# Patient Record
Sex: MALE | Race: ASIAN | NOT HISPANIC OR LATINO | URBAN - METROPOLITAN AREA
[De-identification: names, ages, dates, MRNs, and addresses within clinical notes are randomized per-mention and may not be internally consistent; named-entity substitution may affect disease eponyms.]

---

## 2017-12-18 ENCOUNTER — EMERGENCY (EMERGENCY)
Facility: HOSPITAL | Age: 77
LOS: 1 days | Discharge: HOME | End: 2017-12-18
Attending: EMERGENCY MEDICINE | Admitting: EMERGENCY MEDICINE
Payer: MEDICARE

## 2017-12-18 VITALS
TEMPERATURE: 98 F | DIASTOLIC BLOOD PRESSURE: 85 MMHG | RESPIRATION RATE: 20 BRPM | SYSTOLIC BLOOD PRESSURE: 136 MMHG | HEART RATE: 80 BPM | OXYGEN SATURATION: 100 %

## 2017-12-18 DIAGNOSIS — R26.81 UNSTEADINESS ON FEET: ICD-10-CM

## 2017-12-18 LAB
ALBUMIN SERPL ELPH-MCNC: 3.5 G/DL — SIGNIFICANT CHANGE UP (ref 3.3–5)
ALP SERPL-CCNC: 93 U/L — SIGNIFICANT CHANGE UP (ref 40–120)
ALT FLD-CCNC: 23 U/L — SIGNIFICANT CHANGE UP (ref 4–41)
AST SERPL-CCNC: 25 U/L — SIGNIFICANT CHANGE UP (ref 4–40)
BASE EXCESS BLDV CALC-SCNC: 3.1 MMOL/L — SIGNIFICANT CHANGE UP
BASOPHILS # BLD AUTO: 0.07 K/UL — SIGNIFICANT CHANGE UP (ref 0–0.2)
BASOPHILS NFR BLD AUTO: 0.4 % — SIGNIFICANT CHANGE UP (ref 0–2)
BILIRUB SERPL-MCNC: 0.3 MG/DL — SIGNIFICANT CHANGE UP (ref 0.2–1.2)
BLOOD GAS VENOUS - CREATININE: 0.89 MG/DL — SIGNIFICANT CHANGE UP (ref 0.5–1.3)
BUN SERPL-MCNC: 13 MG/DL — SIGNIFICANT CHANGE UP (ref 7–23)
CALCIUM SERPL-MCNC: 8 MG/DL — LOW (ref 8.4–10.5)
CHLORIDE BLDV-SCNC: 97 MMOL/L — SIGNIFICANT CHANGE UP (ref 96–108)
CHLORIDE SERPL-SCNC: 92 MMOL/L — LOW (ref 98–107)
CHOLEST SERPL-MCNC: 145 MG/DL — SIGNIFICANT CHANGE UP (ref 120–199)
CK MB BLD-MCNC: 3.63 NG/ML — SIGNIFICANT CHANGE UP (ref 1–6.6)
CK MB BLD-MCNC: SIGNIFICANT CHANGE UP (ref 0–2.5)
CK SERPL-CCNC: 89 U/L — SIGNIFICANT CHANGE UP (ref 30–200)
CO2 SERPL-SCNC: 24 MMOL/L — SIGNIFICANT CHANGE UP (ref 22–31)
CREAT SERPL-MCNC: 0.96 MG/DL — SIGNIFICANT CHANGE UP (ref 0.5–1.3)
EOSINOPHIL # BLD AUTO: 0.1 K/UL — SIGNIFICANT CHANGE UP (ref 0–0.5)
EOSINOPHIL NFR BLD AUTO: 0.6 % — SIGNIFICANT CHANGE UP (ref 0–6)
GAS PNL BLDV: 126 MMOL/L — LOW (ref 136–146)
GLUCOSE BLDV-MCNC: 261 — HIGH (ref 70–99)
GLUCOSE SERPL-MCNC: 247 MG/DL — HIGH (ref 70–99)
HBA1C BLD-MCNC: 8.4 % — HIGH (ref 4–5.6)
HCO3 BLDV-SCNC: 26 MMOL/L — SIGNIFICANT CHANGE UP (ref 20–27)
HCT VFR BLD CALC: 39.2 % — SIGNIFICANT CHANGE UP (ref 39–50)
HCT VFR BLDV CALC: 43.1 % — SIGNIFICANT CHANGE UP (ref 39–51)
HDLC SERPL-MCNC: 37 MG/DL — SIGNIFICANT CHANGE UP (ref 35–55)
HGB BLD-MCNC: 13.7 G/DL — SIGNIFICANT CHANGE UP (ref 13–17)
HGB BLDV-MCNC: 14 G/DL — SIGNIFICANT CHANGE UP (ref 13–17)
IMM GRANULOCYTES # BLD AUTO: 0.07 # — SIGNIFICANT CHANGE UP
IMM GRANULOCYTES NFR BLD AUTO: 0.4 % — SIGNIFICANT CHANGE UP (ref 0–1.5)
LACTATE BLDV-MCNC: 1.9 MMOL/L — SIGNIFICANT CHANGE UP (ref 0.5–2)
LIPID PNL WITH DIRECT LDL SERPL: 83 MG/DL — SIGNIFICANT CHANGE UP
LYMPHOCYTES # BLD AUTO: 24.7 % — SIGNIFICANT CHANGE UP (ref 13–44)
LYMPHOCYTES # BLD AUTO: 3.93 K/UL — HIGH (ref 1–3.3)
MCHC RBC-ENTMCNC: 32 PG — SIGNIFICANT CHANGE UP (ref 27–34)
MCHC RBC-ENTMCNC: 34.9 % — SIGNIFICANT CHANGE UP (ref 32–36)
MCV RBC AUTO: 91.6 FL — SIGNIFICANT CHANGE UP (ref 80–100)
MONOCYTES # BLD AUTO: 1.35 K/UL — HIGH (ref 0–0.9)
MONOCYTES NFR BLD AUTO: 8.5 % — SIGNIFICANT CHANGE UP (ref 2–14)
NEUTROPHILS # BLD AUTO: 10.41 K/UL — HIGH (ref 1.8–7.4)
NEUTROPHILS NFR BLD AUTO: 65.4 % — SIGNIFICANT CHANGE UP (ref 43–77)
NRBC # FLD: 0 — SIGNIFICANT CHANGE UP
PCO2 BLDV: 50 MMHG — SIGNIFICANT CHANGE UP (ref 41–51)
PH BLDV: 7.37 PH — SIGNIFICANT CHANGE UP (ref 7.32–7.43)
PLATELET # BLD AUTO: 248 K/UL — SIGNIFICANT CHANGE UP (ref 150–400)
PMV BLD: 10 FL — SIGNIFICANT CHANGE UP (ref 7–13)
PO2 BLDV: 42 MMHG — HIGH (ref 35–40)
POTASSIUM BLDV-SCNC: 4.1 MMOL/L — SIGNIFICANT CHANGE UP (ref 3.4–4.5)
POTASSIUM SERPL-MCNC: 4.4 MMOL/L — SIGNIFICANT CHANGE UP (ref 3.5–5.3)
POTASSIUM SERPL-SCNC: 4.4 MMOL/L — SIGNIFICANT CHANGE UP (ref 3.5–5.3)
PROT SERPL-MCNC: 7.9 G/DL — SIGNIFICANT CHANGE UP (ref 6–8.3)
RBC # BLD: 4.28 M/UL — SIGNIFICANT CHANGE UP (ref 4.2–5.8)
RBC # FLD: 11.9 % — SIGNIFICANT CHANGE UP (ref 10.3–14.5)
SAO2 % BLDV: 73.1 % — SIGNIFICANT CHANGE UP (ref 60–85)
SODIUM SERPL-SCNC: 127 MMOL/L — LOW (ref 135–145)
TRIGL SERPL-MCNC: 124 MG/DL — SIGNIFICANT CHANGE UP (ref 10–149)
TROPONIN T SERPL-MCNC: < 0.06 NG/ML — SIGNIFICANT CHANGE UP (ref 0–0.06)
TROPONIN T SERPL-MCNC: < 0.06 NG/ML — SIGNIFICANT CHANGE UP (ref 0–0.06)
WBC # BLD: 15.93 K/UL — HIGH (ref 3.8–10.5)
WBC # FLD AUTO: 15.93 K/UL — HIGH (ref 3.8–10.5)

## 2017-12-18 PROCEDURE — 99218: CPT | Mod: GC

## 2017-12-18 PROCEDURE — 70450 CT HEAD/BRAIN W/O DYE: CPT | Mod: 26

## 2017-12-18 PROCEDURE — 71020: CPT | Mod: 26

## 2017-12-18 RX ORDER — ALBUTEROL 90 UG/1
2.5 AEROSOL, METERED ORAL EVERY 4 HOURS
Qty: 0 | Refills: 0 | Status: DISCONTINUED | OUTPATIENT
Start: 2017-12-18 | End: 2017-12-19

## 2017-12-18 RX ORDER — IPRATROPIUM/ALBUTEROL SULFATE 18-103MCG
3 AEROSOL WITH ADAPTER (GRAM) INHALATION ONCE
Qty: 0 | Refills: 0 | Status: COMPLETED | OUTPATIENT
Start: 2017-12-18 | End: 2017-12-18

## 2017-12-18 RX ORDER — TAMSULOSIN HYDROCHLORIDE 0.4 MG/1
0.4 CAPSULE ORAL AT BEDTIME
Qty: 0 | Refills: 0 | Status: DISCONTINUED | OUTPATIENT
Start: 2017-12-18 | End: 2017-12-22

## 2017-12-18 RX ADMIN — ALBUTEROL 2.5 MILLIGRAM(S): 90 AEROSOL, METERED ORAL at 23:04

## 2017-12-18 RX ADMIN — Medication 3 MILLILITER(S): at 18:38

## 2017-12-18 RX ADMIN — Medication 40 MILLIGRAM(S): at 19:52

## 2017-12-18 RX ADMIN — TAMSULOSIN HYDROCHLORIDE 0.4 MILLIGRAM(S): 0.4 CAPSULE ORAL at 23:04

## 2017-12-18 NOTE — ED ADULT NURSE NOTE - OBJECTIVE STATEMENT
The patient is a 78 y/o male a&ox4 presenting from [facility] with a c/c of SOB and cough x3 weeks.  The patient reports his SOB worsens with exertion and remits with rest.  At present patient is denying SOB, respirations even and unlabored, patient able to speak comfortably in full sentences.  Patient reports a productive cough with "brown," sputum x3 weeks.  Patient denies CP, N/V/D, fevers/chills, GI/ symptoms.  VSS, patient in nad MD at bedside, will continue to monitor.

## 2017-12-18 NOTE — ED ADULT NURSE REASSESSMENT NOTE - NS ED NURSE REASSESS COMMENT FT1
pt aox3; duoneb treatment given as per order. Pt appears comfortable in no acute respiratory distress VSS. blood sugar 196. Pt going to cdu; report endorsed to MINH Knight

## 2017-12-18 NOTE — CONSULT NOTE ADULT - PROBLEM SELECTOR RECOMMENDATION 9
- Recommend MRI brain w/o contrast, MRA head w/o contrast, MRA neck w/ contrast  - Check HbA1c and lipid profile  - ASA 81 mg daily  - If neuroimaging unrevealing, pt may f/u at neurology clinic [phone] or with his private neurologist

## 2017-12-18 NOTE — ED CDU PROVIDER INITIAL DAY NOTE - ATTENDING CONTRIBUTION TO CARE
fe: hx from pt, family, urgi-care referral.   Pt has hx 'asthma' and on advair. past 2-3 weeks has increasing but intermittent episodes wheeze/shortness of breath, cough. went to urgi [facility]care today, received pred and nebulized treatments and sent to ed for further care. In ED, pt also c/o episodes of unsteady gait past 2 weeks, at times associated with dizziness. last episode this am.   exam: occas bilateral wheeze. rr approx 16. no resp distress.  gait nl. neuro exam nl.  impression: subacute asthma worsening. appears stable after rx.   neurology consulted for gait abn; CT done and pt recc to have MRI. Transferred to CDU for further care.

## 2017-12-18 NOTE — ED ADULT TRIAGE NOTE - CHIEF COMPLAINT QUOTE
Arrives via EMS from [facility].  pt c/o diff breathing and cough getting worse x 1 week.  states sob worse w exertion. denies chest pain.   h/o asthma only. Arrives via EMS from [facility].  pt c/o diff breathing and cough getting worse x 1 week.  states sob worse w exertion. denies chest pain.   h/o asthma, DM (non-compliant w meds)

## 2017-12-18 NOTE — ED PROVIDER NOTE - PROGRESS NOTE DETAILS
CARA Paris- Neuro saw and examined patient, recommend MRI to r/o cva/tia. Pt feels better with nebulizers, but pt and family agree to stay.

## 2017-12-18 NOTE — CONSULT NOTE ADULT - ASSESSMENT
78 y/o man w/ PMH DM II (noncompliant with medications) p/w difficulty walking x 1 hour today, now resolved. Neurologic exam significant for horizontal nystagmus on right lateral gaze with no other focal deficits, ambulating without difficulty. CTH shows nonspecific left pontine lucency which may be artifactual. Symptoms today may have been 2/2 inner ear pathology ie vestibular neuritis in the setting of URI-like symptoms, however TIA is also in differential. Given ABCD2 score of 5, would recommend further neuroimaging in CDU.

## 2017-12-18 NOTE — ED PROVIDER NOTE - ATTENDING CONTRIBUTION TO CARE
fe: hx from pt, family, urgi-care referral.   Pt has hx 'asthma' and on advair. past 2-3 weeks has increasing but intermittent episodes wheeze/shortness of breath, cough. went to [facility] today, received pred and nebulized treatments and sent to ed for further care. In ED, pt also c/o episodes of unsteady gait past 2 weeks, at times associated with dizziness. last episode this am.   exam: occas bilateral wheeze. rr approx 16. no resp distress.  gait nl. neuro exam nl.  impression: subacute asthma worsening. appears stable after rx.   neurology consulted for gait abn; CT done and pt recc to have MRI. will transfer to CDU for further care.

## 2017-12-18 NOTE — CONSULT NOTE ADULT - ATTENDING COMMENTS
Pt examined, chart reviewed, case discussed on rounds. Pt is now non-focal and without Sx. MRI/MRA head negative.  Probable peripheral vertigo due to URI.

## 2017-12-18 NOTE — ED PROVIDER NOTE - OBJECTIVE STATEMENT
77 year old male, PMhx of DM (non-compliant on medications), asthma (on daily advair, no albuterol); presents to the ED from urgent care for low O2 sat and difficulty ambulating. Patient states he has had an upper respiratory infection (nasal congestion, mild cough, sore throat) two weeks ago. 77 year old male, PMhx of DM (non-compliant on medications), asthma (on daily advair, no albuterol); presents to the ED from urgent care for low O2 sat and difficulty ambulating. Patient states he has had an upper respiratory infection (nasal congestion, mild cough, sore throat) two weeks ago. Since then, he has had a worsening cough and dizziness this morning. Patient describes the dizziness as being "off balance", difficulty walking straight which lasted approximately 4 hours and dissipated without intervention. The dizziness and worsening cough/wheezing prompted him to go to the urgent care center, who sent him to the ED due to low O2 sat. Patient endorses a similar episode of dizziness two weeks ago, and intermittent chills for the last several days. Cough is productive of "dark" grey sputum. He denies chest pain, headache, nausea, vomiting, decreased PO intake, or other complaints. 77 year old male, PMhx of DM (non-compliant on medications), asthma (on daily advair, no albuterol); presents to the ED from urgent care for low O2 sat and difficulty ambulating. Patient states he has had an upper respiratory infection (nasal congestion, mild cough, sore throat) two weeks ago. Since then, he has had a worsening cough and dizziness this morning. Patient describes the dizziness as being "off balance", difficulty walking straight which lasted approximately 4 hours and dissipated without intervention. The dizziness and worsening cough/wheezing prompted him to go to the urgent care center, who sent him to the ED due to low O2 sat. Patient endorses a similar episode of dizziness two weeks ago, and intermittent chills for the last several days. Cough is productive of "dark" grey sputum. He denies chest pain, headache, nausea, vomiting, decreased PO intake, or other complaints. Pt received prednisone 60mg and duonebs at urgent care with some relief.

## 2017-12-18 NOTE — ED ADULT NURSE NOTE - CHIEF COMPLAINT QUOTE
Arrives via EMS from [facility].  pt c/o diff breathing and cough getting worse x 1 week.  states sob worse w exertion. denies chest pain.   h/o asthma, DM (non-compliant w meds)

## 2017-12-18 NOTE — CONSULT NOTE ADULT - SUBJECTIVE AND OBJECTIVE BOX
Neurology Consult Note    Name  JIMMY CALLAHAN    HPI: 76 y/o man w/ PMH DM II (noncompliant with medications) p/w difficulty walking x 1 hour today. Pt has had URI-like symptoms with cough, runny nose x several days. Today, woke up in usual state of health, however around 12 PM, when standing up to walk outside, realized he had trouble walking, was swaying and falling over the both sides. Also had more trouble breathing, coughing/wheezing. Denies vertigo, n/v, or tinnitus. Did not change with position. Pt has had similar symptoms few weeks ago, also in the setting of URI symptoms, lasting 5-10 mins and self-resolving. Symptoms today lasted approximately 1 hour and self-resolved. Denies visual changes, HA, focal numbness, or weakness.     NIHSS 0 MRS 0 ABCD2 5    Review of Systems:  Constitutional: As above  Eyes: no eye pain, visual disturbances, or discharge  ENT:  No difficulty hearing, tinnitus, vertigo; No sinus or throat pain  Neck: No pain or stiffness  Respiratory: No cough, dyspnea, wheezing   Cardiovascular: No chest pain, palpitations  Gastrointestinal: As above  Genitourinary: No dysuria, frequency, hematuria or incontinence  Neurological: As above  Psychiatric: No depression, anxiety, mood swings or difficulty sleeping  Musculoskeletal: No joint pain or swelling; No muscle, back or extremity pain  Skin: No itching, burning, rashes or lesions     MEDICATIONS: Noncompliant with DM II meds, not taking ASA    Allergies    No Known Allergies    PAST MEDICAL & SURGICAL HISTORY:  Asthma  Diabetes    SH: Denies tobacco, EtOH, or other substance use    FH: Mother possibly had a stroke in her 70s.    Objective:   Vital Signs Last 24 Hrs  T(C): 36.9 (18 Dec 2017 16:21), Max: 36.9 (18 Dec 2017 15:34)  T(F): 98.4 (18 Dec 2017 16:21), Max: 98.4 (18 Dec 2017 15:34)  HR: 83 (18 Dec 2017 16:21) (80 - 83)  BP: 176/95 (18 Dec 2017 16:21) (136/85 - 176/95)  BP(mean): --  RR: 18 (18 Dec 2017 16:21) (18 - 20)  SpO2: 100% (18 Dec 2017 16:21) (100% - 100%)    General Adult Exam  GENERAL APPEARANCE: Well developed, well nourished, alert and cooperative, and appears to be in no acute distress.    Neurological Exam:  Mental Status: Orientated to self, date and place.  follows commands, speech fluent    Cranial Nerves: PERRL, EOMI, VFF, + several beats horizontal nystagmus on right lateral gaze.  CN V1-3 intact to light touch.  No facial asymmetry.  Hearing intact to finger rub bilaterally.  Tongue, uvula and palate midline.  Sternocleidomastoid and Trapezius intact bilaterally.    Motor:   Tone: normal.                  Strength:     [] Upper extremity                      Delt       Bicep    Tricep                                                  R         5/5        5/5        5/5       5/5                                               L          5/5 5/5        5/5       5/5  [] Lower extremity                       HF          KE          KF        DF         PF                                               R        5/5 5/5 5/5       5/5       5/5                                               L         5/5        5/5       5/5       5/5        5/5   Tremor: No resting, postural or action tremor.  No myoclonus.    Sensation: intact to light touch x 4 extremities    Coord: No ataxia or dysmetria on FTN or HTS b/l    Gait: Normal, ambulates independently. Mild difficulty with tandem gait      Other:      12-18                          13.7   15.93 )-----------( 248                 39.2     127<L>  |  92<L>  |  13  ----------------------------<  247<H>  4.4   |  24  |  0.96    Ca    8.0<L>      18 Dec 2017 16:18    TPro  7.9  /  Alb  3.5  /  TBili  0.3  /  DBili  x   /  AST  25  /  ALT  23  /  AlkPhos  93  12-18    LIVER FUNCTIONS - ( 18 Dec 2017 16:18 )  Alb: 3.5 g/dL / Pro: 7.9 g/dL / ALK PHOS: 93 u/L / ALT: 23 u/L / AST: 25 u/L / GGT: x             Radiology    < from: CT Head No Cont (12.18.17 @ 17:12) >  FINDINGS:     There is no acute intra-axial or extra-axial hemorrhage. There is no mass   effect, effacement of the basal cisterns or shift of midline structures.   There is no evidence of acute territorial infarct.    A focus of lucency is demonstrated in the left caitlin may be artifactual   due to streak from adjacent bony structures or may chronic pontine   pathology. The ventricles and sulci are normal in size and configuration   for the patient's age.    There is an empty/partially empty sella. This can be further evaluated   with MRI if pituitary abnormality is clinically aspect.    Bilateral maxillary sinus air-fluid levels. The remaining visualized   paranasal sinuses, mastoid air cells and middle ear cavities are clear.    There is no displaced calvarial fracture.      IMPRESSION:    No acute intracranial hemorrhage, mass effect or acute territorial   infarct.    Nonspecific lucency in the left caitlin may be artifactual; however, if   there are neurologic symptoms commensurate with pontine pathology,   contrast enhanced brain MRI would provide better evaluation of this   region of the brain.    Bilateral maxillary sinus air-fluid level; correlate with signs and   symptoms for acute sinusitis.    < end of copied text >

## 2017-12-19 VITALS — OXYGEN SATURATION: 97 %

## 2017-12-19 PROCEDURE — 70544 MR ANGIOGRAPHY HEAD W/O DYE: CPT | Mod: 26,59

## 2017-12-19 PROCEDURE — 99217: CPT | Mod: GC

## 2017-12-19 PROCEDURE — 70548 MR ANGIOGRAPHY NECK W/DYE: CPT | Mod: 26

## 2017-12-19 PROCEDURE — 70551 MRI BRAIN STEM W/O DYE: CPT | Mod: 26

## 2017-12-19 RX ORDER — MECLIZINE HCL 12.5 MG
1 TABLET ORAL
Qty: 9 | Refills: 0
Start: 2017-12-19 | End: 2017-12-21

## 2017-12-19 RX ORDER — ALBUTEROL 90 UG/1
2 AEROSOL, METERED ORAL
Qty: 1 | Refills: 0
Start: 2017-12-19 | End: 2018-01-17

## 2017-12-19 RX ORDER — ASPIRIN/CALCIUM CARB/MAGNESIUM 324 MG
81 TABLET ORAL ONCE
Qty: 0 | Refills: 0 | Status: COMPLETED | OUTPATIENT
Start: 2017-12-19 | End: 2017-12-19

## 2017-12-19 RX ORDER — ALBUTEROL 90 UG/1
2.5 AEROSOL, METERED ORAL EVERY 6 HOURS
Qty: 0 | Refills: 0 | Status: DISCONTINUED | OUTPATIENT
Start: 2017-12-19 | End: 2017-12-22

## 2017-12-19 RX ADMIN — ALBUTEROL 2.5 MILLIGRAM(S): 90 AEROSOL, METERED ORAL at 05:47

## 2017-12-19 RX ADMIN — ALBUTEROL 2.5 MILLIGRAM(S): 90 AEROSOL, METERED ORAL at 09:14

## 2017-12-19 RX ADMIN — Medication 81 MILLIGRAM(S): at 09:14

## 2017-12-19 NOTE — ED CDU PROVIDER SUBSEQUENT DAY NOTE - HISTORY
69 yo male, hx dm , asthma (never intubated), presenting with cough, sob, dizziness.  CXR clear, receiving steroids/nebs for asthma with improvement.  Seen by neuro for dizziness, MRI/MRA okay.  Pt denies any neuro complaints. pending neuro re-eval.

## 2017-12-19 NOTE — ED CDU PROVIDER DISPOSITION NOTE - CLINICAL COURSE
76 y/o male pmh dm, asthma sent in from urgent care for cough, sob and dizziness. Pt admits to productive cough with brown sputum x2 weeks. Pt admits to increasing wheezing and WALKER over the last 2 weeks. Pt admits to feeling lightheaded today so he went to urgent care. Pt was off balance at urgent care w/ diffuse wheezing and hypoxia and advised to present to ER.  CXR clear, receiving steroids/nebs for asthma with improvement.  Seen by neuro for dizziness, MRI/MRA was normal and recommended meclizine for peripheral vertigo.  your respiratory symptoms resolved.  will need to continue nebs at home gradually spacing it out from Q6 to Q8 then Q12 then as needed.  You have an appt with your PMD and neurologist.    final diagnosis peripheral vertigo with asthma exacerbation.  rx prednisone, and albuterol.

## 2017-12-19 NOTE — ED CDU PROVIDER DISPOSITION NOTE - PLAN OF CARE
Follow up with your primary physician for a post hospital visit within 48 hours, taking all results from the ER to be reviewed. Continue any medications you were on prior to this visit. To take the prednisone 40mg (2, 20mg tablets ) once daily for 5 more days starting today, as well as the proventil inhaler 2 puffs every 6 hours as needed for cough/wheezing/bronchospasm. If any chest pains, shortness of breath, worsening, concerning or new signs or symptoms return to the ER Follow up with your primary physician for a post hospital visit within 48 hours, taking all results from the ER to be reviewed. Continue any medications you were on prior to this visit. To take the prednisone 40mg (2, 20mg tablets ) once daily for 5 more days starting today, as well as the proventil inhaler 2 puffs every 6 hours as needed for cough/wheezing/bronchospasm. If needed for dizziness you can take the meclizine 25 mg 1 tab every 8 hours.   Set up a follow up to be seen by Dr Garcia , neurology for a post hospital visit as well, call to make the appointment,  [phone]    If any chest pains, shortness of breath, dizziness, worsening, concerning or new signs or symptoms return to the ER

## 2017-12-19 NOTE — ED CDU PROVIDER SUBSEQUENT DAY NOTE - MEDICAL DECISION MAKING DETAILS
Asthma exacerbation- change nebs to every 6 hours, cont prednisone, if improvement with nebs and able to ambulate without difficulty will dc home   Dizziness- MRI/MRA ok, waiting on neuro re-eval this morning

## 2018-04-04 NOTE — ED ADULT TRIAGE NOTE - BP NONINVASIVE DIASTOLIC (MM HG)
Thank you for letting us care for you today.      -The following procedures were ordered for you: Mammogram.  [facility] ([phone]). We have specialists that will call you to set this up.   If you do not hear from someone about this in 2-3 days please call the phone number listed to schedule this appointment. .             
85

## 2019-11-29 ENCOUNTER — INPATIENT (INPATIENT)
Facility: HOSPITAL | Age: 79
LOS: 2 days | Discharge: HOME | End: 2019-12-02
Attending: HOSPITALIST | Admitting: HOSPITALIST
Payer: MEDICARE

## 2019-11-29 VITALS
HEART RATE: 109 BPM | SYSTOLIC BLOOD PRESSURE: 143 MMHG | DIASTOLIC BLOOD PRESSURE: 50 MMHG | OXYGEN SATURATION: 100 % | TEMPERATURE: 98 F | RESPIRATION RATE: 18 BRPM

## 2019-11-29 DIAGNOSIS — Z29.9 ENCOUNTER FOR PROPHYLACTIC MEASURES, UNSPECIFIED: ICD-10-CM

## 2019-11-29 DIAGNOSIS — J45.909 UNSPECIFIED ASTHMA, UNCOMPLICATED: ICD-10-CM

## 2019-11-29 DIAGNOSIS — R79.89 OTHER SPECIFIED ABNORMAL FINDINGS OF BLOOD CHEMISTRY: ICD-10-CM

## 2019-11-29 DIAGNOSIS — Z02.9 ENCOUNTER FOR ADMINISTRATIVE EXAMINATIONS, UNSPECIFIED: ICD-10-CM

## 2019-11-29 DIAGNOSIS — Z98.49 CATARACT EXTRACTION STATUS, UNSPECIFIED EYE: Chronic | ICD-10-CM

## 2019-11-29 DIAGNOSIS — J18.9 PNEUMONIA, UNSPECIFIED ORGANISM: ICD-10-CM

## 2019-11-29 DIAGNOSIS — E11.9 TYPE 2 DIABETES MELLITUS WITHOUT COMPLICATIONS: ICD-10-CM

## 2019-11-29 DIAGNOSIS — E87.1 HYPO-OSMOLALITY AND HYPONATREMIA: ICD-10-CM

## 2019-11-29 DIAGNOSIS — N40.0 BENIGN PROSTATIC HYPERPLASIA WITHOUT LOWER URINARY TRACT SYMPTOMS: ICD-10-CM

## 2019-11-29 LAB
ALBUMIN SERPL ELPH-MCNC: 3.4 G/DL — SIGNIFICANT CHANGE UP (ref 3.3–5)
ALP SERPL-CCNC: 76 U/L — SIGNIFICANT CHANGE UP (ref 40–120)
ALT FLD-CCNC: 11 U/L — SIGNIFICANT CHANGE UP (ref 4–41)
ANION GAP SERPL CALC-SCNC: 10 MMO/L — SIGNIFICANT CHANGE UP (ref 7–14)
ANION GAP SERPL CALC-SCNC: 11 MMO/L — SIGNIFICANT CHANGE UP (ref 7–14)
APPEARANCE UR: CLEAR — SIGNIFICANT CHANGE UP
AST SERPL-CCNC: 17 U/L — SIGNIFICANT CHANGE UP (ref 4–40)
BACTERIA # UR AUTO: HIGH
BASE EXCESS BLDV CALC-SCNC: 2.7 MMOL/L — SIGNIFICANT CHANGE UP
BASOPHILS # BLD AUTO: 0.02 K/UL — SIGNIFICANT CHANGE UP (ref 0–0.2)
BASOPHILS NFR BLD AUTO: 0.2 % — SIGNIFICANT CHANGE UP (ref 0–2)
BILIRUB SERPL-MCNC: 0.8 MG/DL — SIGNIFICANT CHANGE UP (ref 0.2–1.2)
BILIRUB UR-MCNC: NEGATIVE — SIGNIFICANT CHANGE UP
BLOOD GAS VENOUS - CREATININE: 1.02 MG/DL — SIGNIFICANT CHANGE UP (ref 0.5–1.3)
BLOOD GAS VENOUS - FIO2: 21 — SIGNIFICANT CHANGE UP
BLOOD UR QL VISUAL: HIGH
BUN SERPL-MCNC: 14 MG/DL — SIGNIFICANT CHANGE UP (ref 7–23)
BUN SERPL-MCNC: 17 MG/DL — SIGNIFICANT CHANGE UP (ref 7–23)
CALCIUM SERPL-MCNC: 7.6 MG/DL — LOW (ref 8.4–10.5)
CALCIUM SERPL-MCNC: 8.5 MG/DL — SIGNIFICANT CHANGE UP (ref 8.4–10.5)
CHLORIDE BLDV-SCNC: 98 MMOL/L — SIGNIFICANT CHANGE UP (ref 96–108)
CHLORIDE SERPL-SCNC: 102 MMOL/L — SIGNIFICANT CHANGE UP (ref 98–107)
CHLORIDE SERPL-SCNC: 96 MMOL/L — LOW (ref 98–107)
CK MB BLD-MCNC: 2.32 NG/ML — SIGNIFICANT CHANGE UP (ref 1–6.6)
CK MB BLD-MCNC: 2.44 NG/ML — SIGNIFICANT CHANGE UP (ref 1–6.6)
CK MB BLD-MCNC: SIGNIFICANT CHANGE UP (ref 0–2.5)
CK MB BLD-MCNC: SIGNIFICANT CHANGE UP (ref 0–2.5)
CK SERPL-CCNC: 63 U/L — SIGNIFICANT CHANGE UP (ref 30–200)
CK SERPL-CCNC: 71 U/L — SIGNIFICANT CHANGE UP (ref 30–200)
CO2 SERPL-SCNC: 20 MMOL/L — LOW (ref 22–31)
CO2 SERPL-SCNC: 26 MMOL/L — SIGNIFICANT CHANGE UP (ref 22–31)
COLOR SPEC: YELLOW — SIGNIFICANT CHANGE UP
CREAT SERPL-MCNC: 0.95 MG/DL — SIGNIFICANT CHANGE UP (ref 0.5–1.3)
CREAT SERPL-MCNC: 1.09 MG/DL — SIGNIFICANT CHANGE UP (ref 0.5–1.3)
EOSINOPHIL # BLD AUTO: 0.01 K/UL — SIGNIFICANT CHANGE UP (ref 0–0.5)
EOSINOPHIL NFR BLD AUTO: 0.1 % — SIGNIFICANT CHANGE UP (ref 0–6)
GAS PNL BLDV: 133 MMOL/L — LOW (ref 136–146)
GLUCOSE BLDV-MCNC: 238 MG/DL — HIGH (ref 70–99)
GLUCOSE SERPL-MCNC: 245 MG/DL — HIGH (ref 70–99)
GLUCOSE SERPL-MCNC: 334 MG/DL — HIGH (ref 70–99)
GLUCOSE UR-MCNC: >1000 — HIGH
HCO3 BLDV-SCNC: 25 MMOL/L — SIGNIFICANT CHANGE UP (ref 20–27)
HCT VFR BLD CALC: 39.8 % — SIGNIFICANT CHANGE UP (ref 39–50)
HCT VFR BLDV CALC: 42.3 % — SIGNIFICANT CHANGE UP (ref 39–51)
HGB BLD-MCNC: 13.6 G/DL — SIGNIFICANT CHANGE UP (ref 13–17)
HGB BLDV-MCNC: 13.8 G/DL — SIGNIFICANT CHANGE UP (ref 13–17)
HYALINE CASTS # UR AUTO: NEGATIVE — SIGNIFICANT CHANGE UP
IMM GRANULOCYTES NFR BLD AUTO: 0.8 % — SIGNIFICANT CHANGE UP (ref 0–1.5)
KETONES UR-MCNC: SIGNIFICANT CHANGE UP
LACTATE BLDV-MCNC: 1.7 MMOL/L — SIGNIFICANT CHANGE UP (ref 0.5–2)
LEUKOCYTE ESTERASE UR-ACNC: SIGNIFICANT CHANGE UP
LYMPHOCYTES # BLD AUTO: 1.5 K/UL — SIGNIFICANT CHANGE UP (ref 1–3.3)
LYMPHOCYTES # BLD AUTO: 13.1 % — SIGNIFICANT CHANGE UP (ref 13–44)
MAGNESIUM SERPL-MCNC: 1.9 MG/DL — SIGNIFICANT CHANGE UP (ref 1.6–2.6)
MAGNESIUM SERPL-MCNC: 2 MG/DL — SIGNIFICANT CHANGE UP (ref 1.6–2.6)
MCHC RBC-ENTMCNC: 31.2 PG — SIGNIFICANT CHANGE UP (ref 27–34)
MCHC RBC-ENTMCNC: 34.2 % — SIGNIFICANT CHANGE UP (ref 32–36)
MCV RBC AUTO: 91.3 FL — SIGNIFICANT CHANGE UP (ref 80–100)
MONOCYTES # BLD AUTO: 0.79 K/UL — SIGNIFICANT CHANGE UP (ref 0–0.9)
MONOCYTES NFR BLD AUTO: 6.9 % — SIGNIFICANT CHANGE UP (ref 2–14)
NEUTROPHILS # BLD AUTO: 9.03 K/UL — HIGH (ref 1.8–7.4)
NEUTROPHILS NFR BLD AUTO: 78.9 % — HIGH (ref 43–77)
NITRITE UR-MCNC: NEGATIVE — SIGNIFICANT CHANGE UP
NRBC # FLD: 0 K/UL — SIGNIFICANT CHANGE UP (ref 0–0)
PCO2 BLDV: 49 MMHG — SIGNIFICANT CHANGE UP (ref 41–51)
PH BLDV: 7.37 PH — SIGNIFICANT CHANGE UP (ref 7.32–7.43)
PH UR: 6 — SIGNIFICANT CHANGE UP (ref 5–8)
PHOSPHATE SERPL-MCNC: 1.3 MG/DL — LOW (ref 2.5–4.5)
PLATELET # BLD AUTO: 176 K/UL — SIGNIFICANT CHANGE UP (ref 150–400)
PMV BLD: 10.7 FL — SIGNIFICANT CHANGE UP (ref 7–13)
PO2 BLDV: < 24 MMHG — LOW (ref 35–40)
POTASSIUM BLDV-SCNC: 3.7 MMOL/L — SIGNIFICANT CHANGE UP (ref 3.4–4.5)
POTASSIUM SERPL-MCNC: 3.9 MMOL/L — SIGNIFICANT CHANGE UP (ref 3.5–5.3)
POTASSIUM SERPL-MCNC: 4 MMOL/L — SIGNIFICANT CHANGE UP (ref 3.5–5.3)
POTASSIUM SERPL-SCNC: 3.9 MMOL/L — SIGNIFICANT CHANGE UP (ref 3.5–5.3)
POTASSIUM SERPL-SCNC: 4 MMOL/L — SIGNIFICANT CHANGE UP (ref 3.5–5.3)
PROT SERPL-MCNC: 7.1 G/DL — SIGNIFICANT CHANGE UP (ref 6–8.3)
PROT UR-MCNC: 50 — SIGNIFICANT CHANGE UP
RBC # BLD: 4.36 M/UL — SIGNIFICANT CHANGE UP (ref 4.2–5.8)
RBC # FLD: 12.9 % — SIGNIFICANT CHANGE UP (ref 10.3–14.5)
RBC CASTS # UR COMP ASSIST: SIGNIFICANT CHANGE UP (ref 0–?)
SAO2 % BLDV: 26.9 % — LOW (ref 60–85)
SODIUM SERPL-SCNC: 132 MMOL/L — LOW (ref 135–145)
SODIUM SERPL-SCNC: 133 MMOL/L — LOW (ref 135–145)
SP GR SPEC: 1.02 — SIGNIFICANT CHANGE UP (ref 1–1.04)
SQUAMOUS # UR AUTO: SIGNIFICANT CHANGE UP
TROPONIN T, HIGH SENSITIVITY: 127 NG/L — CRITICAL HIGH (ref ?–14)
TROPONIN T, HIGH SENSITIVITY: 98 NG/L — CRITICAL HIGH (ref ?–14)
TSH SERPL-MCNC: 4.66 UIU/ML — HIGH (ref 0.27–4.2)
UROBILINOGEN FLD QL: NORMAL — SIGNIFICANT CHANGE UP
WBC # BLD: 11.44 K/UL — HIGH (ref 3.8–10.5)
WBC # FLD AUTO: 11.44 K/UL — HIGH (ref 3.8–10.5)
WBC UR QL: SIGNIFICANT CHANGE UP (ref 0–?)

## 2019-11-29 PROCEDURE — 99223 1ST HOSP IP/OBS HIGH 75: CPT

## 2019-11-29 PROCEDURE — 71046 X-RAY EXAM CHEST 2 VIEWS: CPT | Mod: 26

## 2019-11-29 RX ORDER — AZITHROMYCIN 500 MG/1
500 TABLET, FILM COATED ORAL ONCE
Refills: 0 | Status: COMPLETED | OUTPATIENT
Start: 2019-11-29 | End: 2019-11-29

## 2019-11-29 RX ORDER — TAMSULOSIN HYDROCHLORIDE 0.4 MG/1
0.4 CAPSULE ORAL AT BEDTIME
Refills: 0 | Status: DISCONTINUED | OUTPATIENT
Start: 2019-11-29 | End: 2019-12-02

## 2019-11-29 RX ORDER — DEXTROSE 50 % IN WATER 50 %
15 SYRINGE (ML) INTRAVENOUS ONCE
Refills: 0 | Status: DISCONTINUED | OUTPATIENT
Start: 2019-11-29 | End: 2019-12-02

## 2019-11-29 RX ORDER — AZITHROMYCIN 500 MG/1
500 TABLET, FILM COATED ORAL EVERY 24 HOURS
Refills: 0 | Status: DISCONTINUED | OUTPATIENT
Start: 2019-11-30 | End: 2019-12-01

## 2019-11-29 RX ORDER — SODIUM CHLORIDE 9 MG/ML
1000 INJECTION INTRAMUSCULAR; INTRAVENOUS; SUBCUTANEOUS
Refills: 0 | Status: COMPLETED | OUTPATIENT
Start: 2019-11-29 | End: 2019-11-29

## 2019-11-29 RX ORDER — DEXTROSE 50 % IN WATER 50 %
25 SYRINGE (ML) INTRAVENOUS ONCE
Refills: 0 | Status: DISCONTINUED | OUTPATIENT
Start: 2019-11-29 | End: 2019-12-02

## 2019-11-29 RX ORDER — GLUCAGON INJECTION, SOLUTION 0.5 MG/.1ML
1 INJECTION, SOLUTION SUBCUTANEOUS ONCE
Refills: 0 | Status: DISCONTINUED | OUTPATIENT
Start: 2019-11-29 | End: 2019-12-02

## 2019-11-29 RX ORDER — DEXTROSE 50 % IN WATER 50 %
12.5 SYRINGE (ML) INTRAVENOUS ONCE
Refills: 0 | Status: DISCONTINUED | OUTPATIENT
Start: 2019-11-29 | End: 2019-12-02

## 2019-11-29 RX ORDER — ASPIRIN/CALCIUM CARB/MAGNESIUM 324 MG
81 TABLET ORAL DAILY
Refills: 0 | Status: DISCONTINUED | OUTPATIENT
Start: 2019-11-29 | End: 2019-12-02

## 2019-11-29 RX ORDER — ACETAMINOPHEN 500 MG
650 TABLET ORAL EVERY 6 HOURS
Refills: 0 | Status: DISCONTINUED | OUTPATIENT
Start: 2019-11-29 | End: 2019-12-02

## 2019-11-29 RX ORDER — HEPARIN SODIUM 5000 [USP'U]/ML
5000 INJECTION INTRAVENOUS; SUBCUTANEOUS EVERY 12 HOURS
Refills: 0 | Status: DISCONTINUED | OUTPATIENT
Start: 2019-11-29 | End: 2019-12-02

## 2019-11-29 RX ORDER — TIOTROPIUM BROMIDE 18 UG/1
1 CAPSULE ORAL; RESPIRATORY (INHALATION) DAILY
Refills: 0 | Status: DISCONTINUED | OUTPATIENT
Start: 2019-11-29 | End: 2019-12-02

## 2019-11-29 RX ORDER — SODIUM CHLORIDE 9 MG/ML
1000 INJECTION INTRAMUSCULAR; INTRAVENOUS; SUBCUTANEOUS ONCE
Refills: 0 | Status: COMPLETED | OUTPATIENT
Start: 2019-11-29 | End: 2019-11-29

## 2019-11-29 RX ORDER — SODIUM CHLORIDE 9 MG/ML
1000 INJECTION INTRAMUSCULAR; INTRAVENOUS; SUBCUTANEOUS
Refills: 0 | Status: DISCONTINUED | OUTPATIENT
Start: 2019-11-29 | End: 2019-12-01

## 2019-11-29 RX ORDER — IPRATROPIUM/ALBUTEROL SULFATE 18-103MCG
3 AEROSOL WITH ADAPTER (GRAM) INHALATION EVERY 6 HOURS
Refills: 0 | Status: DISCONTINUED | OUTPATIENT
Start: 2019-11-29 | End: 2019-12-02

## 2019-11-29 RX ORDER — ASPIRIN/CALCIUM CARB/MAGNESIUM 324 MG
162 TABLET ORAL DAILY
Refills: 0 | Status: DISCONTINUED | OUTPATIENT
Start: 2019-11-29 | End: 2019-11-29

## 2019-11-29 RX ORDER — ACETAMINOPHEN 500 MG
650 TABLET ORAL ONCE
Refills: 0 | Status: COMPLETED | OUTPATIENT
Start: 2019-11-29 | End: 2019-11-29

## 2019-11-29 RX ORDER — SODIUM CHLORIDE 9 MG/ML
1000 INJECTION, SOLUTION INTRAVENOUS
Refills: 0 | Status: DISCONTINUED | OUTPATIENT
Start: 2019-11-29 | End: 2019-12-02

## 2019-11-29 RX ORDER — CEFTRIAXONE 500 MG/1
1000 INJECTION, POWDER, FOR SOLUTION INTRAMUSCULAR; INTRAVENOUS EVERY 24 HOURS
Refills: 0 | Status: DISCONTINUED | OUTPATIENT
Start: 2019-11-30 | End: 2019-12-02

## 2019-11-29 RX ORDER — CEFTRIAXONE 500 MG/1
1000 INJECTION, POWDER, FOR SOLUTION INTRAMUSCULAR; INTRAVENOUS ONCE
Refills: 0 | Status: COMPLETED | OUTPATIENT
Start: 2019-11-29 | End: 2019-11-29

## 2019-11-29 RX ORDER — SODIUM,POTASSIUM PHOSPHATES 278-250MG
1 POWDER IN PACKET (EA) ORAL ONCE
Refills: 0 | Status: COMPLETED | OUTPATIENT
Start: 2019-11-29 | End: 2019-11-29

## 2019-11-29 RX ORDER — INSULIN LISPRO 100/ML
VIAL (ML) SUBCUTANEOUS
Refills: 0 | Status: DISCONTINUED | OUTPATIENT
Start: 2019-11-29 | End: 2019-12-02

## 2019-11-29 RX ORDER — INSULIN LISPRO 100/ML
VIAL (ML) SUBCUTANEOUS AT BEDTIME
Refills: 0 | Status: DISCONTINUED | OUTPATIENT
Start: 2019-11-29 | End: 2019-12-02

## 2019-11-29 RX ORDER — ONDANSETRON 8 MG/1
4 TABLET, FILM COATED ORAL EVERY 8 HOURS
Refills: 0 | Status: DISCONTINUED | OUTPATIENT
Start: 2019-11-29 | End: 2019-12-02

## 2019-11-29 RX ORDER — BUDESONIDE AND FORMOTEROL FUMARATE DIHYDRATE 160; 4.5 UG/1; UG/1
2 AEROSOL RESPIRATORY (INHALATION)
Refills: 0 | Status: DISCONTINUED | OUTPATIENT
Start: 2019-11-29 | End: 2019-12-02

## 2019-11-29 RX ORDER — ONDANSETRON 8 MG/1
4 TABLET, FILM COATED ORAL ONCE
Refills: 0 | Status: COMPLETED | OUTPATIENT
Start: 2019-11-29 | End: 2019-11-29

## 2019-11-29 RX ADMIN — Medication 650 MILLIGRAM(S): at 18:01

## 2019-11-29 RX ADMIN — AZITHROMYCIN 500 MILLIGRAM(S): 500 TABLET, FILM COATED ORAL at 13:45

## 2019-11-29 RX ADMIN — AZITHROMYCIN 255 MILLIGRAM(S): 500 TABLET, FILM COATED ORAL at 12:40

## 2019-11-29 RX ADMIN — Medication 162 MILLIGRAM(S): at 13:41

## 2019-11-29 RX ADMIN — Medication 650 MILLIGRAM(S): at 11:20

## 2019-11-29 RX ADMIN — BUDESONIDE AND FORMOTEROL FUMARATE DIHYDRATE 2 PUFF(S): 160; 4.5 AEROSOL RESPIRATORY (INHALATION) at 22:32

## 2019-11-29 RX ADMIN — Medication 1 TABLET(S): at 22:32

## 2019-11-29 RX ADMIN — SODIUM CHLORIDE 1000 MILLILITER(S): 9 INJECTION INTRAMUSCULAR; INTRAVENOUS; SUBCUTANEOUS at 17:48

## 2019-11-29 RX ADMIN — Medication 650 MILLIGRAM(S): at 10:48

## 2019-11-29 RX ADMIN — CEFTRIAXONE 100 MILLIGRAM(S): 500 INJECTION, POWDER, FOR SOLUTION INTRAMUSCULAR; INTRAVENOUS at 12:00

## 2019-11-29 RX ADMIN — SODIUM CHLORIDE 150 MILLILITER(S): 9 INJECTION INTRAMUSCULAR; INTRAVENOUS; SUBCUTANEOUS at 14:51

## 2019-11-29 RX ADMIN — SODIUM CHLORIDE 1000 MILLILITER(S): 9 INJECTION INTRAMUSCULAR; INTRAVENOUS; SUBCUTANEOUS at 10:48

## 2019-11-29 RX ADMIN — ONDANSETRON 4 MILLIGRAM(S): 8 TABLET, FILM COATED ORAL at 19:55

## 2019-11-29 RX ADMIN — CEFTRIAXONE 1000 MILLIGRAM(S): 500 INJECTION, POWDER, FOR SOLUTION INTRAMUSCULAR; INTRAVENOUS at 12:40

## 2019-11-29 RX ADMIN — TAMSULOSIN HYDROCHLORIDE 0.4 MILLIGRAM(S): 0.4 CAPSULE ORAL at 22:33

## 2019-11-29 RX ADMIN — SODIUM CHLORIDE 75 MILLILITER(S): 9 INJECTION INTRAMUSCULAR; INTRAVENOUS; SUBCUTANEOUS at 17:48

## 2019-11-29 RX ADMIN — SODIUM CHLORIDE 1000 MILLILITER(S): 9 INJECTION INTRAMUSCULAR; INTRAVENOUS; SUBCUTANEOUS at 11:30

## 2019-11-29 RX ADMIN — Medication 650 MILLIGRAM(S): at 19:30

## 2019-11-29 RX ADMIN — HEPARIN SODIUM 5000 UNIT(S): 5000 INJECTION INTRAVENOUS; SUBCUTANEOUS at 22:33

## 2019-11-29 NOTE — ED PROVIDER NOTE - NEUROLOGICAL, MLM
A&O x3. CN 2-12 intact. Strength 5/5 throughout b/l UE and LE. Sensation intact to light touch throughout b/l UE and LE. Finger to nose intact. Gait wnl.

## 2019-11-29 NOTE — ED PROVIDER NOTE - CLINICAL SUMMARY MEDICAL DECISION MAKING FREE TEXT BOX
Pt p/w multiple symptoms, on exam in NAD and non focal neuro exam. May be due to viral illness and dehydration vs peripheral vertigo vs less likely central etiology as pt has non focal neuro exam, intermittent dizziness as opposed to constant, and steady gait. Plan: labs, IVF, analgesia prn, cxr

## 2019-11-29 NOTE — ED ADULT TRIAGE NOTE - CHIEF COMPLAINT QUOTE
Pt c/o fever for the past 2-3 days, weakness, intermittent sob, dizziness, and unsteady gait. Pt denies sob, breathing even and unlabored, afebrile, denies any present pain.

## 2019-11-29 NOTE — H&P ADULT - NSHPSOCIALHISTORY_GEN_ALL_CORE
Pt is  and lives with his wife. He is fully functional and independent at baseline; he still drives. He works at [employer]. He denies smoking and drinking.

## 2019-11-29 NOTE — H&P ADULT - NEUROLOGICAL DETAILS
responds to verbal commands/responds to pain/sensation intact/normal strength/cranial nerves intact/alert and oriented x 3

## 2019-11-29 NOTE — H&P ADULT - RS GEN PE MLT RESP DETAILS PC
airway patent/wheezes/no chest wall tenderness/no intercostal retractions/respirations non-labored/rhonchi/no rales

## 2019-11-29 NOTE — ED PROVIDER NOTE - ATTENDING CONTRIBUTION TO CARE
Dr. Lam: 80 yo male with DM, asthma, in ED with intermittent subjective fevers for 4 days with essentially nonproductive cough.  Pt states this is accompanied by feeling "off balance", but when clarified during interview, pt feels more generally weak when he feels subjectively febrile, and it makes him feel like he cannot walk like he normally does.  No associated room spinning sensation, CP/SOB, N/V/D or abdominal pain.  No recent travel.  Pt did recently go to an urgent care for this and was told that he may have ?vestibular neuritis, but no treatment or further evaluation was done.  Today symptoms continued and so he came to the ED.  On exam pt overall well appearing, in NAD, heart RRR, lungs CTAB, abd NTND, extremities without swelling, strength 5/5 in all extremities and skin without rash.  Normal speech and normal tandem gait.  Family in room agrees that pt's speech and gait are his normal.

## 2019-11-29 NOTE — H&P ADULT - PROBLEM SELECTOR PLAN 4
Start insulin sliding scale for coverage while inpatient  Hold oral hypoglycemics (Metformin)  Check HgA1C  Monitor finger sticks  Diabetic diet

## 2019-11-29 NOTE — H&P ADULT - NSHPLABSRESULTS_GEN_ALL_CORE
EKG: NSR at 97 bpm with ILBBB, TWI I and AVL  CE x2: Trop 127-->98  WBC: 11.44  Na: 133  Glucose: 245  UA: > 1000 glucose, blood, small LE, bacteria

## 2019-11-29 NOTE — H&P ADULT - PROBLEM SELECTOR PLAN 2
Likely demand ischemia in the setting of underlying sepsis/infectious process  Less likely ACS given no anginal symptoms and normal CK/CKMB levels  EKG shows NSR at 97 bpm with ILBBB, TWI I and AVL  Delta troponin 127-->98, CK and CKMB levels normal  ASA 81mg started  Echocardiogram ordered  Monitor on telemetry  Will consider cardiology consult if clinically indicated

## 2019-11-29 NOTE — H&P ADULT - PROBLEM SELECTOR PLAN 1
Pt meets criteria for sepsis given leukocytosis, tachycardia and fever to 100.6F  Sepsis likely secondary to pneumonia seen on CXR  CXR shows right lower lobe pneumonia.  S/P 1L NS in ED, will continue maintenance IVF with NS at 75 cc/hr  Continue Zithromax and Ceftriaxone IV  Tylenol PRN for fever  Duonebs PRN for shortness of breath and wheezing  Urine and blood cultures and urine legionella ordered

## 2019-11-29 NOTE — H&P ADULT - NEGATIVE CARDIOVASCULAR SYMPTOMS
no claudication/no paroxysmal nocturnal dyspnea/no orthopnea/no peripheral edema/no palpitations/no chest pain

## 2019-11-29 NOTE — H&P ADULT - HISTORY OF PRESENT ILLNESS
78 y/o male with a PMHx of DM, BPH and asthma presents to ED with intermittent fevers for the past three days. Pt states that he never took his temperature over the past few days, but he felt warm to touch (as per his granddaughter) which fluctuated with intermittent chills and shivering. Pt has been taking Tylenol as needed for the "fevers" which provided some help. Pt admits to associated nausea with two episodes of non-bloody and non-bilious vomiting yesterday, which is accompanied by diminished appetite. Patient's granddaughter reports that he does not drink a lot of water on a daily basis and his appetite has decreased since this started. Pt also elicits feeling generalized weakness and unsteadiness for the past few days. Pt says he feels "off balance" when he ambulates. Pt had an episode of dyspnea yesterday when ambulating to the bathroom which he used his inhaler for with mild alleviation of his symptoms. Pt denies recent travel, sick contacts, headache, dizziness, visual deficits, chest pain, shortness of breath at rest, orthopnea, palpitations, abdominal pain, diarrhea, constipation, hematochezia, melena, dysuria, hematuria, LOC, syncope, peripheral edema. Upon arrival to ED, pt found to be febrile to 100.6 and tachycardic to 109 bpm. ED course: EKG: NSR at 97 bpm with ILBBB, TWI I and AVL. CE x2: Trop 127-->98. WBC: 11.44. Na: 133. Glucose: 245. UA: > 1000 glucose, blood, small LE, bacteria. CXR: Right lower lobe pneumonia. Pt was given IVF, Ceftriaxone and Zithromax. Pt is now admitted to telemetry.

## 2019-11-29 NOTE — H&P ADULT - ATTENDING COMMENTS
Nausea/vomiting: vomited once during visit. Etiology unclear - possible due to RLL pneumonia.  - Zofran 4 mg IV q8h PRN  - abdominal exam benign  - monitor

## 2019-11-29 NOTE — ED ADULT NURSE NOTE - OBJECTIVE STATEMENT
Presents with c/o weakness, fever?, unsteady gait, and shortness of breath x few days.  IV access obtained.  Labs drawn and sent, to include RSV.  IVF in progress.  Call bell within reach.  Instructed to call for assistance as needed.  Family currently at bedside.

## 2019-11-29 NOTE — H&P ADULT - PROBLEM SELECTOR PLAN 5
Pt with diffuse expiratory wheezing likely in the setting of underlying pneumonia  Continue Advair and start duonebs q6hrs PRN  Pt oxygenating well on room air  No need for supplemental oxygen or continuous pulse oximetry at this time as respiratory status is stable

## 2019-11-29 NOTE — H&P ADULT - NEGATIVE NEUROLOGICAL SYMPTOMS
no paresthesias/no generalized seizures/no focal seizures/no loss of sensation/no syncope/no vertigo/no headache/no facial palsy/no tremors/no confusion/no loss of consciousness/no hemiparesis/no transient paralysis/no weakness

## 2019-11-29 NOTE — H&P ADULT - ASSESSMENT
78 y/o male with a PMHx of DM, BPH and asthma presents to ED with intermittent fevers, chills, fatigue and nausea, found to be febrile and tachycardic with leukocytosis meeting criteria for sepsis likely secondary to right lower lobe pneumonia, complicated by elevated troponin levels and hyponatremia.

## 2019-11-29 NOTE — ED PROVIDER NOTE - OBJECTIVE STATEMENT
79 M hx DM, asthma, presenting due to 4 days of feeling fevers, associated with loss of balance, and NB vomiting. Better in the morning. In PM starts shivering, losing balance.  When not feeling feverish does not feel off balance. Presently has mild dizziness; feels generally weak. Per family member pt not having dysarthria but having "slow response time". Was at urgent care a  few days ago told this may be due to "vestibular neuritis". 79 M hx DM, asthma, presenting due to 4 days of feeling fevers, associated with loss of balance, and NB vomiting. Better in the morning. In PM starts shivering, losing balance.  When not feeling feverish does not feel off balance. Presently has mild dizziness, cough; feels generally weak. Per family member pt not having dysarthria but having "slow response time". Yesterday noted slight increase in work of breathing so used albuterol with improvement. He denies sore throat, CP, SoB, rhinorrhea, focal numbness/tingling right now. Was at urgent care a  few days ago told this may be due to "vestibular neuritis".

## 2019-11-29 NOTE — H&P ADULT - [facility] PE MLT D E PC
----- Message from Aliza Cadet sent at 5/2/2018 12:10 PM CDT -----  Contact: self  [phone]  Pt is requesting to speak to you regarding her medicine that you gave her on 5-1-18, she read the side affects and she has concerns. Pls call pt [phone]. Thanks.......Ez   no rub/no murmur/regular rate and rhythm

## 2019-11-29 NOTE — H&P ADULT - NEGATIVE OPHTHALMOLOGIC SYMPTOMS
no blurred vision L/no pain R/no loss of vision L/no blurred vision R/no diplopia/no loss of vision R/no pain L/no photophobia

## 2019-11-29 NOTE — H&P ADULT - PROBLEM SELECTOR PLAN 3
Likely hypovolemic hyponatremia in the setting of decreased PO intake  Pt appears dry on exam  S/P 1L NS bolus in ED  Continue maintenance NS at 75 cc/hr  Encourage oral hydration  Monitor BMP

## 2019-11-30 LAB
ALBUMIN SERPL ELPH-MCNC: 2.5 G/DL — LOW (ref 3.3–5)
ALP SERPL-CCNC: 70 U/L — SIGNIFICANT CHANGE UP (ref 40–120)
ALT FLD-CCNC: 8 U/L — SIGNIFICANT CHANGE UP (ref 4–41)
ANION GAP SERPL CALC-SCNC: 10 MMO/L — SIGNIFICANT CHANGE UP (ref 7–14)
AST SERPL-CCNC: 15 U/L — SIGNIFICANT CHANGE UP (ref 4–40)
BACTERIA UR CULT: SIGNIFICANT CHANGE UP
BILIRUB SERPL-MCNC: 0.2 MG/DL — SIGNIFICANT CHANGE UP (ref 0.2–1.2)
BUN SERPL-MCNC: 15 MG/DL — SIGNIFICANT CHANGE UP (ref 7–23)
CALCIUM SERPL-MCNC: 7.5 MG/DL — LOW (ref 8.4–10.5)
CHLORIDE SERPL-SCNC: 100 MMOL/L — SIGNIFICANT CHANGE UP (ref 98–107)
CO2 SERPL-SCNC: 23 MMOL/L — SIGNIFICANT CHANGE UP (ref 22–31)
CREAT SERPL-MCNC: 0.89 MG/DL — SIGNIFICANT CHANGE UP (ref 0.5–1.3)
GLUCOSE SERPL-MCNC: 306 MG/DL — HIGH (ref 70–99)
HCT VFR BLD CALC: 35.8 % — LOW (ref 39–50)
HGB BLD-MCNC: 11.9 G/DL — LOW (ref 13–17)
L PNEUMO AG UR QL: NEGATIVE — SIGNIFICANT CHANGE UP
MAGNESIUM SERPL-MCNC: 1.9 MG/DL — SIGNIFICANT CHANGE UP (ref 1.6–2.6)
MCHC RBC-ENTMCNC: 30.9 PG — SIGNIFICANT CHANGE UP (ref 27–34)
MCHC RBC-ENTMCNC: 33.2 % — SIGNIFICANT CHANGE UP (ref 32–36)
MCV RBC AUTO: 93 FL — SIGNIFICANT CHANGE UP (ref 80–100)
NRBC # FLD: 0 K/UL — SIGNIFICANT CHANGE UP (ref 0–0)
PHOSPHATE SERPL-MCNC: 1.4 MG/DL — LOW (ref 2.5–4.5)
PLATELET # BLD AUTO: 178 K/UL — SIGNIFICANT CHANGE UP (ref 150–400)
PMV BLD: 11.5 FL — SIGNIFICANT CHANGE UP (ref 7–13)
POTASSIUM SERPL-MCNC: 3.6 MMOL/L — SIGNIFICANT CHANGE UP (ref 3.5–5.3)
POTASSIUM SERPL-SCNC: 3.6 MMOL/L — SIGNIFICANT CHANGE UP (ref 3.5–5.3)
PROT SERPL-MCNC: 6 G/DL — SIGNIFICANT CHANGE UP (ref 6–8.3)
RBC # BLD: 3.85 M/UL — LOW (ref 4.2–5.8)
RBC # FLD: 13.2 % — SIGNIFICANT CHANGE UP (ref 10.3–14.5)
SODIUM SERPL-SCNC: 133 MMOL/L — LOW (ref 135–145)
SPECIMEN SOURCE: SIGNIFICANT CHANGE UP
T3FREE SERPL-MCNC: 2.03 PG/ML — SIGNIFICANT CHANGE UP (ref 1.8–4.6)
T4 FREE SERPL-MCNC: 1.32 NG/DL — SIGNIFICANT CHANGE UP (ref 0.9–1.8)
WBC # BLD: 9.87 K/UL — SIGNIFICANT CHANGE UP (ref 3.8–10.5)
WBC # FLD AUTO: 9.87 K/UL — SIGNIFICANT CHANGE UP (ref 3.8–10.5)

## 2019-11-30 PROCEDURE — 99233 SBSQ HOSP IP/OBS HIGH 50: CPT

## 2019-11-30 RX ADMIN — TAMSULOSIN HYDROCHLORIDE 0.4 MILLIGRAM(S): 0.4 CAPSULE ORAL at 21:48

## 2019-11-30 RX ADMIN — BUDESONIDE AND FORMOTEROL FUMARATE DIHYDRATE 2 PUFF(S): 160; 4.5 AEROSOL RESPIRATORY (INHALATION) at 12:31

## 2019-11-30 RX ADMIN — CEFTRIAXONE 100 MILLIGRAM(S): 500 INJECTION, POWDER, FOR SOLUTION INTRAMUSCULAR; INTRAVENOUS at 12:32

## 2019-11-30 RX ADMIN — Medication 1: at 13:51

## 2019-11-30 RX ADMIN — Medication 2: at 18:41

## 2019-11-30 RX ADMIN — HEPARIN SODIUM 5000 UNIT(S): 5000 INJECTION INTRAVENOUS; SUBCUTANEOUS at 13:51

## 2019-11-30 RX ADMIN — AZITHROMYCIN 255 MILLIGRAM(S): 500 TABLET, FILM COATED ORAL at 13:51

## 2019-11-30 RX ADMIN — BUDESONIDE AND FORMOTEROL FUMARATE DIHYDRATE 2 PUFF(S): 160; 4.5 AEROSOL RESPIRATORY (INHALATION) at 21:48

## 2019-11-30 RX ADMIN — HEPARIN SODIUM 5000 UNIT(S): 5000 INJECTION INTRAVENOUS; SUBCUTANEOUS at 21:48

## 2019-11-30 RX ADMIN — Medication 81 MILLIGRAM(S): at 12:32

## 2019-11-30 NOTE — PROGRESS NOTE ADULT - SUBJECTIVE AND OBJECTIVE BOX
Patient is a 79y old  Male who presents with a chief complaint of Fever, chills, weakness (2019 17:40)      SUBJECTIVE / OVERNIGHT EVENTS:    MEDICATIONS  (STANDING):  aspirin enteric coated 81 milliGRAM(s) Oral daily  azithromycin  IVPB 500 milliGRAM(s) IV Intermittent every 24 hours  budesonide 160 MICROgram(s)/formoterol 4.5 MICROgram(s) Inhaler 2 Puff(s) Inhalation two times a day  cefTRIAXone   IVPB 1000 milliGRAM(s) IV Intermittent every 24 hours  dextrose 5%. 1000 milliLiter(s) (50 mL/Hr) IV Continuous <Continuous>  dextrose 50% Injectable 12.5 Gram(s) IV Push once  dextrose 50% Injectable 25 Gram(s) IV Push once  dextrose 50% Injectable 25 Gram(s) IV Push once  heparin  Injectable 5000 Unit(s) SubCutaneous every 12 hours  insulin lispro (HumaLOG) corrective regimen sliding scale   SubCutaneous three times a day before meals  insulin lispro (HumaLOG) corrective regimen sliding scale   SubCutaneous at bedtime  sodium chloride 0.9%. 1000 milliLiter(s) (75 mL/Hr) IV Continuous <Continuous>  tamsulosin 0.4 milliGRAM(s) Oral at bedtime  tiotropium 18 MICROgram(s) Capsule 1 Capsule(s) Inhalation daily    MEDICATIONS  (PRN):  acetaminophen   Tablet .. 650 milliGRAM(s) Oral every 6 hours PRN Temp greater or equal to 38C (100.4F), Mild Pain (1 - 3), Moderate Pain (4 - 6)  albuterol/ipratropium for Nebulization 3 milliLiter(s) Nebulizer every 6 hours PRN Shortness of Breath and/or Wheezing  dextrose 40% Gel 15 Gram(s) Oral once PRN Blood Glucose LESS THAN 70 milliGRAM(s)/deciliter  glucagon  Injectable 1 milliGRAM(s) IntraMuscular once PRN Glucose LESS THAN 70 milligrams/deciliter  ondansetron Injectable 4 milliGRAM(s) IV Push every 8 hours PRN Nausea and/or Vomiting      Vital Signs Last 24 Hrs  T(C): 37.1 (2019 05:53), Max: 38 (2019 18:00)  T(F): 98.8 (2019 05:53), Max: 100.4 (2019 18:00)  HR: 87 (2019 05:53) (83 - 103)  BP: 122/60 (2019 05:53) (101/51 - 147/67)  BP(mean): --  RR: 17 (2019 05:53) (16 - 20)  SpO2: 98% (2019 05:53) (94% - 98%)  CAPILLARY BLOOD GLUCOSE      POCT Blood Glucose.: 124 mg/dL (2019 09:05)  POCT Blood Glucose.: 187 mg/dL (2019 22:23)  POCT Blood Glucose.: 123 mg/dL (2019 17:54)    I&O's Summary    2019 07:01  -  2019 07:00  --------------------------------------------------------  IN: 1750 mL / OUT: 850 mL / NET: 900 mL        PHYSICAL EXAM:  GENERAL: NAD, well-developed  HEAD:  Atraumatic, Normocephalic  EYES: EOMI, PERRLA, conjunctiva and sclera clear  NECK: Supple, No JVD  CHEST/LUNG: Clear to auscultation bilaterally; No wheeze  HEART: Regular rate and rhythm; No murmurs, rubs, or gallops  ABDOMEN: Soft, Nontender, Nondistended; Bowel sounds present  EXTREMITIES:  2+ Peripheral Pulses, No clubbing, cyanosis, or edema  PSYCH: AAOx3  NEUROLOGY: non-focal  SKIN: No rashes or lesions    LABS:                        13.6   11.44 )-----------( 176      ( 2019 10:30 )             39.8         132<L>  |  102  |  14  ----------------------------<  334<H>  3.9   |  20<L>  |  0.95    Ca    7.6<L>      2019 13:40  Phos  1.3       Mg     1.9         TPro  7.1  /  Alb  3.4  /  TBili  0.8  /  DBili  x   /  AST  17  /  ALT  11  /  AlkPhos  76        CARDIAC MARKERS ( 2019 13:40 )  x     / x     / 63 u/L / 2.32 ng/mL / x      CARDIAC MARKERS ( 2019 10:30 )  x     / x     / 71 u/L / 2.44 ng/mL / x          Urinalysis Basic - ( 2019 10:30 )    Color: YELLOW / Appearance: CLEAR / S.020 / pH: 6.0  Gluc: >1000 / Ketone: TRACE  / Bili: NEGATIVE / Urobili: NORMAL   Blood: MODERATE / Protein: 50 / Nitrite: NEGATIVE   Leuk Esterase: SMALL / RBC: 3-5 / WBC 3-5   Sq Epi: OCC / Non Sq Epi: x / Bacteria: MODERATE        RADIOLOGY & ADDITIONAL TESTS:    Imaging Personally Reviewed:    Consultant(s) Notes Reviewed:      Care Discussed with Consultants/Other Providers: Patient is a 79y old  Male who presents with a chief complaint of Fever, chills, weakness (2019 17:40)      SUBJECTIVE / OVERNIGHT EVENTS: patient seen and examined by bedside, feeling better, denies headache, dizziness, SOB, CP, Palpitations , N/V/D, abdominal pain  Tele; no acute events, NSR at 70s       MEDICATIONS  (STANDING):  aspirin enteric coated 81 milliGRAM(s) Oral daily  azithromycin  IVPB 500 milliGRAM(s) IV Intermittent every 24 hours  budesonide 160 MICROgram(s)/formoterol 4.5 MICROgram(s) Inhaler 2 Puff(s) Inhalation two times a day  cefTRIAXone   IVPB 1000 milliGRAM(s) IV Intermittent every 24 hours  dextrose 5%. 1000 milliLiter(s) (50 mL/Hr) IV Continuous <Continuous>  dextrose 50% Injectable 12.5 Gram(s) IV Push once  dextrose 50% Injectable 25 Gram(s) IV Push once  dextrose 50% Injectable 25 Gram(s) IV Push once  heparin  Injectable 5000 Unit(s) SubCutaneous every 12 hours  insulin lispro (HumaLOG) corrective regimen sliding scale   SubCutaneous three times a day before meals  insulin lispro (HumaLOG) corrective regimen sliding scale   SubCutaneous at bedtime  sodium chloride 0.9%. 1000 milliLiter(s) (75 mL/Hr) IV Continuous <Continuous>  tamsulosin 0.4 milliGRAM(s) Oral at bedtime  tiotropium 18 MICROgram(s) Capsule 1 Capsule(s) Inhalation daily    MEDICATIONS  (PRN):  acetaminophen   Tablet .. 650 milliGRAM(s) Oral every 6 hours PRN Temp greater or equal to 38C (100.4F), Mild Pain (1 - 3), Moderate Pain (4 - 6)  albuterol/ipratropium for Nebulization 3 milliLiter(s) Nebulizer every 6 hours PRN Shortness of Breath and/or Wheezing  dextrose 40% Gel 15 Gram(s) Oral once PRN Blood Glucose LESS THAN 70 milliGRAM(s)/deciliter  glucagon  Injectable 1 milliGRAM(s) IntraMuscular once PRN Glucose LESS THAN 70 milligrams/deciliter  ondansetron Injectable 4 milliGRAM(s) IV Push every 8 hours PRN Nausea and/or Vomiting      Vital Signs Last 24 Hrs  T(C): 37.1 (2019 05:53), Max: 38 (2019 18:00)  T(F): 98.8 (2019 05:53), Max: 100.4 (2019 18:00)  HR: 87 (2019 05:53) (83 - 103)  BP: 122/60 (2019 05:53) (101/51 - 147/67)  BP(mean): --  RR: 17 (2019 05:53) (16 - 20)  SpO2: 98% (2019 05:53) (94% - 98%)  CAPILLARY BLOOD GLUCOSE      POCT Blood Glucose.: 124 mg/dL (2019 09:05)  POCT Blood Glucose.: 187 mg/dL (2019 22:23)  POCT Blood Glucose.: 123 mg/dL (2019 17:54)    I&O's Summary    2019 07:01  -  2019 07:00  --------------------------------------------------------  IN: 1750 mL / OUT: 850 mL / NET: 900 mL        PHYSICAL EXAM:  GENERAL: NAD, thin built   HEAD:  Atraumatic, Normocephalic  EYES: EOMI, PERRLA, conjunctiva and sclera clear  NECK: Supple,   CHEST/LUNG:  course breath sounds in RLL ; No wheeze  HEART: Regular rate and rhythm;   ABDOMEN: Soft, Nontender, Nondistended; Bowel sounds present  EXTREMITIES:  2+ Peripheral Pulses, No clubbing, cyanosis, or edema  PSYCH: AAOx3  NEUROLOGY: non-focal  SKIN: No rashes or lesions    LABS:                        13.6   11.44 )-----------( 176      ( 2019 10:30 )             39.8         132<L>  |  102  |  14  ----------------------------<  334<H>  3.9   |  20<L>  |  0.95    Ca    7.6<L>      2019 13:40  Phos  1.3       Mg     1.9         TPro  7.1  /  Alb  3.4  /  TBili  0.8  /  DBili  x   /  AST  17  /  ALT  11  /  AlkPhos  76  11-      CARDIAC MARKERS ( 2019 13:40 )  x     / x     / 63 u/L / 2.32 ng/mL / x      CARDIAC MARKERS ( 2019 10:30 )  x     / x     / 71 u/L / 2.44 ng/mL / x          Urinalysis Basic - ( 2019 10:30 )    Color: YELLOW / Appearance: CLEAR / S.020 / pH: 6.0  Gluc: >1000 / Ketone: TRACE  / Bili: NEGATIVE / Urobili: NORMAL   Blood: MODERATE / Protein: 50 / Nitrite: NEGATIVE   Leuk Esterase: SMALL / RBC: 3-5 / WBC 3-5   Sq Epi: OCC / Non Sq Epi: x / Bacteria: MODERATE        RADIOLOGY & ADDITIONAL TESTS:  < from: Xray Chest 2 Views PA/Lat (19 @ 11:25) >    Patchy opacities in the right mid and lower lung fields consistent with   pneumonia. Left lung and right upper lobe are normal. The heart is not   enlarged and there are no effusions.        < end of copied text >    Imaging Personally Reviewed:    Consultant(s) Notes Reviewed:      Care Discussed with Consultants/Other Providers:

## 2019-11-30 NOTE — PROGRESS NOTE ADULT - ASSESSMENT
80 y/o male with a PMHx of DM, BPH and asthma presents to ED with intermittent fevers, chills, fatigue and nausea, found to be febrile and tachycardic with leukocytosis meeting criteria for sepsis likely secondary to right lower lobe pneumonia, complicated by elevated troponin levels and hyponatremia.

## 2019-11-30 NOTE — PROGRESS NOTE ADULT - PROBLEM SELECTOR PLAN 3
Likely hypovolemic hyponatremia in the setting of decreased PO intake  Pt appears dry on exam  S/P 1L NS bolus in ED  Continue maintenance NS at 75 cc/hr  Encourage oral hydration  Monitor BMP Likely hypovolemic hyponatremia in the setting of decreased PO intake  Pt appears dry on exam  S/P 1L NS bolus in ED  Continue maintenance NS at 75 cc/hr for 12 hrs   Encourage oral hydration  Monitor BMP

## 2019-12-01 ENCOUNTER — TRANSCRIPTION ENCOUNTER (OUTPATIENT)
Age: 79
End: 2019-12-01

## 2019-12-01 LAB
ALBUMIN SERPL ELPH-MCNC: 2.2 G/DL — LOW (ref 3.3–5)
ALP SERPL-CCNC: 66 U/L — SIGNIFICANT CHANGE UP (ref 40–120)
ALT FLD-CCNC: 10 U/L — SIGNIFICANT CHANGE UP (ref 4–41)
ANION GAP SERPL CALC-SCNC: 11 MMO/L — SIGNIFICANT CHANGE UP (ref 7–14)
AST SERPL-CCNC: 16 U/L — SIGNIFICANT CHANGE UP (ref 4–40)
BILIRUB SERPL-MCNC: 0.4 MG/DL — SIGNIFICANT CHANGE UP (ref 0.2–1.2)
BUN SERPL-MCNC: 12 MG/DL — SIGNIFICANT CHANGE UP (ref 7–23)
CALCIUM SERPL-MCNC: 7.9 MG/DL — LOW (ref 8.4–10.5)
CHLORIDE SERPL-SCNC: 101 MMOL/L — SIGNIFICANT CHANGE UP (ref 98–107)
CHOLEST SERPL-MCNC: 93 MG/DL — LOW (ref 120–199)
CO2 SERPL-SCNC: 23 MMOL/L — SIGNIFICANT CHANGE UP (ref 22–31)
CREAT SERPL-MCNC: 0.94 MG/DL — SIGNIFICANT CHANGE UP (ref 0.5–1.3)
GLUCOSE SERPL-MCNC: 108 MG/DL — HIGH (ref 70–99)
HBA1C BLD-MCNC: 6.9 % — HIGH (ref 4–5.6)
HCT VFR BLD CALC: 35.4 % — LOW (ref 39–50)
HDLC SERPL-MCNC: 22 MG/DL — LOW (ref 35–55)
HGB BLD-MCNC: 11.7 G/DL — LOW (ref 13–17)
LIPID PNL WITH DIRECT LDL SERPL: 49 MG/DL — SIGNIFICANT CHANGE UP
MAGNESIUM SERPL-MCNC: 2 MG/DL — SIGNIFICANT CHANGE UP (ref 1.6–2.6)
MCHC RBC-ENTMCNC: 30.3 PG — SIGNIFICANT CHANGE UP (ref 27–34)
MCHC RBC-ENTMCNC: 33.1 % — SIGNIFICANT CHANGE UP (ref 32–36)
MCV RBC AUTO: 91.7 FL — SIGNIFICANT CHANGE UP (ref 80–100)
NRBC # FLD: 0 K/UL — SIGNIFICANT CHANGE UP (ref 0–0)
PHOSPHATE SERPL-MCNC: 1.7 MG/DL — LOW (ref 2.5–4.5)
PLATELET # BLD AUTO: 174 K/UL — SIGNIFICANT CHANGE UP (ref 150–400)
PMV BLD: 11.2 FL — SIGNIFICANT CHANGE UP (ref 7–13)
POTASSIUM SERPL-MCNC: 3.2 MMOL/L — LOW (ref 3.5–5.3)
POTASSIUM SERPL-SCNC: 3.2 MMOL/L — LOW (ref 3.5–5.3)
PROT SERPL-MCNC: 5.9 G/DL — LOW (ref 6–8.3)
RBC # BLD: 3.86 M/UL — LOW (ref 4.2–5.8)
RBC # FLD: 13.1 % — SIGNIFICANT CHANGE UP (ref 10.3–14.5)
SODIUM SERPL-SCNC: 135 MMOL/L — SIGNIFICANT CHANGE UP (ref 135–145)
TRIGL SERPL-MCNC: 123 MG/DL — SIGNIFICANT CHANGE UP (ref 10–149)
WBC # BLD: 7.2 K/UL — SIGNIFICANT CHANGE UP (ref 3.8–10.5)
WBC # FLD AUTO: 7.2 K/UL — SIGNIFICANT CHANGE UP (ref 3.8–10.5)

## 2019-12-01 PROCEDURE — 99233 SBSQ HOSP IP/OBS HIGH 50: CPT

## 2019-12-01 PROCEDURE — 93306 TTE W/DOPPLER COMPLETE: CPT | Mod: 26

## 2019-12-01 RX ORDER — SODIUM,POTASSIUM PHOSPHATES 278-250MG
1 POWDER IN PACKET (EA) ORAL
Refills: 0 | Status: COMPLETED | OUTPATIENT
Start: 2019-12-01 | End: 2019-12-01

## 2019-12-01 RX ORDER — POTASSIUM CHLORIDE 20 MEQ
40 PACKET (EA) ORAL ONCE
Refills: 0 | Status: COMPLETED | OUTPATIENT
Start: 2019-12-01 | End: 2019-12-01

## 2019-12-01 RX ORDER — POTASSIUM PHOSPHATE, MONOBASIC POTASSIUM PHOSPHATE, DIBASIC 236; 224 MG/ML; MG/ML
15 INJECTION, SOLUTION INTRAVENOUS ONCE
Refills: 0 | Status: COMPLETED | OUTPATIENT
Start: 2019-12-01 | End: 2019-12-01

## 2019-12-01 RX ADMIN — Medication 1 TABLET(S): at 18:32

## 2019-12-01 RX ADMIN — Medication 2: at 18:32

## 2019-12-01 RX ADMIN — Medication 1 TABLET(S): at 11:00

## 2019-12-01 RX ADMIN — HEPARIN SODIUM 5000 UNIT(S): 5000 INJECTION INTRAVENOUS; SUBCUTANEOUS at 11:01

## 2019-12-01 RX ADMIN — Medication 40 MILLIEQUIVALENT(S): at 18:32

## 2019-12-01 RX ADMIN — POTASSIUM PHOSPHATE, MONOBASIC POTASSIUM PHOSPHATE, DIBASIC 62.5 MILLIMOLE(S): 236; 224 INJECTION, SOLUTION INTRAVENOUS at 18:32

## 2019-12-01 RX ADMIN — Medication 40 MILLIEQUIVALENT(S): at 11:00

## 2019-12-01 RX ADMIN — CEFTRIAXONE 100 MILLIGRAM(S): 500 INJECTION, POWDER, FOR SOLUTION INTRAMUSCULAR; INTRAVENOUS at 12:13

## 2019-12-01 RX ADMIN — BUDESONIDE AND FORMOTEROL FUMARATE DIHYDRATE 2 PUFF(S): 160; 4.5 AEROSOL RESPIRATORY (INHALATION) at 21:03

## 2019-12-01 RX ADMIN — BUDESONIDE AND FORMOTEROL FUMARATE DIHYDRATE 2 PUFF(S): 160; 4.5 AEROSOL RESPIRATORY (INHALATION) at 11:00

## 2019-12-01 RX ADMIN — AZITHROMYCIN 255 MILLIGRAM(S): 500 TABLET, FILM COATED ORAL at 12:51

## 2019-12-01 RX ADMIN — Medication 81 MILLIGRAM(S): at 11:01

## 2019-12-01 RX ADMIN — HEPARIN SODIUM 5000 UNIT(S): 5000 INJECTION INTRAVENOUS; SUBCUTANEOUS at 22:43

## 2019-12-01 RX ADMIN — TAMSULOSIN HYDROCHLORIDE 0.4 MILLIGRAM(S): 0.4 CAPSULE ORAL at 22:43

## 2019-12-01 NOTE — PROGRESS NOTE ADULT - PROBLEM SELECTOR PLAN 3
Likely hypovolemic hyponatremia in the setting of decreased PO intake  Pt appears dry on exam  S/P 1L NS bolus in ED  Continue maintenance NS at 75 cc/hr for 12 hrs   Encourage oral hydration  Monitor BMP Likely hypovolemic hyponatremia in the setting of decreased PO intake, Na 135 today, resolved   S/P 1L NS bolus in ED  s/p maintenance NS at 75 cc/hr for 12 hrs   Encourage oral hydration  Monitor BMP  hypokalemia and hypophosphatemia :will supplement , monitor BMP

## 2019-12-01 NOTE — PROGRESS NOTE ADULT - PROBLEM SELECTOR PLAN 2
Likely demand ischemia in the setting of underlying sepsis/infectious process  Less likely ACS given no anginal symptoms and normal CK/CKMB levels  EKG shows NSR at 97 bpm with ILBBB, TWI I and AVL  Delta troponin 127-->98, CK and CKMB levels normal  ASA 81mg started  Echocardiogram ordered  Monitor on telemetry  Will consider cardiology consult if clinically indicated Likely demand ischemia in the setting of underlying sepsis/infectious process  Less likely ACS given no anginal symptoms and normal CK/CKMB levels  EKG shows NSR at 97 bpm with ILBBB, TWI I and AVL  Delta troponin 127-->98, CK and CKMB levels normal  ASA 81mg started  Echocardiogram ordered  Monitor on telemetry, no acute events on tele , NSR 80s   Will consider cardiology consult if clinically indicated

## 2019-12-01 NOTE — DISCHARGE NOTE PROVIDER - CARE PROVIDER_API CALL
Dr. Galeana,   PCP  Phone: (   )    -  Fax: (   )    -  Follow Up Time: Dr. Galeana,   PCP  Phone: (   )    -  Fax: (   )    -  Follow Up Time:     David Bowden (MD)  Cardiovascular Disease; Nuclear Cardiology  [address] [place] [address]  Phone: [phone]  Fax: [phone]  Follow Up Time:

## 2019-12-01 NOTE — PROGRESS NOTE ADULT - SUBJECTIVE AND OBJECTIVE BOX
Patient is a 79y old  Male who presents with a chief complaint of Fever, chills, weakness (2019 12:08)      SUBJECTIVE / OVERNIGHT EVENTS:    MEDICATIONS  (STANDING):  aspirin enteric coated 81 milliGRAM(s) Oral daily  azithromycin  IVPB 500 milliGRAM(s) IV Intermittent every 24 hours  budesonide 160 MICROgram(s)/formoterol 4.5 MICROgram(s) Inhaler 2 Puff(s) Inhalation two times a day  cefTRIAXone   IVPB 1000 milliGRAM(s) IV Intermittent every 24 hours  dextrose 5%. 1000 milliLiter(s) (50 mL/Hr) IV Continuous <Continuous>  dextrose 50% Injectable 12.5 Gram(s) IV Push once  dextrose 50% Injectable 25 Gram(s) IV Push once  dextrose 50% Injectable 25 Gram(s) IV Push once  heparin  Injectable 5000 Unit(s) SubCutaneous every 12 hours  insulin lispro (HumaLOG) corrective regimen sliding scale   SubCutaneous three times a day before meals  insulin lispro (HumaLOG) corrective regimen sliding scale   SubCutaneous at bedtime  sodium chloride 0.9%. 1000 milliLiter(s) (75 mL/Hr) IV Continuous <Continuous>  tamsulosin 0.4 milliGRAM(s) Oral at bedtime  tiotropium 18 MICROgram(s) Capsule 1 Capsule(s) Inhalation daily    MEDICATIONS  (PRN):  acetaminophen   Tablet .. 650 milliGRAM(s) Oral every 6 hours PRN Temp greater or equal to 38C (100.4F), Mild Pain (1 - 3), Moderate Pain (4 - 6)  albuterol/ipratropium for Nebulization 3 milliLiter(s) Nebulizer every 6 hours PRN Shortness of Breath and/or Wheezing  dextrose 40% Gel 15 Gram(s) Oral once PRN Blood Glucose LESS THAN 70 milliGRAM(s)/deciliter  glucagon  Injectable 1 milliGRAM(s) IntraMuscular once PRN Glucose LESS THAN 70 milligrams/deciliter  ondansetron Injectable 4 milliGRAM(s) IV Push every 8 hours PRN Nausea and/or Vomiting      Vital Signs Last 24 Hrs  T(C): 36.8 (01 Dec 2019 05:45), Max: 37.4 (2019 21:09)  T(F): 98.3 (01 Dec 2019 05:45), Max: 99.4 (2019 21:09)  HR: 80 (01 Dec 2019 05:45) (80 - 92)  BP: 115/51 (01 Dec 2019 05:45) (114/51 - 146/97)  BP(mean): --  RR: 17 (01 Dec 2019 05:45) (16 - 17)  SpO2: 98% (01 Dec 2019 05:45) (98% - 100%)  CAPILLARY BLOOD GLUCOSE      POCT Blood Glucose.: 119 mg/dL (2019 21:44)  POCT Blood Glucose.: 241 mg/dL (2019 18:06)  POCT Blood Glucose.: 188 mg/dL (2019 13:11)  POCT Blood Glucose.: 124 mg/dL (2019 09:05)    I&O's Summary    2019 07:01  -  01 Dec 2019 07:00  --------------------------------------------------------  IN: 925 mL / OUT: 1525 mL / NET: -600 mL        PHYSICAL EXAM:  GENERAL: NAD, well-developed  HEAD:  Atraumatic, Normocephalic  EYES: EOMI, PERRLA, conjunctiva and sclera clear  NECK: Supple, No JVD  CHEST/LUNG: Clear to auscultation bilaterally; No wheeze  HEART: Regular rate and rhythm; No murmurs, rubs, or gallops  ABDOMEN: Soft, Nontender, Nondistended; Bowel sounds present  EXTREMITIES:  2+ Peripheral Pulses, No clubbing, cyanosis, or edema  PSYCH: AAOx3  NEUROLOGY: non-focal  SKIN: No rashes or lesions    LABS:                        11.7   7.20  )-----------( 174      ( 01 Dec 2019 07:00 )             35.4     11-30    133<L>  |  100  |  15  ----------------------------<  306<H>  3.6   |  23  |  0.89    Ca    7.5<L>      2019 14:20  Phos  1.4     11-30  Mg     1.9     11-30    TPro  6.0  /  Alb  2.5<L>  /  TBili  0.2  /  DBili  x   /  AST  15  /  ALT  8   /  AlkPhos  70  11-30      CARDIAC MARKERS ( 2019 13:40 )  x     / x     / 63 u/L / 2.32 ng/mL / x      CARDIAC MARKERS ( 2019 10:30 )  x     / x     / 71 u/L / 2.44 ng/mL / x          Urinalysis Basic - ( 2019 10:30 )    Color: YELLOW / Appearance: CLEAR / S.020 / pH: 6.0  Gluc: >1000 / Ketone: TRACE  / Bili: NEGATIVE / Urobili: NORMAL   Blood: MODERATE / Protein: 50 / Nitrite: NEGATIVE   Leuk Esterase: SMALL / RBC: 3-5 / WBC 3-5   Sq Epi: OCC / Non Sq Epi: x / Bacteria: MODERATE        RADIOLOGY & ADDITIONAL TESTS:    Imaging Personally Reviewed:    Consultant(s) Notes Reviewed:      Care Discussed with Consultants/Other Providers: Patient is a 79y old  Male who presents with a chief complaint of Fever, chills, weakness (2019 12:08)      SUBJECTIVE / OVERNIGHT EVENTS: patient seen and examined by bedside, feeling better , denies fever, chills , headache, dizziness, SOB, CP, Palpitations , N/V/D, abdominal pain        MEDICATIONS  (STANDING):  aspirin enteric coated 81 milliGRAM(s) Oral daily  azithromycin  IVPB 500 milliGRAM(s) IV Intermittent every 24 hours  budesonide 160 MICROgram(s)/formoterol 4.5 MICROgram(s) Inhaler 2 Puff(s) Inhalation two times a day  cefTRIAXone   IVPB 1000 milliGRAM(s) IV Intermittent every 24 hours  dextrose 5%. 1000 milliLiter(s) (50 mL/Hr) IV Continuous <Continuous>  dextrose 50% Injectable 12.5 Gram(s) IV Push once  dextrose 50% Injectable 25 Gram(s) IV Push once  dextrose 50% Injectable 25 Gram(s) IV Push once  heparin  Injectable 5000 Unit(s) SubCutaneous every 12 hours  insulin lispro (HumaLOG) corrective regimen sliding scale   SubCutaneous three times a day before meals  insulin lispro (HumaLOG) corrective regimen sliding scale   SubCutaneous at bedtime  sodium chloride 0.9%. 1000 milliLiter(s) (75 mL/Hr) IV Continuous <Continuous>  tamsulosin 0.4 milliGRAM(s) Oral at bedtime  tiotropium 18 MICROgram(s) Capsule 1 Capsule(s) Inhalation daily    MEDICATIONS  (PRN):  acetaminophen   Tablet .. 650 milliGRAM(s) Oral every 6 hours PRN Temp greater or equal to 38C (100.4F), Mild Pain (1 - 3), Moderate Pain (4 - 6)  albuterol/ipratropium for Nebulization 3 milliLiter(s) Nebulizer every 6 hours PRN Shortness of Breath and/or Wheezing  dextrose 40% Gel 15 Gram(s) Oral once PRN Blood Glucose LESS THAN 70 milliGRAM(s)/deciliter  glucagon  Injectable 1 milliGRAM(s) IntraMuscular once PRN Glucose LESS THAN 70 milligrams/deciliter  ondansetron Injectable 4 milliGRAM(s) IV Push every 8 hours PRN Nausea and/or Vomiting      Vital Signs Last 24 Hrs  T(C): 36.8 (01 Dec 2019 05:45), Max: 37.4 (2019 21:09)  T(F): 98.3 (01 Dec 2019 05:45), Max: 99.4 (2019 21:09)  HR: 80 (01 Dec 2019 05:45) (80 - 92)  BP: 115/51 (01 Dec 2019 05:45) (114/51 - 146/97)  BP(mean): --  RR: 17 (01 Dec 2019 05:45) (16 - 17)  SpO2: 98% (01 Dec 2019 05:45) (98% - 100%)  CAPILLARY BLOOD GLUCOSE      POCT Blood Glucose.: 119 mg/dL (2019 21:44)  POCT Blood Glucose.: 241 mg/dL (2019 18:06)  POCT Blood Glucose.: 188 mg/dL (2019 13:11)  POCT Blood Glucose.: 124 mg/dL (2019 09:05)    I&O's Summary    2019 07:01  -  01 Dec 2019 07:00  --------------------------------------------------------  IN: 925 mL / OUT: 1525 mL / NET: -600 mL  PHYSICAL EXAM:  GENERAL: NAD, thin built   HEAD:  Atraumatic, Normocephalic  EYES: EOMI, PERRLA, conjunctiva and sclera clear  NECK: Supple,   CHEST/LUNG:  course breath sounds in RLL ; No wheeze  HEART: Regular rate and rhythm;   ABDOMEN: Soft, Nontender, Nondistended; Bowel sounds present  EXTREMITIES:  2+ Peripheral Pulses, No clubbing, cyanosis, or edema  PSYCH: AAOx3  NEUROLOGY: non-focal  SKIN: No rashes or lesions          LABS:                        11.7   7.20  )-----------( 174      ( 01 Dec 2019 07:00 )             35.4     11-30    133<L>  |  100  |  15  ----------------------------<  306<H>  3.6   |  23  |  0.89    Ca    7.5<L>      2019 14:20  Phos  1.4     11-30  Mg     1.9     11-30    TPro  6.0  /  Alb  2.5<L>  /  TBili  0.2  /  DBili  x   /  AST  15  /  ALT  8   /  AlkPhos  70        CARDIAC MARKERS ( 2019 13:40 )  x     / x     / 63 u/L / 2.32 ng/mL / x      CARDIAC MARKERS ( 2019 10:30 )  x     / x     / 71 u/L / 2.44 ng/mL / x          Urinalysis Basic - ( 2019 10:30 )    Color: YELLOW / Appearance: CLEAR / S.020 / pH: 6.0  Gluc: >1000 / Ketone: TRACE  / Bili: NEGATIVE / Urobili: NORMAL   Blood: MODERATE / Protein: 50 / Nitrite: NEGATIVE   Leuk Esterase: SMALL / RBC: 3-5 / WBC 3-5   Sq Epi: OCC / Non Sq Epi: x / Bacteria: MODERATE        RADIOLOGY & ADDITIONAL TESTS:    Imaging Personally Reviewed:    Consultant(s) Notes Reviewed:      Care Discussed with Consultants/Other Providers:

## 2019-12-01 NOTE — DISCHARGE NOTE PROVIDER - HOSPITAL COURSE
80 y/o male with a PMHx of DM, BPH and asthma presents to ED with intermittent fevers, chills, fatigue and nausea, found to be febrile and tachycardic with leukocytosis meeting criteria for sepsis likely secondary to right lower lobe pneumonia, complicated by elevated troponin levels and hyponatremia.        Hospital course:    Patient was admitted for sepsis secondary to RLL PNA. CXR showed RLL pneumonia. He was given IVF and IV ceftriaxone. He was on Zithromax but the urine legionella came back negative so Zithromax was DC'ed. Urine cx and blood cx were negative. He also had elevated troponin levels on admission likely demand ischemia in the setting of underlying sepsis/infectious process. EKG showed NSR at 97 bpm with ILBBB, TWI I and AVL. CE x2: Trop 127-->98, CK levels normal. Echo was done that was normal. Aspirin was started. Patients course was c/b diffuse expiratory wheezing likely in the setting of underlying pneumonia. Pt was given Advair and duonebs.        **Incomplete 80 y/o male with a PMHx of DM, BPH and asthma presents to ED with intermittent fevers, chills, fatigue and nausea, found to be febrile and tachycardic with leukocytosis meeting criteria for sepsis likely secondary to right lower lobe pneumonia, complicated by elevated troponin levels and hyponatremia.        Hospital course:    Patient was admitted for sepsis secondary to RLL PNA. CXR showed RLL pneumonia. He was given IVF and IV ceftriaxone. He was on Zithromax but the urine legionella came back negative so Zithromax was DC'ed. Urine cx and blood cx were negative. He also had elevated troponin levels on admission likely demand ischemia in the setting of underlying sepsis/infectious process. EKG showed NSR at 97 bpm with ILBBB, TWI I and AVL. CE x2: Trop 127-->98, CK levels normal. Echo was done that was normal. Aspirin was started. Cardiology consulted, low suspicion for ACS, no inpatient workup needed. Patients course was c/b diffuse expiratory wheezing likely in the setting of underlying pneumonia. Pt was given Advair and duonebs with improvement. IV ceftriaxone changed to ***, Pt to complete total 6 days of abx *****        **Incomplete 78 y/o male with a PMHx of DM, BPH and asthma presents to ED with intermittent fevers, chills, fatigue and nausea, found to be febrile and tachycardic with leukocytosis meeting criteria for sepsis likely secondary to right lower lobe pneumonia, complicated by elevated troponin levels and hyponatremia.        Hospital course:    Patient was admitted for sepsis secondary to RLL PNA. CXR showed RLL pneumonia. He was given IVF and IV ceftriaxone. He was on Zithromax but the urine legionella came back negative so Zithromax was DC'ed. Urine cx and blood cx were negative. He also had elevated troponin levels on admission likely demand ischemia in the setting of underlying sepsis/infectious process. EKG showed NSR at 97 bpm with ILBBB, TWI I and AVL. CE x2: Trop 127-->98, CK levels normal. Echo was done that was normal. Aspirin was started. Cardiology consulted, low suspicion for ACS, no inpatient workup needed. outpatient stress test. Patients course was c/b diffuse expiratory wheezing likely in the setting of underlying pneumonia. Pt was given Advair and duonebs with improvement. IV ceftriaxone changed to ***, Pt to complete total 6 days of abx *****        **Incomplete 80 y/o male with a PMHx of DM, BPH and asthma presents to ED with intermittent fevers, chills, fatigue and nausea, found to be febrile and tachycardic with leukocytosis meeting criteria for sepsis likely secondary to right lower lobe pneumonia, complicated by elevated troponin levels and hyponatremia.        Hospital course:    Patient was admitted for sepsis secondary to RLL PNA. CXR showed RLL pneumonia. He was given IVF and IV ceftriaxone. He was on Zithromax but the urine legionella came back negative so Zithromax was DC'ed. Urine cx and blood cx were negative. He also had elevated troponin levels on admission likely demand ischemia in the setting of underlying sepsis/infectious process. EKG showed NSR at 97 bpm with ILBBB, TWI I and AVL. CE x2: Trop 127-->98, CK levels normal. Echo was done that was normal. Aspirin was started. Cardiology consulted, low suspicion for ACS, no inpatient workup needed. outpatient stress test. Patients course was c/b diffuse expiratory wheezing likely in the setting of underlying pneumonia. Pt was given Advair and duonebs with improvement. Pt completed 5 days of IV ceftriaxone. PT assessed pt no needs        As per Dr White pt cleared for discharge on 12/2. Reviewed discharge medications with patient; All new medications requiring new prescription sent to pharmacy of patients choice. Reviewed need for prescription for previous home medication and new prescriptions sent if requested. Patient in agreement and understands.

## 2019-12-01 NOTE — PROGRESS NOTE ADULT - PROBLEM SELECTOR PLAN 1
Pt meets criteria for sepsis given leukocytosis, tachycardia and fever to 100.6F  Sepsis likely secondary to pneumonia seen on CXR  CXR shows right lower lobe pneumonia.  S/P 1L NS in ED, will continue maintenance IVF with NS at 75 cc/hr for 12 hrs   Continue Zithromax and Ceftriaxone IV  Tylenol PRN for fever  Duonebs PRN for shortness of breath and wheezing  Urine and blood cultures and urine legionella  pending Pt meets criteria for sepsis given leukocytosis, tachycardia and fever to 100.6F  Sepsis likely secondary to pneumonia seen on CXR  CXR shows right lower lobe pneumonia.  S/P 1L NS in ED, will continue maintenance IVF with NS at 75 cc/hr for 12 hrs   on  Zithromax and Ceftriaxone IV  Tylenol PRN for fever  Duonebs PRN for shortness of breath and wheezing  Urine and blood cultures  NGTD  and urine legionella   negative  Will DC Zithromax after today's dose and transition to PO abx to treat for 5-7 day course

## 2019-12-01 NOTE — DISCHARGE NOTE PROVIDER - PROVIDER TOKENS
FREE:[LAST:[Dr. Galeana],PHONE:[(   )    -],FAX:[(   )    -],ADDRESS:[PCP]] FREE:[LAST:[Dr. Galeana],PHONE:[(   )    -],FAX:[(   )    -],ADDRESS:[PCP]],PROVIDER:[TOKEN:[[id number]]]

## 2019-12-01 NOTE — PROGRESS NOTE ADULT - PROBLEM SELECTOR PLAN 4
Start insulin sliding scale for coverage while inpatient  Hold oral hypoglycemics (Metformin)  Check HgA1C  Monitor finger sticks  Diabetic diet Start insulin sliding scale for coverage while inpatient  Hold oral hypoglycemics (Metformin)  HgA1C 6.9  Monitor finger sticks  Diabetic diet

## 2019-12-01 NOTE — DISCHARGE NOTE PROVIDER - NSDCCPCAREPLAN_GEN_ALL_CORE_FT
PRINCIPAL DISCHARGE DIAGNOSIS  Diagnosis: PNA (pneumonia)  Assessment and Plan of Treatment: You were admitted for a pneumonia which is a lung infection. You were given IV antibiotics. You will go home on oral antibiotics. Please monitor for fevers. Please report back to the ER if you have high fevers or high heart rates. Please follow up with your PCP.      SECONDARY DISCHARGE DIAGNOSES  Diagnosis: Troponin level elevated  Assessment and Plan of Treatment: Your cardiac enzymes were elevated. You were started on aspirin. You had an echo done that was normal. Please follow up with your PCP. PRINCIPAL DISCHARGE DIAGNOSIS  Diagnosis: PNA (pneumonia)  Assessment and Plan of Treatment: You were admitted for a pneumonia which is a lung infection. You were given IV antibiotics. You will go home on oral antibiotics. Please monitor for fevers. Please report back to the ER if you have high fevers or high heart rates. Please follow up with your PCP.      SECONDARY DISCHARGE DIAGNOSES  Diagnosis: Troponin level elevated  Assessment and Plan of Treatment: Your cardiac enzymes were elevated. You were started on aspirin. You had an echo done that was normal. Please follow up with your cardiology in 2 weeks    Diagnosis: BPH (benign prostatic hyperplasia)  Assessment and Plan of Treatment: continue home meds, follow up with PMD    Diagnosis: DM (diabetes mellitus)  Assessment and Plan of Treatment: Hypoglycemia management: please check your fingerstick every morning or if you are not feeling well. If your FS is >300mg/dl X3 or more readings please contact your PMD/Endocrinologist. If your FS is low <70mg/dl and/or you have symptoms of very low blood sugar FIRST drink1/2 cup of apple juice (or take 4 glucose tab/tube of glucose gel) and recheck FS in 15mins. Repeat these steps until blood sugar is above 100mg/dl, if NECESSARY. Then call your provider to discuss low blood sugar.   What to expend at followup appointment: please bring a log of your fingerstick and/or your glucometer to you appointment. Your blood sugar tracking will help your diabetes team determine the best plan    Diagnosis: Asthma  Assessment and Plan of Treatment: Continue current medications as prescribed.  Avoid exposures to environmental allergens such as carpets, pets and both first-hand and second-hand smoking.  During seasonal allergy period, take a shower as soon as you get home and change your clothes immediately.  Follow up your routine medical appointments. PRINCIPAL DISCHARGE DIAGNOSIS  Diagnosis: PNA (pneumonia)  Assessment and Plan of Treatment: You were admitted for a pneumonia which is a lung infection. You were given IV antibiotics. You will go home on oral antibiotics. Please monitor for fevers. Please report back to the ER if you have high fevers or high heart rates. Please follow up with your PCP.      SECONDARY DISCHARGE DIAGNOSES  Diagnosis: Troponin level elevated  Assessment and Plan of Treatment: Your cardiac enzymes were elevated. You were started on aspirin. You had an echo done that was normal. recommended outpatient stress. Please follow up with your Dr Bowden in 2 weeks    Diagnosis: BPH (benign prostatic hyperplasia)  Assessment and Plan of Treatment: continue home meds, follow up with PMD    Diagnosis: DM (diabetes mellitus)  Assessment and Plan of Treatment: Hypoglycemia management: please check your fingerstick every morning or if you are not feeling well. If your FS is >300mg/dl X3 or more readings please contact your PMD/Endocrinologist. If your FS is low <70mg/dl and/or you have symptoms of very low blood sugar FIRST drink1/2 cup of apple juice (or take 4 glucose tab/tube of glucose gel) and recheck FS in 15mins. Repeat these steps until blood sugar is above 100mg/dl, if NECESSARY. Then call your provider to discuss low blood sugar.   What to expend at followup appointment: please bring a log of your fingerstick and/or your glucometer to you appointment. Your blood sugar tracking will help your diabetes team determine the best plan    Diagnosis: Asthma  Assessment and Plan of Treatment: Continue current medications as prescribed.  Avoid exposures to environmental allergens such as carpets, pets and both first-hand and second-hand smoking.  During seasonal allergy period, take a shower as soon as you get home and change your clothes immediately.  Follow up your routine medical appointments. PRINCIPAL DISCHARGE DIAGNOSIS  Diagnosis: PNA (pneumonia)  Assessment and Plan of Treatment: You were admitted for a pneumonia which is a lung infection. You were given IV antibiotic and completed course on 12/2. Please monitor for fevers. Please report back to the ER if you have high fevers or high heart rates. Please follow up with your PCP.      SECONDARY DISCHARGE DIAGNOSES  Diagnosis: Troponin level elevated  Assessment and Plan of Treatment: Your cardiac enzymes were elevated. You were started on aspirin. You had an echo done that was normal. recommended outpatient stress. Please follow up with your Dr Bowden in 2 weeks    Diagnosis: BPH (benign prostatic hyperplasia)  Assessment and Plan of Treatment: continue home meds, follow up with PMD    Diagnosis: DM (diabetes mellitus)  Assessment and Plan of Treatment: Hypoglycemia management: please check your fingerstick every morning or if you are not feeling well. If your FS is >300mg/dl X3 or more readings please contact your PMD/Endocrinologist. If your FS is low <70mg/dl and/or you have symptoms of very low blood sugar FIRST drink1/2 cup of apple juice (or take 4 glucose tab/tube of glucose gel) and recheck FS in 15mins. Repeat these steps until blood sugar is above 100mg/dl, if NECESSARY. Then call your provider to discuss low blood sugar.   What to expend at followup appointment: please bring a log of your fingerstick and/or your glucometer to you appointment. Your blood sugar tracking will help your diabetes team determine the best plan    Diagnosis: Asthma  Assessment and Plan of Treatment: Continue current medications as prescribed.  Avoid exposures to environmental allergens such as carpets, pets and both first-hand and second-hand smoking.  During seasonal allergy period, take a shower as soon as you get home and change your clothes immediately.  Follow up your routine medical appointments. PRINCIPAL DISCHARGE DIAGNOSIS  Diagnosis: PNA (pneumonia)  Assessment and Plan of Treatment: You were admitted for a pneumonia which is a lung infection. You were given IV antibiotic transitioned to PO augmentin to completed course on 12/5. Please monitor for fevers. Please report back to the ER if you have high fevers or high heart rates. Please follow up with your PCP.      SECONDARY DISCHARGE DIAGNOSES  Diagnosis: Troponin level elevated  Assessment and Plan of Treatment: Your cardiac enzymes were elevated. You were started on aspirin. You had an echo done that was normal. recommended outpatient stress. Please follow up with your Dr Bowden in 2 weeks    Diagnosis: BPH (benign prostatic hyperplasia)  Assessment and Plan of Treatment: continue home meds, follow up with PMD    Diagnosis: DM (diabetes mellitus)  Assessment and Plan of Treatment: Hypoglycemia management: please check your fingerstick every morning or if you are not feeling well. If your FS is >300mg/dl X3 or more readings please contact your PMD/Endocrinologist. If your FS is low <70mg/dl and/or you have symptoms of very low blood sugar FIRST drink1/2 cup of apple juice (or take 4 glucose tab/tube of glucose gel) and recheck FS in 15mins. Repeat these steps until blood sugar is above 100mg/dl, if NECESSARY. Then call your provider to discuss low blood sugar.   What to expend at followup appointment: please bring a log of your fingerstick and/or your glucometer to you appointment. Your blood sugar tracking will help your diabetes team determine the best plan    Diagnosis: Asthma  Assessment and Plan of Treatment: Continue current medications as prescribed.  Avoid exposures to environmental allergens such as carpets, pets and both first-hand and second-hand smoking.  During seasonal allergy period, take a shower as soon as you get home and change your clothes immediately.  Follow up your routine medical appointments.

## 2019-12-01 NOTE — DISCHARGE NOTE PROVIDER - NSDCMRMEDTOKEN_GEN_ALL_CORE_FT
Advair Diskus 250 mcg-50 mcg inhalation powder: 1 puff(s) inhaled 2 times a day  metFORMIN 500 mg oral tablet: 0.5 tab(s) orally 2 times a day  tamsulosin 0.4 mg oral capsule: 1 cap(s) orally once a day (at bedtime)  Ventolin HFA 90 mcg/inh inhalation aerosol: 2 puff(s) inhaled every 6 hours acetaminophen 325 mg oral tablet: 2 tab(s) orally every 6 hours, As needed, Temp greater or equal to 38C (100.4F), Mild Pain (1 - 3), Moderate Pain (4 - 6)  Advair Diskus 250 mcg-50 mcg inhalation powder: 1 puff(s) inhaled 2 times a day  aspirin 81 mg oral delayed release tablet: 1 tab(s) orally once a day  benzonatate 100 mg oral capsule: 1 cap(s) orally 3 times a day, As needed, Cough  lactulose 10 g/15 mL oral syrup: 22.5 milliliter(s) orally 2 times a day, As needed, constipation  metFORMIN 500 mg oral tablet: 0.5 tab(s) orally 2 times a day  tamsulosin 0.4 mg oral capsule: 1 cap(s) orally once a day (at bedtime)  Ventolin HFA 90 mcg/inh inhalation aerosol: 2 puff(s) inhaled every 6 hours acetaminophen 325 mg oral tablet: 2 tab(s) orally every 6 hours, As needed, Temp greater or equal to 38C (100.4F), Mild Pain (1 - 3), Moderate Pain (4 - 6)  Advair Diskus 250 mcg-50 mcg inhalation powder: 1 puff(s) inhaled 2 times a day  amoxicillin-clavulanate 875 mg-125 mg oral tablet: 1 tab(s) orally 2 times a day to end on 12/5  aspirin 81 mg oral delayed release tablet: 1 tab(s) orally once a day  benzonatate 100 mg oral capsule: 1 cap(s) orally 3 times a day, As needed, Cough  lactulose 10 g/15 mL oral syrup: 22.5 milliliter(s) orally 2 times a day, As needed, constipation  metFORMIN 500 mg oral tablet: 0.5 tab(s) orally 2 times a day  tamsulosin 0.4 mg oral capsule: 1 cap(s) orally once a day (at bedtime)  Ventolin HFA 90 mcg/inh inhalation aerosol: 2 puff(s) inhaled every 6 hours acetaminophen 325 mg oral tablet: 2 tab(s) orally every 6 hours, As needed, Temp greater or equal to 38C (100.4F), Mild Pain (1 - 3), Moderate Pain (4 - 6)  Advair Diskus 250 mcg-50 mcg inhalation powder: 1 puff(s) inhaled 2 times a day  Aspirin Enteric Coated 81 mg oral delayed release tablet: 1 tab(s) orally once a day  benzonatate 100 mg oral capsule: 1 cap(s) orally 3 times a day, As needed, Cough  metFORMIN 500 mg oral tablet: 0.5 tab(s) orally 2 times a day  tamsulosin 0.4 mg oral capsule: 1 cap(s) orally once a day (at bedtime)  Ventolin HFA 90 mcg/inh inhalation aerosol: 2 puff(s) inhaled every 6 hours

## 2019-12-02 ENCOUNTER — TRANSCRIPTION ENCOUNTER (OUTPATIENT)
Age: 79
End: 2019-12-02

## 2019-12-02 VITALS
OXYGEN SATURATION: 96 % | TEMPERATURE: 98 F | DIASTOLIC BLOOD PRESSURE: 55 MMHG | SYSTOLIC BLOOD PRESSURE: 126 MMHG | RESPIRATION RATE: 18 BRPM | HEART RATE: 79 BPM

## 2019-12-02 LAB
ALBUMIN SERPL ELPH-MCNC: 2.8 G/DL — LOW (ref 3.3–5)
ALP SERPL-CCNC: 83 U/L — SIGNIFICANT CHANGE UP (ref 40–120)
ALT FLD-CCNC: 16 U/L — SIGNIFICANT CHANGE UP (ref 4–41)
ANION GAP SERPL CALC-SCNC: 10 MMO/L — SIGNIFICANT CHANGE UP (ref 7–14)
AST SERPL-CCNC: 24 U/L — SIGNIFICANT CHANGE UP (ref 4–40)
BILIRUB SERPL-MCNC: 0.4 MG/DL — SIGNIFICANT CHANGE UP (ref 0.2–1.2)
BUN SERPL-MCNC: 12 MG/DL — SIGNIFICANT CHANGE UP (ref 7–23)
CALCIUM SERPL-MCNC: 8.7 MG/DL — SIGNIFICANT CHANGE UP (ref 8.4–10.5)
CHLORIDE SERPL-SCNC: 100 MMOL/L — SIGNIFICANT CHANGE UP (ref 98–107)
CO2 SERPL-SCNC: 24 MMOL/L — SIGNIFICANT CHANGE UP (ref 22–31)
CREAT SERPL-MCNC: 0.87 MG/DL — SIGNIFICANT CHANGE UP (ref 0.5–1.3)
GLUCOSE SERPL-MCNC: 142 MG/DL — HIGH (ref 70–99)
HCT VFR BLD CALC: 38.9 % — LOW (ref 39–50)
HGB BLD-MCNC: 13 G/DL — SIGNIFICANT CHANGE UP (ref 13–17)
MAGNESIUM SERPL-MCNC: 2 MG/DL — SIGNIFICANT CHANGE UP (ref 1.6–2.6)
MCHC RBC-ENTMCNC: 30.7 PG — SIGNIFICANT CHANGE UP (ref 27–34)
MCHC RBC-ENTMCNC: 33.4 % — SIGNIFICANT CHANGE UP (ref 32–36)
MCV RBC AUTO: 91.7 FL — SIGNIFICANT CHANGE UP (ref 80–100)
NRBC # FLD: 0 K/UL — SIGNIFICANT CHANGE UP (ref 0–0)
PHOSPHATE SERPL-MCNC: 2.7 MG/DL — SIGNIFICANT CHANGE UP (ref 2.5–4.5)
PLATELET # BLD AUTO: 224 K/UL — SIGNIFICANT CHANGE UP (ref 150–400)
PMV BLD: 10.6 FL — SIGNIFICANT CHANGE UP (ref 7–13)
POTASSIUM SERPL-MCNC: 4 MMOL/L — SIGNIFICANT CHANGE UP (ref 3.5–5.3)
POTASSIUM SERPL-SCNC: 4 MMOL/L — SIGNIFICANT CHANGE UP (ref 3.5–5.3)
PROT SERPL-MCNC: 6.8 G/DL — SIGNIFICANT CHANGE UP (ref 6–8.3)
RBC # BLD: 4.24 M/UL — SIGNIFICANT CHANGE UP (ref 4.2–5.8)
RBC # FLD: 13.3 % — SIGNIFICANT CHANGE UP (ref 10.3–14.5)
SODIUM SERPL-SCNC: 134 MMOL/L — LOW (ref 135–145)
WBC # BLD: 7.4 K/UL — SIGNIFICANT CHANGE UP (ref 3.8–10.5)
WBC # FLD AUTO: 7.4 K/UL — SIGNIFICANT CHANGE UP (ref 3.8–10.5)

## 2019-12-02 PROCEDURE — 99239 HOSP IP/OBS DSCHRG MGMT >30: CPT

## 2019-12-02 RX ORDER — LACTULOSE 10 G/15ML
22.5 SOLUTION ORAL
Qty: 100 | Refills: 0
Start: 2019-12-02 | End: 2019-12-15

## 2019-12-02 RX ORDER — ASPIRIN/CALCIUM CARB/MAGNESIUM 324 MG
1 TABLET ORAL
Qty: 30 | Refills: 0
Start: 2019-12-02 | End: 2019-12-31

## 2019-12-02 RX ORDER — LACTULOSE 10 G/15ML
15 SOLUTION ORAL
Refills: 0 | Status: DISCONTINUED | OUTPATIENT
Start: 2019-12-02 | End: 2019-12-02

## 2019-12-02 RX ORDER — ACETAMINOPHEN 500 MG
2 TABLET ORAL
Qty: 0 | Refills: 0 | DISCHARGE
Start: 2019-12-02

## 2019-12-02 RX ADMIN — Medication 2: at 12:56

## 2019-12-02 RX ADMIN — BUDESONIDE AND FORMOTEROL FUMARATE DIHYDRATE 2 PUFF(S): 160; 4.5 AEROSOL RESPIRATORY (INHALATION) at 08:50

## 2019-12-02 RX ADMIN — Medication 2: at 08:51

## 2019-12-02 RX ADMIN — HEPARIN SODIUM 5000 UNIT(S): 5000 INJECTION INTRAVENOUS; SUBCUTANEOUS at 10:22

## 2019-12-02 RX ADMIN — LACTULOSE 15 GRAM(S): 10 SOLUTION ORAL at 08:50

## 2019-12-02 RX ADMIN — Medication 81 MILLIGRAM(S): at 11:02

## 2019-12-02 RX ADMIN — CEFTRIAXONE 100 MILLIGRAM(S): 500 INJECTION, POWDER, FOR SOLUTION INTRAMUSCULAR; INTRAVENOUS at 11:03

## 2019-12-02 RX ADMIN — Medication 100 MILLIGRAM(S): at 11:02

## 2019-12-02 NOTE — PROGRESS NOTE ADULT - ATTENDING COMMENTS
Nausea/vomiting: vomited once during visit. Etiology unclear - possible due to RLL pneumonia.  - Zofran 4 mg IV q8h PRN  - abdominal exam benign  - monitor
Spent 35 mins on dc  Outpt f/u PCP and cardiology
PT eval as pt has stairs at home

## 2019-12-02 NOTE — CONSULT NOTE ADULT - SUBJECTIVE AND OBJECTIVE BOX
David Bowden MD  Interventional Cardiology / Advance Heart Failure and Cardiac Transplant Specialist  [place] Office : [phone] [place] [address] [place] [address]  Tel:   [employer] Office : [phone] [facility] [place] [address]  Tel: [phone]  Cell : [phone]      HISTORY OF PRESENTING ILLNESS:  HPI:  80 y/o male with a PMHx of DM, BPH and asthma presents to ED with intermittent fevers for the past three days. Pt states that he never took his temperature over the past few days, but he felt warm to touch (as per his granddaughter) which fluctuated with intermittent chills and shivering. Pt has been taking Tylenol as needed for the "fevers" which provided some help. Pt admits to associated nausea with two episodes of non-bloody and non-bilious vomiting yesterday, which is accompanied by diminished appetite. Patient's granddaughter reports that he does not drink a lot of water on a daily basis and his appetite has decreased since this started. Pt also elicits feeling generalized weakness and unsteadiness for the past few days. Pt says he feels "off balance" when he ambulates. Pt had an episode of dyspnea yesterday when ambulating to the bathroom which he used his inhaler for with mild alleviation of his symptoms. Pt denies recent travel, sick contacts, headache, dizziness, visual deficits, chest pain, shortness of breath at rest, orthopnea, palpitations, abdominal pain, diarrhea, constipation, hematochezia, melena, dysuria, hematuria, LOC, syncope, peripheral edema. Upon arrival to ED, pt found to be febrile to 100.6 and tachycardic to 109 bpm. ED course: EKG: NSR at 97 bpm with ILBBB, TWI I and AVL. CE x2: Trop 127-->98. WBC: 11.44. Na: 133. Glucose: 245. UA: > 1000 glucose, blood, small LE, bacteria. CXR: Right lower lobe pneumonia. Pt was given IVF, Ceftriaxone and Zithromax. Pt is now admitted to telemetry.  Pt doing better with abx, no cp, SOB improving, cough improving, less weak, denies any chest pains on exertion as out pt, has SOB sec to asthma which he has had for years stable    PAST MEDICAL & SURGICAL HISTORY:  BPH (benign prostatic hyperplasia)  DM (diabetes mellitus)  Asthma  S/P cataract extraction      SOCIAL HISTORY: Substance Use (street drugs): ( x ) never used  (  ) other:    FAMILY HISTORY:  No pertinent family history in first degree relatives      REVIEW OF SYSTEMS:  CONSTITUTIONAL: No fever, weight loss, or fatigue  EYES: No eye pain, visual disturbances, or discharge  ENMT:  No difficulty hearing, tinnitus, vertigo; No sinus or throat pain  BREASTS: No pain, masses, or nipple discharge  GASTROINTESTINAL: No abdominal or epigastric pain. No nausea, vomiting, or hematemesis; No diarrhea or constipation. No melena or hematochezia.  GENITOURINARY: No dysuria, frequency, hematuria, or incontinence  NEUROLOGICAL: No headaches, memory loss, loss of strength, numbness, or tremors  ENDOCRINE: No heat or cold intolerance; No hair loss  MUSCULOSKELETAL: No joint pain or swelling; No muscle, back, or extremity pain  PSYCHIATRIC: No depression, anxiety, mood swings, or difficulty sleeping  HEME/LYMPH: No easy bruising, or bleeding gums  All others negative    MEDICATIONS:  aspirin enteric coated 81 milliGRAM(s) Oral daily  heparin  Injectable 5000 Unit(s) SubCutaneous every 12 hours  tamsulosin 0.4 milliGRAM(s) Oral at bedtime    cefTRIAXone   IVPB 1000 milliGRAM(s) IV Intermittent every 24 hours    albuterol/ipratropium for Nebulization 3 milliLiter(s) Nebulizer every 6 hours PRN  benzonatate 100 milliGRAM(s) Oral three times a day PRN  budesonide 160 MICROgram(s)/formoterol 4.5 MICROgram(s) Inhaler 2 Puff(s) Inhalation two times a day  tiotropium 18 MICROgram(s) Capsule 1 Capsule(s) Inhalation daily    acetaminophen   Tablet .. 650 milliGRAM(s) Oral every 6 hours PRN  ondansetron Injectable 4 milliGRAM(s) IV Push every 8 hours PRN    lactulose Syrup 15 Gram(s) Oral two times a day PRN    dextrose 40% Gel 15 Gram(s) Oral once PRN  dextrose 50% Injectable 12.5 Gram(s) IV Push once  dextrose 50% Injectable 25 Gram(s) IV Push once  dextrose 50% Injectable 25 Gram(s) IV Push once  glucagon  Injectable 1 milliGRAM(s) IntraMuscular once PRN  insulin lispro (HumaLOG) corrective regimen sliding scale   SubCutaneous three times a day before meals  insulin lispro (HumaLOG) corrective regimen sliding scale   SubCutaneous at bedtime    dextrose 5%. 1000 milliLiter(s) IV Continuous <Continuous>      FAMILY HISTORY:  No pertinent family history in first degree relatives        Allergies    No Known Allergies    Intolerances    	      PHYSICAL EXAM:  T(C): 36.6 (12-02-19 @ 10:20), Max: 37.5 (12-02-19 @ 01:45)  HR: 79 (12-02-19 @ 10:20) (77 - 88)  BP: 126/55 (12-02-19 @ 10:20) (126/55 - 147/53)  RR: 18 (12-02-19 @ 10:20) (17 - 18)  SpO2: 96% (12-02-19 @ 10:20) (96% - 99%)  Wt(kg): --  I&O's Summary      GENERAL: NAD   EYES: EOMI, PERRLA, conjunctiva and sclera clear  ENMT: No tonsillar erythema, exudates, or enlargement; Moist mucous membranes, Good dentition, No lesions  Cardiovascular: Normal S1 S2, No JVD, No murmurs, No edema  Respiratory: mild b/l rhonchi  Gastrointestinal:  Soft, Non-tender, + BS	  Extremities: Normal range of motion, No clubbing, cyanosis or edema  LYMPH: No lymphadenopathy noted    LABS:	 	    CARDIAC MARKERS:                                  13.0   7.40  )-----------( 224      ( 02 Dec 2019 07:30 )             38.9     12-02    134<L>  |  100  |  12  ----------------------------<  142<H>  4.0   |  24  |  0.87    Ca    8.7      02 Dec 2019 07:30  Phos  2.7     12-02  Mg     2.0     12-02    TPro  6.8  /  Alb  2.8<L>  /  TBili  0.4  /  DBili  x   /  AST  24  /  ALT  16  /  AlkPhos  83  12-02    proBNP:   Lipid Profile:   HgA1c:   TSH:     Consultant(s) Notes Reviewed:  [x ] YES  [ ] NO    Care Discussed with Consultants/Other Providers [ x] YES  [ ] NO    Imaging Personally Reviewed independently:  [x] YES  [ ] NO    All labs, radiologic studies, vitals, orders and medications list reviewed. Patient is seen and examined at bedside. Case discussed with medical team.    ASSESSMENT/PLAN:

## 2019-12-02 NOTE — PROGRESS NOTE ADULT - PROBLEM SELECTOR PLAN 1
Pt meets criteria for sepsis given leukocytosis, tachycardia and fever to 100.6F  Sepsis likely secondary to pneumonia seen on CXR  CXR shows right lower lobe pneumonia.  S/P 1L NS in ED, will continue maintenance IVF with NS at 75 cc/hr for 12 hrs    Zithromax and Ceftriaxone IV will transition to augmentin on dc  Tylenol PRN for fever  Duonebs PRN for shortness of breath and wheezing  Urine and blood cultures  NGTD  and urine legionella   negative

## 2019-12-02 NOTE — PHYSICAL THERAPY INITIAL EVALUATION ADULT - ADDITIONAL COMMENTS
Pt lives in house with family,  with 2-3 steps to enter. Pt was independent in all ADLs and ambulation prior to admission. No prior use of an assistive device.

## 2019-12-02 NOTE — PHYSICAL THERAPY INITIAL EVALUATION ADULT - PERTINENT HX OF CURRENT PROBLEM, REHAB EVAL
As per H&P: "80 y/o male with a PMHx of DM, BPH and asthma presents to ED with intermittent fevers, chills, fatigue and nausea, found to be febrile and tachycardic with leukocytosis meeting criteria for sepsis likely secondary to right lower lobe pneumonia, complicated by elevated troponin levels and hyponatremia."

## 2019-12-02 NOTE — CONSULT NOTE ADULT - ASSESSMENT
EKG - NSR LBBB  echo - normal LV     a/p     1) Tropinin elevation - likely type 2 sec to sepsis, no current chest pains, will follow up in office as out pt for out pt stress test     2) Pneumonia - on abx     3) DVT prophylasix - sc heaprin

## 2019-12-02 NOTE — PROGRESS NOTE ADULT - PROBLEM SELECTOR PLAN 8
Attempted to reach out to PCP, unavailable, [facility] system with multiple different physicians

## 2019-12-02 NOTE — CHART NOTE - NSCHARTNOTEFT_GEN_A_CORE
Pt with elevated trop, normal CK. Normal TTE. CP free. Cardiology consulted for elevated trop. As per Dr Bowden pt cleared for discharge. Followup in his clinic

## 2019-12-02 NOTE — PROGRESS NOTE ADULT - PROBLEM SELECTOR PLAN 2
Likely demand ischemia in the setting of underlying sepsis/infectious process  Less likely ACS given no anginal symptoms and normal CK/CKMB levels  EKG shows NSR at 97 bpm with ILBBB, TWI I and AVL  Delta troponin 127-->98, CK and CKMB levels normal  ASA 81mg started  Echocardiogram with no acute findings, cards cleared pt  Monitor on telemetry, no acute events on tele , NSR 80s

## 2019-12-02 NOTE — PROGRESS NOTE ADULT - PROBLEM SELECTOR PLAN 4
Type 2 DM  Start insulin sliding scale for coverage while inpatient  Hold oral hypoglycemics (Metformin)  HgA1C 6.9  Monitor finger sticks  Diabetic diet

## 2019-12-02 NOTE — DISCHARGE NOTE NURSING/CASE MANAGEMENT/SOCIAL WORK - PATIENT PORTAL LINK FT
You can access the FollowMyHealth Patient Portal offered by [facility] by registering at the following website: [email or web address]. By joining "[employer]’s FollowMyHealth portal, you will also be able to view your health information using other applications (apps) compatible with our system.

## 2019-12-02 NOTE — PROGRESS NOTE ADULT - SUBJECTIVE AND OBJECTIVE BOX
[facility] Division of Hospital Medicine  Ismael White MD  Pager [phone]      Patient is a 79y old  Male who presents with a chief complaint of Fever, chills, weakness (02 Dec 2019 11:14)      SUBJECTIVE / OVERNIGHT EVENTS: Improved cough and SOB    MEDICATIONS  (STANDING):  aspirin enteric coated 81 milliGRAM(s) Oral daily  budesonide 160 MICROgram(s)/formoterol 4.5 MICROgram(s) Inhaler 2 Puff(s) Inhalation two times a day  dextrose 5%. 1000 milliLiter(s) (50 mL/Hr) IV Continuous <Continuous>  dextrose 50% Injectable 12.5 Gram(s) IV Push once  dextrose 50% Injectable 25 Gram(s) IV Push once  dextrose 50% Injectable 25 Gram(s) IV Push once  heparin  Injectable 5000 Unit(s) SubCutaneous every 12 hours  insulin lispro (HumaLOG) corrective regimen sliding scale   SubCutaneous three times a day before meals  insulin lispro (HumaLOG) corrective regimen sliding scale   SubCutaneous at bedtime  tamsulosin 0.4 milliGRAM(s) Oral at bedtime  tiotropium 18 MICROgram(s) Capsule 1 Capsule(s) Inhalation daily    MEDICATIONS  (PRN):  acetaminophen   Tablet .. 650 milliGRAM(s) Oral every 6 hours PRN Temp greater or equal to 38C (100.4F), Mild Pain (1 - 3), Moderate Pain (4 - 6)  albuterol/ipratropium for Nebulization 3 milliLiter(s) Nebulizer every 6 hours PRN Shortness of Breath and/or Wheezing  benzonatate 100 milliGRAM(s) Oral three times a day PRN Cough  dextrose 40% Gel 15 Gram(s) Oral once PRN Blood Glucose LESS THAN 70 milliGRAM(s)/deciliter  glucagon  Injectable 1 milliGRAM(s) IntraMuscular once PRN Glucose LESS THAN 70 milligrams/deciliter  lactulose Syrup 15 Gram(s) Oral two times a day PRN constipation  ondansetron Injectable 4 milliGRAM(s) IV Push every 8 hours PRN Nausea and/or Vomiting      CAPILLARY BLOOD GLUCOSE      POCT Blood Glucose.: 209 mg/dL (02 Dec 2019 12:44)  POCT Blood Glucose.: 239 mg/dL (02 Dec 2019 08:45)  POCT Blood Glucose.: 150 mg/dL (01 Dec 2019 22:19)  POCT Blood Glucose.: 228 mg/dL (01 Dec 2019 17:49)    I&O's Summary      PHYSICAL EXAM:  Vital Signs Last 24 Hrs  T(C): 36.6 (02 Dec 2019 10:20), Max: 37.5 (02 Dec 2019 01:45)  T(F): 97.9 (02 Dec 2019 10:20), Max: 99.5 (02 Dec 2019 01:45)  HR: 79 (02 Dec 2019 10:20) (77 - 88)  BP: 126/55 (02 Dec 2019 10:20) (126/55 - 147/53)  BP(mean): --  RR: 18 (02 Dec 2019 10:20) (17 - 18)  SpO2: 96% (02 Dec 2019 10:20) (96% - 99%)    CONSTITUTIONAL: NAD  EYES: PERRLA; conjunctiva and sclera clear  ENMT: mmm  NECK: Supple, no palpable masses; no thyromegaly  RESPIRATORY: Normal respiratory effort; lungs are clear to auscultation bilaterally  CARDIOVASCULAR: Regular rate and rhythm, + S1 and S2  ABDOMEN: Nontender to palpation, normoactive bowel sounds, no rebound/guarding  MUSCULOSKELETAL:  no clubbing or cyanosis of digits;   PSYCH: A+O x 3  NEUROLOGY: no gross sensory deficits   SKIN: No rashes;     LABS:                        13.0   7.40  )-----------( 224      ( 02 Dec 2019 07:30 )             38.9     12-02    134<L>  |  100  |  12  ----------------------------<  142<H>  4.0   |  24  |  0.87    Ca    8.7      02 Dec 2019 07:30  Phos  2.7     12-02  Mg     2.0     12-02    TPro  6.8  /  Alb  2.8<L>  /  TBili  0.4  /  DBili  x   /  AST  24  /  ALT  16  /  AlkPhos  83  12-02              Culture - Blood (collected 29 Nov 2019 15:20)  Source: BLOOD  Preliminary Report (01 Dec 2019 15:20):    NO ORGANISMS ISOLATED    NO ORGANISMS ISOLATED AT 48 HRS.    Culture - Blood (collected 29 Nov 2019 14:54)  Source: BLOOD  Preliminary Report (01 Dec 2019 14:55):    NO ORGANISMS ISOLATED    NO ORGANISMS ISOLATED AT 48 HRS.

## 2019-12-02 NOTE — PROGRESS NOTE ADULT - PROBLEM SELECTOR PLAN 5
Pt with diffuse expiratory wheezing likely in the setting of underlying pneumonia  Continue Advair and duonebs q6hrs PRN  Pt oxygenating well on room air  No need for supplemental oxygen or continuous pulse oximetry at this time as respiratory status is stable

## 2019-12-04 LAB
BACTERIA BLD CULT: SIGNIFICANT CHANGE UP
BACTERIA BLD CULT: SIGNIFICANT CHANGE UP

## 2019-12-16 ENCOUNTER — INPATIENT (INPATIENT)
Facility: HOSPITAL | Age: 79
LOS: 1 days | Discharge: HOME | End: 2019-12-18
Attending: HOSPITALIST | Admitting: HOSPITALIST
Payer: MEDICARE

## 2019-12-16 VITALS
TEMPERATURE: 98 F | DIASTOLIC BLOOD PRESSURE: 44 MMHG | OXYGEN SATURATION: 99 % | SYSTOLIC BLOOD PRESSURE: 148 MMHG | HEART RATE: 80 BPM | RESPIRATION RATE: 20 BRPM

## 2019-12-16 DIAGNOSIS — J45.909 UNSPECIFIED ASTHMA, UNCOMPLICATED: ICD-10-CM

## 2019-12-16 DIAGNOSIS — N40.0 BENIGN PROSTATIC HYPERPLASIA WITHOUT LOWER URINARY TRACT SYMPTOMS: ICD-10-CM

## 2019-12-16 DIAGNOSIS — Z29.9 ENCOUNTER FOR PROPHYLACTIC MEASURES, UNSPECIFIED: ICD-10-CM

## 2019-12-16 DIAGNOSIS — E11.9 TYPE 2 DIABETES MELLITUS WITHOUT COMPLICATIONS: ICD-10-CM

## 2019-12-16 DIAGNOSIS — R07.9 CHEST PAIN, UNSPECIFIED: ICD-10-CM

## 2019-12-16 DIAGNOSIS — J18.9 PNEUMONIA, UNSPECIFIED ORGANISM: ICD-10-CM

## 2019-12-16 DIAGNOSIS — Z02.9 ENCOUNTER FOR ADMINISTRATIVE EXAMINATIONS, UNSPECIFIED: ICD-10-CM

## 2019-12-16 DIAGNOSIS — J90 PLEURAL EFFUSION, NOT ELSEWHERE CLASSIFIED: ICD-10-CM

## 2019-12-16 DIAGNOSIS — E87.1 HYPO-OSMOLALITY AND HYPONATREMIA: ICD-10-CM

## 2019-12-16 DIAGNOSIS — Z98.49 CATARACT EXTRACTION STATUS, UNSPECIFIED EYE: Chronic | ICD-10-CM

## 2019-12-16 LAB
ALBUMIN SERPL ELPH-MCNC: 3.5 G/DL — SIGNIFICANT CHANGE UP (ref 3.3–5)
ALP SERPL-CCNC: 72 U/L — SIGNIFICANT CHANGE UP (ref 40–120)
ALT FLD-CCNC: 14 U/L — SIGNIFICANT CHANGE UP (ref 4–41)
ANION GAP SERPL CALC-SCNC: 11 MMO/L — SIGNIFICANT CHANGE UP (ref 7–14)
AST SERPL-CCNC: 20 U/L — SIGNIFICANT CHANGE UP (ref 4–40)
BASE EXCESS BLDV CALC-SCNC: 0.7 MMOL/L — SIGNIFICANT CHANGE UP
BASOPHILS # BLD AUTO: 0.12 K/UL — SIGNIFICANT CHANGE UP (ref 0–0.2)
BASOPHILS NFR BLD AUTO: 1.5 % — SIGNIFICANT CHANGE UP (ref 0–2)
BILIRUB SERPL-MCNC: 0.2 MG/DL — SIGNIFICANT CHANGE UP (ref 0.2–1.2)
BLOOD GAS VENOUS - CREATININE: 0.85 MG/DL — SIGNIFICANT CHANGE UP (ref 0.5–1.3)
BUN SERPL-MCNC: 13 MG/DL — SIGNIFICANT CHANGE UP (ref 7–23)
CALCIUM SERPL-MCNC: 9 MG/DL — SIGNIFICANT CHANGE UP (ref 8.4–10.5)
CHLORIDE BLDV-SCNC: 97 MMOL/L — SIGNIFICANT CHANGE UP (ref 96–108)
CHLORIDE SERPL-SCNC: 96 MMOL/L — LOW (ref 98–107)
CO2 SERPL-SCNC: 25 MMOL/L — SIGNIFICANT CHANGE UP (ref 22–31)
CREAT SERPL-MCNC: 0.96 MG/DL — SIGNIFICANT CHANGE UP (ref 0.5–1.3)
EOSINOPHIL # BLD AUTO: 0.22 K/UL — SIGNIFICANT CHANGE UP (ref 0–0.5)
EOSINOPHIL NFR BLD AUTO: 2.7 % — SIGNIFICANT CHANGE UP (ref 0–6)
GAS PNL BLDV: 132 MMOL/L — LOW (ref 136–146)
GLUCOSE BLDV-MCNC: 147 MG/DL — HIGH (ref 70–99)
GLUCOSE SERPL-MCNC: 169 MG/DL — HIGH (ref 70–99)
HCO3 BLDV-SCNC: 25 MMOL/L — SIGNIFICANT CHANGE UP (ref 20–27)
HCT VFR BLD CALC: 37.3 % — LOW (ref 39–50)
HCT VFR BLDV CALC: 38.2 % — LOW (ref 39–51)
HGB BLD-MCNC: 12.2 G/DL — LOW (ref 13–17)
HGB BLDV-MCNC: 12.4 G/DL — LOW (ref 13–17)
IMM GRANULOCYTES NFR BLD AUTO: 0.2 % — SIGNIFICANT CHANGE UP (ref 0–1.5)
LACTATE BLDV-MCNC: 2.4 MMOL/L — HIGH (ref 0.5–2)
LYMPHOCYTES # BLD AUTO: 3.5 K/UL — HIGH (ref 1–3.3)
LYMPHOCYTES # BLD AUTO: 43.6 % — SIGNIFICANT CHANGE UP (ref 13–44)
MCHC RBC-ENTMCNC: 29.7 PG — SIGNIFICANT CHANGE UP (ref 27–34)
MCHC RBC-ENTMCNC: 32.7 % — SIGNIFICANT CHANGE UP (ref 32–36)
MCV RBC AUTO: 90.8 FL — SIGNIFICANT CHANGE UP (ref 80–100)
MONOCYTES # BLD AUTO: 0.78 K/UL — SIGNIFICANT CHANGE UP (ref 0–0.9)
MONOCYTES NFR BLD AUTO: 9.7 % — SIGNIFICANT CHANGE UP (ref 2–14)
NEUTROPHILS # BLD AUTO: 3.38 K/UL — SIGNIFICANT CHANGE UP (ref 1.8–7.4)
NEUTROPHILS NFR BLD AUTO: 42.3 % — LOW (ref 43–77)
NRBC # FLD: 0 K/UL — SIGNIFICANT CHANGE UP (ref 0–0)
PCO2 BLDV: 40 MMHG — LOW (ref 41–51)
PH BLDV: 7.41 PH — SIGNIFICANT CHANGE UP (ref 7.32–7.43)
PLATELET # BLD AUTO: 345 K/UL — SIGNIFICANT CHANGE UP (ref 150–400)
PMV BLD: 9.8 FL — SIGNIFICANT CHANGE UP (ref 7–13)
PO2 BLDV: 87 MMHG — HIGH (ref 35–40)
POTASSIUM BLDV-SCNC: 3.9 MMOL/L — SIGNIFICANT CHANGE UP (ref 3.4–4.5)
POTASSIUM SERPL-MCNC: 4.4 MMOL/L — SIGNIFICANT CHANGE UP (ref 3.5–5.3)
POTASSIUM SERPL-SCNC: 4.4 MMOL/L — SIGNIFICANT CHANGE UP (ref 3.5–5.3)
PROT SERPL-MCNC: 6.8 G/DL — SIGNIFICANT CHANGE UP (ref 6–8.3)
RBC # BLD: 4.11 M/UL — LOW (ref 4.2–5.8)
RBC # FLD: 13 % — SIGNIFICANT CHANGE UP (ref 10.3–14.5)
SAO2 % BLDV: 96 % — HIGH (ref 60–85)
SODIUM SERPL-SCNC: 132 MMOL/L — LOW (ref 135–145)
WBC # BLD: 8.02 K/UL — SIGNIFICANT CHANGE UP (ref 3.8–10.5)
WBC # FLD AUTO: 8.02 K/UL — SIGNIFICANT CHANGE UP (ref 3.8–10.5)

## 2019-12-16 PROCEDURE — 99223 1ST HOSP IP/OBS HIGH 75: CPT | Mod: GC

## 2019-12-16 PROCEDURE — 71046 X-RAY EXAM CHEST 2 VIEWS: CPT | Mod: 26

## 2019-12-16 PROCEDURE — 71275 CT ANGIOGRAPHY CHEST: CPT | Mod: 26

## 2019-12-16 RX ORDER — LIDOCAINE 4 G/100G
1 CREAM TOPICAL ONCE
Refills: 0 | Status: COMPLETED | OUTPATIENT
Start: 2019-12-16 | End: 2019-12-16

## 2019-12-16 RX ORDER — INSULIN LISPRO 100/ML
VIAL (ML) SUBCUTANEOUS
Refills: 0 | Status: DISCONTINUED | OUTPATIENT
Start: 2019-12-16 | End: 2019-12-18

## 2019-12-16 RX ORDER — PIPERACILLIN AND TAZOBACTAM 4; .5 G/20ML; G/20ML
3.38 INJECTION, POWDER, LYOPHILIZED, FOR SOLUTION INTRAVENOUS ONCE
Refills: 0 | Status: COMPLETED | OUTPATIENT
Start: 2019-12-16 | End: 2019-12-16

## 2019-12-16 RX ORDER — ACETAMINOPHEN 500 MG
650 TABLET ORAL EVERY 6 HOURS
Refills: 0 | Status: DISCONTINUED | OUTPATIENT
Start: 2019-12-16 | End: 2019-12-17

## 2019-12-16 RX ORDER — INSULIN LISPRO 100/ML
VIAL (ML) SUBCUTANEOUS AT BEDTIME
Refills: 0 | Status: DISCONTINUED | OUTPATIENT
Start: 2019-12-16 | End: 2019-12-18

## 2019-12-16 RX ORDER — AZITHROMYCIN 500 MG/1
500 TABLET, FILM COATED ORAL ONCE
Refills: 0 | Status: COMPLETED | OUTPATIENT
Start: 2019-12-16 | End: 2019-12-16

## 2019-12-16 RX ORDER — DEXTROSE 50 % IN WATER 50 %
12.5 SYRINGE (ML) INTRAVENOUS ONCE
Refills: 0 | Status: DISCONTINUED | OUTPATIENT
Start: 2019-12-16 | End: 2019-12-18

## 2019-12-16 RX ORDER — SODIUM CHLORIDE 9 MG/ML
1000 INJECTION, SOLUTION INTRAVENOUS
Refills: 0 | Status: DISCONTINUED | OUTPATIENT
Start: 2019-12-16 | End: 2019-12-18

## 2019-12-16 RX ORDER — DEXTROSE 50 % IN WATER 50 %
15 SYRINGE (ML) INTRAVENOUS ONCE
Refills: 0 | Status: DISCONTINUED | OUTPATIENT
Start: 2019-12-16 | End: 2019-12-18

## 2019-12-16 RX ORDER — BUDESONIDE AND FORMOTEROL FUMARATE DIHYDRATE 160; 4.5 UG/1; UG/1
2 AEROSOL RESPIRATORY (INHALATION)
Refills: 0 | Status: DISCONTINUED | OUTPATIENT
Start: 2019-12-16 | End: 2019-12-18

## 2019-12-16 RX ORDER — TAMSULOSIN HYDROCHLORIDE 0.4 MG/1
1 CAPSULE ORAL
Qty: 0 | Refills: 0 | DISCHARGE

## 2019-12-16 RX ORDER — DEXTROSE 50 % IN WATER 50 %
25 SYRINGE (ML) INTRAVENOUS ONCE
Refills: 0 | Status: DISCONTINUED | OUTPATIENT
Start: 2019-12-16 | End: 2019-12-18

## 2019-12-16 RX ORDER — VANCOMYCIN HCL 1 G
1000 VIAL (EA) INTRAVENOUS ONCE
Refills: 0 | Status: COMPLETED | OUTPATIENT
Start: 2019-12-16 | End: 2019-12-16

## 2019-12-16 RX ORDER — VANCOMYCIN HCL 1 G
750 VIAL (EA) INTRAVENOUS EVERY 24 HOURS
Refills: 0 | Status: DISCONTINUED | OUTPATIENT
Start: 2019-12-17 | End: 2019-12-17

## 2019-12-16 RX ORDER — PIPERACILLIN AND TAZOBACTAM 4; .5 G/20ML; G/20ML
3.38 INJECTION, POWDER, LYOPHILIZED, FOR SOLUTION INTRAVENOUS EVERY 8 HOURS
Refills: 0 | Status: DISCONTINUED | OUTPATIENT
Start: 2019-12-16 | End: 2019-12-17

## 2019-12-16 RX ORDER — GLUCAGON INJECTION, SOLUTION 0.5 MG/.1ML
1 INJECTION, SOLUTION SUBCUTANEOUS ONCE
Refills: 0 | Status: DISCONTINUED | OUTPATIENT
Start: 2019-12-16 | End: 2019-12-18

## 2019-12-16 RX ORDER — TAMSULOSIN HYDROCHLORIDE 0.4 MG/1
0.4 CAPSULE ORAL AT BEDTIME
Refills: 0 | Status: DISCONTINUED | OUTPATIENT
Start: 2019-12-16 | End: 2019-12-18

## 2019-12-16 RX ORDER — HEPARIN SODIUM 5000 [USP'U]/ML
5000 INJECTION INTRAVENOUS; SUBCUTANEOUS EVERY 12 HOURS
Refills: 0 | Status: DISCONTINUED | OUTPATIENT
Start: 2019-12-16 | End: 2019-12-18

## 2019-12-16 RX ORDER — METFORMIN HYDROCHLORIDE 850 MG/1
0.5 TABLET ORAL
Qty: 0 | Refills: 0 | DISCHARGE

## 2019-12-16 RX ORDER — ALBUTEROL 90 UG/1
2 AEROSOL, METERED ORAL EVERY 6 HOURS
Refills: 0 | Status: DISCONTINUED | OUTPATIENT
Start: 2019-12-16 | End: 2019-12-18

## 2019-12-16 RX ADMIN — LIDOCAINE 1 PATCH: 4 CREAM TOPICAL at 21:22

## 2019-12-16 RX ADMIN — BUDESONIDE AND FORMOTEROL FUMARATE DIHYDRATE 2 PUFF(S): 160; 4.5 AEROSOL RESPIRATORY (INHALATION) at 22:14

## 2019-12-16 RX ADMIN — AZITHROMYCIN 255 MILLIGRAM(S): 500 TABLET, FILM COATED ORAL at 16:11

## 2019-12-16 RX ADMIN — Medication 250 MILLIGRAM(S): at 18:05

## 2019-12-16 RX ADMIN — LIDOCAINE 1 PATCH: 4 CREAM TOPICAL at 18:02

## 2019-12-16 RX ADMIN — TAMSULOSIN HYDROCHLORIDE 0.4 MILLIGRAM(S): 0.4 CAPSULE ORAL at 22:07

## 2019-12-16 RX ADMIN — PIPERACILLIN AND TAZOBACTAM 25 GRAM(S): 4; .5 INJECTION, POWDER, LYOPHILIZED, FOR SOLUTION INTRAVENOUS at 22:06

## 2019-12-16 RX ADMIN — PIPERACILLIN AND TAZOBACTAM 200 GRAM(S): 4; .5 INJECTION, POWDER, LYOPHILIZED, FOR SOLUTION INTRAVENOUS at 15:35

## 2019-12-16 RX ADMIN — LIDOCAINE 1 PATCH: 4 CREAM TOPICAL at 09:46

## 2019-12-16 NOTE — PATIENT PROFILE ADULT - HOME ACCESSIBILITY CONCERNS
Consent: The patient's consent was obtained including but not limited to risks of crusting, scabbing, blistering, scarring, darker or lighter pigmentary change, recurrence, incomplete removal and infection. Detail Level: Simple Number Of Freeze-Thaw Cycles: 2 freeze-thaw cycles Duration Of Freeze Thaw-Cycle (Seconds): 5 Render Post-Care Instructions In Note?: no Post-Care Instructions: I reviewed with the patient in detail post-care instructions. Patient is to wear sunprotection, and avoid picking at any of the treated lesions. Pt may apply Vaseline to crusted or scabbing areas. stairs to enter home

## 2019-12-16 NOTE — H&P ADULT - HISTORY OF PRESENT ILLNESS
79 M with PMH of asthma, DM, BPH presenting with cough and worsening shortness of breath for past few weeks. Patient also with R lower chest wall pain when coughing and taking deep breaths. Patient has had cough for past few weeks, worsening over the past 5 days. Patient denies any chest pain at rest, no dyspnea on exertion. Of note, patient was recently discharged from the hospital after treated for RLL pneumonia with a 5 day course of abx. Patient denies any fevers, chills, headache, dizziness, abdominal pain, nausea, vomiting, diarrhea, dysuria.     In the ED, T 97.5, HR 80, /44, RR 20 satting 99% on room air. Patient given vanc, zosyn, and azithro. Lidocaine patch for chest wall pain. 79 M with PMH of asthma, DM, BPH presenting with worsening R sided chest wall pain when coughing, also worse with deep breaths. Patient also with productive cough for past few weeks. Patient denies any substernal chest pain, no pain at rest, no dyspnea on exertion. Of note, patient was recently discharged from the hospital after completing course of abx for RLL PNA. Patient denies any fevers, chills, headache, dizziness, abdominal pain, nausea, vomiting, diarrhea, dysuria. No recent falls or trauma.     In the ED, T 97.5, HR 80, /44, RR 20 satting 99% on room air. Patient given vanc, zosyn, and azithro. Lidocaine patch for chest wall pain. 80yo Male with MHx of asthma, DM Type 2, BPH presenting with worsening Rt sided chest wall pain when coughing, also worse with deep breaths. Patient also with productive cough for past few weeks. Patient denies any substernal chest pain, no pain at rest, no dyspnea on exertion. Of note, patient was recently discharged from the hospital after completing course of abx for RLL PNA. Patient denies any fevers, chills, headache, dizziness, abdominal pain, nausea, vomiting, diarrhea, dysuria. No recent falls or trauma.     In the ED, T 97.5, HR 80, /44, RR 20 satting 99% on room air. Patient given vanc, zosyn, and azithro. Lidocaine patch for chest wall pain.

## 2019-12-16 NOTE — ED PROVIDER NOTE - ATTENDING CONTRIBUTION TO CARE
DR. BARBOZA, ATTENDING MD-  I performed a face to face bedside interview with the patient regarding history of present illness, review of symptoms and past medical history. I completed an independent physical exam.  I have discussed the patient's plan of care with the resident.   Documentation as above in the note.    78 y/o male h/o recent rll pna two weeks prior here with worsening right lower chest pain, worse with deep insp, cough.  No h/o dvt/pe.  No leg swelling/pain.  Concern for recurrent pna vs ptx vs pleurisy vs pe vs acs.  Obtain cbc, cmp, trop, ctpa, cxr, ekg, give pain med, reassess.

## 2019-12-16 NOTE — H&P ADULT - NSHPPHYSICALEXAM_GEN_ALL_CORE
PHYSICAL EXAM:    Vital Signs Last 24 Hrs  T(C): 36.6 (16 Dec 2019 16:19), Max: 36.7 (16 Dec 2019 09:12)  T(F): 97.9 (16 Dec 2019 16:19), Max: 98.1 (16 Dec 2019 09:12)  HR: 76 (16 Dec 2019 16:19) (76 - 82)  BP: 126/45 (16 Dec 2019 16:19) (126/45 - 160/62)  BP(mean): --  RR: 18 (16 Dec 2019 16:19) (18 - 20)  SpO2: 100% (16 Dec 2019 16:19) (99% - 100%)    General: No acute distress, thin elderly male  HEENT: NCAT.  PERRL.  EOMI.  No scleral icterus or injection.   Neck: Supple.  Full ROM.  No JVD. No lymphadenopathy.   Heart: RRR.  Normal S1 and S2.  No murmurs, rubs, or gallops.   Lungs: CTAB. No wheezes, crackles, or rhonchi. Decreased breath sounds at R base  Abdomen: BS+, soft, NT/ND.   Skin: Warm and dry.  No rashes.  Extremities: No edema, clubbing, or cyanosis.   Musculoskeletal: R sided R pain, tender to palpation. No deformities.   Neuro: A&Ox3.  No neuro focal deficits. Patient moving all extremities PHYSICAL EXAM:    Vital Signs Last 24 Hrs  T(C): 36.6 (16 Dec 2019 16:19), Max: 36.7 (16 Dec 2019 09:12)  T(F): 97.9 (16 Dec 2019 16:19), Max: 98.1 (16 Dec 2019 09:12)  HR: 76 (16 Dec 2019 16:19) (76 - 82)  BP: 126/45 (16 Dec 2019 16:19) (126/45 - 160/62)  BP(mean): --  RR: 18 (16 Dec 2019 16:19) (18 - 20)  SpO2: 100% (16 Dec 2019 16:19) (99% - 100%)    General: No acute distress, thin elderly male  HEENT: NCAT.  PERRL.  EOMI.  No scleral icterus or injection.   Neck: Supple.  Full ROM.  No JVD. No lymphadenopathy.   Heart: RRR.  Normal S1 and S2.  No murmurs, rubs, or gallops.   Lungs: CTAB. No wheezes or rhonchi. Decreased breath sounds at R base  Abdomen: BS+, soft, NT/ND.   Skin: Warm and dry.  No rashes.  Extremities: No edema, clubbing, or cyanosis.   Musculoskeletal: R sided R pain, tender to palpation. No deformities.   Neuro: A&Ox3.  No neuro focal deficits. Patient moving all extremities

## 2019-12-16 NOTE — ED ADULT TRIAGE NOTE - CHIEF COMPLAINT QUOTE
Pt c/o SOB and pain when he breathes on the right side. Pt was recently treated here for  pneumonia.

## 2019-12-16 NOTE — ED PROVIDER NOTE - CARE PLAN
Principal Discharge DX:	Chest pain Principal Discharge DX:	Pneumonia  Secondary Diagnosis:	Pleural effusion  Secondary Diagnosis:	Chest pain

## 2019-12-16 NOTE — H&P ADULT - PROBLEM SELECTOR PLAN 9
Transitions of Care Status:  1.  Name of PCP: Dr. Julia Shah  2.  PCP Contacted on Admission: [ ] Y    [ x] N    3.  PCP contacted at Discharge: [ ] Y    [ ] N    [ ] N/A  4.  Post-Discharge Appointment Date and Location:  5.  Summary of Handoff given to PCP: - DVT ppx: HSQ  - Diet: DASH/CC

## 2019-12-16 NOTE — H&P ADULT - PROBLEM SELECTOR PLAN 3
- chest pain is musculoskeletal and pleuritic most likely 2/2 to pleural effusion  - continue lidocaine patch, tylenol for pain control - chest pain multifactorial of musculoskeletal and pleuritic etiology most likely 2/2 to pleural effusion  - continue lidocaine patch, tylenol for pain control

## 2019-12-16 NOTE — ED ADULT NURSE NOTE - NSIMPLEMENTINTERV_GEN_ALL_ED
Implemented All Universal Safety Interventions:  [place] to call system. Call bell, personal items and telephone within reach. Instruct patient to call for assistance. Room bathroom lighting operational. Non-slip footwear when patient is off stretcher. Physically safe environment: no spills, clutter or unnecessary equipment. Stretcher in lowest position, wheels locked, appropriate side rails in place.

## 2019-12-16 NOTE — ED PROVIDER NOTE - NS ED ROS FT
GENERAL: No fever or chills  EYES: No change in vision  HEENT: No trouble swallowing or speaking  CARDIAC: No chest pain  PULMONARY: +cough, no SOB  GI: No abdominal pain, no nausea or no vomiting, no diarrhea or constipation  : No changes in urination  SKIN: No rashes  NEURO: No headache, no numbness  MSK: No joint pain  Otherwise as HPI or negative.

## 2019-12-16 NOTE — H&P ADULT - NSHPREVIEWOFSYSTEMS_GEN_ALL_CORE
REVIEW OF SYSTEMS:    CONSTITUTIONAL: No weakness, fevers or chills  EYES/ENT: No visual changes;  No vertigo or throat pain   NECK: No pain or stiffness  RESPIRATORY: + cough, no wheezing, hemoptysis; No shortness of breath  CARDIOVASCULAR: + chest wall pain, no palpitations  GASTROINTESTINAL: No abdominal pain; nausea, vomiting;  diarrhea or constipation. No hematemesis, melena or hematochezia.  GENITOURINARY: No dysuria, frequency or hematuria  NEUROLOGICAL: No numbness or weakness  SKIN: No itching, burning, rashes, or lesions   MUSCULOSKEL: R rib pain  All other review of systems is negative unless indicated above. REVIEW OF SYSTEMS:    CONSTITUTIONAL: No weakness, fevers or chills  EYES/ENT: No visual changes;  No vertigo or throat pain   NECK: No pain or stiffness  RESPIRATORY: + cough, no wheezing, hemoptysis; No shortness of breath  CARDIOVASCULAR: + chest wall pain, no palpitations  GASTROINTESTINAL: No abdominal pain; nausea, vomiting;  diarrhea or constipation. No hematemesis, melena or hematochezia.  GENITOURINARY: No dysuria, frequency or hematuria  NEUROLOGICAL: No numbness or weakness  SKIN: No itching, burning, rashes, or lesions   MUSCULOSKEL: Rt sided rib pain

## 2019-12-16 NOTE — H&P ADULT - PROBLEM SELECTOR PLAN 10
Transitions of Care Status:  1.  Name of PCP: Dr. Julia Shah  2.  PCP Contacted on Admission: [ ] Y    [ x] N    3.  PCP contacted at Discharge: [ ] Y    [ ] N    [ ] N/A  4.  Post-Discharge Appointment Date and Location:  5.  Summary of Handoff given to PCP:

## 2019-12-16 NOTE — H&P ADULT - PROBLEM SELECTOR PLAN 5
- patient with A1c 6.9 on 12/1/19 on metformin at home  - monitor FS, ISS premeal, qhs - hyponatremia to 132 most likely in the setting of PNA  - encourage po intake  - continue to monitor - hyponatremia to 132 most likely in the setting of PNA  - encourage po intake  - monitor lytes daily

## 2019-12-16 NOTE — H&P ADULT - PROBLEM SELECTOR PLAN 1
- patient with cough for past few weeks, recently treated with abx. CTA showing consolidative and patchy opacities in the right middle lobe and right   lower lobe 2/2 to PNA  - most likely resolving PNA  - continue zosyn for now   - f/u blood cultures - patient with cough for past few weeks, recently treated with abx. CTA showing consolidative and patchy opacities in the right middle lobe and right   lower lobe 2/2 to PNA  - most likely resolving PNA  - continue zosyn for now   - f/u blood cultures  - incentive spirometer - patient with cough for past few weeks, recently treated with abx. CTA showing consolidative and patchy opacities in the right middle lobe and right   lower lobe 2/2 to PNA  - continue vanc and zosyn for now   - pulm consult in am  - f/u blood cultures, urine legionella  - incentive spirometer Recently treated PNA, RML;  CTA showing consolidative and patchy opacities in the right middle lobe and right   lower lobe 2/2 to PNA  - continue vanc and zosyn  -s/p Azithromycin in ED   - Pulmonology consult in am, primary team   - f/u blood cultures, urine legionella  - incentive spirometer

## 2019-12-16 NOTE — ED PROVIDER NOTE - CLINICAL SUMMARY MEDICAL DECISION MAKING FREE TEXT BOX
78 y/o M w/ PMH of asthma, BPH, DM presenting w/ cough and pleuritic chest pain. Possible due to residual RLL pneumonia irritating diaphragm, however given recent hospitalization and pleuritic description of pain, will get CTA to eval for PE. Labs. Poss MSK pain, will place lidocaine patch. Reassess the need for additional intervention as clinically indicated.

## 2019-12-16 NOTE — ED ADULT NURSE REASSESSMENT NOTE - NS ED NURSE REASSESS COMMENT FT1
report received form [facility], pt A&Ox4, wife at bedside. endorses chest pain with deep breath, otherwise asymptomatic. respirations even and non labored. pt in for transport to floor.

## 2019-12-16 NOTE — H&P ADULT - ASSESSMENT
79 M with PMH of asthma, DM, BPH presenting with cough and worsening shortness of breath found to have consolidation and patchy opacities in the RML and RLL on CTA, admitted for PNA. 80yo Male with MHx of asthma, DM Type 2, BPH, recently discharged after completing course of abx for RLL PNA readmitted with likely recurrent PNA of RML and RLL c/b new Rt pleural effusion and pleuritic CP;

## 2019-12-16 NOTE — H&P ADULT - NSHPSOCIALHISTORY_GEN_ALL_CORE
Patient denies smoking history or alcohol use. Lives with wife and son. Ambulatory without assistance.

## 2019-12-16 NOTE — H&P ADULT - PROBLEM SELECTOR PLAN 6
- continue tamsulosin TSH on prior admission elevated, 4.66; Possibly due to infection  -f/u outpt with the PCP in 2-4 weeks

## 2019-12-16 NOTE — H&P ADULT - PROBLEM SELECTOR PLAN 4
- hyponatremia to 132 most likely in the setting of poor po intake  - encourage po intake  - continue to monitor - hyponatremia to 132 most likely in the setting of PNA  - encourage po intake  - continue to monitor - patient with A1c 6.9 on 12/1/19 on metformin at home  -hold oral agents  - monitor FS, ISS premeal, qhs   - patient with A1c 6.9 on 12/1/19 on metformin at home  -hold oral agents  - monitor FS, ISS premeal, qhs

## 2019-12-16 NOTE — ED PROVIDER NOTE - OBJECTIVE STATEMENT
78 y/o M w/ PMH of asthma, BPH, DM presenting w/ cough. Triage note states "SOB" but pt denies feeling short of breath. Presents with family. Reports for the past few weeks he has been having persistent cough. Associated w/ R lower chest wall pain when cough and when taking deep breath. Denies pain at rest. Pain w/ cough worsened over the past approx 5 days which prompted ED visit. Denies dyspnea on exertion. Pt has been using topical balms w/ minimal relief. Did not use any medication for his cough. Has been unable to appropriately use inhalers due pain w/ deep breath. Denies trauma to the area. Was recently admitted for a right lower lobe pneumonia. Pt reports this past has been present since discharge, just worsening recently. Denies fevers, chills, headache, dizziness, blurred vision, chest pain, shortness of breath, abdominal pain, n/v/d/c, urinary symptoms, MSK pain, rash. 78 y/o M w/ PMH of asthma, BPH, DM presenting w/ cough. Triage note states "SOB" but pt denies feeling short of breath. Presents with family. Reports for the past few weeks he has been having persistent cough. Associated w/ R lower chest wall pain when cough and when taking deep breath. Denies pain at rest. Pain w/ cough worsened over the past approx 5 days which prompted ED visit. Denies dyspnea on exertion. Pt has been using topical balms w/ minimal relief. Did not use any medication for his cough. Has been unable to appropriately use inhalers due pain w/ deep breath. Denies trauma to the area. Was recently admitted for a right lower lobe pneumonia. Pt reports this past has been present since discharge, just worsening recently. Completed course of abx outpatient. Denies fevers, chills, headache, dizziness, blurred vision, chest pain, shortness of breath, abdominal pain, n/v/d/c, urinary symptoms, MSK pain, rash.

## 2019-12-16 NOTE — H&P ADULT - NSHPLABSRESULTS_GEN_ALL_CORE
CBC Full  -  ( 16 Dec 2019 10:19 )  WBC Count : 8.02 K/uL  Hemoglobin : 12.2 g/dL  Hematocrit : 37.3 %  Platelet Count - Automated : 345 K/uL  Mean Cell Volume : 90.8 fL  Mean Cell Hemoglobin : 29.7 pg  Mean Cell Hemoglobin Concentration : 32.7 %  Auto Neutrophil # : 3.38 K/uL  Auto Lymphocyte # : 3.50 K/uL  Auto Monocyte # : 0.78 K/uL  Auto Eosinophil # : 0.22 K/uL  Auto Basophil # : 0.12 K/uL  Auto Neutrophil % : 42.3 %  Auto Lymphocyte % : 43.6 %  Auto Monocyte % : 9.7 %  Auto Eosinophil % : 2.7 %  Auto Basophil % : 1.5 %    12-16    132<L>  |  96<L>  |  13  ----------------------------<  169<H>  4.4   |  25  |  0.96    Ca    9.0      16 Dec 2019 10:19    TPro  6.8  /  Alb  3.5  /  TBili  0.2  /  DBili  x   /  AST  20  /  ALT  14  /  AlkPhos  72  12-16    LIVER FUNCTIONS - ( 16 Dec 2019 10:19 )  Alb: 3.5 g/dL / Pro: 6.8 g/dL / ALK PHOS: 72 u/L / ALT: 14 u/L / AST: 20 u/L / GGT: x             < from: CT Angio Chest w/ IV Cont (12.16.19 @ 12:25) >      FINDINGS:    No axillary, mediastinal, or hilar lymphadenopathy. The thyroid is normal.    Atherosclerotic changes of the thoracic aorta which is normal in size and   contour. The pulmonary artery is normal in size. No pulmonary embolus.    Patent central airways. Small right pleural effusion. Patchy   consolidation of the right middle lobe and right lower lobe,. The   remainder of the lungs are clear. T pneumothorax.    Below the diaphragm, there are bilateral renal cysts.    Evaluation of the osseous structures demonstratesdegenerative changes.    IMPRESSION:     1.  No pulmonary embolus.  2.  Consolidative and patchy opacities in the right middle lobe and right   lower lobe are likely secondary to a combination of pneumonia and   atelectasis.  3.  Right pleural effusion is new.    < end of copied text > EKG, nsr, 78bpm, qtc 460, LBBB, isolated Tw inv in aVL, no acute ST changes - my reading, unchanged compared to 11/29/19      CBC Full  -  ( 16 Dec 2019 10:19 )  WBC Count : 8.02 K/uL  Hemoglobin : 12.2 g/dL  Hematocrit : 37.3 %  Platelet Count - Automated : 345 K/uL  Mean Cell Volume : 90.8 fL  Mean Cell Hemoglobin : 29.7 pg  Mean Cell Hemoglobin Concentration : 32.7 %  Auto Neutrophil # : 3.38 K/uL  Auto Lymphocyte # : 3.50 K/uL  Auto Monocyte # : 0.78 K/uL  Auto Eosinophil # : 0.22 K/uL  Auto Basophil # : 0.12 K/uL  Auto Neutrophil % : 42.3 %  Auto Lymphocyte % : 43.6 %  Auto Monocyte % : 9.7 %  Auto Eosinophil % : 2.7 %  Auto Basophil % : 1.5 %    12-16    132<L>  |  96<L>  |  13  ----------------------------<  169<H>  4.4   |  25  |  0.96    Ca    9.0      16 Dec 2019 10:19    TPro  6.8  /  Alb  3.5  /  TBili  0.2  /  DBili  x   /  AST  20  /  ALT  14  /  AlkPhos  72  12-16    LIVER FUNCTIONS - ( 16 Dec 2019 10:19 )  Alb: 3.5 g/dL / Pro: 6.8 g/dL / ALK PHOS: 72 u/L / ALT: 14 u/L / AST: 20 u/L / GGT: x             < from: CT Angio Chest w/ IV Cont (12.16.19 @ 12:25) >      FINDINGS:    No axillary, mediastinal, or hilar lymphadenopathy. The thyroid is normal.    Atherosclerotic changes of the thoracic aorta which is normal in size and   contour. The pulmonary artery is normal in size. No pulmonary embolus.    Patent central airways. Small right pleural effusion. Patchy   consolidation of the right middle lobe and right lower lobe,. The   remainder of the lungs are clear. T pneumothorax.    Below the diaphragm, there are bilateral renal cysts.    Evaluation of the osseous structures demonstrates degenerative changes.    IMPRESSION:     1.  No pulmonary embolus.  2.  Consolidative and patchy opacities in the right middle lobe and right   lower lobe are likely secondary to a combination of pneumonia and   atelectasis.  3.  Right pleural effusion is new.    < end of copied text >

## 2019-12-17 ENCOUNTER — TRANSCRIPTION ENCOUNTER (OUTPATIENT)
Age: 79
End: 2019-12-17

## 2019-12-17 DIAGNOSIS — R07.81 PLEURODYNIA: ICD-10-CM

## 2019-12-17 DIAGNOSIS — J18.1 LOBAR PNEUMONIA, UNSPECIFIED ORGANISM: ICD-10-CM

## 2019-12-17 DIAGNOSIS — E11.65 TYPE 2 DIABETES MELLITUS WITH HYPERGLYCEMIA: ICD-10-CM

## 2019-12-17 LAB
ALBUMIN SERPL ELPH-MCNC: 3.3 G/DL — SIGNIFICANT CHANGE UP (ref 3.3–5)
ALP SERPL-CCNC: 63 U/L — SIGNIFICANT CHANGE UP (ref 40–120)
ALT FLD-CCNC: 13 U/L — SIGNIFICANT CHANGE UP (ref 4–41)
ANION GAP SERPL CALC-SCNC: 11 MMO/L — SIGNIFICANT CHANGE UP (ref 7–14)
AST SERPL-CCNC: 17 U/L — SIGNIFICANT CHANGE UP (ref 4–40)
BASOPHILS # BLD AUTO: 0.11 K/UL — SIGNIFICANT CHANGE UP (ref 0–0.2)
BASOPHILS NFR BLD AUTO: 1.5 % — SIGNIFICANT CHANGE UP (ref 0–2)
BILIRUB SERPL-MCNC: 0.3 MG/DL — SIGNIFICANT CHANGE UP (ref 0.2–1.2)
BUN SERPL-MCNC: 11 MG/DL — SIGNIFICANT CHANGE UP (ref 7–23)
CALCIUM SERPL-MCNC: 8.5 MG/DL — SIGNIFICANT CHANGE UP (ref 8.4–10.5)
CHLORIDE SERPL-SCNC: 100 MMOL/L — SIGNIFICANT CHANGE UP (ref 98–107)
CO2 SERPL-SCNC: 24 MMOL/L — SIGNIFICANT CHANGE UP (ref 22–31)
CREAT SERPL-MCNC: 0.96 MG/DL — SIGNIFICANT CHANGE UP (ref 0.5–1.3)
EOSINOPHIL # BLD AUTO: 0.27 K/UL — SIGNIFICANT CHANGE UP (ref 0–0.5)
EOSINOPHIL NFR BLD AUTO: 3.6 % — SIGNIFICANT CHANGE UP (ref 0–6)
GLUCOSE BLDC GLUCOMTR-MCNC: 145 MG/DL — HIGH (ref 70–99)
GLUCOSE BLDC GLUCOMTR-MCNC: 147 MG/DL — HIGH (ref 70–99)
GLUCOSE BLDC GLUCOMTR-MCNC: 170 MG/DL — HIGH (ref 70–99)
GLUCOSE SERPL-MCNC: 107 MG/DL — HIGH (ref 70–99)
HCT VFR BLD CALC: 39.2 % — SIGNIFICANT CHANGE UP (ref 39–50)
HGB BLD-MCNC: 13.1 G/DL — SIGNIFICANT CHANGE UP (ref 13–17)
IMM GRANULOCYTES NFR BLD AUTO: 0.4 % — SIGNIFICANT CHANGE UP (ref 0–1.5)
L PNEUMO AG UR QL: NEGATIVE — SIGNIFICANT CHANGE UP
LYMPHOCYTES # BLD AUTO: 3.19 K/UL — SIGNIFICANT CHANGE UP (ref 1–3.3)
LYMPHOCYTES # BLD AUTO: 42.3 % — SIGNIFICANT CHANGE UP (ref 13–44)
MAGNESIUM SERPL-MCNC: 1.9 MG/DL — SIGNIFICANT CHANGE UP (ref 1.6–2.6)
MCHC RBC-ENTMCNC: 30.6 PG — SIGNIFICANT CHANGE UP (ref 27–34)
MCHC RBC-ENTMCNC: 33.4 % — SIGNIFICANT CHANGE UP (ref 32–36)
MCV RBC AUTO: 91.6 FL — SIGNIFICANT CHANGE UP (ref 80–100)
MONOCYTES # BLD AUTO: 0.71 K/UL — SIGNIFICANT CHANGE UP (ref 0–0.9)
MONOCYTES NFR BLD AUTO: 9.4 % — SIGNIFICANT CHANGE UP (ref 2–14)
NEUTROPHILS # BLD AUTO: 3.24 K/UL — SIGNIFICANT CHANGE UP (ref 1.8–7.4)
NEUTROPHILS NFR BLD AUTO: 42.8 % — LOW (ref 43–77)
NRBC # FLD: 0 K/UL — SIGNIFICANT CHANGE UP (ref 0–0)
PHOSPHATE SERPL-MCNC: 3.1 MG/DL — SIGNIFICANT CHANGE UP (ref 2.5–4.5)
PLATELET # BLD AUTO: 361 K/UL — SIGNIFICANT CHANGE UP (ref 150–400)
PMV BLD: 10.1 FL — SIGNIFICANT CHANGE UP (ref 7–13)
POTASSIUM SERPL-MCNC: 4.1 MMOL/L — SIGNIFICANT CHANGE UP (ref 3.5–5.3)
POTASSIUM SERPL-SCNC: 4.1 MMOL/L — SIGNIFICANT CHANGE UP (ref 3.5–5.3)
PROT SERPL-MCNC: 6.6 G/DL — SIGNIFICANT CHANGE UP (ref 6–8.3)
RBC # BLD: 4.28 M/UL — SIGNIFICANT CHANGE UP (ref 4.2–5.8)
RBC # FLD: 13.2 % — SIGNIFICANT CHANGE UP (ref 10.3–14.5)
SODIUM SERPL-SCNC: 135 MMOL/L — SIGNIFICANT CHANGE UP (ref 135–145)
SPECIMEN SOURCE: SIGNIFICANT CHANGE UP
SPECIMEN SOURCE: SIGNIFICANT CHANGE UP
WBC # BLD: 7.55 K/UL — SIGNIFICANT CHANGE UP (ref 3.8–10.5)
WBC # FLD AUTO: 7.55 K/UL — SIGNIFICANT CHANGE UP (ref 3.8–10.5)

## 2019-12-17 PROCEDURE — 99233 SBSQ HOSP IP/OBS HIGH 50: CPT

## 2019-12-17 PROCEDURE — 99223 1ST HOSP IP/OBS HIGH 75: CPT | Mod: GC

## 2019-12-17 RX ORDER — LIDOCAINE 4 G/100G
1 CREAM TOPICAL ONCE
Refills: 0 | Status: COMPLETED | OUTPATIENT
Start: 2019-12-17 | End: 2019-12-17

## 2019-12-17 RX ORDER — ASPIRIN/CALCIUM CARB/MAGNESIUM 324 MG
81 TABLET ORAL DAILY
Refills: 0 | Status: DISCONTINUED | OUTPATIENT
Start: 2019-12-17 | End: 2019-12-18

## 2019-12-17 RX ORDER — ACETAMINOPHEN 500 MG
650 TABLET ORAL EVERY 6 HOURS
Refills: 0 | Status: COMPLETED | OUTPATIENT
Start: 2019-12-17 | End: 2019-12-18

## 2019-12-17 RX ORDER — POLYETHYLENE GLYCOL 3350 17 G/17G
17 POWDER, FOR SOLUTION ORAL ONCE
Refills: 0 | Status: COMPLETED | OUTPATIENT
Start: 2019-12-17 | End: 2019-12-17

## 2019-12-17 RX ORDER — SENNA PLUS 8.6 MG/1
1 TABLET ORAL ONCE
Refills: 0 | Status: COMPLETED | OUTPATIENT
Start: 2019-12-17 | End: 2019-12-17

## 2019-12-17 RX ADMIN — Medication 650 MILLIGRAM(S): at 23:42

## 2019-12-17 RX ADMIN — BUDESONIDE AND FORMOTEROL FUMARATE DIHYDRATE 2 PUFF(S): 160; 4.5 AEROSOL RESPIRATORY (INHALATION) at 08:51

## 2019-12-17 RX ADMIN — Medication 81 MILLIGRAM(S): at 12:15

## 2019-12-17 RX ADMIN — SENNA PLUS 1 TABLET(S): 8.6 TABLET ORAL at 21:30

## 2019-12-17 RX ADMIN — LIDOCAINE 1 PATCH: 4 CREAM TOPICAL at 19:30

## 2019-12-17 RX ADMIN — LIDOCAINE 1 PATCH: 4 CREAM TOPICAL at 17:54

## 2019-12-17 RX ADMIN — Medication 650 MILLIGRAM(S): at 17:55

## 2019-12-17 RX ADMIN — HEPARIN SODIUM 5000 UNIT(S): 5000 INJECTION INTRAVENOUS; SUBCUTANEOUS at 05:54

## 2019-12-17 RX ADMIN — PIPERACILLIN AND TAZOBACTAM 25 GRAM(S): 4; .5 INJECTION, POWDER, LYOPHILIZED, FOR SOLUTION INTRAVENOUS at 05:53

## 2019-12-17 RX ADMIN — Medication 1: at 12:15

## 2019-12-17 RX ADMIN — HEPARIN SODIUM 5000 UNIT(S): 5000 INJECTION INTRAVENOUS; SUBCUTANEOUS at 17:54

## 2019-12-17 RX ADMIN — POLYETHYLENE GLYCOL 3350 17 GRAM(S): 17 POWDER, FOR SOLUTION ORAL at 21:26

## 2019-12-17 RX ADMIN — PIPERACILLIN AND TAZOBACTAM 25 GRAM(S): 4; .5 INJECTION, POWDER, LYOPHILIZED, FOR SOLUTION INTRAVENOUS at 15:02

## 2019-12-17 RX ADMIN — BUDESONIDE AND FORMOTEROL FUMARATE DIHYDRATE 2 PUFF(S): 160; 4.5 AEROSOL RESPIRATORY (INHALATION) at 21:26

## 2019-12-17 RX ADMIN — TAMSULOSIN HYDROCHLORIDE 0.4 MILLIGRAM(S): 0.4 CAPSULE ORAL at 21:26

## 2019-12-17 NOTE — CONSULT NOTE ADULT - ATTENDING COMMENTS
patient with recent admission for pneumonia, now readmitted for recurrent pleuritic chest pain and effusion. On ultrasound, effusion appears simple and very small, and Ct c/w resolving pneumonia. Agree with plan as above.

## 2019-12-17 NOTE — DISCHARGE NOTE PROVIDER - HOSPITAL COURSE
80 y/o male with a PMHx of DM, BPH and asthma recent adm for CAP- returns with pleuritic CP d/t small pleural effusion- seen by pulmonary- no intervention planned        HOSPITAL COURSE    Pneumonia of right middle lobe due to infectious organism.      - Recently treated PNA, RML;    CTA showing consolidative and patchy opacities in the right middle lobe and right     lower lobe 2/2 to PNA    - d/w pulm- no clinical indication of infection- d/c abx    - f/u blood cultures, urine legionella    - incentive spirometer.         Pleural effusion.      - Pleural effusion seen on CXR and CTA most likely in the setting of PNA    - patient satting well on room air, no need for urgent thoracentesis at this time    - Pulm advises OP f/u     -  monitor O2 sats.         Pleuritic chest pain.      - chest pain multifactorial of musculoskeletal and pleuritic etiology most likely 2/2 to pleural effusion    - continue lidocaine patch, tylenol ATC for pain control- hesitant to give  NSAIDs d/t  age.         Type 2 diabetes mellitus with hyperglycemia, without long-term current use of insulin.      -     - patient with A1c 6.9 on 12/1/19 on metformin at home    - hold oral agents    - monitor FS, ISS premeal, qhs.         Hyponatremia.      - resolved.         R/O Hypothyroidism.     - TSH on prior admission elevated, 4.66; Possibly due to infection    - f/u outpt with the PCP in 2-4 weeks.        BPH (benign prostatic hyperplasia).      - continue tamsulosin.         Asthma.     - continue Symbicort in patient, albuterol prn.         Dispo: 78 y/o male with a PMHx of DM, BPH and asthma recent adm for CAP- returns with pleuritic CP d/t small pleural effusion- seen by pulmonary- no intervention planned        HOSPITAL COURSE    Pneumonia of right middle lobe due to infectious organism.      - Recently treated PNA, RML    - CTA showing consolidative and patchy opacities in the right middle lobe and right     lower lobe 2/2 to PNA    - Pulmonary consult - Pt nontoxic appearing without evidence of infection and no constitutional symptoms, CTA chest showing RLL and RML opacities and R pleural effusion. Symptoms and imaging findings likely associated with resolved pneumonia. No role for thoracentesis at present time; R effusion is small without evidence of complexity. Pt will need repeat CT chest non-contrast in 6 weeks to ensure resolution of opacities and pleural effusions    - BCX NTD, urine legionella neg    - incentive spirometer.         Pleural effusion.      - Pleural effusion seen on CXR and CTA most likely in the setting of PNA    - patient satting well on room air, no need for urgent thoracentesis at this time    - Pulm advises OP f/u     -  monitor O2 sats.         Pleuritic chest pain.      - chest pain multifactorial of musculoskeletal and pleuritic etiology most likely 2/2 to pleural effusion    - continue lidocaine patch, tylenol ATC for pain control         Type 2 diabetes mellitus with hyperglycemia, without long-term current use of insulin.      -     - patient with A1c 6.9 on 12/1/19 on metformin at home    - hold oral agents    - monitor FS, ISS premeal, qhs.         Hyponatremia.      - resolved.         R/O Hypothyroidism.     - TSH on prior admission elevated, 4.66; Possibly due to infection    - f/u outpt with the PCP in 2-4 weeks.        BPH (benign prostatic hyperplasia).      - continue tamsulosin.         Asthma.     - continue Symbicort in patient, albuterol prn.         Dispo: DC home, pt's hospital course was discussed with attending Dr. Dillard on 12/18/19, pt is medically stable for DC 80 y/o male with a PMHx of DM, BPH and asthma recent adm for CAP- returns with pleuritic CP d/t small pleural effusion- seen by pulmonary- no intervention planned        HOSPITAL COURSE    Pneumonia of right middle lobe due to infectious organism.      - Recently treated PNA, RML    - CTA showing consolidative and patchy opacities in the right middle lobe and right     lower lobe 2/2 to PNA    - Pulmonary consult - Pt nontoxic appearing without evidence of infection and no constitutional symptoms, CTA chest showing RLL and RML opacities and R pleural effusion. Symptoms and imaging findings likely associated with resolved pneumonia. No role for thoracentesis at present time; R effusion is small without evidence of complexity. Pt will need repeat CT chest non-contrast in 6 weeks to ensure resolution of opacities and pleural effusions    - BCX NTD, urine legionella neg            Pleural effusion.      - Pleural effusion seen on CXR and CTA most likely in the setting of PNA    - very small        Pleuritic chest pain.  improved on day of d/c    - chest pain multifactorial of musculoskeletal and pleuritic etiology most likely 2/2 to pleural effusion    - continue lidocaine patch, tylenol ATC for pain control         Type 2 diabetes mellitus with hyperglycemia, without long-term current use of insulin.      -     - patient with A1c 6.9 on 12/1/19 on metformin at home    - hold oral agents    - monitor FS, ISS premeal, qhs.                     Asthma.     - continue Symbicort in patient, albuterol prn.         Dispo: DC home, pt's hospital course was discussed with attending Dr. Dillard on 12/18/19, pt is medically stable for DC

## 2019-12-17 NOTE — PROGRESS NOTE ADULT - SUBJECTIVE AND OBJECTIVE BOX
[facility] Division of Hospital Medicine  Jenni Morales MD  Pager [phone]    Patient is a 79y old  Male who presents with a chief complaint of Shortness of breath (16 Dec 2019 19:23)      SUBJECTIVE / OVERNIGHT EVENTS: pt seen and examined at 140p- said he has been getting pain on right side of chest on coughing and deep breathing- no fevers +fatigue  ADDITIONAL REVIEW OF SYSTEMS:    MEDICATIONS  (STANDING):  acetaminophen   Tablet .. 650 milliGRAM(s) Oral every 6 hours  aspirin enteric coated 81 milliGRAM(s) Oral daily  budesonide 160 MICROgram(s)/formoterol 4.5 MICROgram(s) Inhaler 2 Puff(s) Inhalation two times a day  dextrose 5%. 1000 milliLiter(s) (50 mL/Hr) IV Continuous <Continuous>  dextrose 50% Injectable 12.5 Gram(s) IV Push once  dextrose 50% Injectable 25 Gram(s) IV Push once  dextrose 50% Injectable 25 Gram(s) IV Push once  heparin  Injectable 5000 Unit(s) SubCutaneous every 12 hours  insulin lispro (HumaLOG) corrective regimen sliding scale   SubCutaneous three times a day before meals  insulin lispro (HumaLOG) corrective regimen sliding scale   SubCutaneous at bedtime  tamsulosin 0.4 milliGRAM(s) Oral at bedtime    MEDICATIONS  (PRN):  ALBUTerol    90 MICROgram(s) HFA Inhaler 2 Puff(s) Inhalation every 6 hours PRN Shortness of Breath and/or Wheezing  benzonatate 100 milliGRAM(s) Oral three times a day PRN Cough  dextrose 40% Gel 15 Gram(s) Oral once PRN Blood Glucose LESS THAN 70 milliGRAM(s)/deciliter  glucagon  Injectable 1 milliGRAM(s) IntraMuscular once PRN Glucose LESS THAN 70 milligrams/deciliter      CAPILLARY BLOOD GLUCOSE      POCT Blood Glucose.: 170 mg/dL (17 Dec 2019 11:49)  POCT Blood Glucose.: 105 mg/dL (17 Dec 2019 08:10)  POCT Blood Glucose.: 134 mg/dL (16 Dec 2019 22:16)    I&O's Summary      PHYSICAL EXAM:  Vital Signs Last 24 Hrs  T(C): 36.5 (17 Dec 2019 12:14), Max: 36.5 (16 Dec 2019 21:21)  T(F): 97.7 (17 Dec 2019 12:14), Max: 97.7 (16 Dec 2019 21:21)  HR: 85 (17 Dec 2019 12:14) (72 - 85)  BP: 131/68 (17 Dec 2019 12:14) (107/50 - 131/68)  BP(mean): --  RR: 16 (17 Dec 2019 12:14) (16 - 16)  SpO2: 98% (17 Dec 2019 12:14) (98% - 99%)  CONSTITUTIONAL: NAD, well-developed, well-groomed  EYES: conjunctiva and sclera clear  NECK: Supple  RESPIRATORY: Normal respiratory effort; lungs are clear to auscultation bilaterally  CARDIOVASCULAR: Regular rate and rhythm, normal S1 and S2, no murmur/rub/gallop; No lower extremity edema;   ABDOMEN: Nontender to palpation, normoactive bowel sounds, not distended;   MUSCULOSKELETAL:  Normal gait; no clubbing or cyanosis of digits; no joint swelling or tenderness to palpation  PSYCH: A+O to person, place, and time; affect appropriate      LABS:                        13.1   7.55  )-----------( 361      ( 17 Dec 2019 06:09 )             39.2     12-17    135  |  100  |  11  ----------------------------<  107<H>  4.1   |  24  |  0.96    Ca    8.5      17 Dec 2019 06:09  Phos  3.1     12-17  Mg     1.9     12-17    TPro  6.6  /  Alb  3.3  /  TBili  0.3  /  DBili  x   /  AST  17  /  ALT  13  /  AlkPhos  63  12-17                RADIOLOGY & ADDITIONAL TESTS:  Results Reviewed:   Imaging Personally Reviewed:  Electrocardiogram Personally Reviewed:    COORDINATION OF CARE:  Care Discussed with Consultants/Other Providers [Y/N]:  Prior or Outpatient Records Reviewed [Y/N]:

## 2019-12-17 NOTE — PROGRESS NOTE ADULT - ASSESSMENT
80 y/o male with a PMHx of DM, BPH and asthma recent adm for CAP- returns with pleuritic CP d/t small pleural effusion- seen by pulmonary- no intervention planned

## 2019-12-17 NOTE — PROGRESS NOTE ADULT - PROBLEM SELECTOR PLAN 3
- chest pain multifactorial of musculoskeletal and pleuritic etiology most likely 2/2 to pleural effusion  - continue lidocaine patch, tylenol ATC for pain control- hesitant to give  NSAIDs d/t  age

## 2019-12-17 NOTE — PROGRESS NOTE ADULT - PROBLEM SELECTOR PLAN 1
Recently treated PNA, RML;  CTA showing consolidative and patchy opacities in the right middle lobe and right   lower lobe 2/2 to PNA  - d/w pulm- no clinical indication of infection- d/c abx  - f/u blood cultures, urine legionella  - incentive spirometer

## 2019-12-17 NOTE — PROGRESS NOTE ADULT - PROBLEM SELECTOR PLAN 6
TSH on prior admission elevated, 4.66; Possibly due to infection  -f/u outpt with the PCP in 2-4 weeks

## 2019-12-17 NOTE — PROGRESS NOTE ADULT - PROBLEM SELECTOR PLAN 2
- Pleural effusion seen on CXR and CTA most likely in the setting of PNA  - patient satting well on room air, no need for urgent thoracentesis at this time  - Pulm advises OP f/u   -  monitor O2 sats

## 2019-12-17 NOTE — CONSULT NOTE ADULT - SUBJECTIVE AND OBJECTIVE BOX
CHIEF COMPLAINT: R pleuritic CP    HPI: 79M with asthma, T2DM, BPH, recent admission an an OSH for pneumonia presents with R sided pleuritic chest pain. Pt completed his course of antibiotics with resolution of the shortness of breath and cough but the pleuritic chest pain has persisted. Pt did has no fever or leukocytosis since admission. CTA chest with no PE but with RLL and RML opacities and R pleural effusion. Pt was initially treated with abx but they have been dc'd. On point of care ultrasound today, pt had a trace R pleural effusion without any evidence of complexity. Pt denies any weight loss or constitutional symptoms.     BPH (benign prostatic hyperplasia)  DM (diabetes mellitus)  Asthma  S/P cataract extraction      FAMILY HISTORY:  No pertinent family history in first degree relatives      SOCIAL HISTORY:  Smoking: [ x] Never Smoked [ ] Former Smoker (__ packs x ___ years) [ ] Current Smoker  (__ packs x ___ years)  Substance Use: [ x] Never Used [ ] Used ____  EtOH Use: none  Marital Status: [ ] Single [ ]  [ ]  [ ]   Sexual History:   Occupation:  Recent Travel:  Country of Birth:  Advance Directives:    Allergies    No Known Allergies    Intolerances        HOME MEDICATIONS:  Home Medications:  acetaminophen 325 mg oral tablet: 2 tab(s) orally every 6 hours, As needed, Temp greater or equal to 38C (100.4F), Mild Pain (1 - 3), Moderate Pain (4 - 6) (16 Dec 2019 20:23)  Advair Diskus 250 mcg-50 mcg inhalation powder: 1 puff(s) inhaled 2 times a day (16 Dec 2019 20:23)  Aspirin Enteric Coated 81 mg oral delayed release tablet: 1 tab(s) orally once a day (17 Dec 2019 00:49)  metFORMIN 500 mg oral tablet: 0.5 tab(s) orally 2 times a day (16 Dec 2019 20:23)  tamsulosin 0.4 mg oral capsule: 1 cap(s) orally once a day (at bedtime) (16 Dec 2019 20:23)      REVIEW OF SYSTEMS:  Constitutional: [ ] negative [ ] fevers [ ] chills [ ] weight loss [ ] weight gain [ ] malaise  HEENT: [ ] negative [ ] visual disturbances [ ] postnasal drip [ ] nasal congestion [ ] sore throat  CV: [ ] negative [ ] chest pain [ ] palpitations [ ] orthopnea   Resp: [ ] negative [ ] cough [ ] shortness of breath [ ] wheezing [ ] sputum [ ] hemoptysis  GI: [ ] negative [ ] nausea [ ] vomiting [ ] abdominal pain [ ] dysphagia [ ] diarrhea [ ] melena [ ] hematochezia  : [ ] negative [ ] dysuria [ ] nocturia [ ] hematuria [ ] increased urinary frequency  Musculoskeletal: [ ] negative [ ] back pain [ ] myalgias [ ] arthralgias [ ] fracture  Skin: [ ] negative [ ] rash [ ] pruritus  Neurological: [ ] negative [ ] headache [ ] dizziness [ ] syncope [ ] weakness [ ] numbness  Psychiatric: [ ] negative [ ] anxiety [ ] depression  Endocrine: [ ] negative [ ] diabetes [ ] thyroid problem  Hematologic/Lymphatic: [ ] negative [ ] anemia [ ] bleeding problem  Allergic/Immunologic: [ ] hives [ ] angioedema  [ x ] All other systems negative except as noted in HPI  [ ] Unable to assess ROS because ________    OBJECTIVE:  ICU Vital Signs Last 24 Hrs  T(C): 36.5 (17 Dec 2019 12:14), Max: 36.5 (16 Dec 2019 21:21)  T(F): 97.7 (17 Dec 2019 12:14), Max: 97.7 (16 Dec 2019 21:21)  HR: 85 (17 Dec 2019 12:14) (72 - 85)  BP: 131/68 (17 Dec 2019 12:14) (107/50 - 131/68)  BP(mean): --  ABP: --  ABP(mean): --  RR: 16 (17 Dec 2019 12:14) (16 - 16)  SpO2: 98% (17 Dec 2019 12:14) (98% - 99%)        CAPILLARY BLOOD GLUCOSE      POCT Blood Glucose.: 147 mg/dL (17 Dec 2019 17:09)      PHYSICAL EXAM:  General: NAD, appears comfortable  Skin: Warm and dry, no rashes  Neuro: AAOx3, nonfocal  HEENT: PERRL, EOMI, no oral lesions  Neck: Full range of motion, no JVD  Lungs: Clear to ascultation bilatereally, no wheezes, rales, or rhonchi   Heart: Regular rate and rhythm, no murmurs  Abdomen: Normoactive bowl sounds, soft, nontender, nondistended  Extremities: No lower extremity tenderness, erythema, or edema   Psych: normal mood and affect    LINES:     HOSPITAL MEDICATIONS:  Standing Meds:  acetaminophen   Tablet .. 650 milliGRAM(s) Oral every 6 hours  aspirin enteric coated 81 milliGRAM(s) Oral daily  budesonide 160 MICROgram(s)/formoterol 4.5 MICROgram(s) Inhaler 2 Puff(s) Inhalation two times a day  dextrose 5%. 1000 milliLiter(s) IV Continuous <Continuous>  dextrose 50% Injectable 12.5 Gram(s) IV Push once  dextrose 50% Injectable 25 Gram(s) IV Push once  dextrose 50% Injectable 25 Gram(s) IV Push once  heparin  Injectable 5000 Unit(s) SubCutaneous every 12 hours  insulin lispro (HumaLOG) corrective regimen sliding scale   SubCutaneous three times a day before meals  insulin lispro (HumaLOG) corrective regimen sliding scale   SubCutaneous at bedtime  lidocaine   Patch 1 Patch Transdermal once  tamsulosin 0.4 milliGRAM(s) Oral at bedtime      PRN Meds:  ALBUTerol    90 MICROgram(s) HFA Inhaler 2 Puff(s) Inhalation every 6 hours PRN  benzonatate 100 milliGRAM(s) Oral three times a day PRN  dextrose 40% Gel 15 Gram(s) Oral once PRN  glucagon  Injectable 1 milliGRAM(s) IntraMuscular once PRN      LABS:                        13.1   7.55  )-----------( 361      ( 17 Dec 2019 06:09 )             39.2     Hgb Trend: 13.1<--, 12.2<--  12-17    135  |  100  |  11  ----------------------------<  107<H>  4.1   |  24  |  0.96    Ca    8.5      17 Dec 2019 06:09  Phos  3.1     12-17  Mg     1.9     12-17    TPro  6.6  /  Alb  3.3  /  TBili  0.3  /  DBili  x   /  AST  17  /  ALT  13  /  AlkPhos  63  12-17    Creatinine Trend: 0.96<--, 0.96<--, 0.87<--, 0.94<--, 0.89<--, 0.95<--      < from: CT Angio Chest w/ IV Cont (12.16.19 @ 12:25) >  IMPRESSION:     1.  No pulmonary embolus.  2.  Consolidative and patchy opacities in the right middle lobe and right   lower lobe are likely secondary to a combination of pneumonia and   atelectasis.  3.  Right pleural effusion is new.    < end of copied text >

## 2019-12-17 NOTE — PROGRESS NOTE ADULT - PROBLEM SELECTOR PLAN 10
Transitions of Care Status:  1.  Name of PCP: Dr. Julia Shah  2.  PCP Contacted on Admission: [ ] Y    [ x] N    3.  PCP contacted at Discharge: [ ] Y    [ ] N    [ ] N/A  4.  Post-Discharge Appointment Date and Location:  5.  Summary of Handoff given to PCP:  misbah planning 12/18

## 2019-12-17 NOTE — PROGRESS NOTE ADULT - PROBLEM SELECTOR PLAN 4
- patient with A1c 6.9 on 12/1/19 on metformin at home  -hold oral agents  - monitor FS, ISS premeal, qhs

## 2019-12-17 NOTE — CONSULT NOTE ADULT - ASSESSMENT
79M with asthma, T2DM, BPH, recent admission an an OSH for pneumonia presents with R sided pleuritic chest pain. Pt nontoxic appearing without evidence of infection and no constitutional symptoms, CTA chest showing RLL and RML opacities and R pleural effusion. Symptoms and imaging findings likely associated with resolved pneumonia.    - No role for thoracentesis at present time; R effusion is small without evidence of complexity  - Pt will need repeat CT chest non-contrast in 6 weeks to ensure resolution of opacities and pleural effusions  - Patient can follow up with us outpatient at [address] [place] [address], [address]. Number [phone]  Appointments can also be made by e-mailing [email or web address] and providing patient's name, , and discharge diagnosis    Igor Chavis MD  Fellow, Pulmonary and Critical Care Medicine  [facility]  Pager: [phone], [phone] ([facility])  Email: [email or web address] 79M with asthma, T2DM, BPH, recent admission an an OSH for pneumonia presents with R sided pleuritic chest pain. Pt nontoxic appearing without evidence of infection and no constitutional symptoms, CTA chest showing RLL and RML opacities and R pleural effusion. Symptoms and imaging findings likely associated with resolved pneumonia.    - No role for thoracentesis at present time; R effusion is small without evidence of complexity  - Pt will need repeat CT chest non-contrast in 6 weeks to ensure resolution of opacities and pleural effusions  - Patient can follow up with us outpatient at [address] [place] [address], [address]. Number [phone]  Appointments can also be made by e-mailing [email or web address] and providing patient's name, , and discharge diagnosis  - Will sign off    Igor Chavis MD  Fellow, Pulmonary and Critical Care Medicine  [facility]  Pager: [phone], [phone] ([facility])  Email: [email or web address]

## 2019-12-17 NOTE — DISCHARGE NOTE PROVIDER - NSDCMRMEDTOKEN_GEN_ALL_CORE_FT
acetaminophen 325 mg oral tablet: 2 tab(s) orally every 6 hours, As needed, Temp greater or equal to 38C (100.4F), Mild Pain (1 - 3), Moderate Pain (4 - 6)  Advair Diskus 250 mcg-50 mcg inhalation powder: 1 puff(s) inhaled 2 times a day  Aspirin Enteric Coated 81 mg oral delayed release tablet: 1 tab(s) orally once a day  benzonatate 100 mg oral capsule: 1 cap(s) orally 3 times a day, As needed, Cough  metFORMIN 500 mg oral tablet: 0.5 tab(s) orally 2 times a day  tamsulosin 0.4 mg oral capsule: 1 cap(s) orally once a day (at bedtime)  Ventolin HFA 90 mcg/inh inhalation aerosol: 2 puff(s) inhaled every 6 hours

## 2019-12-17 NOTE — DISCHARGE NOTE PROVIDER - NSDCCPCAREPLAN_GEN_ALL_CORE_FT
PRINCIPAL DISCHARGE DIAGNOSIS  Diagnosis: Pneumonia  Assessment and Plan of Treatment: Monitor for worsening of disease, such as, increased cough/sputum, difficulty breathing, fever/chills, or changes in mental status. Follow-up with your PCP as an outpatient for further medical care/recommendations.      SECONDARY DISCHARGE DIAGNOSES  Diagnosis: Chest pain  Assessment and Plan of Treatment:     Diagnosis: Pleural effusion  Assessment and Plan of Treatment:     Diagnosis: Type 2 diabetes mellitus with hyperglycemia, without long-term current use of insulin  Assessment and Plan of Treatment: Continue your medication regimen and a consistent carbohydrate diet (Meaning eating the same amount of carbohydrates at the same time each day). Monitor blood glucose levels throughout the day before meals and at bedtime. Record blood sugars and bring to outpatient providers appointment in order to be reviewed by your doctor for management modifications. Monitor for signs/symptoms of low blood glucose, such as, dizziness, altered mental status, or cool/clammy skin. In addition, monitor for signs/symptoms of high blood glucose, such as, feeling hot, dry, fatigued, or with increased thirst/urination. Make regular podiatry appointments in order to have feet checked for wounds and uncontrolled toe nail growth to prevent infections, as well as, appointments with an ophthalmologist to monitor your vision.    Diagnosis: Hyponatremia  Assessment and Plan of Treatment: You were noted with low sodium levels that have now resolved. Please follow-up as an outpatient with your primary care provider for further care and recommendations.    Diagnosis: Asthma  Assessment and Plan of Treatment: Continue your inhalers and annual pulmonary function tests with your outpatient provider. Monitor for asthma exacerbation, such as, shortness of breath, hyperventilation, or any other difficulties breathing and report to the emergency room in the event that your rescue inhaler has not resolved your symptoms.    Diagnosis: BPH (benign prostatic hyperplasia)  Assessment and Plan of Treatment: Continue your medications as directed and please follow-up as an outpatient with your primary care provider for further care and recommendations. PRINCIPAL DISCHARGE DIAGNOSIS  Diagnosis: Pneumonia  Assessment and Plan of Treatment: Pulmonary evaluated you. Your CT chest showed right lower lobe and right middle lobe opacities and R pleural effusion. Symptoms and imaging findings likely associated with resolved pneumonia. No role for thoracentesis at present time; R effusion is small without evidence of complexity. There was no indication for antibiotics. You will need a repeat CT chest non-contrast in 6 weeks to ensure resolution of opacities and pleural effusion. Please follow up with pulmonary as outpatient for further care and recommendations ( [address] [place] [address], [facility]). You will be contacted for appointemnt details, however if you are not contacted by tomorrow, please call [phone] to make an appointment.      SECONDARY DISCHARGE DIAGNOSES  Diagnosis: Chest pain  Assessment and Plan of Treatment: Continue with tylenol only as needed.    Diagnosis: Pleural effusion  Assessment and Plan of Treatment: Your CT chest showed right lower lobe and right middle lobe opacities and R pleural effusion. Symptoms and imaging findings likely associated with resolved pneumonia. No role for thoracentesis at present time; R effusion is small without evidence of complexity. There was no indication for antibiotics. You will need a repeat CT chest non-contrast in 6 weeks to ensure resolution of opacities and pleural effusion. Please follow up with pulmonary as outpatient for further care and recommendations ( [address] [place] [address], [address]). You will be contacted for appointemnt details, however if you are not contacted by tomorrow, please call [phone] to make an appointment.    Diagnosis: Type 2 diabetes mellitus with hyperglycemia, without long-term current use of insulin  Assessment and Plan of Treatment: Continue your medication regimen and a consistent carbohydrate diet (Meaning eating the same amount of carbohydrates at the same time each day). Monitor blood glucose levels throughout the day before meals and at bedtime. Record blood sugars and bring to outpatient providers appointment in order to be reviewed by your doctor for management modifications. Monitor for signs/symptoms of low blood glucose, such as, dizziness, altered mental status, or cool/clammy skin. In addition, monitor for signs/symptoms of high blood glucose, such as, feeling hot, dry, fatigued, or with increased thirst/urination. Make regular podiatry appointments in order to have feet checked for wounds and uncontrolled toe nail growth to prevent infections, as well as, appointments with an ophthalmologist to monitor your vision.    Diagnosis: Hyponatremia  Assessment and Plan of Treatment: You were noted with low sodium levels that have now resolved. Please follow-up as an outpatient with your primary care provider for further care and recommendations.    Diagnosis: Asthma  Assessment and Plan of Treatment: Continue your inhalers and annual pulmonary function tests with your outpatient provider. Monitor for asthma exacerbation, such as, shortness of breath, hyperventilation, or any other difficulties breathing and report to the emergency room in the event that your rescue inhaler has not resolved your symptoms.    Diagnosis: BPH (benign prostatic hyperplasia)  Assessment and Plan of Treatment: Continue your medications as directed and please follow-up as an outpatient with your primary care provider for further care and recommendations.

## 2019-12-18 ENCOUNTER — TRANSCRIPTION ENCOUNTER (OUTPATIENT)
Age: 79
End: 2019-12-18

## 2019-12-18 VITALS
DIASTOLIC BLOOD PRESSURE: 57 MMHG | TEMPERATURE: 98 F | RESPIRATION RATE: 18 BRPM | HEART RATE: 68 BPM | SYSTOLIC BLOOD PRESSURE: 122 MMHG | OXYGEN SATURATION: 99 %

## 2019-12-18 LAB
ANION GAP SERPL CALC-SCNC: 13 MMO/L — SIGNIFICANT CHANGE UP (ref 7–14)
BASE EXCESS BLDV CALC-SCNC: 0.6 MMOL/L — SIGNIFICANT CHANGE UP
BLOOD GAS VENOUS - CREATININE: 0.91 MG/DL — SIGNIFICANT CHANGE UP (ref 0.5–1.3)
BLOOD GAS VENOUS - FIO2: 21 — SIGNIFICANT CHANGE UP
BUN SERPL-MCNC: 16 MG/DL — SIGNIFICANT CHANGE UP (ref 7–23)
CALCIUM SERPL-MCNC: 8.8 MG/DL — SIGNIFICANT CHANGE UP (ref 8.4–10.5)
CHLORIDE BLDV-SCNC: 100 MMOL/L — SIGNIFICANT CHANGE UP (ref 96–108)
CHLORIDE SERPL-SCNC: 100 MMOL/L — SIGNIFICANT CHANGE UP (ref 98–107)
CO2 SERPL-SCNC: 22 MMOL/L — SIGNIFICANT CHANGE UP (ref 22–31)
CREAT SERPL-MCNC: 0.89 MG/DL — SIGNIFICANT CHANGE UP (ref 0.5–1.3)
GAS PNL BLDV: 134 MMOL/L — LOW (ref 136–146)
GLUCOSE BLDC GLUCOMTR-MCNC: 116 MG/DL — HIGH (ref 70–99)
GLUCOSE BLDV-MCNC: 110 MG/DL — HIGH (ref 70–99)
GLUCOSE SERPL-MCNC: 114 MG/DL — HIGH (ref 70–99)
HCO3 BLDV-SCNC: 25 MMOL/L — SIGNIFICANT CHANGE UP (ref 20–27)
HCT VFR BLD CALC: 40.1 % — SIGNIFICANT CHANGE UP (ref 39–50)
HCT VFR BLDV CALC: 42.2 % — SIGNIFICANT CHANGE UP (ref 39–51)
HGB BLD-MCNC: 13.5 G/DL — SIGNIFICANT CHANGE UP (ref 13–17)
HGB BLDV-MCNC: 13.8 G/DL — SIGNIFICANT CHANGE UP (ref 13–17)
LACTATE BLDV-MCNC: 1 MMOL/L — SIGNIFICANT CHANGE UP (ref 0.5–2)
MAGNESIUM SERPL-MCNC: 2 MG/DL — SIGNIFICANT CHANGE UP (ref 1.6–2.6)
MCHC RBC-ENTMCNC: 30.1 PG — SIGNIFICANT CHANGE UP (ref 27–34)
MCHC RBC-ENTMCNC: 33.7 % — SIGNIFICANT CHANGE UP (ref 32–36)
MCV RBC AUTO: 89.5 FL — SIGNIFICANT CHANGE UP (ref 80–100)
NRBC # FLD: 0 K/UL — SIGNIFICANT CHANGE UP (ref 0–0)
PCO2 BLDV: 40 MMHG — LOW (ref 41–51)
PH BLDV: 7.41 PH — SIGNIFICANT CHANGE UP (ref 7.32–7.43)
PHOSPHATE SERPL-MCNC: 3.5 MG/DL — SIGNIFICANT CHANGE UP (ref 2.5–4.5)
PLATELET # BLD AUTO: 336 K/UL — SIGNIFICANT CHANGE UP (ref 150–400)
PMV BLD: 9.8 FL — SIGNIFICANT CHANGE UP (ref 7–13)
PO2 BLDV: 74 MMHG — HIGH (ref 35–40)
POTASSIUM BLDV-SCNC: 4.1 MMOL/L — SIGNIFICANT CHANGE UP (ref 3.4–4.5)
POTASSIUM SERPL-MCNC: 4.3 MMOL/L — SIGNIFICANT CHANGE UP (ref 3.5–5.3)
POTASSIUM SERPL-SCNC: 4.3 MMOL/L — SIGNIFICANT CHANGE UP (ref 3.5–5.3)
RBC # BLD: 4.48 M/UL — SIGNIFICANT CHANGE UP (ref 4.2–5.8)
RBC # FLD: 13.1 % — SIGNIFICANT CHANGE UP (ref 10.3–14.5)
SAO2 % BLDV: 93.6 % — HIGH (ref 60–85)
SODIUM SERPL-SCNC: 135 MMOL/L — SIGNIFICANT CHANGE UP (ref 135–145)
WBC # BLD: 7.15 K/UL — SIGNIFICANT CHANGE UP (ref 3.8–10.5)
WBC # FLD AUTO: 7.15 K/UL — SIGNIFICANT CHANGE UP (ref 3.8–10.5)

## 2019-12-18 PROCEDURE — 99239 HOSP IP/OBS DSCHRG MGMT >30: CPT

## 2019-12-18 RX ADMIN — Medication 81 MILLIGRAM(S): at 11:28

## 2019-12-18 RX ADMIN — Medication 650 MILLIGRAM(S): at 00:40

## 2019-12-18 RX ADMIN — Medication 650 MILLIGRAM(S): at 11:28

## 2019-12-18 RX ADMIN — LIDOCAINE 1 PATCH: 4 CREAM TOPICAL at 05:07

## 2019-12-18 RX ADMIN — HEPARIN SODIUM 5000 UNIT(S): 5000 INJECTION INTRAVENOUS; SUBCUTANEOUS at 05:15

## 2019-12-18 RX ADMIN — Medication 650 MILLIGRAM(S): at 06:15

## 2019-12-18 RX ADMIN — BUDESONIDE AND FORMOTEROL FUMARATE DIHYDRATE 2 PUFF(S): 160; 4.5 AEROSOL RESPIRATORY (INHALATION) at 08:34

## 2019-12-18 RX ADMIN — Medication 650 MILLIGRAM(S): at 05:16

## 2019-12-18 NOTE — CHART NOTE - NSCHARTNOTEFT_GEN_A_CORE
pt seen and examined at 1010a  was dressed and ready to go home  said he felt better today in terms of cp  for dc home w/ outpt f/u w/ pulm  see dc note in [facility]

## 2019-12-18 NOTE — DISCHARGE NOTE NURSING/CASE MANAGEMENT/SOCIAL WORK - PATIENT PORTAL LINK FT
You can access the FollowMyHealth Patient Portal offered by [facility] by registering at the following website: [email or web address]. By joining [employer]’s FollowMyHealth portal, you will also be able to view your health information using other applications (apps) compatible with our system.

## 2019-12-21 LAB
BACTERIA BLD CULT: SIGNIFICANT CHANGE UP
BACTERIA BLD CULT: SIGNIFICANT CHANGE UP

## 2019-12-24 ENCOUNTER — APPOINTMENT (OUTPATIENT)
Dept: PULMONOLOGY | Facility: CLINIC | Age: 79
End: 2019-12-24
Payer: MEDICAID

## 2019-12-24 VITALS
TEMPERATURE: 97.8 F | WEIGHT: 98 LBS | SYSTOLIC BLOOD PRESSURE: 132 MMHG | BODY MASS INDEX: 16.53 KG/M2 | HEIGHT: 64.49 IN | HEART RATE: 89 BPM | OXYGEN SATURATION: 95 % | DIASTOLIC BLOOD PRESSURE: 67 MMHG | RESPIRATION RATE: 16 BRPM

## 2019-12-24 DIAGNOSIS — J45.909 UNSPECIFIED ASTHMA, UNCOMPLICATED: ICD-10-CM

## 2019-12-24 DIAGNOSIS — J15.9 UNSPECIFIED BACTERIAL PNEUMONIA: ICD-10-CM

## 2019-12-24 PROCEDURE — 99214 OFFICE O/P EST MOD 30 MIN: CPT

## 2019-12-24 RX ORDER — FLUTICASONE PROPION/SALMETEROL 250-50 MCG
250-50 BLISTER, WITH INHALATION DEVICE INHALATION
Refills: 0 | Status: ACTIVE | COMMUNITY

## 2019-12-24 NOTE — PHYSICAL EXAM
[General Appearance - Well Developed] : well developed [Normal Appearance] : normal appearance [Well Groomed] : well groomed [General Appearance - Well Nourished] : well nourished [No Deformities] : no deformities [General Appearance - In No Acute Distress] : no acute distress [Normal Conjunctiva] : the conjunctiva exhibited no abnormalities [Eyelids - No Xanthelasma] : the eyelids demonstrated no xanthelasmas [Normal Oropharynx] : normal oropharynx [Neck Cervical Mass (___cm)] : no neck mass was observed [Neck Appearance] : the appearance of the neck was normal [Thyroid Diffuse Enlargement] : the thyroid was not enlarged [Jugular Venous Distention Increased] : there was no jugular-venous distention [Heart Rate And Rhythm] : heart rate and rhythm were normal [Thyroid Nodule] : there were no palpable thyroid nodules [Murmurs] : no murmurs present [Heart Sounds] : normal S1 and S2 [Respiration, Rhythm And Depth] : normal respiratory rhythm and effort [Exaggerated Use Of Accessory Muscles For Inspiration] : no accessory muscle use [Auscultation Breath Sounds / Voice Sounds] : lungs were clear to auscultation bilaterally [Abdomen Soft] : soft [Abdomen Tenderness] : non-tender [Abdomen Mass (___ Cm)] : no abdominal mass palpated [Nail Clubbing] : no clubbing of the fingernails [Cyanosis, Localized] : no localized cyanosis [Petechial Hemorrhages (___cm)] : no petechial hemorrhages [] : no ischemic changes

## 2019-12-24 NOTE — ASSESSMENT
[FreeTextEntry1] : 1. Resolved pneumonia: Repeat imaging in one month to ensure resolution\par \par 2. Asthma: Now well controlled, check pfts next visit.

## 2019-12-24 NOTE — HISTORY OF PRESENT ILLNESS
[FreeTextEntry1] : Patient with hx of asthma, recently hospitalized at [facility] for pneumonia. Then readmitted one week ago for atypical chest pain. CT c/w resolving pneumonia and pleural effusion. Patient now feels well with resolution of symptoms.

## 2020-11-23 NOTE — ED CDU PROVIDER INITIAL DAY NOTE - OBJECTIVE STATEMENT
76 y/o male pmh dm, asthma sent in from urgent care for cough, sob and dizziness. Pt admit sto productive cough with brown sputum x2 weeks. Pt admits to increasing wheezing and WALKER over the last 2 weeks. Pt admits to feeling lightheaded today so he went to urgent care. Pt was off balance at urgent care w/ diffuse wheezing and hypoxia and advised to present to ER. Pt denies chest pain, palpitations, diaphoresis, n/v/d, numbness, tingling, weakness, syncope, change in vision, slurred speech, fever or chills
applying cream to right ankle at site of previous infection

## 2020-12-02 NOTE — DISCHARGE NOTE NURSING/CASE MANAGEMENT/SOCIAL WORK - FLU SEASON?
Patient is a 50y old  Male who presents with a chief complaint of Syncope (01 Dec 2020 20:47)    pt seen in icu [  ], reg med floor [   ], bed [  ], chair at bedside [   ], a+o x3 [  ], lethargic [  ],  nad [  ]    peraza [  ], ngt [  ], peg [  ], et tube [  ], cent line [  ], picc line [  ]        Allergies    No Known Allergies        Vitals    T(F): 99.9 (12-02-20 @ 04:55), Max: 101 (12-01-20 @ 18:14)  HR: 101 (12-02-20 @ 04:55) (95 - 102)  BP: 121/71 (12-02-20 @ 04:55) (94/67 - 136/83)  RR: 18 (12-02-20 @ 04:55) (18 - 18)  SpO2: 95% (12-02-20 @ 04:55) (95% - 98%)  Wt(kg): --  CAPILLARY BLOOD GLUCOSE      POCT Blood Glucose.: 129 mg/dL (01 Dec 2020 10:51)      Labs                          11.2   3.39  )-----------( 149      ( 01 Dec 2020 12:16 )             34.3       12-01    135  |  100  |  31<H>  ----------------------------<  126<H>  4.0   |  26  |  2.10<H>    Ca    8.2<L>      01 Dec 2020 17:27  Phos  2.2     12-01  Mg     1.9     12-01    TPro  8.2  /  Alb  3.4<L>  /  TBili  0.9  /  DBili  x   /  AST  52<H>  /  ALT  45  /  AlkPhos  66  12-01      CARDIAC MARKERS ( 01 Dec 2020 22:06 )  0.898 ng/mL / x     / 503 U/L / x     / 1.8 ng/mL  CARDIAC MARKERS ( 01 Dec 2020 17:27 )  0.304 ng/mL / x     / x     / x     / x      CARDIAC MARKERS ( 01 Dec 2020 08:58 )  0.274 ng/mL / x     / x     / x     / x                Radiology Results      Meds    MEDICATIONS  (STANDING):  aspirin enteric coated 81 milliGRAM(s) Oral daily  atorvastatin 80 milliGRAM(s) Oral at bedtime  dextrose 40% Gel 15 Gram(s) Oral once  dextrose 5%. 1000 milliLiter(s) (50 mL/Hr) IV Continuous <Continuous>  dextrose 5%. 1000 milliLiter(s) (100 mL/Hr) IV Continuous <Continuous>  dextrose 50% Injectable 25 Gram(s) IV Push once  dextrose 50% Injectable 12.5 Gram(s) IV Push once  dextrose 50% Injectable 25 Gram(s) IV Push once  glucagon  Injectable 1 milliGRAM(s) IntraMuscular once  heparin   Injectable 5000 Unit(s) SubCutaneous every 8 hours  insulin lispro (ADMELOG) corrective regimen sliding scale   SubCutaneous Before meals and at bedtime  pantoprazole    Tablet 40 milliGRAM(s) Oral before breakfast      MEDICATIONS  (PRN):  acetaminophen   Tablet .. 650 milliGRAM(s) Oral every 6 hours PRN Temp greater or equal to 38C (100.4F), Mild Pain (1 - 3)      Physical Exam    Neuro :  no focal deficits  Respiratory: CTA B/L  CV: RRR, S1S2, no murmurs,   Abdominal: Soft, NT, ND +BS,  Extremities: No edema, + peripheral pulses    ASSESSMENT    Syncope and collapse    Hypertension        PLAN     Patient is a 50y old  Male who presents with a chief complaint of Syncope (01 Dec 2020 20:47)    pt seen in tele [ x ], reg med floor [   ], bed [x  ], chair at bedside [   ], a+o x3 [x  ], lethargic [  ],  nad [ x ]        Allergies    No Known Allergies        Vitals    T(F): 99.9 (12-02-20 @ 04:55), Max: 101 (12-01-20 @ 18:14)  HR: 101 (12-02-20 @ 04:55) (95 - 102)  BP: 121/71 (12-02-20 @ 04:55) (94/67 - 136/83)  RR: 18 (12-02-20 @ 04:55) (18 - 18)  SpO2: 95% (12-02-20 @ 04:55) (95% - 98%)  Wt(kg): --  CAPILLARY BLOOD GLUCOSE      POCT Blood Glucose.: 129 mg/dL (01 Dec 2020 10:51)      Labs                          11.2   3.39  )-----------( 149      ( 01 Dec 2020 12:16 )             34.3       12-01    135  |  100  |  31<H>  ----------------------------<  126<H>  4.0   |  26  |  2.10<H>    Ca    8.2<L>      01 Dec 2020 17:27  Phos  2.2     12-01  Mg     1.9     12-01    TPro  8.2  /  Alb  3.4<L>  /  TBili  0.9  /  DBili  x   /  AST  52<H>  /  ALT  45  /  AlkPhos  66  12-01      CARDIAC MARKERS ( 01 Dec 2020 22:06 )  0.898 ng/mL / x     / 503 U/L / x     / 1.8 ng/mL  CARDIAC MARKERS ( 01 Dec 2020 17:27 )  0.304 ng/mL / x     / x     / x     / x      CARDIAC MARKERS ( 01 Dec 2020 08:58 )  0.274 ng/mL / x     / x     / x     / x          Respiratory Viral Panel with COVID-19 by OFELIA (12.01.20 @ 16:59)   Rapid RVP Result: Detected   SARS-CoV-2: Detected:   COVID-19 PCR . (12.01.20 @ 09:44)   COVID-19 PCR: Detected:     COVID-19 Antibody - for prior infection screening (12.01.20 @ 23:03)   COVID-19 IgG Antibody Index: 0.07: Roche ECLIA Total AB (MAGDA)   COVID-19 IgG Antibody Interpretation: Negative:         Radiology Results      Meds    MEDICATIONS  (STANDING):  aspirin enteric coated 81 milliGRAM(s) Oral daily  atorvastatin 80 milliGRAM(s) Oral at bedtime  dextrose 40% Gel 15 Gram(s) Oral once  dextrose 5%. 1000 milliLiter(s) (50 mL/Hr) IV Continuous <Continuous>  dextrose 5%. 1000 milliLiter(s) (100 mL/Hr) IV Continuous <Continuous>  dextrose 50% Injectable 25 Gram(s) IV Push once  dextrose 50% Injectable 12.5 Gram(s) IV Push once  dextrose 50% Injectable 25 Gram(s) IV Push once  glucagon  Injectable 1 milliGRAM(s) IntraMuscular once  heparin   Injectable 5000 Unit(s) SubCutaneous every 8 hours  insulin lispro (ADMELOG) corrective regimen sliding scale   SubCutaneous Before meals and at bedtime  pantoprazole    Tablet 40 milliGRAM(s) Oral before breakfast      MEDICATIONS  (PRN):  acetaminophen   Tablet .. 650 milliGRAM(s) Oral every 6 hours PRN Temp greater or equal to 38C (100.4F), Mild Pain (1 - 3)      Physical Exam    Neuro :  no focal deficits  Respiratory: CTA B/L  CV: RRR, S1S2, no murmurs,   Abdominal: Soft, NT, ND +BS,  Extremities: No edema, + peripheral pulses    ASSESSMENT    Syncope and collapse likely vasovagal,   r/o acs,   r/o arrythmia    gastroenteritis possibly 2nd to food poisoning,   dehydration,   covid 19 infection  h/o  pre-DM ,   HLD ,   HTN,   mi   stroke (residual left hemianopsia),   OA of b/l Knees          PLAN      cont tele,   cont aspirin, statin,   ce q8 x3  cardio cons  f/u echo  start cipro, flagyl,   gi cons.   ivf   f/u stool pcr and cx   id cons  pt covid positive with negative antibody  start vit c, vit d, pepcid, singulair, zinc  O2 sat  (95% - 98%) on room air   will add decadron if O2 sat drops below 94% on ra  O2 via nasal canula if needed  pulm cons  contact and airborne isolation  cont albuterol inhaler   f/u procalcitonin,   F/u D-dimer, esr, crp, ldh, ferritin, lactate,   cont tylenol prn,   cont robitussin prn   cont current meds         Yes...

## 2021-03-18 NOTE — PROGRESS NOTE ADULT - REASON FOR ADMISSION
Partially impaired: cannot see medication labels or newsprint, but can see obstacles in path, and the surrounding layout; can count fingers at arm's length Shortness of breath N/A

## 2021-06-16 NOTE — ED ADULT NURSE NOTE - FINAL NURSING ELECTRONIC SIGNATURE
16-Dec-2019 20:14 Doxepin Pregnancy And Lactation Text: This medication is Pregnancy Category C and it isn't known if it is safe during pregnancy. It is also excreted in breast milk and breast feeding isn't recommended.

## 2021-08-31 NOTE — DISCHARGE NOTE PROVIDER - REASON FOR ADMISSION
Pt had tummy tuck 4 days ago and had negative covid then.  Refuses covid swab here.  Denies any fever, body aches or chills. Surgical abd pain worse due to constant coughing   Shortness of breath

## 2022-12-03 ENCOUNTER — EMERGENCY (EMERGENCY)
Facility: HOSPITAL | Age: 82
LOS: 0 days | Discharge: HOME | End: 2022-12-03
Attending: STUDENT IN AN ORGANIZED HEALTH CARE EDUCATION/TRAINING PROGRAM
Payer: MEDICARE

## 2022-12-03 VITALS
TEMPERATURE: 98 F | HEART RATE: 91 BPM | SYSTOLIC BLOOD PRESSURE: 151 MMHG | DIASTOLIC BLOOD PRESSURE: 63 MMHG | WEIGHT: 100.09 LBS | OXYGEN SATURATION: 96 % | HEIGHT: 65 IN | RESPIRATION RATE: 16 BRPM

## 2022-12-03 VITALS
TEMPERATURE: 99 F | DIASTOLIC BLOOD PRESSURE: 55 MMHG | RESPIRATION RATE: 18 BRPM | OXYGEN SATURATION: 97 % | SYSTOLIC BLOOD PRESSURE: 143 MMHG | HEART RATE: 82 BPM

## 2022-12-03 DIAGNOSIS — E11.9 TYPE 2 DIABETES MELLITUS WITHOUT COMPLICATIONS: ICD-10-CM

## 2022-12-03 DIAGNOSIS — I44.7 LEFT BUNDLE-BRANCH BLOCK, UNSPECIFIED: ICD-10-CM

## 2022-12-03 DIAGNOSIS — Z20.822 CONTACT WITH AND (SUSPECTED) EXPOSURE TO COVID-19: ICD-10-CM

## 2022-12-03 DIAGNOSIS — R42 DIZZINESS AND GIDDINESS: ICD-10-CM

## 2022-12-03 DIAGNOSIS — R11.2 NAUSEA WITH VOMITING, UNSPECIFIED: ICD-10-CM

## 2022-12-03 DIAGNOSIS — J10.1 INFLUENZA DUE TO OTHER IDENTIFIED INFLUENZA VIRUS WITH OTHER RESPIRATORY MANIFESTATIONS: ICD-10-CM

## 2022-12-03 DIAGNOSIS — J45.909 UNSPECIFIED ASTHMA, UNCOMPLICATED: ICD-10-CM

## 2022-12-03 LAB
ALBUMIN SERPL ELPH-MCNC: 3.4 G/DL — SIGNIFICANT CHANGE UP (ref 3.3–5)
ALP SERPL-CCNC: 74 U/L — SIGNIFICANT CHANGE UP (ref 40–120)
ALT FLD-CCNC: 27 U/L — SIGNIFICANT CHANGE UP (ref 12–78)
ANION GAP SERPL CALC-SCNC: 7 MMOL/L — SIGNIFICANT CHANGE UP (ref 5–17)
APTT BLD: 32.8 SEC — SIGNIFICANT CHANGE UP (ref 27.5–35.5)
AST SERPL-CCNC: 28 U/L — SIGNIFICANT CHANGE UP (ref 15–37)
BASOPHILS # BLD AUTO: 0.03 K/UL — SIGNIFICANT CHANGE UP (ref 0–0.2)
BASOPHILS NFR BLD AUTO: 0.3 % — SIGNIFICANT CHANGE UP (ref 0–2)
BILIRUB SERPL-MCNC: 0.5 MG/DL — SIGNIFICANT CHANGE UP (ref 0.2–1.2)
BUN SERPL-MCNC: 18 MG/DL — SIGNIFICANT CHANGE UP (ref 7–23)
CALCIUM SERPL-MCNC: 8.6 MG/DL — SIGNIFICANT CHANGE UP (ref 8.5–10.1)
CHLORIDE SERPL-SCNC: 101 MMOL/L — SIGNIFICANT CHANGE UP (ref 96–108)
CO2 SERPL-SCNC: 28 MMOL/L — SIGNIFICANT CHANGE UP (ref 22–31)
CREAT SERPL-MCNC: 1.27 MG/DL — SIGNIFICANT CHANGE UP (ref 0.5–1.3)
EGFR: 56 ML/MIN/1.73M2 — LOW
EOSINOPHIL # BLD AUTO: 0.15 K/UL — SIGNIFICANT CHANGE UP (ref 0–0.5)
EOSINOPHIL NFR BLD AUTO: 1.4 % — SIGNIFICANT CHANGE UP (ref 0–6)
FLUAV AG NPH QL: DETECTED
FLUBV AG NPH QL: SIGNIFICANT CHANGE UP
GLUCOSE BLDC GLUCOMTR-MCNC: 149 MG/DL — HIGH (ref 70–99)
GLUCOSE SERPL-MCNC: 164 MG/DL — HIGH (ref 70–99)
HCT VFR BLD CALC: 42.2 % — SIGNIFICANT CHANGE UP (ref 39–50)
HGB BLD-MCNC: 14.3 G/DL — SIGNIFICANT CHANGE UP (ref 13–17)
IMM GRANULOCYTES NFR BLD AUTO: 0.7 % — SIGNIFICANT CHANGE UP (ref 0–0.9)
INR BLD: 1.53 RATIO — HIGH (ref 0.88–1.16)
LACTATE SERPL-SCNC: 1.3 MMOL/L — SIGNIFICANT CHANGE UP (ref 0.7–2)
LIDOCAIN IGE QN: 138 U/L — SIGNIFICANT CHANGE UP (ref 73–393)
LYMPHOCYTES # BLD AUTO: 1.3 K/UL — SIGNIFICANT CHANGE UP (ref 1–3.3)
LYMPHOCYTES # BLD AUTO: 12.2 % — LOW (ref 13–44)
MCHC RBC-ENTMCNC: 30.3 PG — SIGNIFICANT CHANGE UP (ref 27–34)
MCHC RBC-ENTMCNC: 33.9 G/DL — SIGNIFICANT CHANGE UP (ref 32–36)
MCV RBC AUTO: 89.4 FL — SIGNIFICANT CHANGE UP (ref 80–100)
MONOCYTES # BLD AUTO: 0.56 K/UL — SIGNIFICANT CHANGE UP (ref 0–0.9)
MONOCYTES NFR BLD AUTO: 5.3 % — SIGNIFICANT CHANGE UP (ref 2–14)
NEUTROPHILS # BLD AUTO: 8.52 K/UL — HIGH (ref 1.8–7.4)
NEUTROPHILS NFR BLD AUTO: 80.1 % — HIGH (ref 43–77)
NRBC # BLD: 0 /100 WBCS — SIGNIFICANT CHANGE UP (ref 0–0)
PLATELET # BLD AUTO: 179 K/UL — SIGNIFICANT CHANGE UP (ref 150–400)
POTASSIUM SERPL-MCNC: 3.8 MMOL/L — SIGNIFICANT CHANGE UP (ref 3.5–5.3)
POTASSIUM SERPL-SCNC: 3.8 MMOL/L — SIGNIFICANT CHANGE UP (ref 3.5–5.3)
PROT SERPL-MCNC: 8.1 GM/DL — SIGNIFICANT CHANGE UP (ref 6–8.3)
PROTHROM AB SERPL-ACNC: 18.4 SEC — HIGH (ref 10.5–13.4)
RBC # BLD: 4.72 M/UL — SIGNIFICANT CHANGE UP (ref 4.2–5.8)
RBC # FLD: 12.6 % — SIGNIFICANT CHANGE UP (ref 10.3–14.5)
SARS-COV-2 RNA SPEC QL NAA+PROBE: SIGNIFICANT CHANGE UP
SODIUM SERPL-SCNC: 136 MMOL/L — SIGNIFICANT CHANGE UP (ref 135–145)
TROPONIN I, HIGH SENSITIVITY RESULT: 20.3 NG/L — SIGNIFICANT CHANGE UP
WBC # BLD: 10.63 K/UL — HIGH (ref 3.8–10.5)
WBC # FLD AUTO: 10.63 K/UL — HIGH (ref 3.8–10.5)

## 2022-12-03 PROCEDURE — 99285 EMERGENCY DEPT VISIT HI MDM: CPT

## 2022-12-03 PROCEDURE — 70496 CT ANGIOGRAPHY HEAD: CPT | Mod: 26,MA

## 2022-12-03 PROCEDURE — 70498 CT ANGIOGRAPHY NECK: CPT | Mod: 26,MA

## 2022-12-03 PROCEDURE — 71045 X-RAY EXAM CHEST 1 VIEW: CPT | Mod: 26

## 2022-12-03 PROCEDURE — 93010 ELECTROCARDIOGRAM REPORT: CPT

## 2022-12-03 RX ORDER — SODIUM CHLORIDE 9 MG/ML
1000 INJECTION INTRAMUSCULAR; INTRAVENOUS; SUBCUTANEOUS ONCE
Refills: 0 | Status: COMPLETED | OUTPATIENT
Start: 2022-12-03 | End: 2022-12-03

## 2022-12-03 RX ORDER — ONDANSETRON 8 MG/1
1 TABLET, FILM COATED ORAL
Qty: 10 | Refills: 0
Start: 2022-12-03 | End: 2022-12-07

## 2022-12-03 RX ORDER — ONDANSETRON 8 MG/1
4 TABLET, FILM COATED ORAL ONCE
Refills: 0 | Status: COMPLETED | OUTPATIENT
Start: 2022-12-03 | End: 2022-12-03

## 2022-12-03 RX ADMIN — SODIUM CHLORIDE 1000 MILLILITER(S): 9 INJECTION INTRAMUSCULAR; INTRAVENOUS; SUBCUTANEOUS at 08:50

## 2022-12-03 RX ADMIN — ONDANSETRON 4 MILLIGRAM(S): 8 TABLET, FILM COATED ORAL at 08:54

## 2022-12-03 NOTE — ED PROVIDER NOTE - PROGRESS NOTE DETAILS
W/u significant for: Flu A (+), on re-eval, granddaughter at bedside, pt states he feels well and would like to go home. W/u significant for: Flu A (+). On re-eval, granddaughter at bedside. Shared decision making w/ pt and family: Admission v d/c home w/ close outpatient f/u, strict return precautions. Pt states he feels well and would like to go home. Son, granddaughter live w/ pt, are comfortable and agree w/ this plan. Stable for d/c home. VSS. Given script for Zofran. Recommend Motrin / Tylenol PRN symptomatic relief, continued good PO hydration. Instructed for close outpatient PCP f/u. Signs / symptoms that should prompt immediate return to ED d/t pt, family at length. They understand / agree w/ this plan.

## 2022-12-03 NOTE — ED ADULT TRIAGE NOTE - TEMPERATURE IN CELSIUS (DEGREES C)
09/01/22 1514   Sessions   Type of contact Ongoing sessions   Others Present Mom   Goals and Recommendations   Goals Develop/foster healthy coping strategies for hospitalization and treatment;Encourage normalization and engagement in age-appropriate activities;Identify and/or express:   Specify goal - Identify and/or express Emotions;Representation of physical states;Personal experience and emotions related to hospitalization;Strategies for relaxation   Art Therapy Interventions   Intervention Art Therapy;Providing Materials/Supplies;Supportive Discussion   Art Therapy utilizing Mixed media;Drawing materials;Paint   Session note   Session Notes: Art Therapist received referral that pt was requesting visit during this admission.  Art Therapist provided check in to assess current needs and coping.  Pt was sitting in bedside chair, mom present at bedside.  Pt easily engaged in conversation, expressing interest in drawing and painting materials.  Pt and Art Therapist spoke about current admission, coping, interest in art and music.  Pt expressed gratitude for materials and requested to color by herself.  Art Therapist validated and supported pt's request and offered to check in on following day.         ANUPAM Muro, ATR-BC  Board Certified,Registered Art Therapist     36.6

## 2022-12-03 NOTE — ED PROVIDER NOTE - CLINICAL SUMMARY MEDICAL DECISION MAKING FREE TEXT BOX
82M PMH DM, asthma pw unsteady gait, N/V x 3 days. AF, VSS. FS WNL. NIH 0. Plan: CT brain, CTA head / neck, ECG, CXR, trop, lactate, lipase, CBC, CMP, Flu/COVID. Give IVF, Zofran. Re-eval.

## 2022-12-03 NOTE — ED ADULT NURSE NOTE - OBJECTIVE STATEMENT
pt c/o nausea, vomiting, weakness, unsteady gait, difficulty speaking started on thursday. denies abdominal pain

## 2022-12-03 NOTE — ED ADULT NURSE NOTE - NSIMPLEMENTINTERV_GEN_ALL_ED
Implemented All Fall Risk Interventions:  [place] to call system. Call bell, personal items and telephone within reach. Instruct patient to call for assistance. Room bathroom lighting operational. Non-slip footwear when patient is off stretcher. Physically safe environment: no spills, clutter or unnecessary equipment. Stretcher in lowest position, wheels locked, appropriate side rails in place. Provide visual cue, wrist band, yellow gown, etc. Monitor gait and stability. Monitor for mental status changes and reorient to person, place, and time. Review medications for side effects contributing to fall risk. Reinforce activity limits and safety measures with patient and family. Implemented All Fall Risk Interventions:  [place] to call system. Call bell, personal items and telephone within reach. Instruct patient to call for assistance. Room bathroom lighting operational. Non-slip footwear when patient is off stretcher. Physically safe environment: no spills, clutter or unnecessary equipment. Stretcher in lowest position, wheels locked, appropriate side rails in place. Provide visual cue, wrist band, yellow gown, etc. Monitor gait and stability. Monitor for mental status changes and reorient to person, place, and time. Review medications for side effects contributing to fall risk. Reinforce activity limits and safety measures with patient and family. Implemented All Fall Risk Interventions:  [place] to call system. Call bell, personal items and telephone within reach. Instruct patient to call for assistance. Room bathroom lighting operational. Non-slip footwear when patient is off stretcher. Physically safe environment: no spills, clutter or unnecessary equipment. Stretcher in lowest position, wheels locked, appropriate side rails in place. Provide visual cue, wrist band, yellow gown, etc. Monitor gait and stability. Monitor for mental status changes and reorient to person, place, and time. Review medications for side effects contributing to fall risk. Reinforce activity limits and safety measures with patient and family.

## 2022-12-03 NOTE — ED PROVIDER NOTE - PATIENT PORTAL LINK FT
You can access the FollowMyHealth Patient Portal offered by [facility] by registering at the following website: [email or web address]. By joining [employer]’s FollowMyHealth portal, you will also be able to view your health information using other applications (apps) compatible with our system. You can access the FollowMyHealth Patient Portal offered by [facility] by registering at the following website: [email or web address]. By joining [employer]’s FollowMyHealth portal, you will also be able to view your health information using other applications (apps) compatible with our system. You can access the FollowMyHealth Patient Portal offered by [facility] by registering at the following website: [email or web address]. By joining [employer]’s FollowMyHealth portal, you will also be able to view your health information using other applications (apps) compatible with our system. You can access the FollowMyHealth Patient Portal offered by [facility] by registering at the following website: [email or web address]. By joining [employer]’s FollowMyHealth portal, you will also be able to view your health information using other applications (apps) compatible with our system. You can access the FollowMyHealth Patient Portal offered by [facility] by registering at the following website: [email or web address]. By joining [employer]’s FollowMyHealth portal, you will also be able to view your health information using other applications (apps) compatible with our system. You can access the FollowMyHealth Patient Portal offered by [facility] by registering at the following website: [email or web address]. By joining [employer]’s FollowMyHealth portal, you will also be able to view your health information using other applications (apps) compatible with our system.

## 2022-12-03 NOTE — ED ADULT TRIAGE NOTE - MODE OF ARRIVAL
Patient's 980 ventilator was switched out for a 840 ventilator due to 980 reading low O2 supply. No respiratory complications during switch. Patient was manually bagged with a PEEP valve set to 15. Spo2 did not drop below 85 for exchange. Patient is now in 840 On ACPC: PIP 22, RR 22, +14, 100%. Will continue to monitor. Ambulance EMS

## 2022-12-03 NOTE — ED PROVIDER NOTE - OBJECTIVE STATEMENT
82M PMH DM, asthma pw N/V, dizziness. Pt reports onset dizziness 3 days ago, pt states was driving and is 'sravani to make it home.' Pt endorses swaying, unable to walk straight. Pt reports hx similar every years x past 3-4yrs around Thanksgiving time. Denies recent travel, known sick contacts. Pt states this AM he felt unable to talk. Denies F/C, h/a, CP, SOB, cough, abd pain, back pain, diarrhea, UTI sx, LE pain / swelling. Pt states taking Tylenol.     PMH as above, PSH none, NKDA, meds as listed. 82M PMH DM, asthma pw N/V, dizziness. Pt reports onset dizziness 3 days ago - describes swaying, unable to walk straight. Pt reports hx similar every years x past 3-4yrs around Thanksgiving time. Denies recent travel, known sick contacts. Denies F/C, h/a, CP, SOB, cough, abd pain, back pain, diarrhea, UTI sx, LE pain / swelling. Pt endorses taking Tylenol to aid w/ symptoms.     PMH as above, PSH none, NKDA, meds as listed. 82M PMH DM, asthma pw N/V, dizziness. Pt reports onset dizziness 3 days ago - describes swaying, unable to walk straight. Pt reports hx similar every year x past 3-4yrs around Thanksgiving time. Denies recent travel, known sick contacts. Denies F/C, h/a, CP, SOB, cough, abd pain, back pain, diarrhea, UTI sx, LE pain / swelling. Pt endorses taking Tylenol to aid w/ symptoms.     PMH as above, PSH none, NKDA, meds as listed.

## 2023-05-11 NOTE — PATIENT PROFILE ADULT - INFORMATION PROVIDED TO:
Dx : subacute atrial flutter/ fib c RVR           COPD/ smoker          Legally blind    Obesity / GABBY/ HS     hx of \" tachycardia \" ?    hx of sciatica     - clinically stable    - no signs of overt CHF    -  Optimize BB , continue CCB     start full dose anticoagulation with Lovenox for now     ECHO     cardiology consulted     No indication of ACS or PE   Possibly afib triggered by GABBY vs salmeterol vs other     normal thyroid function       condition fair     observation as predicted LOS 1-2 days                patient

## 2023-11-26 ENCOUNTER — INPATIENT (INPATIENT)
Facility: HOSPITAL | Age: 83
LOS: 1 days | Discharge: HOME | End: 2023-11-28
Attending: INTERNAL MEDICINE | Admitting: INTERNAL MEDICINE
Payer: MEDICARE

## 2023-11-26 VITALS
OXYGEN SATURATION: 87 % | WEIGHT: 98.11 LBS | HEIGHT: 65 IN | SYSTOLIC BLOOD PRESSURE: 135 MMHG | RESPIRATION RATE: 22 BRPM | TEMPERATURE: 100 F | HEART RATE: 98 BPM | DIASTOLIC BLOOD PRESSURE: 75 MMHG

## 2023-11-26 DIAGNOSIS — Z98.49 CATARACT EXTRACTION STATUS, UNSPECIFIED EYE: Chronic | ICD-10-CM

## 2023-11-26 LAB
ALBUMIN SERPL ELPH-MCNC: 3 G/DL — LOW (ref 3.3–5)
ALP SERPL-CCNC: 89 U/L — SIGNIFICANT CHANGE UP (ref 40–120)
ALT FLD-CCNC: 25 U/L — SIGNIFICANT CHANGE UP (ref 12–78)
ANION GAP SERPL CALC-SCNC: 7 MMOL/L — SIGNIFICANT CHANGE UP (ref 5–17)
AST SERPL-CCNC: 23 U/L — SIGNIFICANT CHANGE UP (ref 15–37)
BASOPHILS # BLD AUTO: 0.03 K/UL — SIGNIFICANT CHANGE UP (ref 0–0.2)
BASOPHILS NFR BLD AUTO: 0.3 % — SIGNIFICANT CHANGE UP (ref 0–2)
BILIRUB SERPL-MCNC: 0.7 MG/DL — SIGNIFICANT CHANGE UP (ref 0.2–1.2)
BUN SERPL-MCNC: 17 MG/DL — SIGNIFICANT CHANGE UP (ref 7–23)
CALCIUM SERPL-MCNC: 8.4 MG/DL — LOW (ref 8.5–10.1)
CHLORIDE SERPL-SCNC: 95 MMOL/L — LOW (ref 96–108)
CO2 SERPL-SCNC: 29 MMOL/L — SIGNIFICANT CHANGE UP (ref 22–31)
CREAT SERPL-MCNC: 1.15 MG/DL — SIGNIFICANT CHANGE UP (ref 0.5–1.3)
EGFR: 62 ML/MIN/1.73M2 — SIGNIFICANT CHANGE UP
EOSINOPHIL # BLD AUTO: 0.01 K/UL — SIGNIFICANT CHANGE UP (ref 0–0.5)
EOSINOPHIL NFR BLD AUTO: 0.1 % — SIGNIFICANT CHANGE UP (ref 0–6)
FLUAV AG NPH QL: SIGNIFICANT CHANGE UP
FLUBV AG NPH QL: SIGNIFICANT CHANGE UP
GLUCOSE SERPL-MCNC: 276 MG/DL — HIGH (ref 70–99)
HCT VFR BLD CALC: 41.7 % — SIGNIFICANT CHANGE UP (ref 39–50)
HGB BLD-MCNC: 14 G/DL — SIGNIFICANT CHANGE UP (ref 13–17)
IMM GRANULOCYTES NFR BLD AUTO: 0.5 % — SIGNIFICANT CHANGE UP (ref 0–0.9)
LACTATE SERPL-SCNC: 1.3 MMOL/L — SIGNIFICANT CHANGE UP (ref 0.7–2)
LYMPHOCYTES # BLD AUTO: 0.93 K/UL — LOW (ref 1–3.3)
LYMPHOCYTES # BLD AUTO: 8.4 % — LOW (ref 13–44)
MCHC RBC-ENTMCNC: 29.8 PG — SIGNIFICANT CHANGE UP (ref 27–34)
MCHC RBC-ENTMCNC: 33.6 G/DL — SIGNIFICANT CHANGE UP (ref 32–36)
MCV RBC AUTO: 88.7 FL — SIGNIFICANT CHANGE UP (ref 80–100)
MONOCYTES # BLD AUTO: 0.83 K/UL — SIGNIFICANT CHANGE UP (ref 0–0.9)
MONOCYTES NFR BLD AUTO: 7.5 % — SIGNIFICANT CHANGE UP (ref 2–14)
NEUTROPHILS # BLD AUTO: 9.17 K/UL — HIGH (ref 1.8–7.4)
NEUTROPHILS NFR BLD AUTO: 83.2 % — HIGH (ref 43–77)
NRBC # BLD: 0 /100 WBCS — SIGNIFICANT CHANGE UP (ref 0–0)
NT-PROBNP SERPL-SCNC: 617 PG/ML — HIGH (ref 0–450)
PLATELET # BLD AUTO: 175 K/UL — SIGNIFICANT CHANGE UP (ref 150–400)
POTASSIUM SERPL-MCNC: 3.6 MMOL/L — SIGNIFICANT CHANGE UP (ref 3.5–5.3)
POTASSIUM SERPL-SCNC: 3.6 MMOL/L — SIGNIFICANT CHANGE UP (ref 3.5–5.3)
PROT SERPL-MCNC: 8.1 GM/DL — SIGNIFICANT CHANGE UP (ref 6–8.3)
RAPID RVP RESULT: DETECTED
RBC # BLD: 4.7 M/UL — SIGNIFICANT CHANGE UP (ref 4.2–5.8)
RBC # FLD: 13.2 % — SIGNIFICANT CHANGE UP (ref 10.3–14.5)
RSV RNA SPEC QL NAA+PROBE: DETECTED
SARS-COV-2 RNA SPEC QL NAA+PROBE: SIGNIFICANT CHANGE UP
SODIUM SERPL-SCNC: 131 MMOL/L — LOW (ref 135–145)
TROPONIN I, HIGH SENSITIVITY RESULT: 42.1 NG/L — SIGNIFICANT CHANGE UP
WBC # BLD: 11.02 K/UL — HIGH (ref 3.8–10.5)
WBC # FLD AUTO: 11.02 K/UL — HIGH (ref 3.8–10.5)

## 2023-11-26 PROCEDURE — 71045 X-RAY EXAM CHEST 1 VIEW: CPT | Mod: 26

## 2023-11-26 PROCEDURE — 93010 ELECTROCARDIOGRAM REPORT: CPT

## 2023-11-26 PROCEDURE — 99285 EMERGENCY DEPT VISIT HI MDM: CPT

## 2023-11-26 PROCEDURE — 71250 CT THORAX DX C-: CPT | Mod: 26,MA

## 2023-11-26 RX ORDER — SODIUM CHLORIDE 9 MG/ML
500 INJECTION INTRAMUSCULAR; INTRAVENOUS; SUBCUTANEOUS ONCE
Refills: 0 | Status: COMPLETED | OUTPATIENT
Start: 2023-11-26 | End: 2023-11-26

## 2023-11-26 RX ORDER — IPRATROPIUM/ALBUTEROL SULFATE 18-103MCG
3 AEROSOL WITH ADAPTER (GRAM) INHALATION
Refills: 0 | Status: COMPLETED | OUTPATIENT
Start: 2023-11-26 | End: 2023-11-26

## 2023-11-26 RX ORDER — TAMSULOSIN HYDROCHLORIDE 0.4 MG/1
0.4 CAPSULE ORAL AT BEDTIME
Refills: 0 | Status: DISCONTINUED | OUTPATIENT
Start: 2023-11-26 | End: 2023-11-28

## 2023-11-26 RX ORDER — LANOLIN ALCOHOL/MO/W.PET/CERES
3 CREAM (GRAM) TOPICAL AT BEDTIME
Refills: 0 | Status: DISCONTINUED | OUTPATIENT
Start: 2023-11-26 | End: 2023-11-28

## 2023-11-26 RX ORDER — HEPARIN SODIUM 5000 [USP'U]/ML
5000 INJECTION INTRAVENOUS; SUBCUTANEOUS EVERY 12 HOURS
Refills: 0 | Status: DISCONTINUED | OUTPATIENT
Start: 2023-11-26 | End: 2023-11-28

## 2023-11-26 RX ORDER — ACETAMINOPHEN 500 MG
675 TABLET ORAL ONCE
Refills: 0 | Status: COMPLETED | OUTPATIENT
Start: 2023-11-26 | End: 2023-11-26

## 2023-11-26 RX ORDER — IPRATROPIUM/ALBUTEROL SULFATE 18-103MCG
3 AEROSOL WITH ADAPTER (GRAM) INHALATION EVERY 6 HOURS
Refills: 0 | Status: DISCONTINUED | OUTPATIENT
Start: 2023-11-26 | End: 2023-11-28

## 2023-11-26 RX ADMIN — Medication 270 MILLIGRAM(S): at 20:40

## 2023-11-26 RX ADMIN — Medication 125 MILLIGRAM(S): at 20:39

## 2023-11-26 RX ADMIN — SODIUM CHLORIDE 1000 MILLILITER(S): 9 INJECTION INTRAMUSCULAR; INTRAVENOUS; SUBCUTANEOUS at 20:27

## 2023-11-26 RX ADMIN — Medication 3 MILLILITER(S): at 20:40

## 2023-11-26 RX ADMIN — Medication 3 MILLILITER(S): at 23:32

## 2023-11-26 RX ADMIN — Medication 3 MILLILITER(S): at 21:26

## 2023-11-26 RX ADMIN — Medication 675 MILLIGRAM(S): at 21:10

## 2023-11-26 RX ADMIN — Medication 3 MILLILITER(S): at 21:00

## 2023-11-26 NOTE — PATIENT PROFILE ADULT - CAREGIVER ADDRESS
[address] [place] [address]. [place] [id number] [address] [place] [address]. [place] [phone] [address] [place] [address]. [place] [phone]

## 2023-11-26 NOTE — ED ADULT NURSE NOTE - ED STAT RN HANDOFF DETAILS
Handoff report given to MINH Benites on 1C. RN made aware of pts current condition/test results/reason for admission for viral pneumonia. pt is AOx3, resting in stretcher at this time. NADN. pt is on cardiac monitor and 2L NC sating 97%. pts IV is patent and intact no redness/swelling noted at the site. rounding and safety checks complete. pt is vitally stable upon leaving the ED. any issues endorsed to oncoming RN for followup.

## 2023-11-26 NOTE — ED ADULT NURSE NOTE - NSFALLUNIVINTERV_ED_ALL_ED
Bed/Stretcher in lowest position, wheels locked, appropriate side rails in place/Call bell, personal items and telephone in reach/Instruct patient to call for assistance before getting out of bed/chair/stretcher/Non-slip footwear applied when patient is off stretcher/[place] to call system/Physically safe environment - no spills, clutter or unnecessary equipment/Purposeful proactive rounding/Room/bathroom lighting operational, light cord in reach Bed/Stretcher in lowest position, wheels locked, appropriate side rails in place/Call bell, personal items and telephone in reach/Instruct patient to call for assistance before getting out of bed/chair/stretcher/Non-slip footwear applied when patient is off stretcher/[place] to call system/Physically safe environment - no spills, clutter or unnecessary equipment/Purposeful proactive rounding/Room/bathroom lighting operational, light cord in reach Bed/Stretcher in lowest position, wheels locked, appropriate side rails in place/Call bell, personal items and telephone in reach/Instruct patient to call for assistance before getting out of bed/chair/stretcher/Non-slip footwear applied when patient is off stretcher/[place] to call system/Physically safe environment - no spills, clutter or unnecessary equipment/Purposeful proactive rounding/Room/bathroom lighting operational, light cord in reach

## 2023-11-26 NOTE — H&P ADULT - NSHPREVIEWOFSYSTEMS_GEN_ALL_CORE
CONSTITUTIONAL/GENERAL: +weakness and chills, but no  fevers or chills  EYES/OPHTHALMOLOGIC: No visual changes, No blurry vision, No vertigo   ENMT: No throat pain, or neck stiffness   RESPIRATORY/THORAX: +cough,  no wheezing, hemoptysis; mild shortness of breath  CARDIOVASCULAR: No chest pain or palpitations  GASTROINTESTINAL: No abdominal or epigastric pain. No nausea, vomiting, or hematemesis; No diarrhea or constipation. No melena or hematochezia.  GENITOURINARY: No dysuria, frequency or hematuria  NEUROLOGICAL: No numbness or weakness  SKIN: No itching, rashes  ENDOCRINOLOGY: no heat intolerance, no cold intolerance, no weight gain, no weight loss  PSYCHIATRIC:  no si/no hi, mood stable, no anxiety  MUSCULOSKELETAL SYSTEM:  no joint pain, no myalgias, no arthralgias, no joint swelling

## 2023-11-26 NOTE — H&P ADULT - NSHPLABSRESULTS_GEN_ALL_CORE
EKG personally reviewed: NSR, HR 91 no st elevation or depression. no pvcs.   CXR personally reviewed: no focal consolidation, no pleff, clear lungs  CT scan chest personally reviewed: minor GGO on LML. no focal consolidation appreciated   Labs below are personally reviewed:

## 2023-11-26 NOTE — ED CLERICAL - DIVISION
[facility]... [facility]... [facility]...

## 2023-11-26 NOTE — PATIENT PROFILE ADULT - FALL HARM RISK - HARM RISK INTERVENTIONS
Assistance with ambulation/Assistance OOB with selected safe patient handling equipment/Communicate Risk of Fall with Harm to all staff/Reinforce activity limits and safety measures with patient and family/Tailored Fall Risk Interventions/Visual Cue: Yellow wristband and red socks/Bed in lowest position, wheels locked, appropriate side rails in place/Call bell, personal items and telephone in reach/Instruct patient to call for assistance before getting out of bed or chair/Non-slip footwear when patient is out of bed/[place] to call system/Physically safe environment - no spills, clutter or unnecessary equipment/Purposeful Proactive Rounding/Room/bathroom lighting operational, light cord in reach Assistance with ambulation/Assistance OOB with selected safe patient handling equipment/Communicate Risk of Fall with Harm to all staff/Reinforce activity limits and safety measures with patient and family/Tailored Fall Risk Interventions/Visual Cue: Yellow wristband and red socks/Bed in lowest position, wheels locked, appropriate side rails in place/Call bell, personal items and telephone in reach/Instruct patient to call for assistance before getting out of bed or chair/Non-slip footwear when patient is out of bed/[place] to call system/Physically safe environment - no spills, clutter or unnecessary equipment/Purposeful Proactive Rounding/Room/bathroom lighting operational, light cord in reach Assistance with ambulation/Assistance OOB with selected safe patient handling equipment/Communicate Risk of Fall with Harm to all staff/Reinforce activity limits and safety measures with patient and family/Tailored Fall Risk Interventions/Visual Cue: Yellow wristband and red socks/Bed in lowest position, wheels locked, appropriate side rails in place/Call bell, personal items and telephone in reach/Instruct patient to call for assistance before getting out of bed or chair/Non-slip footwear when patient is out of bed/[place] to call system/Physically safe environment - no spills, clutter or unnecessary equipment/Purposeful Proactive Rounding/Room/bathroom lighting operational, light cord in reach

## 2023-11-26 NOTE — ED PROVIDER NOTE - PHYSICAL EXAMINATION
General: appears lethargic  HEENT: NCAT, Neck supple without meningismus, PERRL, no conjunctival injection  Lungs: CTAB, diffuse exp wheezing, No retractions, No increased work of breathing  Heart: S1S2 RRR, No M/R/G, Pules equal Bilaterally in upper and lower extremities distally  Abd: soft, NT/ND, No guarding, No rebound.  No hernias, no palpable masses.  Extrem: FROM in all joints, no gross deformities appreciated, no significant edema noted, No ulcers. Cap refil < 2sec.  Skin: No rash noted, warm dry.  Neuro:  Grossly normal.  No difficulty ambulating. No focal deficits.  Psychiatric: Appropriate mood and affect.

## 2023-11-26 NOTE — ED ADULT TRIAGE NOTE - CHIEF COMPLAINT QUOTE
as per son, pt  c/o chest pain, cough, running nose, weakness, vomiting, and loss of appetite  since yesterday. history of asthma. o2 at triage 87%

## 2023-11-26 NOTE — ED PROVIDER NOTE - CLINICAL SUMMARY MEDICAL DECISION MAKING FREE TEXT BOX
Patient initially hypoxic to high 80s here, mildly tachypneic, febrile.  Patient given Tylenol and fluids with improvement of  vitals.  Wheezing on exam, likely asthma exacerbation from viral pneumonia.  Treated with DuoNebs and Solu-Medrol with some improvement, patient still requiring 3 L nasal cannula.  Rest of work-up including labs and chest x-ray negative, possible viral pneumonia on chest x-ray.  Will admit for supportive care including oxygen fluids and Tylenol for fever control

## 2023-11-26 NOTE — ED PROVIDER NOTE - NS ED ROS FT
CONST: no fevers, (+) chills  EYES: no pain, no vision changes  ENT: no sore throat, no ear pain, no change in hearing  CV: (+) chest pain, no leg swelling  RESP: no shortness of breath, (+) cough  ABD: no abdominal pain, (+) nausea, no vomiting, no diarrhea  : no dysuria, no flank pain, no hematuria  MSK: no back pain, no extremity pain  NEURO: no headache or additional neurologic complaints  HEME: no easy bleeding  SKIN:  no rash

## 2023-11-26 NOTE — H&P ADULT - ASSESSMENT
82 year old with asthma and BPH who presents with 5 days of cough and cold, admitted for acute respiratory failure with hypoxia 2/2 viral PNA and asthma exacerbation    #Acute Respiratory Failure with Hypoxia #Viral PNA   - Sepsis not present on admission  - monitoring off abx, seems to be more viral, f/u full RVP. febrile only to 100.6  - required up to 5L on admission, weaning oxygen now  - obtain ambulatory pulse ox in the AM    #Asthma exacerbation   - duoneb q6h for now  - prednisone 40qD x 5 days or so  - incentive spirometry    #ETC  - Diet: regular  - DVT ppx: HSQ BID  - ETC: plan discussed with family at bedside, who assisted with med rec- however, does not know the dose of advair, please f/u in AM

## 2023-11-26 NOTE — ED ADULT NURSE NOTE - CAS EDP DISCH DISPOSITION ADMI
[facility]

## 2023-11-26 NOTE — ED ADULT NURSE NOTE - OBJECTIVE STATEMENT
83yM A&Ox4 presenting to ED with cold/flu symptoms including runny nose, cough, congestion, sob. pt reports he has been feeling unwell for the past 3 days. pt denies fever and reports sob stems from preexisting asthma diagnosis. pt reports slight chest discomfort as a result of frequent emesis. pt has been unable to maintain solids/fluids for the past 2 days. NKDA.

## 2023-11-26 NOTE — ED PROVIDER NOTE - OBJECTIVE STATEMENT
-year-old male with past ministry of hypertension hyperlipidemia diabetes asthma presenting with cough for 3 days.  Patient has had associated chills body aches fatigue.  Began vomiting today.  Endorses mild chest pain especially when coughing, denies shortness of breath

## 2023-11-26 NOTE — H&P ADULT - NSHPPHYSICALEXAM_GEN_ALL_CORE
GENERAL: NAD, frail appearing pleasant elderly gentleman, accompanied by friend who is an RN and granddaughter   HEAD:  Atraumatic, Normocephalic  EYES: EOMI, PERRLA, conjunctiva and sclera clear  NECK: Supple, No JVD  CHEST/LUNG: scant wheezing bilaterally. requiring nasal canula to maintain oxygen >92%  HEART: Regular rate and rhythm; No murmurs, rubs, or gallops  ABDOMEN: Soft, Nontender, Nondistended; Bowel sounds present  EXTREMITIES:  2+ Peripheral Pulses, No clubbing, cyanosis, or edema  PSYCH: AAOx3, appropriate affect  NEUROLOGY: non-focal, castellanos  SKIN: No rashes or lesions

## 2023-11-26 NOTE — PATIENT PROFILE ADULT - FUNCTIONAL ASSESSMENT - BASIC MOBILITY 6.
4-calculated by average/Not able to assess (calculate score using [facility] averaging method)  4-calculated by average/Not able to assess (calculate score using [facility] averaging method)  4-calculated by average/Not able to assess (calculate score using [facility] averaging method)

## 2023-11-26 NOTE — H&P ADULT - HISTORY OF PRESENT ILLNESS
Patient is 82 year old with asthma and BPH who presents with 5 days of cough and cold. Patient states he was in good health until the day before thanksgiving when he developed chills and started having dry cough. States cough has been getting worse and today, he also ended up having 3-4 episodes of NBNB emesis. Also states he has been eating less, fatigued, and has had an unsteady gait as well. States he has been taking his albuterol and advair without any improvement in his cough and without any improvement in his breathing. Otherwise, denies any chest pain, palpitations, LE swelling, abdominal pain, diarrhea, constipation, or any other symptoms. Today he decided to come to the hospital due to the multiple episodes of vomiting.

## 2023-11-27 DIAGNOSIS — J96.01 ACUTE RESPIRATORY FAILURE WITH HYPOXIA: ICD-10-CM

## 2023-11-27 DIAGNOSIS — B33.8 OTHER SPECIFIED VIRAL DISEASES: ICD-10-CM

## 2023-11-27 DIAGNOSIS — J45.901 UNSPECIFIED ASTHMA WITH (ACUTE) EXACERBATION: ICD-10-CM

## 2023-11-27 LAB
A1C WITH ESTIMATED AVERAGE GLUCOSE RESULT: 8.1 % — HIGH (ref 4–5.6)
ALBUMIN SERPL ELPH-MCNC: 2.8 G/DL — LOW (ref 3.3–5)
ALP SERPL-CCNC: 79 U/L — SIGNIFICANT CHANGE UP (ref 40–120)
ALT FLD-CCNC: 22 U/L — SIGNIFICANT CHANGE UP (ref 12–78)
ANION GAP SERPL CALC-SCNC: 8 MMOL/L — SIGNIFICANT CHANGE UP (ref 5–17)
ANISOCYTOSIS BLD QL: SLIGHT — SIGNIFICANT CHANGE UP
AST SERPL-CCNC: 17 U/L — SIGNIFICANT CHANGE UP (ref 15–37)
BASOPHILS # BLD AUTO: 0 K/UL — SIGNIFICANT CHANGE UP (ref 0–0.2)
BASOPHILS NFR BLD AUTO: 0 % — SIGNIFICANT CHANGE UP (ref 0–2)
BILIRUB SERPL-MCNC: 0.6 MG/DL — SIGNIFICANT CHANGE UP (ref 0.2–1.2)
BUN SERPL-MCNC: 21 MG/DL — SIGNIFICANT CHANGE UP (ref 7–23)
CALCIUM SERPL-MCNC: 8.3 MG/DL — LOW (ref 8.5–10.1)
CHLORIDE SERPL-SCNC: 100 MMOL/L — SIGNIFICANT CHANGE UP (ref 96–108)
CO2 SERPL-SCNC: 23 MMOL/L — SIGNIFICANT CHANGE UP (ref 22–31)
CREAT SERPL-MCNC: 1.26 MG/DL — SIGNIFICANT CHANGE UP (ref 0.5–1.3)
EGFR: 56 ML/MIN/1.73M2 — LOW
EOSINOPHIL # BLD AUTO: 0 K/UL — SIGNIFICANT CHANGE UP (ref 0–0.5)
EOSINOPHIL NFR BLD AUTO: 0 % — SIGNIFICANT CHANGE UP (ref 0–6)
ESTIMATED AVERAGE GLUCOSE: 186 MG/DL — HIGH (ref 68–114)
GAS PNL BLDA: SIGNIFICANT CHANGE UP
GLUCOSE BLDC GLUCOMTR-MCNC: 265 MG/DL — HIGH (ref 70–99)
GLUCOSE BLDC GLUCOMTR-MCNC: 366 MG/DL — HIGH (ref 70–99)
GLUCOSE BLDC GLUCOMTR-MCNC: 372 MG/DL — HIGH (ref 70–99)
GLUCOSE BLDC GLUCOMTR-MCNC: 424 MG/DL — HIGH (ref 70–99)
GLUCOSE SERPL-MCNC: 460 MG/DL — CRITICAL HIGH (ref 70–99)
HCT VFR BLD CALC: 38.3 % — LOW (ref 39–50)
HGB BLD-MCNC: 12.9 G/DL — LOW (ref 13–17)
LG PLATELETS BLD QL AUTO: SLIGHT — SIGNIFICANT CHANGE UP
LYMPHOCYTES # BLD AUTO: 0.11 K/UL — LOW (ref 1–3.3)
LYMPHOCYTES # BLD AUTO: 1 % — LOW (ref 13–44)
MAGNESIUM SERPL-MCNC: 2.3 MG/DL — SIGNIFICANT CHANGE UP (ref 1.6–2.6)
MANUAL SMEAR VERIFICATION: SIGNIFICANT CHANGE UP
MCHC RBC-ENTMCNC: 30.4 PG — SIGNIFICANT CHANGE UP (ref 27–34)
MCHC RBC-ENTMCNC: 33.7 G/DL — SIGNIFICANT CHANGE UP (ref 32–36)
MCV RBC AUTO: 90.1 FL — SIGNIFICANT CHANGE UP (ref 80–100)
MONOCYTES # BLD AUTO: 0.34 K/UL — SIGNIFICANT CHANGE UP (ref 0–0.9)
MONOCYTES NFR BLD AUTO: 3 % — SIGNIFICANT CHANGE UP (ref 2–14)
NEUTROPHILS # BLD AUTO: 10.39 K/UL — HIGH (ref 1.8–7.4)
NEUTROPHILS NFR BLD AUTO: 88 % — HIGH (ref 43–77)
NEUTS BAND # BLD: 3 % — SIGNIFICANT CHANGE UP (ref 0–8)
NRBC # BLD: 4 /100 — HIGH (ref 0–0)
NRBC # BLD: SIGNIFICANT CHANGE UP /100 WBCS (ref 0–0)
PHOSPHATE SERPL-MCNC: 1.9 MG/DL — LOW (ref 2.5–4.5)
PLAT MORPH BLD: NORMAL — SIGNIFICANT CHANGE UP
PLATELET # BLD AUTO: 170 K/UL — SIGNIFICANT CHANGE UP (ref 150–400)
POTASSIUM SERPL-MCNC: 3.4 MMOL/L — LOW (ref 3.5–5.3)
POTASSIUM SERPL-SCNC: 3.4 MMOL/L — LOW (ref 3.5–5.3)
PROT SERPL-MCNC: 7.6 GM/DL — SIGNIFICANT CHANGE UP (ref 6–8.3)
RBC # BLD: 4.25 M/UL — SIGNIFICANT CHANGE UP (ref 4.2–5.8)
RBC # FLD: 13.3 % — SIGNIFICANT CHANGE UP (ref 10.3–14.5)
RBC BLD AUTO: NORMAL — SIGNIFICANT CHANGE UP
SODIUM SERPL-SCNC: 131 MMOL/L — LOW (ref 135–145)
TOXIC GRANULES BLD QL SMEAR: PRESENT — SIGNIFICANT CHANGE UP
VARIANT LYMPHS # BLD: 5 % — SIGNIFICANT CHANGE UP (ref 0–6)
WBC # BLD: 11.42 K/UL — HIGH (ref 3.8–10.5)
WBC # FLD AUTO: 11.42 K/UL — HIGH (ref 3.8–10.5)

## 2023-11-27 PROCEDURE — 99232 SBSQ HOSP IP/OBS MODERATE 35: CPT

## 2023-11-27 PROCEDURE — 99223 1ST HOSP IP/OBS HIGH 75: CPT

## 2023-11-27 RX ORDER — INSULIN LISPRO 100/ML
VIAL (ML) SUBCUTANEOUS
Refills: 0 | Status: DISCONTINUED | OUTPATIENT
Start: 2023-11-27 | End: 2023-11-28

## 2023-11-27 RX ORDER — INSULIN LISPRO 100/ML
6 VIAL (ML) SUBCUTANEOUS ONCE
Refills: 0 | Status: COMPLETED | OUTPATIENT
Start: 2023-11-27 | End: 2023-11-27

## 2023-11-27 RX ORDER — SODIUM CHLORIDE 9 MG/ML
1000 INJECTION, SOLUTION INTRAVENOUS
Refills: 0 | Status: DISCONTINUED | OUTPATIENT
Start: 2023-11-27 | End: 2023-11-28

## 2023-11-27 RX ORDER — DEXTROSE 50 % IN WATER 50 %
12.5 SYRINGE (ML) INTRAVENOUS ONCE
Refills: 0 | Status: DISCONTINUED | OUTPATIENT
Start: 2023-11-27 | End: 2023-11-28

## 2023-11-27 RX ORDER — POTASSIUM CHLORIDE 20 MEQ
20 PACKET (EA) ORAL ONCE
Refills: 0 | Status: COMPLETED | OUTPATIENT
Start: 2023-11-27 | End: 2023-11-27

## 2023-11-27 RX ORDER — DEXTROSE 50 % IN WATER 50 %
25 SYRINGE (ML) INTRAVENOUS ONCE
Refills: 0 | Status: DISCONTINUED | OUTPATIENT
Start: 2023-11-27 | End: 2023-11-28

## 2023-11-27 RX ORDER — INSULIN LISPRO 100/ML
VIAL (ML) SUBCUTANEOUS AT BEDTIME
Refills: 0 | Status: DISCONTINUED | OUTPATIENT
Start: 2023-11-27 | End: 2023-11-28

## 2023-11-27 RX ORDER — DEXTROSE 50 % IN WATER 50 %
15 SYRINGE (ML) INTRAVENOUS ONCE
Refills: 0 | Status: DISCONTINUED | OUTPATIENT
Start: 2023-11-27 | End: 2023-11-28

## 2023-11-27 RX ORDER — GLUCAGON INJECTION, SOLUTION 0.5 MG/.1ML
1 INJECTION, SOLUTION SUBCUTANEOUS ONCE
Refills: 0 | Status: DISCONTINUED | OUTPATIENT
Start: 2023-11-27 | End: 2023-11-28

## 2023-11-27 RX ADMIN — HEPARIN SODIUM 5000 UNIT(S): 5000 INJECTION INTRAVENOUS; SUBCUTANEOUS at 05:50

## 2023-11-27 RX ADMIN — HEPARIN SODIUM 5000 UNIT(S): 5000 INJECTION INTRAVENOUS; SUBCUTANEOUS at 17:06

## 2023-11-27 RX ADMIN — Medication 3 MILLILITER(S): at 11:40

## 2023-11-27 RX ADMIN — Medication 5: at 11:01

## 2023-11-27 RX ADMIN — Medication 6 UNIT(S): at 07:17

## 2023-11-27 RX ADMIN — Medication 3 MILLILITER(S): at 05:23

## 2023-11-27 RX ADMIN — Medication 3: at 21:13

## 2023-11-27 RX ADMIN — TAMSULOSIN HYDROCHLORIDE 0.4 MILLIGRAM(S): 0.4 CAPSULE ORAL at 21:12

## 2023-11-27 RX ADMIN — Medication 3 MILLILITER(S): at 17:23

## 2023-11-27 RX ADMIN — Medication 40 MILLIGRAM(S): at 05:50

## 2023-11-27 RX ADMIN — Medication 3: at 16:12

## 2023-11-27 RX ADMIN — Medication 20 MILLIEQUIVALENT(S): at 10:10

## 2023-11-27 NOTE — PHYSICAL THERAPY INITIAL EVALUATION ADULT - DIAGNOSIS, PT EVAL
Decreased general endurance and strength, able to ambulate with supervision to independent, no loss of balance.

## 2023-11-27 NOTE — PROGRESS NOTE ADULT - SUBJECTIVE AND OBJECTIVE BOX
Patient is a 83y old  Male who presents with a chief complaint of acute respiratory failure (26 Nov 2023 23:02)      INTERVAL HPI/OVERNIGHT EVENTS: No acute events overnight. Feels better today. Afebrile.     MEDICATIONS  (STANDING):  albuterol/ipratropium for Nebulization 3 milliLiter(s) Nebulizer every 6 hours  dextrose 5%. 1000 milliLiter(s) (50 mL/Hr) IV Continuous <Continuous>  dextrose 5%. 1000 milliLiter(s) (100 mL/Hr) IV Continuous <Continuous>  dextrose 50% Injectable 12.5 Gram(s) IV Push once  dextrose 50% Injectable 25 Gram(s) IV Push once  dextrose 50% Injectable 25 Gram(s) IV Push once  glucagon  Injectable 1 milliGRAM(s) IntraMuscular once  heparin   Injectable 5000 Unit(s) SubCutaneous every 12 hours  insulin lispro (ADMELOG) corrective regimen sliding scale   SubCutaneous at bedtime  insulin lispro (ADMELOG) corrective regimen sliding scale   SubCutaneous three times a day before meals  predniSONE   Tablet 40 milliGRAM(s) Oral daily  tamsulosin 0.4 milliGRAM(s) Oral at bedtime    MEDICATIONS  (PRN):  dextrose Oral Gel 15 Gram(s) Oral once PRN Blood Glucose LESS THAN 70 milliGRAM(s)/deciliter  melatonin 3 milliGRAM(s) Oral at bedtime PRN Insomnia      Allergies    No Known Allergies    Intolerances        REVIEW OF SYSTEMS:  CONSTITUTIONAL: +ve for fatigue  EYES: No eye pain, visual disturbances, or discharge  ENMT:  No difficulty hearing, tinnitus, vertigo; No sinus or throat pain  NECK: No pain or stiffness      Vital Signs Last 24 Hrs  T(C): 36.6 (27 Nov 2023 10:57), Max: 38.1 (26 Nov 2023 19:09)  T(F): 97.9 (27 Nov 2023 10:57), Max: 100.6 (26 Nov 2023 19:09)  HR: 86 (27 Nov 2023 10:57) (82 - 100)  BP: 143/75 (27 Nov 2023 10:57) (104/65 - 169/75)  BP(mean): 86 (27 Nov 2023 04:31) (67 - 102)  RR: 18 (27 Nov 2023 10:57) (15 - 22)  SpO2: 94% (27 Nov 2023 10:57) (87% - 98%)    Parameters below as of 27 Nov 2023 10:57  Patient On (Oxygen Delivery Method): nasal cannula  O2 Flow (L/min): 1      PHYSICAL EXAM:    HEAD:  Atraumatic, Normocephalic  EYES: EOMI, PERRLA, conjunctiva and sclera clear  ENMT: No tonsillar erythema, exudates, or enlargement; Moist mucous membranes, Good dentition, No lesions      LABS:                        12.9   11.42 )-----------( 170      ( 27 Nov 2023 05:50 )             38.3     11-27    131<L>  |  100  |  21  ----------------------------<  460<HH>  3.4<L>   |  23  |  1.26    Ca    8.3<L>      27 Nov 2023 05:50  Phos  1.9     11-27  Mg     2.3     11-27    TPro  7.6  /  Alb  2.8<L>  /  TBili  0.6  /  DBili  x   /  AST  17  /  ALT  22  /  AlkPhos  79  11-27      Urinalysis Basic - ( 27 Nov 2023 05:50 )    Color: x / Appearance: x / SG: x / pH: x  Gluc: 460 mg/dL / Ketone: x  / Bili: x / Urobili: x   Blood: x / Protein: x / Nitrite: x   Leuk Esterase: x / RBC: x / WBC x   Sq Epi: x / Non Sq Epi: x / Bacteria: x      CAPILLARY BLOOD GLUCOSE      POCT Blood Glucose.: 372 mg/dL (27 Nov 2023 10:59)  POCT Blood Glucose.: 424 mg/dL (27 Nov 2023 06:48)

## 2023-11-27 NOTE — PHYSICAL THERAPY INITIAL EVALUATION ADULT - LIGHT TOUCH SENSATION, RUE, REHAB EVAL
EXAM NOTE    HISTORY  Sofy Avilez is a 71 year old female who presents to the office for:  Chief Complaint   Patient presents with   • Blood Pressure     follow up/med check. Pt has no chest pain, no sob, no palpitations.       Presents to the office today with her  for follow-up on blood pressure.  20 minute encounter with  reviewing medications risk benefit side effects expectations options.  Lifestyle management is discussed follow-up is discussed see below    The following issues are considered  Encounter Diagnoses   Name Primary?   • Essential hypertension Yes   • Visit for screening mammogram          As of this date of service, I have reviewed the past medical, family and social history sections, including the medications and allergies listed in the above medical record as well as the nursing notes as found in EPIC.    Patient Active Problem List   Diagnosis   • S/P total knee arthroplasty   • HTN (hypertension)   • Hyperlipidemia   • GERD (gastroesophageal reflux disease)   • Iron deficiency anemia   • History of depression     No problems updated.  Outpatient Medications Marked as Taking for the 7/30/21 encounter (Office Visit) with Seferino Procotr MD   Medication Sig Dispense Refill   • amLODIPine (NORVASC) 5 MG tablet Take 1 tablet by mouth daily. 90 tablet 0   • oxybutynin (DITROPAN) 5 MG tablet Take one half tablet by mouth once daily 45 tablet 0   • omeprazole (PrilOSEC) 20 MG capsule Take 1 capsule by mouth daily. 90 capsule 0   • atorvastatin (LIPITOR) 40 MG tablet Take 1 tablet by mouth nightly. 90 tablet 0   • metoPROLOL succinate (TOPROL-XL) 100 MG 24 hr tablet Take 1 tablet by mouth daily. (Patient taking differently: Take 150 mg by mouth daily. ) 90 tablet 0   • losartan (COZAAR) 50 MG tablet Take 2 tablets by mouth daily. 180 tablet 0   • hydrochlorothiazide (MICROZIDE) 12.5 MG capsule Take 1 capsule by mouth daily. 90 capsule 0   • Apple Cider Vinegar 500 MG Tab Take by  mouth daily.     • Multiple Vitamins-Minerals (vitamin - therapeutic multivitamins w/minerals) tablet Take by mouth daily.     • aspirin 81 MG tablet Take 81 mg by mouth daily.         Tobacco history:   reports that she has been smoking cigarettes. She has a 4.50 pack-year smoking history. She has never used smokeless tobacco.    Body mass index is 27.91 kg/m².    BMI ASSESSMENT/PLAN:  Patient is overweight.    Follow-up in 8 mo       Last four GAD7 Assessments       GAD7 2/22/2021 12/17/2018 9/26/2018   GAD7 Score - 0 0   Feeling nervous, anxious or on edge Not at all Not at all Not at all   Not being able to stop or control worrying Not at all Not at all Not at all   Worrying too much about different things Not at all Not at all Not at all   Trouble relaxing Not at all Not at all Not at all   Being so restless that it's hard to sit still Not at all Not at all Not at all   Becoming easily annoyed or irritable - Not at all Not at all   Feeling afraid as if something awful might happen Not at all Not at all Not at all   Ability to handle work, home and other people Not difficult at all - Not difficult at all       Last four PHQ 2/9 Test Results  0: Not at all  1: Several days  2: More than half the days  3: Nearly every day       PHQ9 SCREENING FLOWSHEET 2/22/2021 10/16/2019 12/17/2018 9/26/2018   Adult PHQ2 Score 0 0 0 0   Adult PHQ2 Interpretation Minimal Depression - - -   Adult PHQ9 Score 3 - 0 1   Adult PHQ9 Interpretation No further screening needed - - -   Little interest or pleasure in activity 0 0 Not at all Not at all   Feeling down, depressed or hopeless 0 0 Not at all Not at all   Trouble falling or staying asleep or sleeping all the time 0 - Not at all Not at all   Feeling tired or having little energy 3 - Not at all Several days   Poor appetite or overeating 0 - Not at all Not at all   Feeling bad about yourself or that you are a failure or have let yourself or family down 0 - Not at all Not at all    Trouble concentrating on things such as reading hte newspaper or watching TV 0 - Not at all Not at all   Moving or speaking slowly that other people have noticed or the opposite - being so fidgety or restless that you have been moving around a lot more than usual 0 - Not at all Not at all   Thoughts that you would be better off dead or of hurting yourself in some way 0 - Not at all Not at all           Depression screening is negative no further plan needed.      PAST SURGICAL HISTORY  Past Surgical History:   Procedure Laterality Date   • Appendectomy     • Cataract extraction w/  intraocular lens implant Left 11/26/2018    Dr Bell   • Cataract extraction w/ intraocular lens implant Right 11/14/2018    Dr. Sid Bell   • Colonoscopy  12/18/14    per Dr. Denson, pt to repeat colonocopy in 10 years for screening.   • Colonoscopy diagnostic  05/01/2006    repeat due in 2016   • Esophagogastroduodenoscopy  12/18/14   • Esophagogastroduodenoscopy transoral flex w/bx single or mult  01/03/2012    EGD with Bx   • Hysterectomy  08/20/2012   • Refractive surgery, one eye Right     RK OD only, done in the 1980's to early 1990's to correct distance vision OD.   • Tarsal tunnel release  08/19/2004    Carpal Tunnel   • Throat surgery procedure unlisted      tonsillectomy unspecified   • Total knee replacement  08/20/2012    Knee replacement BL       FAMILY HISTORY  family history is not on file.      RECENT LABORATORIES INCLUDE  Patient Active Problem List   Component Date Value Ref Range Status   • Fasting Status 02/22/2021 4  Hours Final   • Cholesterol 02/22/2021 153  <=199 mg/dL Final   • Triglycerides 02/22/2021 240* <=149 mg/dL Final   • HDL 02/22/2021 37* >=50 mg/dL Final   • LDL 02/22/2021 68  <=129 mg/dL Final    OPTIMAL           <100  NEAR OPTIMAL      100 to 129  BORDERLINE HIGH   130 to 159  HIGH              160 to 189  VERY HIGH         >=190   • Non-HDL Cholesterol 02/22/2021 116  mg/dL Final     Therapeutic Target:  CHD and risk equivalents  <130  Multiple risk factors     <160  0 to 1 risk factor        <190   • Cholesterol/ HDL Ratio 02/22/2021 4.1  <=4.4 Final   • Fasting Status 02/22/2021 4  Hours Final   • Sodium 02/22/2021 142  135 - 145 mmol/L Final   • Potassium 02/22/2021 4.1  3.4 - 5.1 mmol/L Final   • Chloride 02/22/2021 108* 98 - 107 mmol/L Final   • Carbon Dioxide 02/22/2021 31  21 - 32 mmol/L Final   • Anion Gap 02/22/2021 7* 10 - 20 mmol/L Final   • Glucose 02/22/2021 107* 65 - 99 mg/dL Final   • BUN 02/22/2021 29* 6 - 20 mg/dL Final   • Creatinine 02/22/2021 0.89  0.51 - 0.95 mg/dL Final   • Glomerular Filtration Rate 02/22/2021 65* >90 mL/min/1.73m2 Final    eGFR 60 - 89 mL/min/1.73m2 = Mild decrease in kidney function.   • BUN/ Creatinine Ratio 02/22/2021 33* 7 - 25 Final   • Calcium 02/22/2021 8.7  8.4 - 10.2 mg/dL Final   • Bilirubin, Total 02/22/2021 0.3  0.2 - 1.0 mg/dL Final   • GOT/AST 02/22/2021 17  <=37 Units/L Final   • GPT/ALT 02/22/2021 42  <64 Units/L Final   • Alkaline Phosphatase 02/22/2021 128* 45 - 117 Units/L Final   • Albumin 02/22/2021 4.0  3.6 - 5.1 g/dL Final   • Protein, Total 02/22/2021 7.1  6.4 - 8.2 g/dL Final   • Globulin 02/22/2021 3.1  2.0 - 4.0 g/dL Final   • A/G Ratio 02/22/2021 1.3  1.0 - 2.4 Final   • WBC 02/22/2021 8.4  4.2 - 11.0 K/mcL Final   • RBC 02/22/2021 4.94  4.00 - 5.20 mil/mcL Final   • HGB 02/22/2021 14.8  12.0 - 15.5 g/dL Final   • HCT 02/22/2021 45.5  36.0 - 46.5 % Final   • MCV 02/22/2021 92.1  78.0 - 100.0 fl Final   • MCH 02/22/2021 30.0  26.0 - 34.0 pg Final   • MCHC 02/22/2021 32.5  32.0 - 36.5 g/dL Final   • RDW-CV 02/22/2021 11.6  11.0 - 15.0 % Final   • RDW-SD 02/22/2021 39.0  39.0 - 50.0 fL Final   • PLT 02/22/2021 207  140 - 450 K/mcL Final   • NRBC 02/22/2021 0  <=0 /100 WBC Final   • Neutrophil, Percent 02/22/2021 50  % Final   • Lymphocytes, Percent 02/22/2021 37  % Final   • Mono, Percent 02/22/2021 9  % Final   • Eosinophils,  Percent 02/22/2021 3  % Final   • Basophils, Percent 02/22/2021 1  % Final   • Immature Granulocytes 02/22/2021 0  % Final   • Absolute Neutrophils 02/22/2021 4.3  1.8 - 7.7 K/mcL Final   • Absolute Lymphocytes 02/22/2021 3.1  1.0 - 4.0 K/mcL Final   • Absolute Monocytes 02/22/2021 0.7  0.3 - 0.9 K/mcL Final   • Absolute Eosinophils  02/22/2021 0.3  0.0 - 0.5 K/mcL Final   • Absolute Basophils 02/22/2021 0.1  0.0 - 0.3 K/mcL Final   • Absolute Immmature Granulocytes 02/22/2021 0.0  0.0 - 0.2 K/mcL Final       No results found for this or any previous visit (from the past 24 hour(s)).    REVIEW OF SYSTEMS  No chest pain or ha        PHYSICAL EXAM  Vital Signs:    Vitals:    07/30/21 1125 07/30/21 1141   BP: 124/72 128/72   BP Location: LUE - Left upper extremity LUE - Left upper extremity   Patient Position: Sitting Sitting   Cuff Size: Large Adult Large Adult   Pulse: 72    Weight: 76.1 kg    Height: 5' 5\" (1.651 m)      Body mass index is 27.91 kg/m².    Physical Exam        ASSESSMENT AND PLAN  No problem-specific Assessment & Plan notes found for this encounter.      Sofy was seen today for blood pressure.    Diagnoses and all orders for this visit:    Essential hypertension    Visit for screening mammogram      May have medication refills x6 months  Health maintenance issues reviewed  Health Maintenance   Topic Date Due   • Breast Cancer Screening  11/13/2020   • Influenza Vaccine (1) 08/01/2021   • Medicare Wellness Visit  02/22/2022   • Depression Screening  02/22/2022   • Colorectal Cancer Screen-  12/18/2024   • DTaP/Tdap/Td Vaccine (3 - Td or Tdap) 01/16/2029   • Osteoporosis Screening  Completed   • Hepatitis C Screening  Completed   • Shingles Vaccine  Completed   • COVID-19 Vaccine  Completed   • Pneumococcal Vaccine 65+  Completed   • Hepatitis B Vaccine  Aged Out   • Meningococcal Vaccine  Aged Out   • HPV Vaccine  Aged Out       The 10-year ASCVD risk score (Leo ALEXANDRE Jr., et al., 2013) is: 24.7%     Values used to calculate the score:      Age: 71 years      Sex: Female      Is Non- : No      Diabetic: No      Tobacco smoker: Yes      Systolic Blood Pressure: 128 mmHg      Is BP treated: Yes      HDL Cholesterol: 37 mg/dL      Total Cholesterol: 153 mg/dL    Return in about 8 months (around 3/30/2022) for MCR AWV sub, Med check long fasting labs 2 days prior, flp, cmp as sched.    There are no Patient Instructions on file for this visit.    13  7/30/2021 11:26 AM  7/30/2021 12:05 PM      .     within normal limits

## 2023-11-27 NOTE — PHYSICAL THERAPY INITIAL EVALUATION ADULT - PERTINENT HX OF CURRENT PROBLEM, REHAB EVAL
Pt is admitted with dx viral pneumonia, pt states he was having shortness of breath, cough, colds chills, dry cough. Pmhx Asthma.

## 2023-11-27 NOTE — PROVIDER CONTACT NOTE (CRITICAL VALUE NOTIFICATION) - BACKGROUND
patient admitted to [facility] patient admitted to [facility] patient admitted to [facility]

## 2023-11-27 NOTE — PHYSICAL THERAPY INITIAL EVALUATION ADULT - LIVES WITH, PROFILE
Pt states he lives in a pvt house with wife and son and grandchildren, has 5 steps to enter the house , stays on main floor.

## 2023-11-27 NOTE — PROGRESS NOTE ADULT - ASSESSMENT
A/P    82 year old with asthma and BPH who presents with 5 days of cough and cold, admitted for acute respiratory failure with hypoxia 2/2 viral PNA and asthma exacerbation    #Acute Respiratory Failure with Hypoxia #Viral PNA   - Sepsis not present on admission  - RSV +ve  - required up to 5L on admission, weaning oxygen now      #Asthma exacerbation   - duoneb q6h for now  - prednisone 40qD x 5 days or so  - incentive spirometry    #ETC  - Diet: regular  - DVT ppx: HSQ BID    Likely discharge tomorrow if off supplemental oxygen.     Teresita Neumann MD

## 2023-11-28 ENCOUNTER — TRANSCRIPTION ENCOUNTER (OUTPATIENT)
Age: 83
End: 2023-11-28

## 2023-11-28 VITALS
OXYGEN SATURATION: 95 % | TEMPERATURE: 98 F | SYSTOLIC BLOOD PRESSURE: 135 MMHG | HEART RATE: 99 BPM | RESPIRATION RATE: 17 BRPM | DIASTOLIC BLOOD PRESSURE: 67 MMHG

## 2023-11-28 LAB
A1C WITH ESTIMATED AVERAGE GLUCOSE RESULT: 8.3 % — HIGH (ref 4–5.6)
ANION GAP SERPL CALC-SCNC: 5 MMOL/L — SIGNIFICANT CHANGE UP (ref 5–17)
BUN SERPL-MCNC: 33 MG/DL — HIGH (ref 7–23)
CALCIUM SERPL-MCNC: 9 MG/DL — SIGNIFICANT CHANGE UP (ref 8.5–10.1)
CHLORIDE SERPL-SCNC: 103 MMOL/L — SIGNIFICANT CHANGE UP (ref 96–108)
CO2 SERPL-SCNC: 28 MMOL/L — SIGNIFICANT CHANGE UP (ref 22–31)
CREAT SERPL-MCNC: 1.33 MG/DL — HIGH (ref 0.5–1.3)
EGFR: 52 ML/MIN/1.73M2 — LOW
ESTIMATED AVERAGE GLUCOSE: 192 MG/DL — HIGH (ref 68–114)
GLUCOSE BLDC GLUCOMTR-MCNC: 229 MG/DL — HIGH (ref 70–99)
GLUCOSE BLDC GLUCOMTR-MCNC: 408 MG/DL — HIGH (ref 70–99)
GLUCOSE SERPL-MCNC: 228 MG/DL — HIGH (ref 70–99)
HCT VFR BLD CALC: 36.1 % — LOW (ref 39–50)
HGB BLD-MCNC: 12.6 G/DL — LOW (ref 13–17)
MCHC RBC-ENTMCNC: 32 PG — SIGNIFICANT CHANGE UP (ref 27–34)
MCHC RBC-ENTMCNC: 34.9 G/DL — SIGNIFICANT CHANGE UP (ref 32–36)
MCV RBC AUTO: 91.6 FL — SIGNIFICANT CHANGE UP (ref 80–100)
NRBC # BLD: 0 /100 WBCS — SIGNIFICANT CHANGE UP (ref 0–0)
PLATELET # BLD AUTO: 197 K/UL — SIGNIFICANT CHANGE UP (ref 150–400)
POTASSIUM SERPL-MCNC: 3.8 MMOL/L — SIGNIFICANT CHANGE UP (ref 3.5–5.3)
POTASSIUM SERPL-SCNC: 3.8 MMOL/L — SIGNIFICANT CHANGE UP (ref 3.5–5.3)
RBC # BLD: 3.94 M/UL — LOW (ref 4.2–5.8)
RBC # FLD: 13.6 % — SIGNIFICANT CHANGE UP (ref 10.3–14.5)
SODIUM SERPL-SCNC: 136 MMOL/L — SIGNIFICANT CHANGE UP (ref 135–145)
WBC # BLD: 14.99 K/UL — HIGH (ref 3.8–10.5)
WBC # FLD AUTO: 14.99 K/UL — HIGH (ref 3.8–10.5)

## 2023-11-28 PROCEDURE — 99238 HOSP IP/OBS DSCHRG MGMT 30/<: CPT

## 2023-11-28 RX ORDER — INSULIN LISPRO 100/ML
3 VIAL (ML) SUBCUTANEOUS
Refills: 0 | Status: DISCONTINUED | OUTPATIENT
Start: 2023-11-28 | End: 2023-11-28

## 2023-11-28 RX ORDER — INSULIN GLARGINE 100 [IU]/ML
10 INJECTION, SOLUTION SUBCUTANEOUS AT BEDTIME
Refills: 0 | Status: DISCONTINUED | OUTPATIENT
Start: 2023-11-28 | End: 2023-11-28

## 2023-11-28 RX ADMIN — Medication 3 MILLILITER(S): at 00:38

## 2023-11-28 RX ADMIN — Medication 2: at 08:41

## 2023-11-28 RX ADMIN — Medication 6: at 11:53

## 2023-11-28 RX ADMIN — Medication 3 MILLILITER(S): at 11:08

## 2023-11-28 RX ADMIN — Medication 3 UNIT(S): at 11:53

## 2023-11-28 RX ADMIN — Medication 3 MILLILITER(S): at 06:28

## 2023-11-28 RX ADMIN — HEPARIN SODIUM 5000 UNIT(S): 5000 INJECTION INTRAVENOUS; SUBCUTANEOUS at 05:43

## 2023-11-28 RX ADMIN — Medication 40 MILLIGRAM(S): at 05:43

## 2023-11-28 NOTE — DIETITIAN INITIAL EVALUATION ADULT - OTHER INFO
Denies difficulty swallowing. States dentures are ill fitting and reports he is to follow up as outpatient with his doctor for that; denies need for texture change as he knows what he can tolerate. Reports weight loss over time; unsure UBW.  Pt with T2DM; reports no home DM meds at this time on recommendation of his PCP. States he was not taking medications at home as directed by PCP. HbA1c 8.1% indicates fair blood glucose management in 83 year old pt. Pt offered and refused nutrition education at this time stating his granddaughter is an RN with knowledge of diabetes nutrition and his wife has diabetes as well and knows how to follow a diet for diabetes. Pt encouraged to follow up with outpatient MDs, and to take meds as directed by PCP. Made aware RD remains available.

## 2023-11-28 NOTE — DIETITIAN INITIAL EVALUATION ADULT - PERTINENT MEDS FT
MEDICATIONS  (STANDING):  albuterol/ipratropium for Nebulization 3 milliLiter(s) Nebulizer every 6 hours  dextrose 5%. 1000 milliLiter(s) (100 mL/Hr) IV Continuous <Continuous>  dextrose 5%. 1000 milliLiter(s) (50 mL/Hr) IV Continuous <Continuous>  dextrose 50% Injectable 12.5 Gram(s) IV Push once  dextrose 50% Injectable 25 Gram(s) IV Push once  dextrose 50% Injectable 25 Gram(s) IV Push once  glucagon  Injectable 1 milliGRAM(s) IntraMuscular once  heparin   Injectable 5000 Unit(s) SubCutaneous every 12 hours  insulin lispro (ADMELOG) corrective regimen sliding scale   SubCutaneous at bedtime  insulin lispro (ADMELOG) corrective regimen sliding scale   SubCutaneous three times a day before meals  predniSONE   Tablet 40 milliGRAM(s) Oral daily  tamsulosin 0.4 milliGRAM(s) Oral at bedtime    MEDICATIONS  (PRN):  dextrose Oral Gel 15 Gram(s) Oral once PRN Blood Glucose LESS THAN 70 milliGRAM(s)/deciliter  melatonin 3 milliGRAM(s) Oral at bedtime PRN Insomnia

## 2023-11-28 NOTE — OCCUPATIONAL THERAPY INITIAL EVALUATION ADULT - NSOTDISCHREC_GEN_A_CORE
(pt is currently at functional baseline, no restorative OT services needed at this time. A new OT order should be placed is patient is noted with functional decline)./No skilled OT needs

## 2023-11-28 NOTE — DISCHARGE NOTE NURSING/CASE MANAGEMENT/SOCIAL WORK - PATIENT PORTAL LINK FT
You can access the FollowMyHealth Patient Portal offered by [facility] by registering at the following website: [email or web address]. By joining [employer]s FollowMyHealth portal, you will also be able to view your health information using other applications (apps) compatible with our system. You can access the FollowMyHealth Patient Portal offered by [facility] by registering at the following website: [email or web address]. By joining [employer]’s FollowMyHealth portal, you will also be able to view your health information using other applications (apps) compatible with our system. You can access the FollowMyHealth Patient Portal offered by [facility] by registering at the following website: [email or web address]. By joining [employer]’s FollowMyHealth portal, you will also be able to view your health information using other applications (apps) compatible with our system.

## 2023-11-28 NOTE — DISCHARGE NOTE PROVIDER - HOSPITAL COURSE
82 year old with asthma and BPH who presents with 5 days of cough and cold, admitted for acute respiratory failure with hypoxia 2/2 viral PNA and asthma exacerbation    #Acute Respiratory Failure with Hypoxia #Viral PNA   - Sepsis not present on admission  - RSV +ve  - required up to 5L on admission, weaning oxygen now  SpO2 94% on room air      #Asthma exacerbation   - duoneb q6h for now  - prednisone 40qD x 5 days or so  - incentive spirometry    #ETC  - Diet: regular  - DVT ppx: HSQ BID    Case discussed with attending.  Given patient's improved clinical status and current hemodynamic stability, the decision was made for discharge.    This is a summary of the patients hospitalization.  For full hospital course, please see medical record.

## 2023-11-28 NOTE — OCCUPATIONAL THERAPY INITIAL EVALUATION ADULT - ADDITIONAL COMMENTS
Pt states he lives in a pvt house with wife and son and grandchildren, has 5 steps to enter the house , stays on main floor. Pt uses a cane prn at baseline.

## 2023-11-28 NOTE — DIETITIAN NUTRITION RISK NOTIFICATION - TREATMENT: THE FOLLOWING DIET HAS BEEN RECOMMENDED
Diet, Consistent Carbohydrate w/Evening Snack:   Supplement Feeding Modality:  Oral  Glucerna Shake Cans or Servings Per Day:  1       Frequency:  Daily (11-28-23 @ 11:46) [Pending Verification By Attending]  Diet, Regular (11-26-23 @ 23:13) [Active]

## 2023-11-28 NOTE — DISCHARGE NOTE NURSING/CASE MANAGEMENT/SOCIAL WORK - NSDCPEFALRISK_GEN_ALL_CORE
For information on Fall & Injury Prevention, visit: [email or web address] OR  [email or web address] OR  https://www.cdc.gov/steadi/patient.html For information on Fall & Injury Prevention, visit: [email or web address] OR  [email or web address]-injury OR  https://www.cdc.gov/steadi/patient.html For information on Fall & Injury Prevention, visit: [email or web address] OR  [email or web address] OR  https://www.cdc.gov/steadi/patient.html

## 2023-11-28 NOTE — DISCHARGE NOTE PROVIDER - NSTOBACCOUSAGEY/N_GEN_A_CS
Illness: 14 days of illness including: cough , with green sputum.  Clear rhinorrhea.    Symptoms negative for fever, nasal congestion, sore throat,   Treatments tried: MJ   Since onset, symptoms are worse   Similarly ill exposures: yes  Medical history negative for asthma  He  reports that he quit smoking about 18 years ago. His smoking use included Cigarettes. He has a 32.00 pack-year smoking history. He has never used smokeless tobacco.    
No

## 2023-11-28 NOTE — OCCUPATIONAL THERAPY INITIAL EVALUATION ADULT - PERTINENT HX OF CURRENT PROBLEM, REHAB EVAL
Pt admitted from ED for acute respiratory failure with hypoxia 2/2 viral PNA and asthma exacerbation.

## 2023-11-28 NOTE — DISCHARGE NOTE NURSING/CASE MANAGEMENT/SOCIAL WORK - CAREGIVER ADDRESS
[address] [place] [address]. [place] [id number] [address] [place] [address]. [place] [phone] [address] [place] [address]. [place] [phone]

## 2023-11-28 NOTE — DIETITIAN INITIAL EVALUATION ADULT - PERTINENT LABORATORY DATA
11-28    136  |  103  |  33<H>  ----------------------------<  228<H>  3.8   |  28  |  1.33<H>    Ca    9.0      28 Nov 2023 05:38  Phos  1.9     11-27  Mg     2.3     11-27    TPro  7.6  /  Alb  2.8<L>  /  TBili  0.6  /  DBili  x   /  AST  17  /  ALT  22  /  AlkPhos  79  11-27  POCT Blood Glucose.: 229 mg/dL (11-28-23 @ 08:32)  A1C with Estimated Average Glucose Result: 8.3 % (11-28-23 @ 05:38)  A1C with Estimated Average Glucose Result: 8.1 % (11-27-23 @ 05:50)

## 2023-11-28 NOTE — DIETITIAN INITIAL EVALUATION ADULT - NSPROEDALEARNPREF_GEN_A_NUR
[facility], a part of [facility] (formerly [facility])  [address] [place] [address]. [facility], [address] [facility]                                                    Physical Therapy  Daily Note    Patient Name: Refugio Harkins  Date: 2023      /: 2016  [x]  Patient  Verified  Payor: Rachell Dove / Plan: Treinta Y Mikhail [id number] PPO / Product Type: PPO /    In time: 1000 Out time: 1200  Total Treatment Time (min): 120  Total Timed Codes (min): 120    Treatment Area: Muscle weakness [M62.81]  Unspecified lack of coordination [R27.9]  Unspecified abnormalities of gait and mobility [R26.9]    Visit Type:  [x] Intensive  [] Outpatient  []  Orthotic Clinic Visit   []  Equipment Clinic Visit  [] Virtual Visit    SUBJECTIVE  Pain Level FLACC scale    Start of Session  During Session End of Session    Face  0 0 0   Legs  0 0 0   Activity  0 0 0   Cry  0 0 0   Consolability  0 0 0   Total  0 0 0        Any medication changes, allergies to medications, adverse drug reactions, diagnosis change, or new procedure performed?: [x] No    [] Yes (see summary sheet for update)  Subjective functional status/changes:   [x] No changes reported     Francisco J arrived to PT with his grandmother and his mother came in at the very end of the session. Mom reported that Francisco J had a BM yesterday. Francisco J was in a happy mood for the majority of the session. He did not appear as nervous or frustrated during activities.      OBJECTIVE      40 min Therapeutic Exercise:  [x] See flow sheet:   Rationale: increase ROM, increase strength, improve coordination, improve balance and increase proprioception to improve the patients ability to achieve their functional goals       45 min Neuromuscular Re-education:  [x]  See flow sheet    Rationale: Improve muscle re-education of movement, balance, coordination, kinesthetic sense, posture, and proprioception to improve the patient's ability to achieve their functional goals     min Manual Therapy:  See flowsheet Rationale: decrease pain, increase ROM, increase tissue extensibility, decrease trigger points and increase postural awareness to work towards their functional goals     30 min Gait Training:  See flow sheet       5 min Therapeutic Activities: See Flowsheet   Rationale: to use dynamic activity to improve functional performance and transfers          With   [] TE   [] neuro   [x] other: throughout the session Patient Education: [] Review HEP    [] Progressed/Changed HEP based on:   [] positioning   [] body mechanics   [] transfers   [] heat/ice application  [x]  Reviewed session with caregiver throughout the session  [] other:       Objective/Functional Activities: see functional boxes below     * total motion release abbreviated as TMR in boxes below  Vestibular - swing each direction for 1 min in tailor sitting in square swing   - spin x 10 each direction   Rhythmic Movements / Reflex Integration/ Total Motion Release - fear of paralysis x 3  - supine rocking extension, windscreen wipers, passive sliding on back, feotal rocking, rocking on hands/knees, prone hips side to side, rocking on forearms x 20  - galant x 3 each side  - embrace squeeze x 3 each leg  - foot tendon guard x 3 each leg       [place] ---   Universal Exercise Unit (UEU)/Strengthening Activities - arm pull across his body for about 1 min x 1 each side  - see boxes below for core strengthening  - monkey cage: - alt triple extension 5# 1 x 10, 6# 1 x 10                            - B hip abduction 3# 1 x 15                            - alt supine hip extension 5# 1 x 10 each leg    Core - sit ups with 2 HHA x 20  - see boxes below   Tall / Half Kneel ---   Transitions - see boxes below   Stairs ---   Standing - stop and stand as approach an object with close guarding-CGA x mult reps- CGA if need to help him lower if he starts moving backward  - stand as reach for an object with close guarding-LT  - move between stand, reach to ground, then back up for stand x mult reps with close guarding-CGA    Gait/pre-gait activities - with close guarding-LT at back of his shirt or suit throughout the gym for varying distances  - see boxes below for ramp, balance beam, step up, step down combo   FES/Xcite ---   Other - don/doff red therasuit  - no LLD noted     Total Motion Release (TMR) boxes:  TMR Testing motion Easy side Color/number Other descriptions or issues with activity   Upper Twist (UT) - sitting equal     Lower Twist (LT)  Prone/sitting Right  Y 40    Arm Raise (AR)- sitting equal     Leg Raise (LR)- leg extended in PT lap equal     Upper side bend (USB) - sitting equal     Leg Dangle (LD)- supine equal     Trunk flexion/extension - sitting Flexion  Y 40    Lower Side Bend (LSB)- supine equal     Stand to sit  NT     Arm Press (AP)  NT           Treatment Activity Hard side Pre Color/ number Treatment done Post color/number Changes   Lower twist left Y 40 - supine with knees to the R for 2 min x 1 G 20                                                                Activity Repetitions Comments   [] High Kneeling  [] By David Adam  [] by Chest     [] Rolling Supine to Prone by Ankles       [x] Supine to Sit X 3 each side [] By Mid Trunk  [] By Low Pelvis  [] By One Arm  [] By Pelvis Facing Away  [] Legs Around Trunk, 90 Degrees  [x] Legs Around Trunk 180 Degrees   [] Trunk Extension by Low Abdomen     [] Sitting On Forearm with Intermittent Support       [] Prone to 4 Point to Sit by Pelvis       [] Prone to [place] (rocking) by Elbows  [] \"T\" Method  [] \" Y\" Method   [] Rhythmical Arm Placing in [place]       Activity Repetitions Comments   [] 180 Degrees Standing         [] Supine to Stand    [] By Occiput and Ankles  [] By Forearms and Ankles  [] By Trunk Wrap           [] Standing Through Half Kneeling by Forearms and Ankle  [] Pronated Hold  [] Supinated Hold     [x] Prone to [place] to Squat to Stand by [employer] reps From quad vs prone with support at ankles   [] Standing    [] By Thighs  [] Below Knees  [] By Ankles, shoes, or close guarding  [] Combination   [] Standing 20 Counts Random       [] Prone to Stand     [] By Abdomen and Ankles  [] By Ankle and Forearm   [] Standing Against Trunk    [] Onto Toes  [] Lateral Weight Shifting  [] Marching Pattern         [] Standing on Thighs    [] Bouncing on Knees  [] LEs into Adduction/Abduction  [] Alternating Knee Bend   [] Standing Between Legs       [x] Walking     X 3 on crash pad [] By Thighs  [] By Below Knee  [] By Combination Thigh and Ankle  [x] By Ankles- or LT if try to sit hips back otherwise close guarding     Activity Repetitions Comments   [] Stepping Reaction Pull Back     [] Step Up/Down   [] 4 inch Box  [x] 6 inch Box   8-10 inches - crash pad     - x 2-  with LT at suit only [] By Combination Thigh and Ankle f  [] By Ankles  [] By Below Knees  [] By 1 Leg      Stepping Reaction Pull Back     [x] Steps Across Balance Board - x 3- tends to sit back with stance over R leg [x] By Combination Thigh and Ankle  [] By Below Knees or thigh  [] By Ankles  [] By 1 Leg     [] Steps Along Balance Beam  [] By Combination Thigh and Ankle  [] By Below Knees  [] By Ankles  [] By 1 Leg   [] Steps In/Out 6\" Box    [] By Thighs  [] By 2 Hands on 1 Thigh  [] By Combination or predominantly by ankles         [x] Steps Ascending 6 inch Ramp   - x 2 [] By Combination   [] By Below Knees  [x] By Ankles  [] By 1 Leg   [x] Steps Descending Ramp on Lap  X 1 facing forward [x] By Combination   [] By Below Knees  [] By Ankles  [] By 1 Leg        ASSESSMENT/Changes in Function:       Francisco J participated in a 120 minute physical therapy session to work toward her goals. Francisco J had a great day. He maintained a good attitude throughout the session. He moved from leaning or sitting on the table to walking on his own today twice with the suit on and one time without the suit.  Without the suit on he did need more prompting to decide to move. He continues to stay more forward over his feet in general with the suit on in standing and when leaning down to the ground and coming back up to standing, but he did allow himself to move backward more in standing in the suit today, not necessarily trying to stop himself from going backward like he did yesterday. He was more interested in his entertainment yesterday compared to today so maybe that helped him want to stay up yesterday. Francisco J demonstrated improved control and less nervousness with walking up/down the ramp and across the beam today. He does have a harder time with extension over his R leg, appearing more nervous when he has to extend forward over his R leg and often tending to drop backward into PT. Con't with POC. [x]  See Plan of Care  []  See progress note/recertification  []  See Discharge Summary    Patient's tolerance to therapy:  [x]  good  []  fair  [] increased fatigue  [] other:     Behaviors: happy throughout    Long term goal: TFA:  9/21/22-9/21/23  Francisco J will demonstrate improved total body strength, balance, ability to perform transitions, improved gait, and sustained activity tolerance in order to maximize his safety and independence with all functional mobility in his home and community environments. Partially Met          Short term goals: to be reassessed and revised as necessary:  Patient will: Status: TFA:   Stop and stand without LOB during gait staying in standing for at least 5-10 seconds for improved independence and safety during gait and exploration 2/3 times New Goal 1/3/23-4/3/23   Walk around or over obstacles in a 10' path visually navigating on his own with LT-close guarding and VCing only PM- close guarding-LT for gait, but CGA-min A to steer around objects at times.  Or he is noticing the object, but stopping 1'-2' away and try to reach for it if it is raised off of the floor     Assessed on:  1/3/23 10/24/22-3/24/23   Let go of support to walk 3'-5' toward a toy or a person with verbal prompting only with close guarding 2/3 times  PM- today walked away from support on his own multiple times, but per mom is not doing that at home currently     Assessed on:  1/3/23 10/24/22-3/24/23   Walk 30' with close guarding only 2/3 times for improved independence with navigating about the room PM- still requires close guarding in case of LOB or if he stops he will move backwards to his bottom, but is walking overall with more close guarding vs LT     Assessed on:  1/3/23 9/21/22-3/21/23   Rise to stand on his own through plantigrade or a squat with LT to help initiate 2/3 times to increase independence with mobiity Partially Met : CGA-min A to initiate and close guarding-CGA to complete. Still times he moves too far backward     Assessed on:  1/3/23 3/9/21-4/28/23   Move from standing down to the ground onto his hands and knees or forward through a squat with close guarding 2/3 times for improved safety and efficiency with independent mobility Partially Met  : more consistent when directed or with repetition of an activity, but still tends to drop his bottom back and down to the ground unless held or cued vs maintaining the squat or moving onto hands/knees     Assessed on:  1/3/23 3/9/21-5/8/23   Stand on his own during play for 1-2 min without LOB 2/3 times PM- can stand up to 1 min with close guarding, but skill varies.  Doesn't let go to stand on his own yet or does not tend to stay standing when stops during gait     Assessed on:  1/3/23 7/9/21-3/28/23   Descend a curb or step with close guarding-LT to move about his environment 2/3 times safely PM- still gets nervous when approaching the step or curb - but will step down with 2 HHA with more upright body and extended hips with improved controlled lowering over the back leg     Assessed on:  1/3/23 4/11/22-12/11/22   Stand and reach for toy on the ground and play in a squat or return to standing vs sitting back on the ground for increased independence with play and stability in transitions 2/3 times PM- still tends to primarily want to bring bottom back to ground unless given at minimum CGA     Assessed on:  1/3/23 4/11/22-4/11/23   Ascend 4 steps using 1 handrail with close guard only as seen in 2/3 trials in order to improve independence with negotiating his home environment.  Partially Met: up with 1 HHA with close guarding-CGA as needed to go up all 4 steps     Assessed on:  1/3/23    4/4/19 - 4/28/23               PLAN  [x]  Upgrade activities as tolerated     [x]  Continue plan of care  []  Update interventions per flow sheet       []  Discharge due to:_  []  Other:_          Genevieve Amador, PT, MSPT 1/11/2023 verbal instruction

## 2023-11-28 NOTE — DISCHARGE NOTE PROVIDER - NSDCCPCAREPLAN_GEN_ALL_CORE_FT
PRINCIPAL DISCHARGE DIAGNOSIS  Diagnosis: Viral pneumonia  Assessment and Plan of Treatment: Pneumonia is a lung infection that can cause a fever, cough, and trouble breathing.  Continue all antibiotics as ordered until complete.  Nutrition is important, eat small frequent meals.  Get lots of rest and drink fluids.  Call your health care provider upon arrival home from hospital and make a follow up appointment for one week.  If your cough worsens, you develop fever greater than 101', you have shaking chills, a fast heartbeat, trouble breathing and/or feel your are breathing much faster than usual, call your healthcare provider.  Make sure you wash your hands frequently.

## 2023-11-28 NOTE — DISCHARGE NOTE PROVIDER - DETAILS OF MALNUTRITION DIAGNOSIS/DIAGNOSES
This patient has been assessed with a concern for Malnutrition and was treated during this hospitalization for the following Nutrition diagnosis/diagnoses:     -  11/28/2023: Severe protein-calorie malnutrition   -  11/28/2023: Underweight (BMI < 19)

## 2023-11-28 NOTE — DIETITIAN INITIAL EVALUATION ADULT - ORAL INTAKE PTA/DIET HISTORY
Pt reports good appetite and PO intake PTA. Reports his wife and grandchildren do shopping/cooking PTA and he consumes a healthful diet PTA. No known food allergies.

## 2023-11-28 NOTE — DISCHARGE NOTE PROVIDER - NSDCMRMEDTOKEN_GEN_ALL_CORE_FT
Advair Diskus 100 mcg-50 mcg inhalation powder: 1 inhaled once a day  Albuterol (Eqv-ProAir HFA) 90 mcg/inh inhalation aerosol: 2 puff(s) inhaled every 6 hours  predniSONE 10 mg oral tablet: 3 tab(s) orally once a day Takes 11/29-12/1  predniSONE 10 mg oral tablet: 2 tab(s) orally once a day Take 12/2-12/4  predniSONE 10 mg oral tablet: 1 tab(s) orally once a day Take 12/5-12/7  tamsulosin 0.4 mg oral capsule: 1 cap(s) orally once a day

## 2023-11-28 NOTE — OCCUPATIONAL THERAPY INITIAL EVALUATION ADULT - LEVEL OF INDEPENDENCE: SUPINE/SIT, REHAB EVAL
independent
Father  Still living? No  Family history of Parkinson disease, Age at diagnosis: Age Unknown     Mother  Still living? No  Family history of colorectal cancer, Age at diagnosis: Age Unknown     Sibling  Still living? No  Family history of diabetes mellitus, Age at diagnosis: Age Unknown     Child  Still living? Yes, Estimated age: Age Unknown  Family history of transitional cell carcinoma of bladder, Age at diagnosis: Age Unknown  Family history of melanoma, Age at diagnosis: Age Unknown

## 2023-11-28 NOTE — DIETITIAN INITIAL EVALUATION ADULT - ENERGY INTAKE
Pt reports good appetite and PO intake during LOS; observed 100% breakfast consumed at time of visit.

## 2023-12-05 DIAGNOSIS — J45.901 UNSPECIFIED ASTHMA WITH (ACUTE) EXACERBATION: ICD-10-CM

## 2023-12-05 DIAGNOSIS — J12.1 RESPIRATORY SYNCYTIAL VIRUS PNEUMONIA: ICD-10-CM

## 2023-12-05 DIAGNOSIS — J96.01 ACUTE RESPIRATORY FAILURE WITH HYPOXIA: ICD-10-CM

## 2023-12-07 ENCOUNTER — INPATIENT (INPATIENT)
Facility: HOSPITAL | Age: 83
LOS: 3 days | Discharge: HOME | End: 2023-12-11
Attending: INTERNAL MEDICINE | Admitting: INTERNAL MEDICINE
Payer: MEDICARE

## 2023-12-07 VITALS
HEIGHT: 65 IN | DIASTOLIC BLOOD PRESSURE: 61 MMHG | RESPIRATION RATE: 21 BRPM | TEMPERATURE: 98 F | HEART RATE: 104 BPM | WEIGHT: 95.02 LBS | SYSTOLIC BLOOD PRESSURE: 102 MMHG | OXYGEN SATURATION: 93 %

## 2023-12-07 DIAGNOSIS — Z98.49 CATARACT EXTRACTION STATUS, UNSPECIFIED EYE: Chronic | ICD-10-CM

## 2023-12-07 LAB
ALBUMIN SERPL ELPH-MCNC: 2.4 G/DL — LOW (ref 3.3–5)
ALP SERPL-CCNC: 84 U/L — SIGNIFICANT CHANGE UP (ref 40–120)
ALT FLD-CCNC: 21 U/L — SIGNIFICANT CHANGE UP (ref 12–78)
ANION GAP SERPL CALC-SCNC: 3 MMOL/L — LOW (ref 5–17)
ANION GAP SERPL CALC-SCNC: 7 MMOL/L — SIGNIFICANT CHANGE UP (ref 5–17)
APTT BLD: 29.7 SEC — SIGNIFICANT CHANGE UP (ref 24.5–35.6)
AST SERPL-CCNC: 14 U/L — LOW (ref 15–37)
BASOPHILS # BLD AUTO: 0.04 K/UL — SIGNIFICANT CHANGE UP (ref 0–0.2)
BASOPHILS NFR BLD AUTO: 0.3 % — SIGNIFICANT CHANGE UP (ref 0–2)
BILIRUB SERPL-MCNC: 0.3 MG/DL — SIGNIFICANT CHANGE UP (ref 0.2–1.2)
BUN SERPL-MCNC: 23 MG/DL — SIGNIFICANT CHANGE UP (ref 7–23)
BUN SERPL-MCNC: 32 MG/DL — HIGH (ref 7–23)
CALCIUM SERPL-MCNC: 8.3 MG/DL — LOW (ref 8.5–10.1)
CALCIUM SERPL-MCNC: 8.5 MG/DL — SIGNIFICANT CHANGE UP (ref 8.5–10.1)
CHLORIDE SERPL-SCNC: 102 MMOL/L — SIGNIFICANT CHANGE UP (ref 96–108)
CHLORIDE SERPL-SCNC: 97 MMOL/L — SIGNIFICANT CHANGE UP (ref 96–108)
CO2 SERPL-SCNC: 27 MMOL/L — SIGNIFICANT CHANGE UP (ref 22–31)
CO2 SERPL-SCNC: 29 MMOL/L — SIGNIFICANT CHANGE UP (ref 22–31)
CREAT SERPL-MCNC: 1.05 MG/DL — SIGNIFICANT CHANGE UP (ref 0.5–1.3)
CREAT SERPL-MCNC: 1.74 MG/DL — HIGH (ref 0.5–1.3)
D DIMER BLD IA.RAPID-MCNC: 300 NG/ML DDU — HIGH
EGFR: 38 ML/MIN/1.73M2 — LOW
EGFR: 70 ML/MIN/1.73M2 — SIGNIFICANT CHANGE UP
EOSINOPHIL # BLD AUTO: 0.09 K/UL — SIGNIFICANT CHANGE UP (ref 0–0.5)
EOSINOPHIL NFR BLD AUTO: 0.7 % — SIGNIFICANT CHANGE UP (ref 0–6)
GAS PNL BLDV: SIGNIFICANT CHANGE UP
GLUCOSE BLDC GLUCOMTR-MCNC: 249 MG/DL — HIGH (ref 70–99)
GLUCOSE BLDC GLUCOMTR-MCNC: 423 MG/DL — HIGH (ref 70–99)
GLUCOSE BLDC GLUCOMTR-MCNC: 520 MG/DL — CRITICAL HIGH (ref 70–99)
GLUCOSE BLDC GLUCOMTR-MCNC: >600 MG/DL — CRITICAL HIGH (ref 70–99)
GLUCOSE SERPL-MCNC: 226 MG/DL — HIGH (ref 70–99)
GLUCOSE SERPL-MCNC: 414 MG/DL — HIGH (ref 70–99)
HCT VFR BLD CALC: 39.5 % — SIGNIFICANT CHANGE UP (ref 39–50)
HGB BLD-MCNC: 13.4 G/DL — SIGNIFICANT CHANGE UP (ref 13–17)
IMM GRANULOCYTES NFR BLD AUTO: 0.9 % — SIGNIFICANT CHANGE UP (ref 0–0.9)
INR BLD: 1.26 RATIO — HIGH (ref 0.85–1.18)
LYMPHOCYTES # BLD AUTO: 1.6 K/UL — SIGNIFICANT CHANGE UP (ref 1–3.3)
LYMPHOCYTES # BLD AUTO: 12.3 % — LOW (ref 13–44)
MCHC RBC-ENTMCNC: 30.6 PG — SIGNIFICANT CHANGE UP (ref 27–34)
MCHC RBC-ENTMCNC: 33.9 G/DL — SIGNIFICANT CHANGE UP (ref 32–36)
MCV RBC AUTO: 90.2 FL — SIGNIFICANT CHANGE UP (ref 80–100)
MONOCYTES # BLD AUTO: 0.74 K/UL — SIGNIFICANT CHANGE UP (ref 0–0.9)
MONOCYTES NFR BLD AUTO: 5.7 % — SIGNIFICANT CHANGE UP (ref 2–14)
NEUTROPHILS # BLD AUTO: 10.44 K/UL — HIGH (ref 1.8–7.4)
NEUTROPHILS NFR BLD AUTO: 80.1 % — HIGH (ref 43–77)
NRBC # BLD: 0 /100 WBCS — SIGNIFICANT CHANGE UP (ref 0–0)
NT-PROBNP SERPL-SCNC: 198 PG/ML — SIGNIFICANT CHANGE UP (ref 0–450)
PLATELET # BLD AUTO: 329 K/UL — SIGNIFICANT CHANGE UP (ref 150–400)
POTASSIUM SERPL-MCNC: 4 MMOL/L — SIGNIFICANT CHANGE UP (ref 3.5–5.3)
POTASSIUM SERPL-MCNC: 4.3 MMOL/L — SIGNIFICANT CHANGE UP (ref 3.5–5.3)
POTASSIUM SERPL-SCNC: 4 MMOL/L — SIGNIFICANT CHANGE UP (ref 3.5–5.3)
POTASSIUM SERPL-SCNC: 4.3 MMOL/L — SIGNIFICANT CHANGE UP (ref 3.5–5.3)
PROT SERPL-MCNC: 7.3 GM/DL — SIGNIFICANT CHANGE UP (ref 6–8.3)
PROTHROM AB SERPL-ACNC: 15 SEC — HIGH (ref 9.5–13)
RAPID RVP RESULT: SIGNIFICANT CHANGE UP
RBC # BLD: 4.38 M/UL — SIGNIFICANT CHANGE UP (ref 4.2–5.8)
RBC # FLD: 13.2 % — SIGNIFICANT CHANGE UP (ref 10.3–14.5)
SARS-COV-2 RNA SPEC QL NAA+PROBE: SIGNIFICANT CHANGE UP
SODIUM SERPL-SCNC: 131 MMOL/L — LOW (ref 135–145)
SODIUM SERPL-SCNC: 134 MMOL/L — LOW (ref 135–145)
TROPONIN I, HIGH SENSITIVITY RESULT: 16.9 NG/L — SIGNIFICANT CHANGE UP
WBC # BLD: 13.03 K/UL — HIGH (ref 3.8–10.5)
WBC # FLD AUTO: 13.03 K/UL — HIGH (ref 3.8–10.5)

## 2023-12-07 PROCEDURE — 99285 EMERGENCY DEPT VISIT HI MDM: CPT

## 2023-12-07 PROCEDURE — 93010 ELECTROCARDIOGRAM REPORT: CPT

## 2023-12-07 PROCEDURE — 99223 1ST HOSP IP/OBS HIGH 75: CPT

## 2023-12-07 PROCEDURE — 71045 X-RAY EXAM CHEST 1 VIEW: CPT | Mod: 26

## 2023-12-07 PROCEDURE — 71275 CT ANGIOGRAPHY CHEST: CPT | Mod: 26,MA

## 2023-12-07 RX ORDER — AZITHROMYCIN 500 MG/1
500 TABLET, FILM COATED ORAL ONCE
Refills: 0 | Status: COMPLETED | OUTPATIENT
Start: 2023-12-07 | End: 2023-12-07

## 2023-12-07 RX ORDER — BUDESONIDE AND FORMOTEROL FUMARATE DIHYDRATE 160; 4.5 UG/1; UG/1
2 AEROSOL RESPIRATORY (INHALATION)
Refills: 0 | Status: DISCONTINUED | OUTPATIENT
Start: 2023-12-07 | End: 2023-12-11

## 2023-12-07 RX ORDER — GLUCAGON INJECTION, SOLUTION 0.5 MG/.1ML
1 INJECTION, SOLUTION SUBCUTANEOUS ONCE
Refills: 0 | Status: DISCONTINUED | OUTPATIENT
Start: 2023-12-07 | End: 2023-12-11

## 2023-12-07 RX ORDER — INSULIN LISPRO 100/ML
12 VIAL (ML) SUBCUTANEOUS ONCE
Refills: 0 | Status: COMPLETED | OUTPATIENT
Start: 2023-12-07 | End: 2023-12-07

## 2023-12-07 RX ORDER — INSULIN GLARGINE 100 [IU]/ML
30 INJECTION, SOLUTION SUBCUTANEOUS AT BEDTIME
Refills: 0 | Status: DISCONTINUED | OUTPATIENT
Start: 2023-12-07 | End: 2023-12-08

## 2023-12-07 RX ORDER — IPRATROPIUM/ALBUTEROL SULFATE 18-103MCG
3 AEROSOL WITH ADAPTER (GRAM) INHALATION EVERY 6 HOURS
Refills: 0 | Status: DISCONTINUED | OUTPATIENT
Start: 2023-12-07 | End: 2023-12-07

## 2023-12-07 RX ORDER — IPRATROPIUM/ALBUTEROL SULFATE 18-103MCG
3 AEROSOL WITH ADAPTER (GRAM) INHALATION
Refills: 0 | Status: COMPLETED | OUTPATIENT
Start: 2023-12-07 | End: 2023-12-07

## 2023-12-07 RX ORDER — VANCOMYCIN HCL 1 G
1000 VIAL (EA) INTRAVENOUS ONCE
Refills: 0 | Status: COMPLETED | OUTPATIENT
Start: 2023-12-07 | End: 2023-12-07

## 2023-12-07 RX ORDER — TAMSULOSIN HYDROCHLORIDE 0.4 MG/1
0.4 CAPSULE ORAL AT BEDTIME
Refills: 0 | Status: DISCONTINUED | OUTPATIENT
Start: 2023-12-07 | End: 2023-12-11

## 2023-12-07 RX ORDER — INSULIN LISPRO 100/ML
10 VIAL (ML) SUBCUTANEOUS ONCE
Refills: 0 | Status: COMPLETED | OUTPATIENT
Start: 2023-12-07 | End: 2023-12-07

## 2023-12-07 RX ORDER — DEXTROSE 50 % IN WATER 50 %
25 SYRINGE (ML) INTRAVENOUS ONCE
Refills: 0 | Status: DISCONTINUED | OUTPATIENT
Start: 2023-12-07 | End: 2023-12-11

## 2023-12-07 RX ORDER — INSULIN LISPRO 100/ML
10 VIAL (ML) SUBCUTANEOUS
Refills: 0 | Status: DISCONTINUED | OUTPATIENT
Start: 2023-12-07 | End: 2023-12-08

## 2023-12-07 RX ORDER — DEXTROSE 50 % IN WATER 50 %
12.5 SYRINGE (ML) INTRAVENOUS ONCE
Refills: 0 | Status: DISCONTINUED | OUTPATIENT
Start: 2023-12-07 | End: 2023-12-11

## 2023-12-07 RX ORDER — LIDOCAINE 4 G/100G
1 CREAM TOPICAL ONCE
Refills: 0 | Status: COMPLETED | OUTPATIENT
Start: 2023-12-07 | End: 2023-12-07

## 2023-12-07 RX ORDER — AZITHROMYCIN 500 MG/1
TABLET, FILM COATED ORAL
Refills: 0 | Status: DISCONTINUED | OUTPATIENT
Start: 2023-12-07 | End: 2023-12-07

## 2023-12-07 RX ORDER — AZITHROMYCIN 500 MG/1
500 TABLET, FILM COATED ORAL EVERY 24 HOURS
Refills: 0 | Status: DISCONTINUED | OUTPATIENT
Start: 2023-12-07 | End: 2023-12-10

## 2023-12-07 RX ORDER — IPRATROPIUM/ALBUTEROL SULFATE 18-103MCG
3 AEROSOL WITH ADAPTER (GRAM) INHALATION EVERY 6 HOURS
Refills: 0 | Status: DISCONTINUED | OUTPATIENT
Start: 2023-12-07 | End: 2023-12-11

## 2023-12-07 RX ORDER — SODIUM CHLORIDE 9 MG/ML
1000 INJECTION, SOLUTION INTRAVENOUS
Refills: 0 | Status: DISCONTINUED | OUTPATIENT
Start: 2023-12-07 | End: 2023-12-11

## 2023-12-07 RX ORDER — INSULIN LISPRO 100/ML
VIAL (ML) SUBCUTANEOUS
Refills: 0 | Status: DISCONTINUED | OUTPATIENT
Start: 2023-12-07 | End: 2023-12-11

## 2023-12-07 RX ORDER — SODIUM CHLORIDE 9 MG/ML
1000 INJECTION INTRAMUSCULAR; INTRAVENOUS; SUBCUTANEOUS
Refills: 0 | Status: DISCONTINUED | OUTPATIENT
Start: 2023-12-07 | End: 2023-12-11

## 2023-12-07 RX ORDER — HEPARIN SODIUM 5000 [USP'U]/ML
5000 INJECTION INTRAVENOUS; SUBCUTANEOUS EVERY 12 HOURS
Refills: 0 | Status: DISCONTINUED | OUTPATIENT
Start: 2023-12-07 | End: 2023-12-11

## 2023-12-07 RX ORDER — CEFTRIAXONE 500 MG/1
1000 INJECTION, POWDER, FOR SOLUTION INTRAMUSCULAR; INTRAVENOUS EVERY 24 HOURS
Refills: 0 | Status: DISCONTINUED | OUTPATIENT
Start: 2023-12-07 | End: 2023-12-11

## 2023-12-07 RX ORDER — DEXTROSE 50 % IN WATER 50 %
15 SYRINGE (ML) INTRAVENOUS ONCE
Refills: 0 | Status: DISCONTINUED | OUTPATIENT
Start: 2023-12-07 | End: 2023-12-11

## 2023-12-07 RX ORDER — CEFEPIME 1 G/1
2000 INJECTION, POWDER, FOR SOLUTION INTRAMUSCULAR; INTRAVENOUS ONCE
Refills: 0 | Status: COMPLETED | OUTPATIENT
Start: 2023-12-07 | End: 2023-12-07

## 2023-12-07 RX ADMIN — TAMSULOSIN HYDROCHLORIDE 0.4 MILLIGRAM(S): 0.4 CAPSULE ORAL at 21:32

## 2023-12-07 RX ADMIN — CEFEPIME 100 MILLIGRAM(S): 1 INJECTION, POWDER, FOR SOLUTION INTRAMUSCULAR; INTRAVENOUS at 16:40

## 2023-12-07 RX ADMIN — SODIUM CHLORIDE 75 MILLILITER(S): 9 INJECTION INTRAMUSCULAR; INTRAVENOUS; SUBCUTANEOUS at 22:38

## 2023-12-07 RX ADMIN — Medication 250 MILLIGRAM(S): at 17:15

## 2023-12-07 RX ADMIN — Medication 10 UNIT(S): at 21:32

## 2023-12-07 RX ADMIN — Medication 3 MILLILITER(S): at 11:46

## 2023-12-07 RX ADMIN — Medication 20 MILLIGRAM(S): at 18:05

## 2023-12-07 RX ADMIN — BUDESONIDE AND FORMOTEROL FUMARATE DIHYDRATE 2 PUFF(S): 160; 4.5 AEROSOL RESPIRATORY (INHALATION) at 18:04

## 2023-12-07 RX ADMIN — CEFTRIAXONE 100 MILLIGRAM(S): 500 INJECTION, POWDER, FOR SOLUTION INTRAMUSCULAR; INTRAVENOUS at 19:20

## 2023-12-07 RX ADMIN — Medication 12: at 18:33

## 2023-12-07 RX ADMIN — LIDOCAINE 1 PATCH: 4 CREAM TOPICAL at 11:46

## 2023-12-07 RX ADMIN — Medication 125 MILLIGRAM(S): at 11:47

## 2023-12-07 RX ADMIN — Medication 10 UNIT(S): at 18:32

## 2023-12-07 RX ADMIN — LIDOCAINE 1 PATCH: 4 CREAM TOPICAL at 20:03

## 2023-12-07 RX ADMIN — Medication 12 UNIT(S): at 19:02

## 2023-12-07 RX ADMIN — AZITHROMYCIN 500 MILLIGRAM(S): 500 TABLET, FILM COATED ORAL at 18:02

## 2023-12-07 RX ADMIN — Medication 3 MILLILITER(S): at 12:21

## 2023-12-07 RX ADMIN — INSULIN GLARGINE 30 UNIT(S): 100 INJECTION, SOLUTION SUBCUTANEOUS at 21:32

## 2023-12-07 RX ADMIN — Medication 3 MILLILITER(S): at 12:00

## 2023-12-07 NOTE — ED PROVIDER NOTE - ATTENDING CONTRIBUTION TO CARE
83M PMH Asthma, diabetes (no longer on medication), BPH, recent hospitalization for asthma exacerbation and acute hypoxic respiratory failure in the setting of RSV, tx'd with course of steroids, who today presents with progressive dyspnea x 1 week with increased work of breathing with exertion and laying supine. Reports he is finishing steroid course. No fever. No chest pain. On eval pt is aox3, nad, heart rrr, lungs with exp wheezing b/l and poor inspiratory effort, abd soft ntnd, no peripheral edema, noted tachypnea and mild hypoxia at rest. plan: possible recurrent asthma exacerbation, eval for bacterial pna, r.o PE, check troponin rule out nstemi, labs to rule out metabolic derangements, re-eval after steroids/ nebulizers, if not improving will admit

## 2023-12-07 NOTE — PATIENT PROFILE ADULT - FALL HARM RISK - HARM RISK INTERVENTIONS
Assistance with ambulation/Assistance OOB with selected safe patient handling equipment/Communicate Risk of Fall with Harm to all staff/Reinforce activity limits and safety measures with patient and family/Tailored Fall Risk Interventions/Visual Cue: Yellow wristband and red socks/Bed in lowest position, wheels locked, appropriate side rails in place/Call bell, personal items and telephone in reach/Instruct patient to call for assistance before getting out of bed or chair/Non-slip footwear when patient is out of bed/[place] to call system/Physically safe environment - no spills, clutter or unnecessary equipment/Purposeful Proactive Rounding/Room/bathroom lighting operational, light cord in reach Assistance with ambulation/Assistance OOB with selected safe patient handling equipment/Communicate Risk of Fall with Harm to all staff/Reinforce activity limits and safety measures with patient and family/Tailored Fall Risk Interventions/Visual Cue: Yellow wristband and red socks/Bed in lowest position, wheels locked, appropriate side rails in place/Call bell, personal items and telephone in reach/Instruct patient to call for assistance before getting out of bed or chair/Non-slip footwear when patient is out of bed/[place] to call system/Physically safe environment - no spills, clutter or unnecessary equipment/Purposeful Proactive Rounding/Room/bathroom lighting operational, light cord in reach

## 2023-12-07 NOTE — ED PROVIDER NOTE - PROGRESS NOTE DETAILS
Lucie Donald DO (PGY3): CT showing increased bilateral hazy opacities consistent with infectious etiology given patient's symptoms.  Concern for bacterial infection, given patient recently hospitalized will cover with cefepime and Vanco for first dose.  CTA negative for PE.  Plan for admission for asthma exacerbation in the setting of bacterial pneumonia and hypoxia.  Spoke with Dr. Villatoro will admit to his service.

## 2023-12-07 NOTE — ED ADULT NURSE NOTE - OBJECTIVE STATEMENT
A&OX4. patient C/O  SOB with right sided rib pain with Cough over 2 weeks with brownish phlegm . denies Fevers/or chest pain . patient recently discharge with Dx  b/l pneumonia

## 2023-12-07 NOTE — ED PROVIDER NOTE - PHYSICAL EXAMINATION
GENERAL: Awake. Alert. NAD. Well nourished.  HEENT: NC/AT, PERRL, EOMI, Conjunctiva pink, no scleral icterus. Airway patent.   LUNGS: Decreased breath sounds throughout all lung fields, course breath sounds at R base.  CARDIAC: RRR.  ABDOMEN: No masses noted. Soft, NT, ND, no rebound, no guarding.  EXT: No edema, no calf tenderness, distal pulses 2+ bilaterally  NEURO: A&Ox3. Moving all extremities. Sensation and strength intact throughout.   SKIN: Warm and dry.   PSYCH: Normal affect.

## 2023-12-07 NOTE — PATIENT PROFILE ADULT - CAREGIVER ADDRESS
[address] [place] [address]. [place] [id number] [place] [address]. [place] [id number]

## 2023-12-07 NOTE — ED PROVIDER NOTE - WR ORDER STATUS 1
rx sent to pharmacy. Called pharmacy and notified pharmacist to add bacitracin with his hydrcortisone. Due to pt not having paper and cant write down the rx. Pharmacist notified and voiced understanding. Resulted

## 2023-12-07 NOTE — H&P ADULT - NSHPLABSRESULTS_GEN_ALL_CORE
LABS:                        13.4   13.03 )-----------( 329      ( 07 Dec 2023 12:07 )             39.5     12-07    134<L>  |  102  |  23  ----------------------------<  226<H>  4.0   |  29  |  1.05    Ca    8.3<L>      07 Dec 2023 12:07    TPro  7.3  /  Alb  2.4<L>  /  TBili  0.3  /  DBili  x   /  AST  14<L>  /  ALT  21  /  AlkPhos  84  12-07    PT/INR - ( 07 Dec 2023 12:07 )   PT: 15.0 sec;   INR: 1.26 ratio         PTT - ( 07 Dec 2023 12:07 )  PTT:29.7 sec  Urinalysis Basic - ( 07 Dec 2023 12:07 )    Color: x / Appearance: x / SG: x / pH: x  Gluc: 226 mg/dL / Ketone: x  / Bili: x / Urobili: x   Blood: x / Protein: x / Nitrite: x   Leuk Esterase: x / RBC: x / WBC x   Sq Epi: x / Non Sq Epi: x / Bacteria: x          RADIOLOGY & ADDITIONAL TESTS:

## 2023-12-07 NOTE — ED ADULT NURSE NOTE - ED STAT RN HANDOFF DETAILS
Report endorsed to oncoming RN  leesa for my break coverage. Report given to covering RN and patient informed during rounding Safety checks compld this shift/Safety rounds completed hourly.  Fall +skin precs in place. Any issues endorsed to oncoming RN for follow up. RN Assumed patient's care for coverage.

## 2023-12-07 NOTE — ED ADULT NURSE NOTE - NSFALLUNIVINTERV_ED_ALL_ED
Bed/Stretcher in lowest position, wheels locked, appropriate side rails in place/Call bell, personal items and telephone in reach/Instruct patient to call for assistance before getting out of bed/chair/stretcher/Non-slip footwear applied when patient is off stretcher/[place] to call system/Physically safe environment - no spills, clutter or unnecessary equipment/Purposeful proactive rounding/Room/bathroom lighting operational, light cord in reach Bed/Stretcher in lowest position, wheels locked, appropriate side rails in place/Call bell, personal items and telephone in reach/Instruct patient to call for assistance before getting out of bed/chair/stretcher/Non-slip footwear applied when patient is off stretcher/[place] to call system/Physically safe environment - no spills, clutter or unnecessary equipment/Purposeful proactive rounding/Room/bathroom lighting operational, light cord in reach

## 2023-12-07 NOTE — H&P ADULT - ASSESSMENT
83 year old male PMH asthma, diabetes (no longer on medication), BPH, recent hospitalization for asthma exacerbation and acute hypoxic respiratory failure in the setting of RSV (dc'd 9 days ago) presenting to the emergency department with persistent cough and worsening shortness of breath (exertional and orthopnea) over the past week, no associated fever or chest pain, patient reports bilateral rib pain with coughing.  No new calf pain or swelling.  Patient states she was not placed on antibiotics.  Patient was discharged on a prednisone taper which she finished a few days ago.  Denies urinary symptoms, abdominal pain, nausea, vomiting.  Patient states he is taking his inhaler as prescribed but is not helping.  Patient noted to be 94 to 95% on room air at rest sitting up. On exertion patient's oxygen saturation decreased to 85% on room air.    Plan:   83 year old male PMH asthma, diabetes (no longer on medication), BPH, recent hospitalization for asthma exacerbation and acute hypoxic respiratory failure in the setting of RSV (dc'd 9 days ago) presenting to the emergency department with persistent cough and worsening shortness of breath (exertional and orthopnea) over the past week, no associated fever or chest pain, patient reports bilateral rib pain with coughing.  No new calf pain or swelling.  Patient states she was not placed on antibiotics.  Patient was discharged on a prednisone taper which she finished a few days ago.  Denies urinary symptoms, abdominal pain, nausea, vomiting.  Patient states he is taking his inhaler as prescribed but is not helping.  Patient noted to be 94 to 95% on room air at rest sitting up. On exertion patient's oxygen saturation decreased to 85% on room air.      Plan:  Admit to medicine for asthma exacerbation . Covid negative, Flu panel negative, CTA no PE, lower lobe infiltrates noted. Started on IV Ceftriaxone and Zithromax in ER, Solumedrol given 125 mg x 1 in ER. Continue Symbicort and Duonebs.     DM: Glucose increased > 600 from 226 on arrival. Added Lantus 30 and and Admelog 10 units tid. A1C not optimal at 8.3.  Finger sticks every 4 hours for 24 hours, SMA-7 now and in AM. Anion gap was normal on arrival.       Continue home Flomax at home dose.     No prior CHF or cardiac stents.  83 year old male PMH asthma, diabetes (no longer on medication), BPH, recent hospitalization for asthma exacerbation and acute hypoxic respiratory failure in the setting of RSV (dc'd 9 days ago) presenting to the emergency department with persistent cough and worsening shortness of breath (exertional and orthopnea) over the past week, no associated fever or chest pain, patient reports bilateral rib pain with coughing.  No new calf pain or swelling.  Patient states she was not placed on antibiotics.  Patient was discharged on a prednisone taper which she finished a few days ago.  Denies urinary symptoms, abdominal pain, nausea, vomiting.  Patient states he is taking his inhaler as prescribed but is not helping.  Patient noted to be 94 to 95% on room air at rest sitting up. On exertion patient's oxygen saturation decreased to 85% on room air.      Plan:  Admit to medicine for asthma exacerbation . Covid negative, Flu panel negative, CTA no PE, lower lobe infiltrates noted. Started on IV Ceftriaxone and Zithromax in ER, Solumedrol given 125 mg x 1 in ER. Continue Symbicort and Duonebs.     DM: Glucose increased > 600 from 226 on arrival. Added Lantus 30 and and Admelog 10 units tid. A1C not optimal at 8.3.  Finger sticks every 4 hours for 24 hours, SMA-7 now and in AM. Anion gap was normal on arrival.   Will resume Solumedrol 20 mg tid in AM once glucose more under control.      Continue home Flomax at home dose.     No prior CHF or cardiac stents.  83 year old male PMH asthma, diabetes (no longer on medication), BPH, recent hospitalization for asthma exacerbation and acute hypoxic respiratory failure in the setting of RSV (dc'd 9 days ago) presenting to the emergency department with persistent cough and worsening shortness of breath (exertional and orthopnea) over the past week, no associated fever or chest pain, patient reports bilateral rib pain with coughing.  No new calf pain or swelling.  Patient states she was not placed on antibiotics.  Patient was discharged on a prednisone taper which she finished a few days ago.  Denies urinary symptoms, abdominal pain, nausea, vomiting.  Patient states he is taking his inhaler as prescribed but is not helping.  Patient noted to be 94 to 95% on room air at rest sitting up. On exertion patient's oxygen saturation decreased to 85% on room air.      Plan:  Admit to medicine for asthma exacerbation . Covid negative, Flu panel negative, CTA no PE, lower lobe infiltrates noted. Started on IV Ceftriaxone and Zithromax in ER, Solumedrol given 125 mg x 1 in ER. Continue Symbicort and Duonebs.     DM: Glucose increased > 600 from 226 on arrival. Added Lantus 30 and and Admelog 10 units tid. A1C not optimal at 8.3.  Finger sticks every 4 hours for 24 hours, SMA-7 now and in AM. Anion gap was normal on arrival.   Will resume Solumedrol 20 mg tid in AM once glucose more under control.      Continue home Flomax at home dose.     No prior CHF or cardiac stents.     Glucose improved to around 260, will decrease Lantus to 25/Admelog 8 units tid.  83 year old male PMH asthma, diabetes (no longer on medication), BPH, recent hospitalization for asthma exacerbation and acute hypoxic respiratory failure in the setting of RSV (dc'd 9 days ago) presenting to the emergency department with persistent cough and worsening shortness of breath (exertional and orthopnea) over the past week, no associated fever or chest pain, patient reports bilateral rib pain with coughing.  No new calf pain or swelling.  Patient states she was not placed on antibiotics.  Patient was discharged on a prednisone taper which she finished a few days ago.  Denies urinary symptoms, abdominal pain, nausea, vomiting.  Patient states he is taking his inhaler as prescribed but is not helping.  Patient noted to be 94 to 95% on room air at rest sitting up. On exertion patient's oxygen saturation decreased to 85% on room air.      Plan:  Admit to medicine for asthma exacerbation . Covid negative, Flu panel negative, CTA no PE, lower lobe infiltrates noted. Started on IV Ceftriaxone and Zithromax in ER, Solumedrol given 125 mg x 1 in ER. Continue Symbicort and Duonebs.     DM: Glucose increased > 600 from 226 on arrival. Added Lantus 30 and and Admelog 10 units tid. A1C not optimal at 8.3.  Finger sticks every 4 hours for 24 hours, SMA-7 now and in AM. Anion gap was normal on arrival.   Will resume Solumedrol 20 mg tid in AM once glucose more under control.      Continue home Flomax at home dose.     No prior CHF or cardiac stents.     Addendum:  Glucose later improved to 249, will decrease Lantus to 25/Admelog 8 units tid.

## 2023-12-07 NOTE — ED ADULT NURSE NOTE - ED STAT RN HANDOFF DETAILS 2
Report given to receiving RN jeannie ,pts history, current condition and reason for admission discussed, safety concerns addressed and reviewed, pt currently in stable condition, IV flushes for patency and site shows no signs or symptoms of infiltrate, dressing is clean dry and intact, pt is aware of plan of care. Pt education deemed successful at time of report after patient demonstrates successful teach back for proficiency.

## 2023-12-07 NOTE — H&P ADULT - NSHPPHYSICALEXAM_GEN_ALL_CORE
PHYSICAL EXAMINATION:  Vital Signs Last 24 Hrs  T(C): 36.7 (07 Dec 2023 15:28), Max: 36.8 (07 Dec 2023 10:53)  T(F): 98 (07 Dec 2023 15:28), Max: 98.3 (07 Dec 2023 10:53)  HR: 93 (07 Dec 2023 15:28) (93 - 104)  BP: 116/85 (07 Dec 2023 15:28) (102/61 - 116/85)  BP(mean): --  RR: 17 (07 Dec 2023 15:28) (17 - 21)  SpO2: 92% (07 Dec 2023 15:28) (92% - 93%)    Parameters below as of 07 Dec 2023 15:28  Patient On (Oxygen Delivery Method): room air      CAPILLARY BLOOD GLUCOSE      POCT Blood Glucose.: 243 mg/dL (07 Dec 2023 10:57)      GENERAL: NAD, seen in ER,    ENMT: mucous membranes moist  NECK: supple, No JVD  CHEST/LUNG: clear to auscultation bilaterally; no rales, rhonchi, or wheezing b/l  HEART: normal S1, S2  ABDOMEN: BS+, soft, ND, NT   EXTREMITIES:  pulses palpable; no clubbing, cyanosis, or edema b/l LEs  NEURO: awake, alert, interactive; moves all extremities PHYSICAL EXAMINATION:  Vital Signs Last 24 Hrs  T(C): 36.7 (07 Dec 2023 15:28), Max: 36.8 (07 Dec 2023 10:53)  T(F): 98 (07 Dec 2023 15:28), Max: 98.3 (07 Dec 2023 10:53)  HR: 93 (07 Dec 2023 15:28) (93 - 104)  BP: 116/85 (07 Dec 2023 15:28) (102/61 - 116/85)  BP(mean): --  RR: 17 (07 Dec 2023 15:28) (17 - 21)  SpO2: 92% (07 Dec 2023 15:28) (92% - 93%)    Parameters below as of 07 Dec 2023 15:28  Patient On (Oxygen Delivery Method): room air      CAPILLARY BLOOD GLUCOSE      POCT Blood Glucose.: 243 mg/dL (07 Dec 2023 10:57)      GENERAL: NAD, seen in ER,  comfortable at rest on NC, no wheeze now  ENMT: mucous membranes moist  NECK: supple, No JVD  CHEST/LUNG: clear to auscultation bilaterally; no rales, rhonchi, or wheezing b/l  HEART: normal S1, S2  ABDOMEN: BS+, soft, ND, NT   EXTREMITIES:  pulses palpable; no clubbing, cyanosis, or edema b/l LEs  NEURO: awake, alert, interactive; moves all extremities

## 2023-12-07 NOTE — ED ADULT TRIAGE NOTE - CHIEF COMPLAINT QUOTE
Pt c/o difficulty breathing, coughing ( B/L rib pain with coughing)  x 2 weeks  and Hyperglycemia ( pt taking prednisone) x yesterday  ( 92-93% on RA) X 2 Weeks ago. h/o Asthma, pre-diabetics

## 2023-12-07 NOTE — ED ADULT NURSE NOTE - NS ED NOTE ABUSE RESPONSE YN
Yes Headache Monitoring: I recommended monitoring the headaches for now. There is no evidence of increased intracranial pressure. They were instructed to call if the headaches are worsening.

## 2023-12-07 NOTE — H&P ADULT - HISTORY OF PRESENT ILLNESS
83 year old male PMH asthma, diabetes (no longer on medication), BPH, recent hospitalization for asthma exacerbation and acute hypoxic respiratory failure in the setting of RSV (dc'd 9 days ago) presenting to the emergency department with persistent cough and worsening shortness of breath (exertional and orthopnea) over the past week, no associated fever or chest pain, patient reports bilateral rib pain with coughing.  No new calf pain or swelling.  Patient states she was not placed on antibiotics.  Patient was discharged on a prednisone taper which she finished a few days ago.  Denies urinary symptoms, abdominal pain, nausea, vomiting.  Patient states he is taking his inhaler as prescribed but is not helping.  Patient noted to be 94 to 95% on room air at rest sitting up. On exertion patient's oxygen saturation decreased to 85% on room air.

## 2023-12-07 NOTE — ED PROVIDER NOTE - NS ED ATTENDING STATEMENT MOD
I have seen and examined this patient and fully participated in the care of this patient as the teaching attending.  The service was shared with the MARLY.  I reviewed and verified the documentation and independently performed the documented:

## 2023-12-07 NOTE — ED PROVIDER NOTE - OBJECTIVE STATEMENT
83M PMH Asthma, diabetes (no longer on medication), BPH, recent hospitalization for asthma exacerbation and acute hypoxic respiratory failure in the setting of RSV (dc'd 9 days ago) presenting to the emergency department with persistent cough and worsening shortness of breath (exertional and orthopnea) over the past week, no associated fever or chest pain, patient reports bilateral rib pain with coughing.  patient denies new calf pain or swelling.  Patient states she was not placed on antibiotics.  Patient was discharged on a prednisone taper which she finished a few days ago.  denies urinary symptoms, abdominal pain, nausea, vomiting.  Patient states he is taking his inhaler as prescribed but is not helping.  Patient noted to be 94 to 95% on room air at rest sitting up. On exertion and while laying flat, patient's oxygen saturation decreased to 85% on room air.

## 2023-12-07 NOTE — ED PROVIDER NOTE - CLINICAL SUMMARY MEDICAL DECISION MAKING FREE TEXT BOX
83M PMH Asthma, diabetes (no longer on medication), BPH, recent hospitalization for asthma exacerbation and acute hypoxic respiratory failure in the setting of RSV (dc'd 9 days ago) presenting to the emergency department with persistent cough and worsening shortness of breath (exertional and orthopnea) over the past week, no associated fever or chest pain, patient reports bilateral rib pain with coughing. Given hx and physical, ddx includes but is not limited to   Pneumonia, asthma exacerbation, pleural effusion, lower concern for CHF ( no peripheral edema, lung exam more consistent with pneumonia versus asthma exacerbation), low concern for ACS (no chest pain, sob more consistent with asthma/pneumonia). PE cannot be ruled out given patient's recent hospital stay and worsening shortness of breath (however no dvt signs/symptoms).  Will plan for D-dimer, labs, EKG, chest x-ray, DuoNebs, steroids, reassess.

## 2023-12-08 LAB
A1C WITH ESTIMATED AVERAGE GLUCOSE RESULT: 9.5 % — HIGH (ref 4–5.6)
ANION GAP SERPL CALC-SCNC: 4 MMOL/L — LOW (ref 5–17)
BUN SERPL-MCNC: 28 MG/DL — HIGH (ref 7–23)
CALCIUM SERPL-MCNC: 8.4 MG/DL — LOW (ref 8.5–10.1)
CHLORIDE SERPL-SCNC: 104 MMOL/L — SIGNIFICANT CHANGE UP (ref 96–108)
CO2 SERPL-SCNC: 29 MMOL/L — SIGNIFICANT CHANGE UP (ref 22–31)
CREAT SERPL-MCNC: 1.04 MG/DL — SIGNIFICANT CHANGE UP (ref 0.5–1.3)
EGFR: 70 ML/MIN/1.73M2 — SIGNIFICANT CHANGE UP
ESTIMATED AVERAGE GLUCOSE: 226 MG/DL — HIGH (ref 68–114)
GLUCOSE BLDC GLUCOMTR-MCNC: 105 MG/DL — HIGH (ref 70–99)
GLUCOSE BLDC GLUCOMTR-MCNC: 118 MG/DL — HIGH (ref 70–99)
GLUCOSE BLDC GLUCOMTR-MCNC: 239 MG/DL — HIGH (ref 70–99)
GLUCOSE BLDC GLUCOMTR-MCNC: 354 MG/DL — HIGH (ref 70–99)
GLUCOSE BLDC GLUCOMTR-MCNC: 499 MG/DL — CRITICAL HIGH (ref 70–99)
GLUCOSE BLDC GLUCOMTR-MCNC: 556 MG/DL — CRITICAL HIGH (ref 70–99)
GLUCOSE BLDC GLUCOMTR-MCNC: 95 MG/DL — SIGNIFICANT CHANGE UP (ref 70–99)
GLUCOSE SERPL-MCNC: 103 MG/DL — HIGH (ref 70–99)
POTASSIUM SERPL-MCNC: 3.9 MMOL/L — SIGNIFICANT CHANGE UP (ref 3.5–5.3)
POTASSIUM SERPL-SCNC: 3.9 MMOL/L — SIGNIFICANT CHANGE UP (ref 3.5–5.3)
SODIUM SERPL-SCNC: 137 MMOL/L — SIGNIFICANT CHANGE UP (ref 135–145)

## 2023-12-08 PROCEDURE — 99233 SBSQ HOSP IP/OBS HIGH 50: CPT

## 2023-12-08 RX ORDER — INSULIN LISPRO 100/ML
12 VIAL (ML) SUBCUTANEOUS ONCE
Refills: 0 | Status: COMPLETED | OUTPATIENT
Start: 2023-12-08 | End: 2023-12-08

## 2023-12-08 RX ORDER — INSULIN LISPRO 100/ML
8 VIAL (ML) SUBCUTANEOUS
Refills: 0 | Status: DISCONTINUED | OUTPATIENT
Start: 2023-12-08 | End: 2023-12-08

## 2023-12-08 RX ORDER — INSULIN GLARGINE 100 [IU]/ML
25 INJECTION, SOLUTION SUBCUTANEOUS AT BEDTIME
Refills: 0 | Status: DISCONTINUED | OUTPATIENT
Start: 2023-12-08 | End: 2023-12-08

## 2023-12-08 RX ADMIN — TAMSULOSIN HYDROCHLORIDE 0.4 MILLIGRAM(S): 0.4 CAPSULE ORAL at 21:28

## 2023-12-08 RX ADMIN — AZITHROMYCIN 255 MILLIGRAM(S): 500 TABLET, FILM COATED ORAL at 18:00

## 2023-12-08 RX ADMIN — Medication 20 MILLIGRAM(S): at 13:25

## 2023-12-08 RX ADMIN — Medication 3 MILLILITER(S): at 23:12

## 2023-12-08 RX ADMIN — Medication 12 UNIT(S): at 22:22

## 2023-12-08 RX ADMIN — SODIUM CHLORIDE 75 MILLILITER(S): 9 INJECTION INTRAMUSCULAR; INTRAVENOUS; SUBCUTANEOUS at 11:23

## 2023-12-08 RX ADMIN — Medication 3 MILLILITER(S): at 11:33

## 2023-12-08 RX ADMIN — HEPARIN SODIUM 5000 UNIT(S): 5000 INJECTION INTRAVENOUS; SUBCUTANEOUS at 17:09

## 2023-12-08 RX ADMIN — Medication 20 MILLIGRAM(S): at 05:46

## 2023-12-08 RX ADMIN — BUDESONIDE AND FORMOTEROL FUMARATE DIHYDRATE 2 PUFF(S): 160; 4.5 AEROSOL RESPIRATORY (INHALATION) at 05:46

## 2023-12-08 RX ADMIN — Medication 40 MILLIGRAM(S): at 17:09

## 2023-12-08 RX ADMIN — Medication 4: at 16:57

## 2023-12-08 RX ADMIN — LIDOCAINE 1 PATCH: 4 CREAM TOPICAL at 00:04

## 2023-12-08 RX ADMIN — HEPARIN SODIUM 5000 UNIT(S): 5000 INJECTION INTRAVENOUS; SUBCUTANEOUS at 05:46

## 2023-12-08 RX ADMIN — Medication 3 MILLILITER(S): at 05:21

## 2023-12-08 RX ADMIN — BUDESONIDE AND FORMOTEROL FUMARATE DIHYDRATE 2 PUFF(S): 160; 4.5 AEROSOL RESPIRATORY (INHALATION) at 17:11

## 2023-12-08 RX ADMIN — SODIUM CHLORIDE 75 MILLILITER(S): 9 INJECTION INTRAMUSCULAR; INTRAVENOUS; SUBCUTANEOUS at 22:09

## 2023-12-08 RX ADMIN — Medication 3 MILLILITER(S): at 17:30

## 2023-12-08 RX ADMIN — Medication 10: at 11:21

## 2023-12-08 RX ADMIN — Medication 3 MILLILITER(S): at 00:16

## 2023-12-08 RX ADMIN — CEFTRIAXONE 100 MILLIGRAM(S): 500 INJECTION, POWDER, FOR SOLUTION INTRAMUSCULAR; INTRAVENOUS at 21:26

## 2023-12-08 RX ADMIN — Medication 8 UNIT(S): at 11:21

## 2023-12-08 NOTE — PROGRESS NOTE ADULT - ASSESSMENT
83 year old male PMH asthma, diabetes (no longer on medication), BPH, recent hospitalization for asthma exacerbation and acute hypoxic respiratory failure in the setting of RSV (dc'd 9 days ago) presenting to the emergency department with persistent cough and worsening shortness of breath (exertional and orthopnea) over the past week. Patient noted to be 94 to 95% on room air at rest sitting up. On exertion patient's oxygen saturation decreased to 85% on room air. Admitted for Asthma exacerbation and     #  Plan:  Admit to medicine for asthma exacerbation . Covid negative, Flu panel negative, CTA no PE, lower lobe infiltrates noted. Started on IV Ceftriaxone and Zithromax in ER, Solumedrol given 125 mg x 1 in ER. Continue Symbicort and Duonebs.     DM: Glucose increased > 600 from 226 on arrival. Added Lantus 30 and and Admelog 10 units tid. A1C not optimal at 8.3.  Finger sticks every 4 hours for 24 hours, SMA-7 now and in AM. Anion gap was normal on arrival.   Will resume Solumedrol 20 mg tid in AM once glucose more under control.      Continue home Flomax at home dose.     No prior CHF or cardiac stents.     Addendum:  Glucose later improved to 249, will decrease Lantus to 25/Admelog 8 units tid.      83 year old male PMH asthma, diabetes (no longer on medication), BPH, recent hospitalization for asthma exacerbation and acute hypoxic respiratory failure in the setting of RSV (dc'd 9 days ago) presenting to the emergency department with persistent cough and worsening shortness of breath (exertional and orthopnea) over the past week. Patient noted to be 94 to 95% on room air at rest sitting up. On exertion patient's oxygen saturation decreased to 85% on room air. Admitted for Asthma exacerbation and     #Asthma exacerbation  #RSV   #CAP Bacterial PNA   #Acute hypoxic respiratory failure   Admited to medicine for asthma exacerbation . Covid negative, Flu panel negative, CTA no PE, lower lobe infiltrates noted. Started on IV Ceftriaxone and Zithromax in ER, Solumedrol given 125 mg x 1 in ER. Previously treated for Asthma and AHRF.   - Continue empiric coverage with IV Abx with CTX and Azithro. Check MRSA swab   - Continue Steroids, Duonebs   -     #T2DM  #Steroid induced hyperglycemia  - A1c 9.5. Elevated glucose likely from steroid use. AT 83 years old, may not be a good candidate for long term insulin. high risk of hypoglycemia. Will d/c insulin regimen and continue with SSI for now.   - hypoglycemia protocol initiated     Diet: diabetic diet   DVT prophylaxis: H  Dispo: Admit   Code Status: FC       DM: Glucose increased > 600 from 226 on arrival. Added Lantus 30 and and Admelog 10 units tid. A1C not optimal at 8.3.  Finger sticks every 4 hours for 24 hours, SMA-7 now and in AM. Anion gap was normal on arrival.   Will resume Solumedrol 20 mg tid in AM once glucose more under control.      Continue home Flomax at home dose.     No prior CHF or cardiac stents.     Addendum:  Glucose later improved to 249, will decrease Lantus to 25/Admelog 8 units tid.      83 year old male PMH asthma, diabetes (no longer on medication), BPH, recent hospitalization for asthma exacerbation and acute hypoxic respiratory failure in the setting of RSV (dc'd 9 days ago) presenting to the emergency department with persistent cough and worsening shortness of breath (exertional and orthopnea) over the past week. Patient noted to be 94 to 95% on room air at rest sitting up. On exertion patient's oxygen saturation decreased to 85% on room air. Admitted for Asthma exacerbation and     #Asthma exacerbation  #RSV   #CAP Bacterial PNA   #Acute hypoxic respiratory failure   Admited to medicine for asthma exacerbation . Covid negative, Flu panel negative, CTA no PE, lower lobe infiltrates noted. Started on IV Ceftriaxone and Zithromax in ER, Solumedrol given 125 mg x 1 in ER. Previously treated for Asthma and AHRF.   - Continue empiric coverage with IV Abx with CTX and Azithro. Check MRSA swab   - Continue Steroids, Duonebs   -     #T2DM  #Steroid induced hyperglycemia  - A1c 9.5. Elevated glucose likely from steroid use. AT 83 years old, may not be a good candidate for long term insulin. high risk of hypoglycemia. Will d/c insulin regimen and continue with SSI for now.   - hypoglycemia protocol initiated     Diet: diabetic diet   DVT prophylaxis: SQH  Dispo: Admit   Code Status: FC

## 2023-12-08 NOTE — PROGRESS NOTE ADULT - TIME BILLING
I have spent a total of 54 minutes to prepare to see the patient, obtaining and reviewing history, physical examination, explaining the diagnosis, prognosis and treatment plan with the patient/family/caregiver. I also have spent the time ordering studies and testing, interpreting results, medicine reconciliation, subspecialty consultation and documentation as above.

## 2023-12-09 LAB
ANION GAP SERPL CALC-SCNC: 3 MMOL/L — LOW (ref 5–17)
BASOPHILS # BLD AUTO: 0.02 K/UL — SIGNIFICANT CHANGE UP (ref 0–0.2)
BASOPHILS NFR BLD AUTO: 0.1 % — SIGNIFICANT CHANGE UP (ref 0–2)
BUN SERPL-MCNC: 24 MG/DL — HIGH (ref 7–23)
CALCIUM SERPL-MCNC: 7.7 MG/DL — LOW (ref 8.5–10.1)
CHLORIDE SERPL-SCNC: 109 MMOL/L — HIGH (ref 96–108)
CO2 SERPL-SCNC: 28 MMOL/L — SIGNIFICANT CHANGE UP (ref 22–31)
CREAT SERPL-MCNC: 0.89 MG/DL — SIGNIFICANT CHANGE UP (ref 0.5–1.3)
EGFR: 84 ML/MIN/1.73M2 — SIGNIFICANT CHANGE UP
EOSINOPHIL # BLD AUTO: 0 K/UL — SIGNIFICANT CHANGE UP (ref 0–0.5)
EOSINOPHIL NFR BLD AUTO: 0 % — SIGNIFICANT CHANGE UP (ref 0–6)
GLUCOSE BLDC GLUCOMTR-MCNC: 148 MG/DL — HIGH (ref 70–99)
GLUCOSE BLDC GLUCOMTR-MCNC: 264 MG/DL — HIGH (ref 70–99)
GLUCOSE BLDC GLUCOMTR-MCNC: 289 MG/DL — HIGH (ref 70–99)
GLUCOSE BLDC GLUCOMTR-MCNC: 379 MG/DL — HIGH (ref 70–99)
GLUCOSE BLDC GLUCOMTR-MCNC: 402 MG/DL — HIGH (ref 70–99)
GLUCOSE SERPL-MCNC: 196 MG/DL — HIGH (ref 70–99)
HCT VFR BLD CALC: 34.6 % — LOW (ref 39–50)
HGB BLD-MCNC: 11.9 G/DL — LOW (ref 13–17)
IMM GRANULOCYTES NFR BLD AUTO: 1 % — HIGH (ref 0–0.9)
LYMPHOCYTES # BLD AUTO: 0.56 K/UL — LOW (ref 1–3.3)
LYMPHOCYTES # BLD AUTO: 3.2 % — LOW (ref 13–44)
MAGNESIUM SERPL-MCNC: 2 MG/DL — SIGNIFICANT CHANGE UP (ref 1.6–2.6)
MCHC RBC-ENTMCNC: 31.9 PG — SIGNIFICANT CHANGE UP (ref 27–34)
MCHC RBC-ENTMCNC: 34.4 G/DL — SIGNIFICANT CHANGE UP (ref 32–36)
MCV RBC AUTO: 92.8 FL — SIGNIFICANT CHANGE UP (ref 80–100)
MONOCYTES # BLD AUTO: 0.3 K/UL — SIGNIFICANT CHANGE UP (ref 0–0.9)
MONOCYTES NFR BLD AUTO: 1.7 % — LOW (ref 2–14)
NEUTROPHILS # BLD AUTO: 16.49 K/UL — HIGH (ref 1.8–7.4)
NEUTROPHILS NFR BLD AUTO: 94 % — HIGH (ref 43–77)
NRBC # BLD: 0 /100 WBCS — SIGNIFICANT CHANGE UP (ref 0–0)
PLATELET # BLD AUTO: 314 K/UL — SIGNIFICANT CHANGE UP (ref 150–400)
POTASSIUM SERPL-MCNC: 3.5 MMOL/L — SIGNIFICANT CHANGE UP (ref 3.5–5.3)
POTASSIUM SERPL-SCNC: 3.5 MMOL/L — SIGNIFICANT CHANGE UP (ref 3.5–5.3)
RBC # BLD: 3.73 M/UL — LOW (ref 4.2–5.8)
RBC # FLD: 13.7 % — SIGNIFICANT CHANGE UP (ref 10.3–14.5)
SODIUM SERPL-SCNC: 140 MMOL/L — SIGNIFICANT CHANGE UP (ref 135–145)
WBC # BLD: 17.55 K/UL — HIGH (ref 3.8–10.5)
WBC # FLD AUTO: 17.55 K/UL — HIGH (ref 3.8–10.5)

## 2023-12-09 PROCEDURE — 99232 SBSQ HOSP IP/OBS MODERATE 35: CPT

## 2023-12-09 RX ORDER — INSULIN GLARGINE 100 [IU]/ML
10 INJECTION, SOLUTION SUBCUTANEOUS AT BEDTIME
Refills: 0 | Status: DISCONTINUED | OUTPATIENT
Start: 2023-12-09 | End: 2023-12-11

## 2023-12-09 RX ADMIN — Medication 10: at 11:12

## 2023-12-09 RX ADMIN — HEPARIN SODIUM 5000 UNIT(S): 5000 INJECTION INTRAVENOUS; SUBCUTANEOUS at 17:25

## 2023-12-09 RX ADMIN — HEPARIN SODIUM 5000 UNIT(S): 5000 INJECTION INTRAVENOUS; SUBCUTANEOUS at 06:34

## 2023-12-09 RX ADMIN — Medication 3 MILLILITER(S): at 17:02

## 2023-12-09 RX ADMIN — Medication 3 MILLILITER(S): at 06:05

## 2023-12-09 RX ADMIN — SODIUM CHLORIDE 75 MILLILITER(S): 9 INJECTION INTRAMUSCULAR; INTRAVENOUS; SUBCUTANEOUS at 17:24

## 2023-12-09 RX ADMIN — BUDESONIDE AND FORMOTEROL FUMARATE DIHYDRATE 2 PUFF(S): 160; 4.5 AEROSOL RESPIRATORY (INHALATION) at 17:25

## 2023-12-09 RX ADMIN — BUDESONIDE AND FORMOTEROL FUMARATE DIHYDRATE 2 PUFF(S): 160; 4.5 AEROSOL RESPIRATORY (INHALATION) at 06:35

## 2023-12-09 RX ADMIN — CEFTRIAXONE 100 MILLIGRAM(S): 500 INJECTION, POWDER, FOR SOLUTION INTRAMUSCULAR; INTRAVENOUS at 18:57

## 2023-12-09 RX ADMIN — AZITHROMYCIN 255 MILLIGRAM(S): 500 TABLET, FILM COATED ORAL at 17:24

## 2023-12-09 RX ADMIN — Medication 40 MILLIGRAM(S): at 06:34

## 2023-12-09 RX ADMIN — Medication 6: at 17:24

## 2023-12-09 RX ADMIN — Medication 3 MILLILITER(S): at 11:34

## 2023-12-09 RX ADMIN — TAMSULOSIN HYDROCHLORIDE 0.4 MILLIGRAM(S): 0.4 CAPSULE ORAL at 21:40

## 2023-12-09 RX ADMIN — Medication 3 MILLILITER(S): at 23:46

## 2023-12-09 RX ADMIN — SODIUM CHLORIDE 75 MILLILITER(S): 9 INJECTION INTRAMUSCULAR; INTRAVENOUS; SUBCUTANEOUS at 07:45

## 2023-12-09 RX ADMIN — INSULIN GLARGINE 10 UNIT(S): 100 INJECTION, SOLUTION SUBCUTANEOUS at 21:41

## 2023-12-09 NOTE — PROGRESS NOTE ADULT - ASSESSMENT
83 year old male PMH asthma, diabetes (no longer on medication), BPH, recent hospitalization for asthma exacerbation and acute hypoxic respiratory failure in the setting of RSV (dc'd 9 days ago) presenting to the emergency department with persistent cough and worsening shortness of breath (exertional and orthopnea) over the past week. Patient noted to be 94 to 95% on room air at rest sitting up. On exertion patient's oxygen saturation decreased to 85% on room air. Admitted for Asthma exacerbation and     #Asthma exacerbation  #RSV   #CAP Bacterial PNA   #Acute hypoxic respiratory failure   Admited to medicine for asthma exacerbation . Covid negative, Flu panel negative, CTA no PE, lower lobe infiltrates noted. Started on IV Ceftriaxone and Zithromax in ER, Solumedrol given 125 mg x 1 in ER. Previously treated for Asthma and AHRF.   - Continue empiric coverage with IV Abx with CTX and Azithro.   - MRSA swab pending  - Continue Steroids, Duonebs   - Titrate O2     #T2DM  #Steroid induced hyperglycemia  - A1c 9.5. Brittle diabetic. Elevated glucose likely from steroid use. AT 83 years old, may not be a good candidate for long term insulin. high risk of hypoglycemia.   - Continue Lantus and SSI  - hypoglycemia protocol initiated     Diet: diabetic diet   DVT prophylaxis: SQH  Dispo: Admit   Code Status: KALIA

## 2023-12-09 NOTE — DIETITIAN INITIAL EVALUATION ADULT - PERTINENT LABORATORY DATA
12-09    140  |  109<H>  |  24<H>  ----------------------------<  196<H>  3.5   |  28  |  0.89    Ca    7.7<L>      09 Dec 2023 08:25  Mg     2.0     12-09    POCT Blood Glucose.: 379 mg/dL (12-09-23 @ 11:07)  A1C with Estimated Average Glucose Result: 9.5 % (12-08-23 @ 06:00)  A1C with Estimated Average Glucose Result: 8.3 % (11-28-23 @ 05:38)  A1C with Estimated Average Glucose Result: 8.1 % (11-27-23 @ 05:50)

## 2023-12-09 NOTE — DIETITIAN INITIAL EVALUATION ADULT - PERTINENT MEDS FT
MEDICATIONS  (STANDING):  albuterol/ipratropium for Nebulization 3 milliLiter(s) Nebulizer every 6 hours  azithromycin  IVPB 500 milliGRAM(s) IV Intermittent every 24 hours  budesonide 160 MICROgram(s)/formoterol 4.5 MICROgram(s) Inhaler 2 Puff(s) Inhalation two times a day  cefTRIAXone   IVPB 1000 milliGRAM(s) IV Intermittent every 24 hours  dextrose 5%. 1000 milliLiter(s) (50 mL/Hr) IV Continuous <Continuous>  dextrose 5%. 1000 milliLiter(s) (100 mL/Hr) IV Continuous <Continuous>  dextrose 50% Injectable 25 Gram(s) IV Push once  dextrose 50% Injectable 25 Gram(s) IV Push once  dextrose 50% Injectable 12.5 Gram(s) IV Push once  glucagon  Injectable 1 milliGRAM(s) IntraMuscular once  heparin   Injectable 5000 Unit(s) SubCutaneous every 12 hours  insulin glargine Injectable (LANTUS) 10 Unit(s) SubCutaneous at bedtime  insulin lispro (ADMELOG) corrective regimen sliding scale   SubCutaneous three times a day before meals  methylPREDNISolone sodium succinate Injectable 40 milliGRAM(s) IV Push daily  sodium chloride 0.9%. 1000 milliLiter(s) (75 mL/Hr) IV Continuous <Continuous>  tamsulosin 0.4 milliGRAM(s) Oral at bedtime    MEDICATIONS  (PRN):  dextrose Oral Gel 15 Gram(s) Oral once PRN Blood Glucose LESS THAN 70 milliGRAM(s)/deciliter  hydrocodone/homatropine Syrup 5 milliLiter(s) Oral every 6 hours PRN Cough

## 2023-12-09 NOTE — PROGRESS NOTE ADULT - TIME BILLING
I have spent a total of 54 minutes to prepare to see the patient, obtaining and reviewing history, physical examination, explaining the diagnosis, prognosis and treatment plan with the patient/family/caregiver. I also have spent the time ordering studies and testing, interpreting results, medicine reconciliation, subspecialty consultation and documentation as above. I have spent a total of 46 minutes to prepare to see the patient, obtaining and reviewing history, physical examination, explaining the diagnosis, prognosis and treatment plan with the patient/family/caregiver. I also have spent the time ordering studies and testing, interpreting results, medicine reconciliation, subspecialty consultation and documentation as above.

## 2023-12-09 NOTE — DIETITIAN INITIAL EVALUATION ADULT - OTHER INFO
Pt known to this clinician from previous admission 11/2023. Reports good appetite and PO intake during LOS. Refuses offer of nutritional supplement.  Denies difficulty chewing/swallowing. Reports weight loss over time; unsure UBW.  Pt with T2DM; reports no home DM meds at this time as his appointment with PCP is at the end of this month. States he was not taking any DM medications at home. HbA1c 9.5% indicates poor blood glucose management- increase from 8.3% a few weeks ago is likely due to steroid medication use. Pt offered and refused nutrition education at this time stating his granddaughter is an RN with knowledge of diabetes nutrition and his wife has diabetes as well and knows how to follow a diet for diabetes. Pt encouraged to follow up with outpatient MDs, and to take meds as directed by PCP- verbalizes understanding. Made aware RD remains available.

## 2023-12-10 LAB
ALBUMIN SERPL ELPH-MCNC: 2.1 G/DL — LOW (ref 3.3–5)
ALP SERPL-CCNC: 70 U/L — SIGNIFICANT CHANGE UP (ref 40–120)
ALT FLD-CCNC: 19 U/L — SIGNIFICANT CHANGE UP (ref 12–78)
ANION GAP SERPL CALC-SCNC: 6 MMOL/L — SIGNIFICANT CHANGE UP (ref 5–17)
AST SERPL-CCNC: 13 U/L — LOW (ref 15–37)
BASOPHILS # BLD AUTO: 0.02 K/UL — SIGNIFICANT CHANGE UP (ref 0–0.2)
BASOPHILS NFR BLD AUTO: 0.2 % — SIGNIFICANT CHANGE UP (ref 0–2)
BILIRUB SERPL-MCNC: 0.3 MG/DL — SIGNIFICANT CHANGE UP (ref 0.2–1.2)
BUN SERPL-MCNC: 23 MG/DL — SIGNIFICANT CHANGE UP (ref 7–23)
CALCIUM SERPL-MCNC: 8.3 MG/DL — LOW (ref 8.5–10.1)
CHLORIDE SERPL-SCNC: 106 MMOL/L — SIGNIFICANT CHANGE UP (ref 96–108)
CO2 SERPL-SCNC: 30 MMOL/L — SIGNIFICANT CHANGE UP (ref 22–31)
CREAT SERPL-MCNC: 1.1 MG/DL — SIGNIFICANT CHANGE UP (ref 0.5–1.3)
EGFR: 66 ML/MIN/1.73M2 — SIGNIFICANT CHANGE UP
EOSINOPHIL # BLD AUTO: 0 K/UL — SIGNIFICANT CHANGE UP (ref 0–0.5)
EOSINOPHIL NFR BLD AUTO: 0 % — SIGNIFICANT CHANGE UP (ref 0–6)
GLUCOSE BLDC GLUCOMTR-MCNC: 117 MG/DL — HIGH (ref 70–99)
GLUCOSE BLDC GLUCOMTR-MCNC: 197 MG/DL — HIGH (ref 70–99)
GLUCOSE BLDC GLUCOMTR-MCNC: 222 MG/DL — HIGH (ref 70–99)
GLUCOSE BLDC GLUCOMTR-MCNC: 366 MG/DL — HIGH (ref 70–99)
GLUCOSE SERPL-MCNC: 135 MG/DL — HIGH (ref 70–99)
HCT VFR BLD CALC: 33.4 % — LOW (ref 39–50)
HGB BLD-MCNC: 11.2 G/DL — LOW (ref 13–17)
IMM GRANULOCYTES NFR BLD AUTO: 0.8 % — SIGNIFICANT CHANGE UP (ref 0–0.9)
LEGIONELLA AG UR QL: NEGATIVE — SIGNIFICANT CHANGE UP
LYMPHOCYTES # BLD AUTO: 18 % — SIGNIFICANT CHANGE UP (ref 13–44)
LYMPHOCYTES # BLD AUTO: 2.21 K/UL — SIGNIFICANT CHANGE UP (ref 1–3.3)
MAGNESIUM SERPL-MCNC: 2.2 MG/DL — SIGNIFICANT CHANGE UP (ref 1.6–2.6)
MCHC RBC-ENTMCNC: 31.3 PG — SIGNIFICANT CHANGE UP (ref 27–34)
MCHC RBC-ENTMCNC: 33.5 G/DL — SIGNIFICANT CHANGE UP (ref 32–36)
MCV RBC AUTO: 93.3 FL — SIGNIFICANT CHANGE UP (ref 80–100)
MONOCYTES # BLD AUTO: 0.82 K/UL — SIGNIFICANT CHANGE UP (ref 0–0.9)
MONOCYTES NFR BLD AUTO: 6.7 % — SIGNIFICANT CHANGE UP (ref 2–14)
MRSA PCR RESULT.: SIGNIFICANT CHANGE UP
NEUTROPHILS # BLD AUTO: 9.15 K/UL — HIGH (ref 1.8–7.4)
NEUTROPHILS NFR BLD AUTO: 74.3 % — SIGNIFICANT CHANGE UP (ref 43–77)
NRBC # BLD: 0 /100 WBCS — SIGNIFICANT CHANGE UP (ref 0–0)
PLATELET # BLD AUTO: 287 K/UL — SIGNIFICANT CHANGE UP (ref 150–400)
POTASSIUM SERPL-MCNC: 3.3 MMOL/L — LOW (ref 3.5–5.3)
POTASSIUM SERPL-SCNC: 3.3 MMOL/L — LOW (ref 3.5–5.3)
PROT SERPL-MCNC: 6 GM/DL — SIGNIFICANT CHANGE UP (ref 6–8.3)
RBC # BLD: 3.58 M/UL — LOW (ref 4.2–5.8)
RBC # FLD: 13.7 % — SIGNIFICANT CHANGE UP (ref 10.3–14.5)
S AUREUS DNA NOSE QL NAA+PROBE: SIGNIFICANT CHANGE UP
SODIUM SERPL-SCNC: 142 MMOL/L — SIGNIFICANT CHANGE UP (ref 135–145)
WBC # BLD: 12.3 K/UL — HIGH (ref 3.8–10.5)
WBC # FLD AUTO: 12.3 K/UL — HIGH (ref 3.8–10.5)

## 2023-12-10 PROCEDURE — 99232 SBSQ HOSP IP/OBS MODERATE 35: CPT

## 2023-12-10 RX ADMIN — INSULIN GLARGINE 10 UNIT(S): 100 INJECTION, SOLUTION SUBCUTANEOUS at 22:08

## 2023-12-10 RX ADMIN — HEPARIN SODIUM 5000 UNIT(S): 5000 INJECTION INTRAVENOUS; SUBCUTANEOUS at 17:35

## 2023-12-10 RX ADMIN — SODIUM CHLORIDE 75 MILLILITER(S): 9 INJECTION INTRAMUSCULAR; INTRAVENOUS; SUBCUTANEOUS at 17:36

## 2023-12-10 RX ADMIN — Medication 40 MILLIGRAM(S): at 05:35

## 2023-12-10 RX ADMIN — BUDESONIDE AND FORMOTEROL FUMARATE DIHYDRATE 2 PUFF(S): 160; 4.5 AEROSOL RESPIRATORY (INHALATION) at 05:37

## 2023-12-10 RX ADMIN — Medication 3 MILLILITER(S): at 11:03

## 2023-12-10 RX ADMIN — Medication 3 MILLILITER(S): at 17:17

## 2023-12-10 RX ADMIN — CEFTRIAXONE 100 MILLIGRAM(S): 500 INJECTION, POWDER, FOR SOLUTION INTRAMUSCULAR; INTRAVENOUS at 19:40

## 2023-12-10 RX ADMIN — Medication 4: at 16:51

## 2023-12-10 RX ADMIN — BUDESONIDE AND FORMOTEROL FUMARATE DIHYDRATE 2 PUFF(S): 160; 4.5 AEROSOL RESPIRATORY (INHALATION) at 19:15

## 2023-12-10 RX ADMIN — HEPARIN SODIUM 5000 UNIT(S): 5000 INJECTION INTRAVENOUS; SUBCUTANEOUS at 06:13

## 2023-12-10 RX ADMIN — Medication 3 MILLILITER(S): at 05:24

## 2023-12-10 RX ADMIN — Medication 10: at 11:13

## 2023-12-10 RX ADMIN — TAMSULOSIN HYDROCHLORIDE 0.4 MILLIGRAM(S): 0.4 CAPSULE ORAL at 22:07

## 2023-12-10 NOTE — PROGRESS NOTE ADULT - ASSESSMENT
83 year old male PMH asthma, diabetes (no longer on medication), BPH, recent hospitalization for asthma exacerbation and acute hypoxic respiratory failure in the setting of RSV (dc'd 9 days ago) presenting to the emergency department with persistent cough and worsening shortness of breath (exertional and orthopnea) over the past week. Patient noted to be 94 to 95% on room air at rest sitting up. On exertion patient's oxygen saturation decreased to 85% on room air. Admitted for Asthma exacerbation and     #Asthma exacerbation  #RSV   #CAP Bacterial PNA   #Acute hypoxic respiratory failure   Admited to medicine for asthma exacerbation . Covid negative, Flu panel negative, CTA no PE, lower lobe infiltrates noted. Started on IV Ceftriaxone and Zithromax in ER, Solumedrol given 125 mg x 1 in ER. Previously treated for Asthma and AHRF.   - Continue empiric coverage with IV Abx with CTX and Azithro.   - MRSA swab pending  - Continue Steroids, Duonebs   - Titrate O2     #T2DM  #Steroid induced hyperglycemia  - A1c 9.5. Brittle diabetic. Elevated glucose likely from steroid use. AT 83 years old, may not be a good candidate for long term insulin. high risk of hypoglycemia.   - Continue Lantus and SSI  - hypoglycemia protocol initiated       PT Eval -> discharge Home     Diet: diabetic diet   DVT prophylaxis: SQH  Dispo: Admit, titrate o2   Code Status: FC

## 2023-12-10 NOTE — PROGRESS NOTE ADULT - SUBJECTIVE AND OBJECTIVE BOX
Patient is a 83y old  Male who presents with a chief complaint of SOB from asthma exacerbation and CAP (07 Dec 2023 17:06)    INTERVAL HPI/OVERNIGHT EVENTS: NAEON, continue on o2    MEDICATIONS  (STANDING):  albuterol/ipratropium for Nebulization 3 milliLiter(s) Nebulizer every 6 hours  azithromycin  IVPB 500 milliGRAM(s) IV Intermittent every 24 hours  budesonide 160 MICROgram(s)/formoterol 4.5 MICROgram(s) Inhaler 2 Puff(s) Inhalation two times a day  cefTRIAXone   IVPB 1000 milliGRAM(s) IV Intermittent every 24 hours  dextrose 5%. 1000 milliLiter(s) (100 mL/Hr) IV Continuous <Continuous>  dextrose 5%. 1000 milliLiter(s) (50 mL/Hr) IV Continuous <Continuous>  dextrose 50% Injectable 25 Gram(s) IV Push once  dextrose 50% Injectable 12.5 Gram(s) IV Push once  dextrose 50% Injectable 25 Gram(s) IV Push once  glucagon  Injectable 1 milliGRAM(s) IntraMuscular once  heparin   Injectable 5000 Unit(s) SubCutaneous every 12 hours  insulin glargine Injectable (LANTUS) 25 Unit(s) SubCutaneous at bedtime  insulin lispro (ADMELOG) corrective regimen sliding scale   SubCutaneous three times a day before meals  insulin lispro Injectable (ADMELOG) 8 Unit(s) SubCutaneous three times a day before meals  methylPREDNISolone sodium succinate Injectable 20 milliGRAM(s) IV Push every 8 hours  sodium chloride 0.9%. 1000 milliLiter(s) (75 mL/Hr) IV Continuous <Continuous>  tamsulosin 0.4 milliGRAM(s) Oral at bedtime    MEDICATIONS  (PRN):  dextrose Oral Gel 15 Gram(s) Oral once PRN Blood Glucose LESS THAN 70 milliGRAM(s)/deciliter  hydrocodone/homatropine Syrup 5 milliLiter(s) Oral every 6 hours PRN Cough    Allergies    No Known Allergies    Intolerances      REVIEW OF SYSTEMS:  All other systems reviewed and are negative    Vital Signs Last 24 Hrs  T(C): 36.5 (08 Dec 2023 11:05), Max: 37.1 (07 Dec 2023 17:00)  T(F): 97.7 (08 Dec 2023 11:05), Max: 98.8 (07 Dec 2023 17:00)  HR: 86 (08 Dec 2023 11:05) (73 - 100)  BP: 116/61 (08 Dec 2023 11:05) (116/61 - 148/70)  BP(mean): --  RR: 17 (08 Dec 2023 11:05) (16 - 18)  SpO2: 95% (08 Dec 2023 11:05) (92% - 100%)    Parameters below as of 08 Dec 2023 11:05  Patient On (Oxygen Delivery Method): nasal cannula  O2 Flow (L/min): 2    Daily     Daily Weight in k (07 Dec 2023 21:02)  I&O's Summary    07 Dec 2023 07:01  -  08 Dec 2023 07:00  --------------------------------------------------------  IN: 870 mL / OUT: 400 mL / NET: 470 mL      CAPILLARY BLOOD GLUCOSE      POCT Blood Glucose.: 354 mg/dL (08 Dec 2023 10:59)  POCT Blood Glucose.: 118 mg/dL (08 Dec 2023 07:51)  POCT Blood Glucose.: 105 mg/dL (08 Dec 2023 05:14)  POCT Blood Glucose.: 95 mg/dL (08 Dec 2023 02:20)  POCT Blood Glucose.: 249 mg/dL (07 Dec 2023 23:08)  POCT Blood Glucose.: 423 mg/dL (07 Dec 2023 21:25)  POCT Blood Glucose.: 520 mg/dL (07 Dec 2023 20:13)  POCT Blood Glucose.: >600 mg/dL (07 Dec 2023 19:00)  POCT Blood Glucose.: >600 mg/dL (07 Dec 2023 18:15)  POCT Blood Glucose.: >600 mg/dL (07 Dec 2023 18:15)    PHYSICAL EXAM:  GENERAL: NAD, seen in ER,  comfortable at rest on NC  ENMT: mucous membranes moist  NECK: supple, No JVD  CHEST/LUNG: clear to auscultation bilaterally; wheezing bilaterally, no rales, rhonchi   HEART: normal S1, S2  ABDOMEN: BS+, soft, ND, NT   EXTREMITIES:  pulses palpable; no clubbing, cyanosis, or edema b/l LEs  NEURO: awake, alert, interactive; moves all extremities    Labs                          13.4   13.03 )-----------( 329      ( 07 Dec 2023 12:07 )             39.5     12-    137  |  104  |  28<H>  ----------------------------<  103<H>  3.9   |  29  |  1.04    Ca    8.4<L>      08 Dec 2023 06:00    TPro  7.3  /  Alb  2.4<L>  /  TBili  0.3  /  DBili  x   /  AST  14<L>  /  ALT  21  /  AlkPhos  84  12-07    PT/INR - ( 07 Dec 2023 12:07 )   PT: 15.0 sec;   INR: 1.26 ratio         PTT - ( 07 Dec 2023 12:07 )  PTT:29.7 sec      Urinalysis Basic - ( 08 Dec 2023 06:00 )    Color: x / Appearance: x / SG: x / pH: x  Gluc: 103 mg/dL / Ketone: x  / Bili: x / Urobili: x   Blood: x / Protein: x / Nitrite: x   Leuk Esterase: x / RBC: x / WBC x   Sq Epi: x / Non Sq Epi: x / Bacteria: x                  DVT prophylaxis: > Lovenox 40mg SQ daily  > Heparin   > SCD's
Patient is a 83y old  Male who presents with a chief complaint of SOB from asthma exacerbation and CAP (08 Dec 2023 13:38)    INTERVAL HPI/OVERNIGHT EVENTS: NAEON    MEDICATIONS  (STANDING):  albuterol/ipratropium for Nebulization 3 milliLiter(s) Nebulizer every 6 hours  azithromycin  IVPB 500 milliGRAM(s) IV Intermittent every 24 hours  budesonide 160 MICROgram(s)/formoterol 4.5 MICROgram(s) Inhaler 2 Puff(s) Inhalation two times a day  cefTRIAXone   IVPB 1000 milliGRAM(s) IV Intermittent every 24 hours  dextrose 5%. 1000 milliLiter(s) (50 mL/Hr) IV Continuous <Continuous>  dextrose 5%. 1000 milliLiter(s) (100 mL/Hr) IV Continuous <Continuous>  dextrose 50% Injectable 25 Gram(s) IV Push once  dextrose 50% Injectable 25 Gram(s) IV Push once  dextrose 50% Injectable 12.5 Gram(s) IV Push once  glucagon  Injectable 1 milliGRAM(s) IntraMuscular once  heparin   Injectable 5000 Unit(s) SubCutaneous every 12 hours  insulin glargine Injectable (LANTUS) 10 Unit(s) SubCutaneous at bedtime  insulin lispro (ADMELOG) corrective regimen sliding scale   SubCutaneous three times a day before meals  methylPREDNISolone sodium succinate Injectable 40 milliGRAM(s) IV Push daily  sodium chloride 0.9%. 1000 milliLiter(s) (75 mL/Hr) IV Continuous <Continuous>  tamsulosin 0.4 milliGRAM(s) Oral at bedtime    MEDICATIONS  (PRN):  dextrose Oral Gel 15 Gram(s) Oral once PRN Blood Glucose LESS THAN 70 milliGRAM(s)/deciliter  hydrocodone/homatropine Syrup 5 milliLiter(s) Oral every 6 hours PRN Cough    Allergies    No Known Allergies    Intolerances      REVIEW OF SYSTEMS:  All other systems reviewed and are negative    Vital Signs Last 24 Hrs  T(C): 36.4 (09 Dec 2023 11:14), Max: 36.9 (09 Dec 2023 05:25)  T(F): 97.6 (09 Dec 2023 11:14), Max: 98.4 (09 Dec 2023 05:25)  HR: 81 (09 Dec 2023 11:34) (81 - 90)  BP: 122/57 (09 Dec 2023 11:14) (111/51 - 133/67)  BP(mean): --  RR: 17 (09 Dec 2023 11:14) (17 - 18)  SpO2: 98% (09 Dec 2023 11:34) (97% - 100%)    Parameters below as of 09 Dec 2023 11:34  Patient On (Oxygen Delivery Method): nasal cannula, 3lpm      Daily     Daily   I&O's Summary    08 Dec 2023 07:01  -  09 Dec 2023 07:00  --------------------------------------------------------  IN: 815 mL / OUT: 0 mL / NET: 815 mL      CAPILLARY BLOOD GLUCOSE      POCT Blood Glucose.: 379 mg/dL (09 Dec 2023 11:07)  POCT Blood Glucose.: 402 mg/dL (09 Dec 2023 11:05)  POCT Blood Glucose.: 148 mg/dL (09 Dec 2023 07:47)  POCT Blood Glucose.: 556 mg/dL (08 Dec 2023 21:53)  POCT Blood Glucose.: 499 mg/dL (08 Dec 2023 21:49)  POCT Blood Glucose.: 239 mg/dL (08 Dec 2023 16:31)    PHYSICAL EXAM:  GENERAL: NAD, frail elderly male,  comfortable at rest on NC  ENMT: mucous membranes moist  NECK: supple, No JVD  CHEST/LUNG: clear to auscultation bilaterally; wheezing bilaterally, no rales, rhonchi   HEART: normal S1, S2  ABDOMEN: BS+, soft, ND, NT   EXTREMITIES:  pulses palpable; no clubbing, cyanosis, or edema b/l LEs  NEURO: awake, alert, interactive; moves all extremities    Labs                          11.9   17.55 )-----------( 314      ( 09 Dec 2023 08:25 )             34.6     12-09    140  |  109<H>  |  24<H>  ----------------------------<  196<H>  3.5   |  28  |  0.89    Ca    7.7<L>      09 Dec 2023 08:25  Mg     2.0     12-09            Urinalysis Basic - ( 09 Dec 2023 08:25 )    Color: x / Appearance: x / SG: x / pH: x  Gluc: 196 mg/dL / Ketone: x  / Bili: x / Urobili: x   Blood: x / Protein: x / Nitrite: x   Leuk Esterase: x / RBC: x / WBC x   Sq Epi: x / Non Sq Epi: x / Bacteria: x                  DVT prophylaxis: > Lovenox 40mg SQ daily  > Heparin   > SCD's
Patient is a 83y old  Male who presents with a chief complaint of PNEUMONIA, ACUTE HYPOXIA, RESPIRATORY FAILURE     (09 Dec 2023 15:31)    INTERVAL HPI/OVERNIGHT EVENTS: NAEON, still on NC     MEDICATIONS  (STANDING):  albuterol/ipratropium for Nebulization 3 milliLiter(s) Nebulizer every 6 hours  azithromycin  IVPB 500 milliGRAM(s) IV Intermittent every 24 hours  budesonide 160 MICROgram(s)/formoterol 4.5 MICROgram(s) Inhaler 2 Puff(s) Inhalation two times a day  cefTRIAXone   IVPB 1000 milliGRAM(s) IV Intermittent every 24 hours  dextrose 5%. 1000 milliLiter(s) (100 mL/Hr) IV Continuous <Continuous>  dextrose 5%. 1000 milliLiter(s) (50 mL/Hr) IV Continuous <Continuous>  dextrose 50% Injectable 25 Gram(s) IV Push once  dextrose 50% Injectable 12.5 Gram(s) IV Push once  dextrose 50% Injectable 25 Gram(s) IV Push once  glucagon  Injectable 1 milliGRAM(s) IntraMuscular once  heparin   Injectable 5000 Unit(s) SubCutaneous every 12 hours  insulin glargine Injectable (LANTUS) 10 Unit(s) SubCutaneous at bedtime  insulin lispro (ADMELOG) corrective regimen sliding scale   SubCutaneous three times a day before meals  methylPREDNISolone sodium succinate Injectable 40 milliGRAM(s) IV Push daily  sodium chloride 0.9%. 1000 milliLiter(s) (75 mL/Hr) IV Continuous <Continuous>  tamsulosin 0.4 milliGRAM(s) Oral at bedtime    MEDICATIONS  (PRN):  dextrose Oral Gel 15 Gram(s) Oral once PRN Blood Glucose LESS THAN 70 milliGRAM(s)/deciliter  hydrocodone/homatropine Syrup 5 milliLiter(s) Oral every 6 hours PRN Cough    Allergies    No Known Allergies    Intolerances      REVIEW OF SYSTEMS:  All other systems reviewed and are negative    Vital Signs Last 24 Hrs  T(C): 36.9 (10 Dec 2023 11:05), Max: 36.9 (09 Dec 2023 17:12)  T(F): 98.4 (10 Dec 2023 11:05), Max: 98.5 (09 Dec 2023 17:12)  HR: 87 (10 Dec 2023 11:05) (72 - 88)  BP: 130/62 (10 Dec 2023 11:05) (125/71 - 136/74)  BP(mean): --  RR: 17 (10 Dec 2023 11:05) (17 - 18)  SpO2: 100% (10 Dec 2023 11:05) (97% - 100%)    Parameters below as of 10 Dec 2023 11:05  Patient On (Oxygen Delivery Method): nasal cannula  O2 Flow (L/min): 2    Daily     Daily   I&O's Summary    09 Dec 2023 07:01  -  10 Dec 2023 07:00  --------------------------------------------------------  IN: 455 mL / OUT: 475 mL / NET: -20 mL      CAPILLARY BLOOD GLUCOSE      POCT Blood Glucose.: 366 mg/dL (10 Dec 2023 11:03)  POCT Blood Glucose.: 117 mg/dL (10 Dec 2023 07:35)  POCT Blood Glucose.: 264 mg/dL (09 Dec 2023 21:20)  POCT Blood Glucose.: 289 mg/dL (09 Dec 2023 16:47)    PHYSICAL EXAM:  GENERAL: NAD, frail elderly male,  comfortable at rest on NC  ENMT: mucous membranes moist  NECK: supple, No JVD  CHEST/LUNG: clear to auscultation bilaterally; wheezing bilaterally, no rales, rhonchi   HEART: normal S1, S2  ABDOMEN: BS+, soft, ND, NT   EXTREMITIES:  pulses palpable; no clubbing, cyanosis, or edema b/l LEs  NEURO: awake, alert, interactive; moves all extremities    Labs                          11.2   12.30 )-----------( 287      ( 10 Dec 2023 06:06 )             33.4     12-10    142  |  106  |  23  ----------------------------<  135<H>  3.3<L>   |  30  |  1.10    Ca    8.3<L>      10 Dec 2023 06:06  Mg     2.2     12-10    TPro  6.0  /  Alb  2.1<L>  /  TBili  0.3  /  DBili  x   /  AST  13<L>  /  ALT  19  /  AlkPhos  70  12-10          Urinalysis Basic - ( 10 Dec 2023 06:06 )    Color: x / Appearance: x / SG: x / pH: x  Gluc: 135 mg/dL / Ketone: x  / Bili: x / Urobili: x   Blood: x / Protein: x / Nitrite: x   Leuk Esterase: x / RBC: x / WBC x   Sq Epi: x / Non Sq Epi: x / Bacteria: x                  DVT prophylaxis: > Lovenox 40mg SQ daily  > Heparin   > SCD's

## 2023-12-10 NOTE — PROGRESS NOTE ADULT - REASON FOR ADMISSION
SOB from asthma exacerbation and CAP

## 2023-12-10 NOTE — PROGRESS NOTE ADULT - NUTRITIONAL ASSESSMENT
This patient has been assessed with a concern for Malnutrition and has been determined to have a diagnosis/diagnoses of Severe protein-calorie malnutrition and Underweight (BMI < 19).    This patient is being managed with:   Diet Consistent Carbohydrate/No Snacks-  Entered: Dec  7 2023  4:59PM

## 2023-12-10 NOTE — PHYSICAL THERAPY INITIAL EVALUATION ADULT - LEVEL OF CONSCIOUSNESS, REHAB EVAL
Quality 410: Psoriasis Clinical Response To Oral Systemic Or Biologic Medications: Psoriasis Assessment Tool Documented, Met Specified Benchmark
Detail Level: Generalized
Quality 337: Tuberculosis Prevention For Psoriasis And Psoriatic Arthritis Patients On A Biological Immune Response Modifier: Patient has a documented negative annual TB screening.
alert
Detail Level: Detailed

## 2023-12-10 NOTE — PROGRESS NOTE ADULT - TIME BILLING
I have spent a total of 42 minutes to prepare to see the patient, obtaining and reviewing history, physical examination, explaining the diagnosis, prognosis and treatment plan with the patient/family/caregiver. I also have spent the time ordering studies and testing, interpreting results, medicine reconciliation, subspecialty consultation and documentation as above.

## 2023-12-10 NOTE — PHYSICAL THERAPY INITIAL EVALUATION ADULT - GENERAL OBSERVATIONS, REHAB EVAL
Pt is alert, oriented x 4, follow instructions, on NC at 2 LPM, denies pain or discomfort, except c/o feeling tired and having cough.

## 2023-12-11 ENCOUNTER — TRANSCRIPTION ENCOUNTER (OUTPATIENT)
Age: 83
End: 2023-12-11

## 2023-12-11 VITALS
SYSTOLIC BLOOD PRESSURE: 128 MMHG | TEMPERATURE: 98 F | HEART RATE: 98 BPM | DIASTOLIC BLOOD PRESSURE: 60 MMHG | RESPIRATION RATE: 18 BRPM | OXYGEN SATURATION: 96 %

## 2023-12-11 LAB
GLUCOSE BLDC GLUCOMTR-MCNC: 197 MG/DL — HIGH (ref 70–99)
GLUCOSE BLDC GLUCOMTR-MCNC: 365 MG/DL — HIGH (ref 70–99)
GLUCOSE BLDC GLUCOMTR-MCNC: 92 MG/DL — SIGNIFICANT CHANGE UP (ref 70–99)

## 2023-12-11 PROCEDURE — 99239 HOSP IP/OBS DSCHRG MGMT >30: CPT

## 2023-12-11 RX ORDER — CEFUROXIME AXETIL 250 MG
1 TABLET ORAL
Qty: 9 | Refills: 0
Start: 2023-12-11 | End: 2023-12-13

## 2023-12-11 RX ORDER — METFORMIN HYDROCHLORIDE 850 MG/1
1 TABLET ORAL
Qty: 60 | Refills: 5
Start: 2023-12-11 | End: 2024-06-07

## 2023-12-11 RX ADMIN — Medication 2: at 16:53

## 2023-12-11 RX ADMIN — Medication 40 MILLIGRAM(S): at 05:29

## 2023-12-11 RX ADMIN — BUDESONIDE AND FORMOTEROL FUMARATE DIHYDRATE 2 PUFF(S): 160; 4.5 AEROSOL RESPIRATORY (INHALATION) at 05:29

## 2023-12-11 RX ADMIN — Medication 3 MILLILITER(S): at 11:07

## 2023-12-11 RX ADMIN — Medication 3 MILLILITER(S): at 05:14

## 2023-12-11 RX ADMIN — HEPARIN SODIUM 5000 UNIT(S): 5000 INJECTION INTRAVENOUS; SUBCUTANEOUS at 05:29

## 2023-12-11 RX ADMIN — Medication 10: at 11:43

## 2023-12-11 NOTE — DISCHARGE NOTE PROVIDER - HOSPITAL COURSE
83 year old male PMH asthma, diabetes (no longer on medication), BPH, recent hospitalization for asthma exacerbation and acute hypoxic respiratory failure in the setting of RSV (dc'd 9 days ago) presenting to the emergency department with persistent cough and worsening shortness of breath (exertional and orthopnea) over the past week. Patient noted to be 94 to 95% on room air at rest sitting up. On exertion patient's oxygen saturation decreased to 85% on room air. Admitted for Asthma exacerbation and hyperglycemia     #Asthma exacerbation  #RSV   #CAP Bacterial PNA   #Acute hypoxic respiratory failure   Admited to medicine for Acute hypoxic respiratory failure d/t asthma exacerbation. Covid negative, Flu panel negative, CTA no PE, lower lobe infiltrates noted. Started on IV Ceftriaxone and Zithromax in ER, Solumedrol given 125 mg x 1 in ER. Previously treated for Asthma and AHRF. Required O@nc but now on RA.   Switched to PO steroids to complete 5 day course. Switch IV CTX to PO for total 5 day course. Restart home inhalers. Will refer to pulmonologist.       #T2DM  #Steroid induced hyperglycemia  - A1c 9.5. Brittle diabetic. Elevated glucose likely from steroid use. AT 83 years old, may not be a good candidate for long term insulin. high risk of hypoglycemia.   Restart home antidiabetic regimen and told to follow up with PCP to further titrate Diabates medications.    Patient stable for discharge. Will follow up with PCP and Pulm. Patient and son updated at beside and in agreement with the plan.

## 2023-12-11 NOTE — DISCHARGE NOTE NURSING/CASE MANAGEMENT/SOCIAL WORK - NSDCPEFALRISK_GEN_ALL_CORE
For information on Fall & Injury Prevention, visit: [email or web address] OR  [email or web address] OR  https://www.cdc.gov/steadi/patient.html For information on Fall & Injury Prevention, visit: [email or web address] OR  [email or web address] OR  https://www.cdc.gov/steadi/patient.html

## 2023-12-11 NOTE — DISCHARGE NOTE PROVIDER - NSDCMRMEDTOKEN_GEN_ALL_CORE_FT
Advair Diskus 100 mcg-50 mcg inhalation powder: 1 inhaled once a day  Albuterol (Eqv-ProAir HFA) 90 mcg/inh inhalation aerosol: 2 puff(s) inhaled every 6 hours  cefuroxime 500 mg oral tablet: 1 tab(s) orally 3 times a day  metFORMIN 500 mg oral tablet, extended release: 1 tab(s) orally 2 times a day  predniSONE 50 mg oral tablet: 1 tab(s) orally once a day  tamsulosin 0.4 mg oral capsule: 1 cap(s) orally once a day   Advair Diskus 100 mcg-50 mcg inhalation powder: 1 inhaled once a day  Albuterol (Eqv-ProAir HFA) 90 mcg/inh inhalation aerosol: 2 puff(s) inhaled every 6 hours  cefuroxime 500 mg oral tablet: 1 tab(s) orally 3 times a day  Glucose Test Strips: Provide strips compatible with patients glucometer  metFORMIN 500 mg oral tablet, extended release: 1 tab(s) orally 2 times a day  predniSONE 50 mg oral tablet: 1 tab(s) orally once a day  tamsulosin 0.4 mg oral capsule: 1 cap(s) orally once a day

## 2023-12-11 NOTE — DISCHARGE NOTE PROVIDER - DETAILS OF MALNUTRITION DIAGNOSIS/DIAGNOSES
This patient has been assessed with a concern for Malnutrition and was treated during this hospitalization for the following Nutrition diagnosis/diagnoses:     -  12/09/2023: Severe protein-calorie malnutrition   -  12/09/2023: Underweight (BMI < 19)

## 2023-12-11 NOTE — DISCHARGE NOTE NURSING/CASE MANAGEMENT/SOCIAL WORK - PATIENT PORTAL LINK FT
You can access the FollowMyHealth Patient Portal offered by Health system by registering at the following website: [email or web address]. By joining [employer]’s FollowMyHealth portal, you will also be able to view your health information using other applications (apps) compatible with our system. You can access the FollowMyHealth Patient Portal offered by [facility] by registering at the following website: [email or web address]. By joining [employer]’s FollowMyHealth portal, you will also be able to view your health information using other applications (apps) compatible with our system.

## 2023-12-11 NOTE — DISCHARGE NOTE PROVIDER - NSFOLLOWUPCLINICS_GEN_ALL_ED_FT
[facility] Pulmonolgy and Sleep Medicine  Pulmonology  [address] [place] [address], [place] [address]  Phone: [phone]  Fax:      [facility] Pulmonolgy and Sleep Medicine  Pulmonology  [address] [place] [address], [place] [address]  Phone: [phone]  Fax:

## 2023-12-11 NOTE — DISCHARGE NOTE PROVIDER - NSDCCPCAREPLAN_GEN_ALL_CORE_FT
PRINCIPAL DISCHARGE DIAGNOSIS  Diagnosis: Acute asthma exacerbation  Assessment and Plan of Treatment: Please finish steroid course as directed and complete antibiotics as directed.   Please follow up with pulmonologist.      SECONDARY DISCHARGE DIAGNOSES  Diagnosis: Pneumonia  Assessment and Plan of Treatment: please complete Antibiotics    Diagnosis: Pneumonia  Assessment and Plan of Treatment: please complete Antibiotics    Diagnosis: Acute hypoxic respiratory failure  Assessment and Plan of Treatment:

## 2023-12-11 NOTE — DISCHARGE NOTE NURSING/CASE MANAGEMENT/SOCIAL WORK - CAREGIVER ADDRESS
[address] [place] [address]. [place] [id number] [address] [place] [address]. [place] [phone]

## 2023-12-11 NOTE — DISCHARGE NOTE NURSING/CASE MANAGEMENT/SOCIAL WORK - NSDCFUADDAPPT_GEN_ALL_CORE_FT
It is important to see your primary physician as well as the physicians noted below within the next week to perform a comprehensive medical review.  Call their offices for an appointment as soon as you leave the hospital.  You will also need to see them for renewal of your medications.  If you do not have a primary physician, contact the [facility] Physician Referral Service at [phone].  To obtain your results, you can access the [facility] Patient Portal at [email or web address].  Your medical issues appear to be stable at this time, but if your symptoms recur or worsen, contact your physicians and/or return to the hospital if necessary.  If you encounter any issues or questions with your medication, call your physicians before stopping the medication.    APPTS ARE READY TO BE MADE: [ X] YES    Best Family or Patient Contact (if needed):    Additional Information about above appointments (if needed):    1:   2:   3:     Other comments or requests:    It is important to see your primary physician as well as the physicians noted below within the next week to perform a comprehensive medical review.  Call their offices for an appointment as soon as you leave the hospital.  You will also need to see them for renewal of your medications.  If you do not have a primary physician, contact the [facility] Physician Referral Service at [phone].  To obtain your results, you can access the Follow[facility] Patient Portal at [email or web address].  Your medical issues appear to be stable at this time, but if your symptoms recur or worsen, contact your physicians and/or return to the hospital if necessary.  If you encounter any issues or questions with your medication, call your physicians before stopping the medication.    APPTS ARE READY TO BE MADE: [ X] YES    Best Family or Patient Contact (if needed):    Additional Information about above appointments (if needed):    1:   2:   3:     Other comments or requests:

## 2023-12-11 NOTE — DISCHARGE NOTE PROVIDER - ATTENDING DISCHARGE PHYSICAL EXAMINATION:
GENERAL: NAD, frail elderly male,  comfortable at rest on NC  ENMT: mucous membranes moist  NECK: supple, No JVD  CHEST/LUNG: clear to auscultation bilaterally; no wheezing no rales, rhonchi   HEART: normal S1, S2  ABDOMEN: BS+, soft, ND, NT   EXTREMITIES:  pulses palpable; no clubbing, cyanosis, or edema b/l LEs  NEURO: awake, alert, interactive; moves all extremities

## 2023-12-11 NOTE — DISCHARGE NOTE PROVIDER - NSDCFUADDAPPT_GEN_ALL_CORE_FT
It is important to see your primary physician as well as the physicians noted below within the next week to perform a comprehensive medical review.  Call their offices for an appointment as soon as you leave the hospital.  You will also need to see them for renewal of your medications.  If you do not have a primary physician, contact the [facility] Physician Referral Service at [phone].  To obtain your results, you can access the [employer] Patient Portal at [email or web address].  Your medical issues appear to be stable at this time, but if your symptoms recur or worsen, contact your physicians and/or return to the hospital if necessary.  If you encounter any issues or questions with your medication, call your physicians before stopping the medication.    APPTS ARE READY TO BE MADE: [ X] YES    Best Family or Patient Contact (if needed):    Additional Information about above appointments (if needed):    1:   2:   3:     Other comments or requests:    It is important to see your primary physician as well as the physicians noted below within the next week to perform a comprehensive medical review.  Call their offices for an appointment as soon as you leave the hospital.  You will also need to see them for renewal of your medications.  If you do not have a primary physician, contact the [facility] Physician Referral Service at [phone].  To obtain your results, you can access the [email or web address] Patient Portal at [email or web address].  Your medical issues appear to be stable at this time, but if your symptoms recur or worsen, contact your physicians and/or return to the hospital if necessary.  If you encounter any issues or questions with your medication, call your physicians before stopping the medication.    APPTS ARE READY TO BE MADE: [ X] YES    Best Family or Patient Contact (if needed):    Additional Information about above appointments (if needed):    1:   2:   3:     Other comments or requests:    It is important to see your primary physician as well as the physicians noted below within the next week to perform a comprehensive medical review.  Call their offices for an appointment as soon as you leave the hospital.  You will also need to see them for renewal of your medications.  If you do not have a primary physician, contact the [facility] Physician Referral Service at [phone].  To obtain your results, you can access the [employer] Patient Portal at [email or web address].  Your medical issues appear to be stable at this time, but if your symptoms recur or worsen, contact your physicians and/or return to the hospital if necessary.  If you encounter any issues or questions with your medication, call your physicians before stopping the medication.    APPTS ARE READY TO BE MADE: [ X] YES    Best Family or Patient Contact (if needed):    Additional Information about above appointments (if needed):    1:   2:   3:     Other comments or requests:   Pt is awaiting callback from Dr. Jermaine PETTY office for scheduling.    It is important to see your primary physician as well as the physicians noted below within the next week to perform a comprehensive medical review.  Call their offices for an appointment as soon as you leave the hospital.  You will also need to see them for renewal of your medications.  If you do not have a primary physician, contact the [facility] Physician Referral Service at [phone].  To obtain your results, you can access the [employer] Patient Portal at [email or web address].  Your medical issues appear to be stable at this time, but if your symptoms recur or worsen, contact your physicians and/or return to the hospital if necessary.  If you encounter any issues or questions with your medication, call your physicians before stopping the medication.    APPTS ARE READY TO BE MADE: [ X] YES    Best Family or Patient Contact (if needed):    Additional Information about above appointments (if needed):    1:   2:   3:     Other comments or requests:   Pt is awaiting callback from Dr. Jermaine PETTY office for scheduling.

## 2023-12-14 DIAGNOSIS — J96.01 ACUTE RESPIRATORY FAILURE WITH HYPOXIA: ICD-10-CM

## 2023-12-14 DIAGNOSIS — T38.0X5A ADVERSE EFFECT OF GLUCOCORTICOIDS AND SYNTHETIC ANALOGUES, INITIAL ENCOUNTER: ICD-10-CM

## 2023-12-14 DIAGNOSIS — N40.0 BENIGN PROSTATIC HYPERPLASIA WITHOUT LOWER URINARY TRACT SYMPTOMS: ICD-10-CM

## 2023-12-14 DIAGNOSIS — Z79.52 LONG TERM (CURRENT) USE OF SYSTEMIC STEROIDS: ICD-10-CM

## 2023-12-14 DIAGNOSIS — E43 UNSPECIFIED SEVERE PROTEIN-CALORIE MALNUTRITION: ICD-10-CM

## 2023-12-14 DIAGNOSIS — E11.65 TYPE 2 DIABETES MELLITUS WITH HYPERGLYCEMIA: ICD-10-CM

## 2023-12-14 DIAGNOSIS — J15.9 UNSPECIFIED BACTERIAL PNEUMONIA: ICD-10-CM

## 2023-12-14 DIAGNOSIS — R63.6 UNDERWEIGHT: ICD-10-CM

## 2023-12-14 DIAGNOSIS — J45.901 UNSPECIFIED ASTHMA WITH (ACUTE) EXACERBATION: ICD-10-CM

## 2024-01-14 NOTE — ED ADULT TRIAGE NOTE - GLASGOW COMA SCALE: BEST VERBAL RESPONSE, MLM
(V5) oriented I, Lex Tam MD,  performed the initial face to face bedside interview with this patient regarding history of present illness, review of symptoms and relevant past medical, social and family history.  I completed an independent physical examination.  I was the initial provider who evaluated this patient.   I personally saw the patient and performed a substantive portion of the visit including all aspects of the medical decision making.  I have signed out the follow up of any pending tests (i.e. labs, radiological studies) to the MIGUELINA.  I have communicated the patient’s plan of care and disposition with the MIGUELINA.  The history, relevant review of systems, past medical and surgical history, medical decision making, and physical examination was documented by the scribe in my presence and I attest to the accuracy of the documentation.

## 2024-02-26 NOTE — ED CDU PROVIDER SUBSEQUENT DAY NOTE - PROGRESS NOTE ADDITIONAL1
Subjective   Patient denies abdominal pain or blood in stool. Last BM was brown in color.       Objective     Last Recorded Vitals  /51 (BP Location: Right arm, Patient Position: Lying)   Pulse 91   Temp 37.1 °C (98.8 °F) (Temporal)   Resp 18   Wt (!) 40.5 kg (89 lb 4.6 oz)   SpO2 97%   Intake/Output last 3 Shifts:  No intake or output data in the 24 hours ending 02/26/24 1832    Admission Weight  Weight: (!) 35.4 kg (78 lb) (02/22/24 1836)    Daily Weight  02/23/24 : (!) 40.5 kg (89 lb 4.6 oz)        Physical Exam  Constitutional:       Appearance: Normal appearance.   HENT:      Head: Normocephalic and atraumatic.      Mouth/Throat:      Mouth: Mucous membranes are moist.   Eyes:      Extraocular Movements: Extraocular movements intact.   Cardiovascular:      Rate and Rhythm: Normal rate and regular rhythm.   Pulmonary:      Effort: Pulmonary effort is normal.      Breath sounds: Normal breath sounds.   Abdominal:      General: Abdomen is flat. There is no distension.      Palpations: Abdomen is soft.   Musculoskeletal:      Right lower leg: No edema.      Left lower leg: No edema.   Skin:     General: Skin is warm and dry.   Neurological:      General: No focal deficit present.      Mental Status: She is alert and oriented to person, place, and time.   Psychiatric:         Mood and Affect: Mood normal.             Assessment/Plan     #Acute pulmonary embolism  #Ulcerative colitis flare  #C diff infection  #Acute on chronic Anemia  #HTN  EGD performed 2/23, no signs of overt bleeding  Per GI, inflammatory state from UC flare are likely leading to a hypercoagulable state  Given 1 unit RBC, underwent transfusion reaction, received dose of IV venofer  -Start on Eliquis, hold ASA on discharge, can be resumed outpatient if hgb remains stable  -ID recommended 10 day course of Dificid with outpatient follow up for possible VOWST  -Continue home mesalamine  -Eventual plan is to start Entyvio infusions after  treatment of latent TB, patient to follow up with her GI doc at [facility]       #Latent tuberculosis  Patient's infectious disease doctor is Dr. 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     Dr. Krupa Peralta  Internal Medicine PGY-2  Please message me with any questions    Additional Progress Note...

## 2024-05-15 PROBLEM — J45.909 UNSPECIFIED ASTHMA, UNCOMPLICATED: Chronic | Status: ACTIVE | Noted: 2022-12-03

## 2024-05-15 PROBLEM — E11.9 TYPE 2 DIABETES MELLITUS WITHOUT COMPLICATIONS: Chronic | Status: ACTIVE | Noted: 2022-12-03

## 2024-05-20 ENCOUNTER — INPATIENT (INPATIENT)
Facility: HOSPITAL | Age: 84
LOS: 3 days | Discharge: HOME | End: 2024-05-24
Attending: SURGERY | Admitting: SURGERY
Payer: MEDICARE

## 2024-05-20 VITALS
DIASTOLIC BLOOD PRESSURE: 71 MMHG | TEMPERATURE: 98 F | WEIGHT: 100.09 LBS | SYSTOLIC BLOOD PRESSURE: 138 MMHG | HEART RATE: 74 BPM | OXYGEN SATURATION: 97 % | HEIGHT: 63 IN | RESPIRATION RATE: 18 BRPM

## 2024-05-20 DIAGNOSIS — Z98.49 CATARACT EXTRACTION STATUS, UNSPECIFIED EYE: Chronic | ICD-10-CM

## 2024-05-20 LAB
ALBUMIN SERPL ELPH-MCNC: 2.9 G/DL — LOW (ref 3.3–5)
ALP SERPL-CCNC: 72 U/L — SIGNIFICANT CHANGE UP (ref 40–120)
ALT FLD-CCNC: 15 U/L — SIGNIFICANT CHANGE UP (ref 12–78)
ANION GAP SERPL CALC-SCNC: 6 MMOL/L — SIGNIFICANT CHANGE UP (ref 5–17)
APPEARANCE UR: CLEAR — SIGNIFICANT CHANGE UP
AST SERPL-CCNC: 15 U/L — SIGNIFICANT CHANGE UP (ref 15–37)
BASOPHILS # BLD AUTO: 0.04 K/UL — SIGNIFICANT CHANGE UP (ref 0–0.2)
BASOPHILS NFR BLD AUTO: 0.4 % — SIGNIFICANT CHANGE UP (ref 0–2)
BILIRUB SERPL-MCNC: 0.3 MG/DL — SIGNIFICANT CHANGE UP (ref 0.2–1.2)
BILIRUB UR-MCNC: NEGATIVE — SIGNIFICANT CHANGE UP
BLD GP AB SCN SERPL QL: SIGNIFICANT CHANGE UP
BUN SERPL-MCNC: 13 MG/DL — SIGNIFICANT CHANGE UP (ref 7–23)
CALCIUM SERPL-MCNC: 9 MG/DL — SIGNIFICANT CHANGE UP (ref 8.5–10.1)
CHLORIDE SERPL-SCNC: 94 MMOL/L — LOW (ref 96–108)
CO2 SERPL-SCNC: 28 MMOL/L — SIGNIFICANT CHANGE UP (ref 22–31)
COLOR SPEC: YELLOW — SIGNIFICANT CHANGE UP
CREAT SERPL-MCNC: 0.82 MG/DL — SIGNIFICANT CHANGE UP (ref 0.5–1.3)
DIFF PNL FLD: NEGATIVE — SIGNIFICANT CHANGE UP
EGFR: 86 ML/MIN/1.73M2 — SIGNIFICANT CHANGE UP
EOSINOPHIL # BLD AUTO: 0.15 K/UL — SIGNIFICANT CHANGE UP (ref 0–0.5)
EOSINOPHIL NFR BLD AUTO: 1.5 % — SIGNIFICANT CHANGE UP (ref 0–6)
GLUCOSE BLDC GLUCOMTR-MCNC: 89 MG/DL — SIGNIFICANT CHANGE UP (ref 70–99)
GLUCOSE SERPL-MCNC: 115 MG/DL — HIGH (ref 70–99)
GLUCOSE UR QL: NEGATIVE MG/DL — SIGNIFICANT CHANGE UP
HCT VFR BLD CALC: 32.2 % — LOW (ref 39–50)
HGB BLD-MCNC: 11.4 G/DL — LOW (ref 13–17)
IMM GRANULOCYTES NFR BLD AUTO: 0.4 % — SIGNIFICANT CHANGE UP (ref 0–0.9)
KETONES UR-MCNC: NEGATIVE MG/DL — SIGNIFICANT CHANGE UP
LACTATE SERPL-SCNC: 0.5 MMOL/L — LOW (ref 0.7–2)
LEUKOCYTE ESTERASE UR-ACNC: NEGATIVE — SIGNIFICANT CHANGE UP
LIDOCAIN IGE QN: 42 U/L — SIGNIFICANT CHANGE UP (ref 13–75)
LYMPHOCYTES # BLD AUTO: 2.7 K/UL — SIGNIFICANT CHANGE UP (ref 1–3.3)
LYMPHOCYTES # BLD AUTO: 26.3 % — SIGNIFICANT CHANGE UP (ref 13–44)
MCHC RBC-ENTMCNC: 31.2 PG — SIGNIFICANT CHANGE UP (ref 27–34)
MCHC RBC-ENTMCNC: 35.4 G/DL — SIGNIFICANT CHANGE UP (ref 32–36)
MCV RBC AUTO: 88.2 FL — SIGNIFICANT CHANGE UP (ref 80–100)
MONOCYTES # BLD AUTO: 0.94 K/UL — HIGH (ref 0–0.9)
MONOCYTES NFR BLD AUTO: 9.2 % — SIGNIFICANT CHANGE UP (ref 2–14)
NEUTROPHILS # BLD AUTO: 6.4 K/UL — SIGNIFICANT CHANGE UP (ref 1.8–7.4)
NEUTROPHILS NFR BLD AUTO: 62.2 % — SIGNIFICANT CHANGE UP (ref 43–77)
NITRITE UR-MCNC: NEGATIVE — SIGNIFICANT CHANGE UP
NRBC # BLD: 0 /100 WBCS — SIGNIFICANT CHANGE UP (ref 0–0)
PH UR: 6.5 — SIGNIFICANT CHANGE UP (ref 5–8)
PLATELET # BLD AUTO: 319 K/UL — SIGNIFICANT CHANGE UP (ref 150–400)
POTASSIUM SERPL-MCNC: 4.8 MMOL/L — SIGNIFICANT CHANGE UP (ref 3.5–5.3)
POTASSIUM SERPL-SCNC: 4.8 MMOL/L — SIGNIFICANT CHANGE UP (ref 3.5–5.3)
PROT SERPL-MCNC: 8.2 GM/DL — SIGNIFICANT CHANGE UP (ref 6–8.3)
PROT UR-MCNC: SIGNIFICANT CHANGE UP MG/DL
RBC # BLD: 3.65 M/UL — LOW (ref 4.2–5.8)
RBC # FLD: 14 % — SIGNIFICANT CHANGE UP (ref 10.3–14.5)
SODIUM SERPL-SCNC: 128 MMOL/L — LOW (ref 135–145)
SP GR SPEC: 1.01 — SIGNIFICANT CHANGE UP (ref 1–1.03)
TROPONIN I, HIGH SENSITIVITY RESULT: 13 NG/L — SIGNIFICANT CHANGE UP
UROBILINOGEN FLD QL: 1 MG/DL — SIGNIFICANT CHANGE UP (ref 0.2–1)
WBC # BLD: 10.27 K/UL — SIGNIFICANT CHANGE UP (ref 3.8–10.5)
WBC # FLD AUTO: 10.27 K/UL — SIGNIFICANT CHANGE UP (ref 3.8–10.5)

## 2024-05-20 PROCEDURE — 74174 CTA ABD&PLVS W/CONTRAST: CPT | Mod: 26,MC

## 2024-05-20 PROCEDURE — 93010 ELECTROCARDIOGRAM REPORT: CPT

## 2024-05-20 PROCEDURE — 71045 X-RAY EXAM CHEST 1 VIEW: CPT | Mod: 26

## 2024-05-20 PROCEDURE — 99285 EMERGENCY DEPT VISIT HI MDM: CPT

## 2024-05-20 RX ORDER — BUDESONIDE AND FORMOTEROL FUMARATE DIHYDRATE 160; 4.5 UG/1; UG/1
2 AEROSOL RESPIRATORY (INHALATION)
Refills: 0 | Status: DISCONTINUED | OUTPATIENT
Start: 2024-05-20 | End: 2024-05-24

## 2024-05-20 RX ORDER — KETOROLAC TROMETHAMINE 30 MG/ML
15 SYRINGE (ML) INJECTION ONCE
Refills: 0 | Status: DISCONTINUED | OUTPATIENT
Start: 2024-05-20 | End: 2024-05-20

## 2024-05-20 RX ORDER — PANTOPRAZOLE SODIUM 20 MG/1
TABLET, DELAYED RELEASE ORAL
Refills: 0 | Status: DISCONTINUED | OUTPATIENT
Start: 2024-05-20 | End: 2024-05-24

## 2024-05-20 RX ORDER — SODIUM CHLORIDE 9 MG/ML
500 INJECTION INTRAMUSCULAR; INTRAVENOUS; SUBCUTANEOUS ONCE
Refills: 0 | Status: COMPLETED | OUTPATIENT
Start: 2024-05-20 | End: 2024-05-20

## 2024-05-20 RX ORDER — MORPHINE SULFATE 50 MG/1
2 CAPSULE, EXTENDED RELEASE ORAL EVERY 4 HOURS
Refills: 0 | Status: DISCONTINUED | OUTPATIENT
Start: 2024-05-20 | End: 2024-05-21

## 2024-05-20 RX ORDER — ASPIRIN/CALCIUM CARB/MAGNESIUM 324 MG
1 TABLET ORAL
Qty: 0 | Refills: 0 | DISCHARGE

## 2024-05-20 RX ORDER — FAMOTIDINE 10 MG/ML
20 INJECTION INTRAVENOUS ONCE
Refills: 0 | Status: COMPLETED | OUTPATIENT
Start: 2024-05-20 | End: 2024-05-20

## 2024-05-20 RX ORDER — PANTOPRAZOLE SODIUM 20 MG/1
40 TABLET, DELAYED RELEASE ORAL ONCE
Refills: 0 | Status: COMPLETED | OUTPATIENT
Start: 2024-05-20 | End: 2024-05-20

## 2024-05-20 RX ORDER — PANTOPRAZOLE SODIUM 20 MG/1
40 TABLET, DELAYED RELEASE ORAL DAILY
Refills: 0 | Status: DISCONTINUED | OUTPATIENT
Start: 2024-05-21 | End: 2024-05-24

## 2024-05-20 RX ORDER — DEXTROSE MONOHYDRATE, SODIUM CHLORIDE, AND POTASSIUM CHLORIDE 50; .745; 4.5 G/1000ML; G/1000ML; G/1000ML
1000 INJECTION, SOLUTION INTRAVENOUS
Refills: 0 | Status: DISCONTINUED | OUTPATIENT
Start: 2024-05-20 | End: 2024-05-20

## 2024-05-20 RX ORDER — ONDANSETRON 8 MG/1
4 TABLET, FILM COATED ORAL EVERY 6 HOURS
Refills: 0 | Status: DISCONTINUED | OUTPATIENT
Start: 2024-05-20 | End: 2024-05-24

## 2024-05-20 RX ORDER — ALBUTEROL 90 UG/1
2 AEROSOL, METERED ORAL
Refills: 0 | DISCHARGE

## 2024-05-20 RX ORDER — SODIUM CHLORIDE 9 MG/ML
1000 INJECTION INTRAMUSCULAR; INTRAVENOUS; SUBCUTANEOUS ONCE
Refills: 0 | Status: COMPLETED | OUTPATIENT
Start: 2024-05-20 | End: 2024-05-20

## 2024-05-20 RX ORDER — ALBUTEROL 90 UG/1
2 AEROSOL, METERED ORAL EVERY 6 HOURS
Refills: 0 | Status: DISCONTINUED | OUTPATIENT
Start: 2024-05-20 | End: 2024-05-24

## 2024-05-20 RX ORDER — HEPARIN SODIUM 5000 [USP'U]/ML
5000 INJECTION INTRAVENOUS; SUBCUTANEOUS EVERY 8 HOURS
Refills: 0 | Status: DISCONTINUED | OUTPATIENT
Start: 2024-05-20 | End: 2024-05-24

## 2024-05-20 RX ORDER — SODIUM CHLORIDE 9 MG/ML
1000 INJECTION, SOLUTION INTRAVENOUS
Refills: 0 | Status: DISCONTINUED | OUTPATIENT
Start: 2024-05-20 | End: 2024-05-21

## 2024-05-20 RX ORDER — FLUTICASONE PROPIONATE AND SALMETEROL 50; 250 UG/1; UG/1
1 POWDER ORAL; RESPIRATORY (INHALATION)
Qty: 0 | Refills: 0 | DISCHARGE

## 2024-05-20 RX ADMIN — Medication 15 MILLIGRAM(S): at 13:52

## 2024-05-20 RX ADMIN — FAMOTIDINE 20 MILLIGRAM(S): 10 INJECTION INTRAVENOUS at 13:52

## 2024-05-20 RX ADMIN — PANTOPRAZOLE SODIUM 40 MILLIGRAM(S): 20 TABLET, DELAYED RELEASE ORAL at 18:58

## 2024-05-20 RX ADMIN — Medication 15 MILLIGRAM(S): at 14:52

## 2024-05-20 RX ADMIN — HEPARIN SODIUM 5000 UNIT(S): 5000 INJECTION INTRAVENOUS; SUBCUTANEOUS at 23:00

## 2024-05-20 RX ADMIN — SODIUM CHLORIDE 80 MILLILITER(S): 9 INJECTION, SOLUTION INTRAVENOUS at 21:14

## 2024-05-20 RX ADMIN — SODIUM CHLORIDE 500 MILLILITER(S): 9 INJECTION INTRAMUSCULAR; INTRAVENOUS; SUBCUTANEOUS at 18:58

## 2024-05-20 RX ADMIN — SODIUM CHLORIDE 1000 MILLILITER(S): 9 INJECTION INTRAMUSCULAR; INTRAVENOUS; SUBCUTANEOUS at 13:52

## 2024-05-20 NOTE — ED PROVIDER NOTE - PHYSICAL EXAMINATION
GEN: Awake, alert, interactive, NAD.  HEAD AND NECK: NC/AT. Airway patent. Neck supple.   EYES:  Clear b/l. EOMI. PERRL.   ENT: Moist mucus membranes.   CARDIAC: Regular rate, regular rhythm. No evident pedal edema.    RESP/CHEST: Normal respiratory effort with no use of accessory muscles or retractions. Clear throughout on auscultation.  ABD: Firm, non-distended, non-tender. No rebound, no guarding.   BACK: No midline spinal TTP. No CVAT.   EXTREMITIES: Moving all extremities with no apparent deformities.   SKIN: Warm, dry, intact normal color. No rash.   NEURO: AOx3, CN II-XII grossly intact, no focal deficits.   PSYCH: Appropriate mood and affect.

## 2024-05-20 NOTE — ED ADULT NURSE NOTE - NS ED NURSE REPORT GIVEN DT
Maryruth Pallas is a 45 y o  female  Chief Complaint   Patient presents with    Neurologic Problem    Migraine       Assessment:  1  Chronic migraine without aura, intractable, without status migrainosus    2  Bilateral occipital neuralgia        Plan:   MRI scan of the brain  blood work  discontinue Topamax  Depakote extended release 250 mg a day  patient also on gabapentin 600 mg twice a day   continue with magnesium oxide 400 mg twice a day  she was advised to take riboflavin  400 mg once a day  avoid excessive use of over-the-counter analgesics to prevent  Rebound headache  avoid migraine triggers  follow-up in 2-3 months  Discussion:   differential diagnosis discussed with the patient, patient has occipital neuralgia, transformed migraine and analgesic rebound headache with chronic daily headaches, pathophysiology discussed with her, would recommend an MRI scan of the brain and blood work to evaluate for headaches her recent blood work including sed rate C-reactive protein and routine blood work was unremarkable except for low ferritin and she is on iron replacement  different prophylactic medications were discussed with her, since patient is having kidney stones have advised her to stop Topamax and side effects of Depakote discussed with her including liver abnormality we will start her on 250 mg a day if she has any excessive weight gain or other side effects then she would stop the medication she  Is agreeable, she will continue with her gabapentin magnesium oxide I have also advised her to take riboflavin 400 mg a day to help with the headaches       I discussed with her to avoid taking excessive Tylenol as it can cause analgesic rebound headache and interact with Depakote, if she does not get relief with Depakote then we could try CGRP injection as patient has failed occipital nerve blocks and has failed Botox, she also is in follow up with her pain specialist regarding her back pain and chronic pain syndrome and fibromyalgia which could be contributing to some of her symptoms  I strongly encouraged her to quit smoking she is going to discuss with the family physician and has tried in the past, she was advised to avoid migraine triggers which we discussed in detail including foods to avoid she was advised to keep herself well hydrated quit smoking regular exercise keep her blood pressure cholesterol sugar under control to go to the hospital if has any worsening symptoms and call me in follow-up with other physicians and see me back in 2-3 months  Subjective:    HPI    46 year left handed   American female with past medical history significant for hypertension, diabetes under control since he had the bariatric surgery and March of 2017, chronic pain syndrome / fibromyalgia myofascial pain does follow-up with Dr Valerie Saeed, also has history of chronic migraine headaches and was in follow up with Dr Nelson Lind and  Also has occipital neuralgia, COPD who continues to smoke 2 packs per day and has been maintained on Topamax 100 mg twice a day gabapentin 600 mg twice a day and magnesium oxide 400 mg twice a day, she recently was diagnosed with a kidney stone, she has been also tried on Topamax in the past which only lasted for a month and a half and has been given numerous occipital nerve blocks with little relief       she continues to have chronic daily headaches mostly confined to the occipital head region associated with photophobia phonophobia about 6-7 on 10 last for a few hours to a few days not associated with visual aura she does get some occasional blurriness of the vision some nausea but no vomiting, takes about 3-4 Tylenol  For headaches and could take up to 8-10 Tylenols a day at least 3 to 4 times a week, does not drink caffeine, smokes 2 packs per day, she currently has kidney stones and is going to follow up with a urologist, her appetite is good weight has been stable no other complaints      Vitals:    08/25/21 0844   BP: 112/74   BP Location: Left arm   Patient Position: Sitting   Cuff Size: Standard   Pulse: 76   Resp: 16   Temp: (!) 97 1 °F (36 2 °C)   TempSrc: Temporal   Weight: 73 5 kg (162 lb)   Height: 5' 2" (1 575 m)       Current Medications    Current Outpatient Medications:     acetaminophen (TYLENOL) 650 mg CR tablet, Take 1 tablet by mouth every 8 (eight) hours, Disp: , Rfl:     albuterol (2 5 mg/3 mL) 0 083 % nebulizer solution, Take 2 5 mg by nebulization 4 (four) times a day, Disp: , Rfl:     albuterol (PROVENTIL HFA,VENTOLIN HFA) 90 mcg/act inhaler, Inhale 2 puffs every 6 (six) hours as needed for wheezing, Disp: , Rfl:     ascorbic acid (RA Vitamin C) 500 MG tablet, Take 1 tablet (500 mg total) by mouth daily, Disp: 30 tablet, Rfl: 0    budesonide-formoterol (SYMBICORT) 160-4 5 mcg/act inhaler, Inhale 2 puffs 2 (two) times a day, Disp: , Rfl:     celecoxib (CeleBREX) 100 mg capsule, take 1 capsule by mouth twice a day, Disp: 60 capsule, Rfl: 3    cyclobenzaprine (FLEXERIL) 10 mg tablet, Take 1 tablet (10 mg total) by mouth daily at bedtime, Disp: 30 tablet, Rfl: 3    dicyclomine (BENTYL) 20 mg tablet, Take 20 mg by mouth daily, Disp: , Rfl:     DULoxetine (CYMBALTA) 30 mg delayed release capsule, Take 30 mg by mouth daily, Disp: , Rfl:     enoxaparin (LOVENOX) 40 mg/0 4 mL, Inject 0 4 mL (40 mg total) under the skin daily for 28 days Starting after surgery, Disp: 28 Syringe, Rfl: 0    ergocalciferol (VITAMIN D2) 50,000 units, Take 50,000 Units by mouth once a week, Disp: , Rfl:     ferrous sulfate 324 (65 Fe) mg, Take 1 tablet (324 mg total) by mouth 2 (two) times a day before meals, Disp: 60 tablet, Rfl: 0    folic acid (RA Folic Acid) 209 mcg tablet, Take 1 tablet (400 mcg total) by mouth daily, Disp: 30 tablet, Rfl: 0    gabapentin (NEURONTIN) 600 MG tablet, Take 600 mg by mouth 3 (three) times a day, Disp: , Rfl:    HYDROcodone-acetaminophen (NORCO) 5-325 mg per tablet, Take 1 tablet by mouth every 6 (six) hours as needed for painMax Daily Amount: 4 tablets, Disp: 20 tablet, Rfl: 0    ipratropium (ATROVENT) 0 02 % nebulizer solution, Inhale 0 25 mg every 6 (six) hours as needed, Disp: , Rfl:     magnesium oxide (MAG-OX) 400 mg, Take 400 mg by mouth 2 (two) times a day, Disp: , Rfl:     mometasone (ASMANEX TWISTHALER) 220 MCG/INH inhaler, Inhale 220 mcg daily, Disp: , Rfl:     Multiple Vitamins-Minerals (Tab-A-Charles) TABS, Take 1 tablet by mouth daily, Disp: 30 tablet, Rfl: 1    NON FORMULARY, , Disp: , Rfl:     ondansetron (ZOFRAN-ODT) 4 mg disintegrating tablet, ondansetron 4 mg disintegrating tablet, Disp: , Rfl:     pantoprazole (PROTONIX) 40 mg tablet, 40 mg, Disp: , Rfl:     predniSONE 10 mg tablet, Take 4 tablets for 3 days then take 3 tablets for 3 days then take 2 tablets for 3 days then take 1 tablet for 3 days, Disp: , Rfl:     RA FOLIC ACID 476 MCG tablet, take 1 tablet by mouth once daily, Disp: 30 tablet, Rfl: 0    tiotropium (SPIRIVA RESPIMAT) 2 5 MCG/ACT AERS inhaler, Inhale daily, Disp: , Rfl:     tiotropium-olodaterol (STIOLTO RESPIMAT) 2 5-2 5 MCG/ACT inhaler, Inhale 2 puffs daily, Disp: , Rfl:     topiramate (TOPAMAX) 100 mg tablet, Take 100 mg by mouth 2 (two) times a day, Disp: , Rfl:       Allergies  Patient has no known allergies      Past Medical History  Past Medical History:   Diagnosis Date    Arthritis     Asthma     COPD [facility]chronic obstructive pulmonary disease) ([facility] [address] )     Coronary artery disease     Depression     Diabetes mellitus (HCC)     with weight loss no longer has high BS    Diverticulitis     Fibromyalgia     Fibromyalgia, primary     GERD (gastroesophageal reflux disease)     Hypertension     lost weight and no longer has    Migraine     Pancreatitis     Stomach problems          Past Surgical History:  Past Surgical History:   Procedure Laterality Date    ABDOMINAL SURGERY      CHOLECYSTECTOMY      GALLBLADDER SURGERY      GASTRIC BYPASS      PANNICULECTOMY  2017    CO WRIST Stephanie Mourning LIG Right 3/3/2020    Procedure: RELEASE CARPAL TUNNEL ENDOSCOPIC Right;  Surgeon: Tanvi Li MD;  Location: [facility];  Service: Orthopedics    CO WRIST Stephanie Damonurning LIG Left 3/18/2020    Procedure: RELEASE LEFT CARPAL TUNNEL ENDOSCOPIC;  Surgeon: Tanvi Li MD;  Location: [facility];  Service: Orthopedics    STOMACH SURGERY      tummy tuck    TUBAL LIGATION           Family History:  Family History   Problem Relation Age of Onset    Rheum arthritis Mother     Hypertension Mother     COPD Mother     Diabetes Father        Social History:   reports that she has been smoking cigarettes  She has a 12 50 pack-year smoking history  She has never used smokeless tobacco  She reports that she does not drink alcohol and does not use drugs  I have reviewed the past medical history, surgical history, social and family history, current medications, allergies vitals, review of systems, and updated this information as appropriate today  Objective:    Physical Exam    Neurological Exam      GENERAL:  Cooperative in no acute distress  Well-developed and well-nourished    HEAD and NECK   Head is atraumatic normocephalic with no lesions or masses  Neck is supple with full range of motion    CARDIOVASCULAR  Carotid Arteries-no carotid bruits  NEUROLOGIC:  Mental Status-the patient is awake alert and oriented without aphasia or apraxia  Cranial Nerves: Visual fields are full to confrontation  Visual acuity is 20/20 with hand-held chart, Extraocular movements are full without nystagmus  Pupils are 2-1/2 mm and reactive  Face is symmetrical to light touch  Movements of facial expression move symmetrically  Hearing is normal to finger rub bilaterally  Soft palate lifts symmetrically  Shoulder shrug is symmetrical  Tongue is midline without atrophy    Motor: No drift is noted on arm extension  Strength is full in the upper and lower extremities with normal bulk and tone  Sensory: Intact to temperature and vibratory sensation in the upper and lower extremities bilaterally  Cortical function is intact  Coordination: Finger to nose testing is performed accurately  Romberg is negative  Gait reveals a normal base with symmetrical arm swing  Tandem walk is normal   Reflexes:     2+ and symmetrical   Toes are downgoing  no spine tenderness  No tenderness at the occipital protuberance, no temporal artery tenderness  ROS:  Review of Systems   Constitutional: Positive for fatigue  Negative for appetite change and fever  HENT: Negative  Negative for hearing loss, tinnitus, trouble swallowing and voice change  Eyes: Negative  Negative for photophobia and pain  Respiratory: Positive for shortness of breath  Cardiovascular: Negative  Negative for palpitations  Gastrointestinal: Positive for abdominal pain  Negative for nausea and vomiting  Endocrine: Negative  Negative for cold intolerance  Genitourinary: Negative  Negative for dysuria, frequency and urgency  Musculoskeletal: Positive for back pain, gait problem, myalgias and neck pain  Skin: Negative  Negative for rash  Neurological: Positive for numbness and headaches  Negative for dizziness, tremors, seizures, syncope, facial asymmetry, speech difficulty, weakness and light-headedness  Hematological: Bruises/bleeds easily  Psychiatric/Behavioral: Positive for decreased concentration, dysphoric mood and sleep disturbance  Negative for confusion and hallucinations  20-May-2024 21:03

## 2024-05-20 NOTE — ED ADULT NURSE REASSESSMENT NOTE - NS ED NURSE REASSESS COMMENT FT1
Handoff report received from MINH Medrano for shift change. Plan of care reviewed. Pt received resting on stretcher. IV site & patency inspected and integrity maintained. Left AC 20G patent, 500 cc bolus finished. pt aox4, amb at home baseline, denies abd pain /tenderness at this time. Pt pendign bed, Will continue to monitor.

## 2024-05-20 NOTE — PATIENT PROFILE ADULT - FALL HARM RISK - HARM RISK INTERVENTIONS

## 2024-05-20 NOTE — ED ADULT TRIAGE NOTE - CHIEF COMPLAINT QUOTE
per son pt's been having abd pain x2weeks but recently worsened w dark red blood in stool, vomiting, inability to eat and weight loss 15 lbs in 2 months. Denies fever, chills, cp, sob  hx asthma  NKDA

## 2024-05-20 NOTE — ED PROVIDER NOTE - OBJECTIVE STATEMENT
83M PMH asthma, DM BIB son d/t worsening abd pain. Per son, pt w/ decreased PO intake x past 3mo d/t 'gassiness,' pain w/ meals. Pt w/ 15lb wt loss over past 2-3mo. Pt w/ 1 episode black emesis 1mo ago. Pt w/ abd pain x past 3wks, worsening x past 4 days. Pt w/ severe pain this AM s/p eating breakfast eggs. Pt w/ 1 episode diarrhea w/ dark-red blood mixed into stool 2 days ago. Pt endorses constipation. Denies F/C, h/a, CP, SOB, cough, flank pain, UTI sx, LE swelling. Per son, pt walking w/ cane x past 3mo, previously strong / healthy.     PMH as above, PSH none, NKDA, Meds as listed.

## 2024-05-20 NOTE — H&P ADULT - ASSESSMENT
83M with CT showing large ulcerating mass communicating with proximal jejunum, ? contained perforation   PMH asthma, DM. Colonoscopy 8 yrs ago, normal per son, has never had upper endoscopy.    NPO  IV hydration  Upper GI series in am  F/U AM labs, stat lactate  serial abdominal exams, DVT prophylaxis  PPI  Discussed with Dr Garcia

## 2024-05-20 NOTE — ED PROVIDER NOTE - CLINICAL SUMMARY MEDICAL DECISION MAKING FREE TEXT BOX
83M PMH asthma, BPH, DM pw abd pain x3wks, worsening pain x past 4 days a/w decreased PO intake, wt loss x past 3mo. Afebrile, VSS. Exam as noted in PE. Plan for CBC, CMP, lipase, trop, T&S, ECG, CXR, UA/C, CTA AP. Give IVF, Toradol, Pepcid. Re-eval. 83M PMH asthma, BPH, DM pw abd pain x3wks, worsening pain x past 4 days a/w decreased PO intake, wt loss x past 3mo. Afebrile, VSS. Exam as noted in PE. Plan for CBC, CMP, lipase, trop, T&S, ECG, CXR, UA/C, CTA AP. Give IVF, Toradol, Pepcid. Re-eval.  Pt care signed out to incoming team at change of shift, pending CT read.

## 2024-05-20 NOTE — ED ADULT NURSE NOTE - NSICDXPASTMEDICALHX_GEN_ALL_CORE_FT
PAST MEDICAL HISTORY:  Asthma     Asthma     BPH (benign prostatic hyperplasia)     Diabetes     DM (diabetes mellitus)

## 2024-05-20 NOTE — ED ADULT NURSE NOTE - OBJECTIVE STATEMENT
patient 82 yo male w/ PMH asthma, DM c/o abd pain for the past several months that has recently worsened over the past 4 days. as per son at the bedside, patient has been having decreased PO intake and also began having bright red blood and loose stools 2 days ago. also endorses that patient had episode dark red vomiting 1 month ago. denies  symptoms, fever, cough. abd firm on exam.

## 2024-05-20 NOTE — H&P ADULT - NSHPPHYSICALEXAM_GEN_ALL_CORE
Constitutional: Cachectic  HEENT: NC/AT, PERRL, EOMI, anicteric sclera, no nasal discharge; uvula midline, no oropharyngeal erythema or exudates  Neck: supple; no JVD or thyromegaly  Respiratory: CTA B/L; no W/R/R, no retractions  Cardiac: +S1/S2; RRR; no M/R/G; PMI non-displaced  Gastrointestinal: soft, NT/ND; no rebound or guarding; +BS   Extremities: , no clubbing or cyanosis; no peripheral edema  Musculoskeletal:  no joint swelling, tenderness or erythema  Vascular: 2+ radial, femoral, DP/PT pulses B/L  Skin: warm, dry and intact; no rashes, wounds, or scars  Neurologic: AAOx3; CNS grossly intact; no focal deficits

## 2024-05-20 NOTE — H&P ADULT - HISTORY OF PRESENT ILLNESS
83M with PMH asthma, DM presents with  worsening upper aching abd pain over the last 4  days, Initially pain started 4 weeks ago, he went to [facility] and the treated him for constipation. Per son patient has had decreased PO intake past 4-5 weeks secondary  abdominal pain w/ meals. + 15lb wt loss over that period of time. Pt w/ 1 episode black emesis 1mo ago, 1 episode diarrhea w/ dark-red blood mixed into stool 2 days ago. Pt stated he has Hx constipation. Denies F/C, h/a, CP, SOB, cough, flank pain. Colonoscopy 8 yrs ago, normal per son, has never had upper endoscopy.

## 2024-05-21 LAB
ANION GAP SERPL CALC-SCNC: 6 MMOL/L — SIGNIFICANT CHANGE UP (ref 5–17)
ANION GAP SERPL CALC-SCNC: 8 MMOL/L — SIGNIFICANT CHANGE UP (ref 5–17)
BASOPHILS # BLD AUTO: 0.06 K/UL — SIGNIFICANT CHANGE UP (ref 0–0.2)
BASOPHILS NFR BLD AUTO: 0.8 % — SIGNIFICANT CHANGE UP (ref 0–2)
BUN SERPL-MCNC: 10 MG/DL — SIGNIFICANT CHANGE UP (ref 7–23)
BUN SERPL-MCNC: 10 MG/DL — SIGNIFICANT CHANGE UP (ref 7–23)
CALCIUM SERPL-MCNC: 8.3 MG/DL — LOW (ref 8.5–10.1)
CALCIUM SERPL-MCNC: 8.8 MG/DL — SIGNIFICANT CHANGE UP (ref 8.5–10.1)
CANCER AG19-9 SERPL-ACNC: 10 U/ML — SIGNIFICANT CHANGE UP
CEA SERPL-MCNC: 2 NG/ML — SIGNIFICANT CHANGE UP (ref 0–3.8)
CHLORIDE SERPL-SCNC: 104 MMOL/L — SIGNIFICANT CHANGE UP (ref 96–108)
CHLORIDE SERPL-SCNC: 97 MMOL/L — SIGNIFICANT CHANGE UP (ref 96–108)
CO2 SERPL-SCNC: 26 MMOL/L — SIGNIFICANT CHANGE UP (ref 22–31)
CO2 SERPL-SCNC: 27 MMOL/L — SIGNIFICANT CHANGE UP (ref 22–31)
CREAT SERPL-MCNC: 0.79 MG/DL — SIGNIFICANT CHANGE UP (ref 0.5–1.3)
CREAT SERPL-MCNC: 0.91 MG/DL — SIGNIFICANT CHANGE UP (ref 0.5–1.3)
CULTURE RESULTS: SIGNIFICANT CHANGE UP
EGFR: 83 ML/MIN/1.73M2 — SIGNIFICANT CHANGE UP
EGFR: 87 ML/MIN/1.73M2 — SIGNIFICANT CHANGE UP
EOSINOPHIL # BLD AUTO: 0.18 K/UL — SIGNIFICANT CHANGE UP (ref 0–0.5)
EOSINOPHIL NFR BLD AUTO: 2.5 % — SIGNIFICANT CHANGE UP (ref 0–6)
GLUCOSE BLDC GLUCOMTR-MCNC: 119 MG/DL — HIGH (ref 70–99)
GLUCOSE BLDC GLUCOMTR-MCNC: 133 MG/DL — HIGH (ref 70–99)
GLUCOSE SERPL-MCNC: 108 MG/DL — HIGH (ref 70–99)
GLUCOSE SERPL-MCNC: 176 MG/DL — HIGH (ref 70–99)
HCT VFR BLD CALC: 30.7 % — LOW (ref 39–50)
HGB BLD-MCNC: 10.9 G/DL — LOW (ref 13–17)
IMM GRANULOCYTES NFR BLD AUTO: 0.1 % — SIGNIFICANT CHANGE UP (ref 0–0.9)
LYMPHOCYTES # BLD AUTO: 2.33 K/UL — SIGNIFICANT CHANGE UP (ref 1–3.3)
LYMPHOCYTES # BLD AUTO: 32.8 % — SIGNIFICANT CHANGE UP (ref 13–44)
MAGNESIUM SERPL-MCNC: 1.7 MG/DL — SIGNIFICANT CHANGE UP (ref 1.6–2.6)
MCHC RBC-ENTMCNC: 31.9 PG — SIGNIFICANT CHANGE UP (ref 27–34)
MCHC RBC-ENTMCNC: 35.5 G/DL — SIGNIFICANT CHANGE UP (ref 32–36)
MCV RBC AUTO: 89.8 FL — SIGNIFICANT CHANGE UP (ref 80–100)
MONOCYTES # BLD AUTO: 0.78 K/UL — SIGNIFICANT CHANGE UP (ref 0–0.9)
MONOCYTES NFR BLD AUTO: 11 % — SIGNIFICANT CHANGE UP (ref 2–14)
NEUTROPHILS # BLD AUTO: 3.75 K/UL — SIGNIFICANT CHANGE UP (ref 1.8–7.4)
NEUTROPHILS NFR BLD AUTO: 52.8 % — SIGNIFICANT CHANGE UP (ref 43–77)
NRBC # BLD: 0 /100 WBCS — SIGNIFICANT CHANGE UP (ref 0–0)
PHOSPHATE SERPL-MCNC: 3.4 MG/DL — SIGNIFICANT CHANGE UP (ref 2.5–4.5)
PLATELET # BLD AUTO: 309 K/UL — SIGNIFICANT CHANGE UP (ref 150–400)
POTASSIUM SERPL-MCNC: 3.6 MMOL/L — SIGNIFICANT CHANGE UP (ref 3.5–5.3)
POTASSIUM SERPL-MCNC: 3.9 MMOL/L — SIGNIFICANT CHANGE UP (ref 3.5–5.3)
POTASSIUM SERPL-SCNC: 3.6 MMOL/L — SIGNIFICANT CHANGE UP (ref 3.5–5.3)
POTASSIUM SERPL-SCNC: 3.9 MMOL/L — SIGNIFICANT CHANGE UP (ref 3.5–5.3)
RBC # BLD: 3.42 M/UL — LOW (ref 4.2–5.8)
RBC # FLD: 14.4 % — SIGNIFICANT CHANGE UP (ref 10.3–14.5)
SODIUM SERPL-SCNC: 130 MMOL/L — LOW (ref 135–145)
SODIUM SERPL-SCNC: 138 MMOL/L — SIGNIFICANT CHANGE UP (ref 135–145)
SPECIMEN SOURCE: SIGNIFICANT CHANGE UP
WBC # BLD: 7.11 K/UL — SIGNIFICANT CHANGE UP (ref 3.8–10.5)
WBC # FLD AUTO: 7.11 K/UL — SIGNIFICANT CHANGE UP (ref 3.8–10.5)

## 2024-05-21 PROCEDURE — 74240 X-RAY XM UPR GI TRC 1CNTRST: CPT | Mod: 26

## 2024-05-21 RX ORDER — DEXTROSE MONOHYDRATE, SODIUM CHLORIDE, AND POTASSIUM CHLORIDE 50; .745; 4.5 G/1000ML; G/1000ML; G/1000ML
1000 INJECTION, SOLUTION INTRAVENOUS
Refills: 0 | Status: DISCONTINUED | OUTPATIENT
Start: 2024-05-21 | End: 2024-05-22

## 2024-05-21 RX ADMIN — HEPARIN SODIUM 5000 UNIT(S): 5000 INJECTION INTRAVENOUS; SUBCUTANEOUS at 21:43

## 2024-05-21 RX ADMIN — HEPARIN SODIUM 5000 UNIT(S): 5000 INJECTION INTRAVENOUS; SUBCUTANEOUS at 06:41

## 2024-05-21 RX ADMIN — PANTOPRAZOLE SODIUM 40 MILLIGRAM(S): 20 TABLET, DELAYED RELEASE ORAL at 13:57

## 2024-05-21 RX ADMIN — BUDESONIDE AND FORMOTEROL FUMARATE DIHYDRATE 2 PUFF(S): 160; 4.5 AEROSOL RESPIRATORY (INHALATION) at 17:40

## 2024-05-21 RX ADMIN — HEPARIN SODIUM 5000 UNIT(S): 5000 INJECTION INTRAVENOUS; SUBCUTANEOUS at 13:57

## 2024-05-21 RX ADMIN — BUDESONIDE AND FORMOTEROL FUMARATE DIHYDRATE 2 PUFF(S): 160; 4.5 AEROSOL RESPIRATORY (INHALATION) at 06:41

## 2024-05-21 RX ADMIN — DEXTROSE MONOHYDRATE, SODIUM CHLORIDE, AND POTASSIUM CHLORIDE 90 MILLILITER(S): 50; .745; 4.5 INJECTION, SOLUTION INTRAVENOUS at 12:29

## 2024-05-21 RX ADMIN — DEXTROSE MONOHYDRATE, SODIUM CHLORIDE, AND POTASSIUM CHLORIDE 90 MILLILITER(S): 50; .745; 4.5 INJECTION, SOLUTION INTRAVENOUS at 21:42

## 2024-05-21 NOTE — CONSULT NOTE ADULT - SUBJECTIVE AND OBJECTIVE BOX
Full consult dictated    Plan:  82 yo M with h/o DM and asthma admitted with few days of abdominal pain.  CT shows large ulcerating mass communicating with proximal jejunum, ? contained perforation. UGI series was done this AM showing nonobstructive gas pattern with no free air, no extra, or jejunal fistula noted. Pt with nl WBC,, no fever. CT results to be reviewed with radiologist in AM. Agree with clear liquids for now. IV protonix. Will discuss further treatment plan with Dr Garcia.

## 2024-05-22 LAB
ANION GAP SERPL CALC-SCNC: 7 MMOL/L — SIGNIFICANT CHANGE UP (ref 5–17)
BUN SERPL-MCNC: 11 MG/DL — SIGNIFICANT CHANGE UP (ref 7–23)
CALCIUM SERPL-MCNC: 8.7 MG/DL — SIGNIFICANT CHANGE UP (ref 8.5–10.1)
CHLORIDE SERPL-SCNC: 103 MMOL/L — SIGNIFICANT CHANGE UP (ref 96–108)
CO2 SERPL-SCNC: 26 MMOL/L — SIGNIFICANT CHANGE UP (ref 22–31)
CREAT SERPL-MCNC: 0.83 MG/DL — SIGNIFICANT CHANGE UP (ref 0.5–1.3)
EGFR: 86 ML/MIN/1.73M2 — SIGNIFICANT CHANGE UP
GLUCOSE BLDC GLUCOMTR-MCNC: 110 MG/DL — HIGH (ref 70–99)
GLUCOSE BLDC GLUCOMTR-MCNC: 187 MG/DL — HIGH (ref 70–99)
GLUCOSE SERPL-MCNC: 105 MG/DL — HIGH (ref 70–99)
HCT VFR BLD CALC: 30.8 % — LOW (ref 39–50)
HGB BLD-MCNC: 10.9 G/DL — LOW (ref 13–17)
MAGNESIUM SERPL-MCNC: 1.9 MG/DL — SIGNIFICANT CHANGE UP (ref 1.6–2.6)
MCHC RBC-ENTMCNC: 31.5 PG — SIGNIFICANT CHANGE UP (ref 27–34)
MCHC RBC-ENTMCNC: 35.4 G/DL — SIGNIFICANT CHANGE UP (ref 32–36)
MCV RBC AUTO: 89 FL — SIGNIFICANT CHANGE UP (ref 80–100)
NRBC # BLD: 0 /100 WBCS — SIGNIFICANT CHANGE UP (ref 0–0)
PHOSPHATE SERPL-MCNC: 3.1 MG/DL — SIGNIFICANT CHANGE UP (ref 2.5–4.5)
PLATELET # BLD AUTO: 320 K/UL — SIGNIFICANT CHANGE UP (ref 150–400)
POTASSIUM SERPL-MCNC: 3.4 MMOL/L — LOW (ref 3.5–5.3)
POTASSIUM SERPL-SCNC: 3.4 MMOL/L — LOW (ref 3.5–5.3)
RBC # BLD: 3.46 M/UL — LOW (ref 4.2–5.8)
RBC # FLD: 14.1 % — SIGNIFICANT CHANGE UP (ref 10.3–14.5)
SODIUM SERPL-SCNC: 136 MMOL/L — SIGNIFICANT CHANGE UP (ref 135–145)
WBC # BLD: 6.18 K/UL — SIGNIFICANT CHANGE UP (ref 3.8–10.5)
WBC # FLD AUTO: 6.18 K/UL — SIGNIFICANT CHANGE UP (ref 3.8–10.5)

## 2024-05-22 RX ORDER — POTASSIUM CHLORIDE 20 MEQ
40 PACKET (EA) ORAL ONCE
Refills: 0 | Status: COMPLETED | OUTPATIENT
Start: 2024-05-22 | End: 2024-05-22

## 2024-05-22 RX ORDER — DEXTROSE MONOHYDRATE, SODIUM CHLORIDE, AND POTASSIUM CHLORIDE 50; .745; 4.5 G/1000ML; G/1000ML; G/1000ML
1000 INJECTION, SOLUTION INTRAVENOUS
Refills: 0 | Status: DISCONTINUED | OUTPATIENT
Start: 2024-05-22 | End: 2024-05-24

## 2024-05-22 RX ADMIN — Medication 40 MILLIEQUIVALENT(S): at 12:05

## 2024-05-22 RX ADMIN — DEXTROSE MONOHYDRATE, SODIUM CHLORIDE, AND POTASSIUM CHLORIDE 90 MILLILITER(S): 50; .745; 4.5 INJECTION, SOLUTION INTRAVENOUS at 08:26

## 2024-05-22 RX ADMIN — DEXTROSE MONOHYDRATE, SODIUM CHLORIDE, AND POTASSIUM CHLORIDE 90 MILLILITER(S): 50; .745; 4.5 INJECTION, SOLUTION INTRAVENOUS at 05:58

## 2024-05-22 RX ADMIN — BUDESONIDE AND FORMOTEROL FUMARATE DIHYDRATE 2 PUFF(S): 160; 4.5 AEROSOL RESPIRATORY (INHALATION) at 08:27

## 2024-05-22 RX ADMIN — PANTOPRAZOLE SODIUM 40 MILLIGRAM(S): 20 TABLET, DELAYED RELEASE ORAL at 12:03

## 2024-05-22 RX ADMIN — HEPARIN SODIUM 5000 UNIT(S): 5000 INJECTION INTRAVENOUS; SUBCUTANEOUS at 15:19

## 2024-05-22 RX ADMIN — BUDESONIDE AND FORMOTEROL FUMARATE DIHYDRATE 2 PUFF(S): 160; 4.5 AEROSOL RESPIRATORY (INHALATION) at 18:15

## 2024-05-22 RX ADMIN — DEXTROSE MONOHYDRATE, SODIUM CHLORIDE, AND POTASSIUM CHLORIDE 75 MILLILITER(S): 50; .745; 4.5 INJECTION, SOLUTION INTRAVENOUS at 15:18

## 2024-05-22 RX ADMIN — HEPARIN SODIUM 5000 UNIT(S): 5000 INJECTION INTRAVENOUS; SUBCUTANEOUS at 21:21

## 2024-05-22 RX ADMIN — HEPARIN SODIUM 5000 UNIT(S): 5000 INJECTION INTRAVENOUS; SUBCUTANEOUS at 05:58

## 2024-05-22 NOTE — DIETITIAN INITIAL EVALUATION ADULT - OTHER INFO
Per conversation c pt & son, who was bedside at time of visit, lives c spouse; has been mostly independent. Pt at [facility] recently for abdominal pain; tx for constipation; the pain continued to worsen c decreased PO intake over several months; wt loss as noted. Per CT found c large ulcerating mass communicating c proximal jejunum; upper GI series performed 5/21; results pending.  Denies any N/V/D; pt c hx of constipation.

## 2024-05-22 NOTE — DIETITIAN INITIAL EVALUATION ADULT - SIGNS/SYMPTOMS
Physical findings of severe fat/muscle loss; 12.5% wt loss x 3 months; <75% nutrition needs >1 month

## 2024-05-22 NOTE — DIETITIAN INITIAL EVALUATION ADULT - PERTINENT LABORATORY DATA
05-22    136  |  103  |  11  ----------------------------<  105<H>  3.4<L>   |  26  |  0.83    Ca    8.7      22 May 2024 06:15  Phos  3.1     05-22  Mg     1.9     05-22    TPro  8.2  /  Alb  2.9<L>  /  TBili  0.3  /  DBili  x   /  AST  15  /  ALT  15  /  AlkPhos  72  05-20  POCT Blood Glucose.: 187 mg/dL (05-22-24); 05-21 119, 133  A1C Result: 9.5 % (12-08-23)  A1C Result: 8.3 % (11-28-23)  A1C Result: 8.1 % (11-27-23)

## 2024-05-23 ENCOUNTER — TRANSCRIPTION ENCOUNTER (OUTPATIENT)
Age: 84
End: 2024-05-23

## 2024-05-23 LAB
ANION GAP SERPL CALC-SCNC: 6 MMOL/L — SIGNIFICANT CHANGE UP (ref 5–17)
BUN SERPL-MCNC: 7 MG/DL — SIGNIFICANT CHANGE UP (ref 7–23)
CALCIUM SERPL-MCNC: 9 MG/DL — SIGNIFICANT CHANGE UP (ref 8.5–10.1)
CHLORIDE SERPL-SCNC: 100 MMOL/L — SIGNIFICANT CHANGE UP (ref 96–108)
CO2 SERPL-SCNC: 27 MMOL/L — SIGNIFICANT CHANGE UP (ref 22–31)
CREAT SERPL-MCNC: 0.88 MG/DL — SIGNIFICANT CHANGE UP (ref 0.5–1.3)
EGFR: 84 ML/MIN/1.73M2 — SIGNIFICANT CHANGE UP
GLUCOSE SERPL-MCNC: 153 MG/DL — HIGH (ref 70–99)
HCT VFR BLD CALC: 31.3 % — LOW (ref 39–50)
HGB BLD-MCNC: 11.4 G/DL — LOW (ref 13–17)
MAGNESIUM SERPL-MCNC: 1.8 MG/DL — SIGNIFICANT CHANGE UP (ref 1.6–2.6)
MCHC RBC-ENTMCNC: 33.4 PG — SIGNIFICANT CHANGE UP (ref 27–34)
MCHC RBC-ENTMCNC: 36.4 G/DL — HIGH (ref 32–36)
MCV RBC AUTO: 91.8 FL — SIGNIFICANT CHANGE UP (ref 80–100)
NRBC # BLD: 0 /100 WBCS — SIGNIFICANT CHANGE UP (ref 0–0)
PHOSPHATE SERPL-MCNC: 2.4 MG/DL — LOW (ref 2.5–4.5)
PLATELET # BLD AUTO: 309 K/UL — SIGNIFICANT CHANGE UP (ref 150–400)
POTASSIUM SERPL-MCNC: 4 MMOL/L — SIGNIFICANT CHANGE UP (ref 3.5–5.3)
POTASSIUM SERPL-SCNC: 4 MMOL/L — SIGNIFICANT CHANGE UP (ref 3.5–5.3)
RBC # BLD: 3.41 M/UL — LOW (ref 4.2–5.8)
RBC # FLD: 15.8 % — HIGH (ref 10.3–14.5)
SODIUM SERPL-SCNC: 133 MMOL/L — LOW (ref 135–145)
WBC # BLD: 5.97 K/UL — SIGNIFICANT CHANGE UP (ref 3.8–10.5)
WBC # FLD AUTO: 5.97 K/UL — SIGNIFICANT CHANGE UP (ref 3.8–10.5)

## 2024-05-23 RX ORDER — SODIUM,POTASSIUM PHOSPHATES 278-250MG
1 POWDER IN PACKET (EA) ORAL ONCE
Refills: 0 | Status: COMPLETED | OUTPATIENT
Start: 2024-05-23 | End: 2024-05-23

## 2024-05-23 RX ADMIN — DEXTROSE MONOHYDRATE, SODIUM CHLORIDE, AND POTASSIUM CHLORIDE 75 MILLILITER(S): 50; .745; 4.5 INJECTION, SOLUTION INTRAVENOUS at 21:13

## 2024-05-23 RX ADMIN — HEPARIN SODIUM 5000 UNIT(S): 5000 INJECTION INTRAVENOUS; SUBCUTANEOUS at 13:50

## 2024-05-23 RX ADMIN — BUDESONIDE AND FORMOTEROL FUMARATE DIHYDRATE 2 PUFF(S): 160; 4.5 AEROSOL RESPIRATORY (INHALATION) at 18:28

## 2024-05-23 RX ADMIN — BUDESONIDE AND FORMOTEROL FUMARATE DIHYDRATE 2 PUFF(S): 160; 4.5 AEROSOL RESPIRATORY (INHALATION) at 05:10

## 2024-05-23 RX ADMIN — HEPARIN SODIUM 5000 UNIT(S): 5000 INJECTION INTRAVENOUS; SUBCUTANEOUS at 05:10

## 2024-05-23 RX ADMIN — PANTOPRAZOLE SODIUM 40 MILLIGRAM(S): 20 TABLET, DELAYED RELEASE ORAL at 13:51

## 2024-05-23 RX ADMIN — DEXTROSE MONOHYDRATE, SODIUM CHLORIDE, AND POTASSIUM CHLORIDE 75 MILLILITER(S): 50; .745; 4.5 INJECTION, SOLUTION INTRAVENOUS at 05:11

## 2024-05-23 RX ADMIN — HEPARIN SODIUM 5000 UNIT(S): 5000 INJECTION INTRAVENOUS; SUBCUTANEOUS at 21:13

## 2024-05-23 RX ADMIN — Medication 1 TABLET(S): at 13:50

## 2024-05-23 RX ADMIN — DEXTROSE MONOHYDRATE, SODIUM CHLORIDE, AND POTASSIUM CHLORIDE 75 MILLILITER(S): 50; .745; 4.5 INJECTION, SOLUTION INTRAVENOUS at 14:15

## 2024-05-23 NOTE — PROGRESS NOTE ADULT - ASSESSMENT
84 Y/O male with CT showing large ulcerating mass communicating with proximal jejunum, ? contained perforation   Colonoscopy 8 yrs ago, normal per son, has never had upper endoscopy.  UGIS with nonobstructive gas pattern with no free air, extra, or jejunal fistula noted.   No leukocytosis. Benign abdominal exam.   Will need outpatient GI F/U.      PLAN:     - F/U regular diet tolerance   - PPI  - DVT ppx, OOB/AAT  - Discussed with Dr. Garcia

## 2024-05-23 NOTE — DISCHARGE NOTE PROVIDER - CARE PROVIDER_API CALL
Ravindra Pinzon  Gastroenterology  [address], [address]  [place] [address]  Phone: [phone]  Fax: [phone]  Follow Up Time: 2 weeks    Arielle Garcia  Surgery  [address] [place] [address]  Phone: [phone]  Fax: [phone]  Follow Up Time: 2 weeks   Arielle Garcia  Surgery  [address] [place] [address]  Phone: [phone]  Fax: [phone]  Follow Up Time: 2 weeks    Aj Red  Gastroenterology  [address], [address]  [place] [address]  Phone: [phone]  Fax: [phone]  Follow Up Time:     Abdi Sanchez  Otolaryngology  [address] [place] [address] [address]  [place] [address]  Phone: [phone]  Fax: [phone]  Follow Up Time:

## 2024-05-23 NOTE — DISCHARGE NOTE PROVIDER - HOSPITAL COURSE
82 yo M with h/o DM and asthma presented to er with a few days of abdominal pain.  CT showed large ulcerating mass communicating with proximal jejunum, ? contained perforation. Pt was admitted to surgical service, started on PPI. UGI series was done showing nonobstructive gas pattern with no free air, no extra, or jejunal fistula noted. Gi was consulted. pts pain improved, remained afebrile without leukocytosis, diet was advanced and tolerated well. At the time of discharge, the patient was hemodynamically stable, was tolerating PO diet, was ambulating, and was comfortable with adequate pain control. No nausea, vomiting, fever, chills. Patient instructed to follow up and to call the office to make an appointment. Patient instructed to call for any fever over 101, nausea, vomiting, severe pain, no passing of gas or bowel movement. Patient understood.

## 2024-05-23 NOTE — DISCHARGE NOTE PROVIDER - NSDCFUSCHEDAPPT_GEN_ALL_CORE_FT
[facility]  [facility] [address] [facility]  Scheduled Appointment: 06/07/2024

## 2024-05-23 NOTE — PROGRESS NOTE ADULT - NUTRITIONAL ASSESSMENT
This patient has been assessed with a concern for Malnutrition and has been determined to have a diagnosis/diagnoses of Severe protein-calorie malnutrition and Underweight (BMI < 19).    This patient is being managed with:   Diet Regular-  Entered: May 23 2024 12:36PM

## 2024-05-23 NOTE — DISCHARGE NOTE PROVIDER - NSDCFUADDAPPT_GEN_ALL_CORE_FT
follow up with GI in 1-2 weeks, please call to schedule appointment  follow up with your PCP in 1-2 weeks  follow up with surgeon Dr rosario in the next 2 weeks  follow up with GI within 1week, please call to schedule appointment  follow up with your PCP in 1-2 weeks  follow up with surgeon Dr Garcia in the next 2 weeks

## 2024-05-23 NOTE — DISCHARGE NOTE PROVIDER - NSDCMRMEDTOKEN_GEN_ALL_CORE_FT
acetaminophen 325 mg oral tablet: 2 tab(s) orally every 6 hours, As needed, Temp greater or equal to 38C (100.4F), Mild Pain (1 - 3), Moderate Pain (4 - 6)  Advair Diskus 100 mcg-50 mcg inhalation powder: 1 inhaled once a day  Advair Diskus 250 mcg-50 mcg inhalation powder: 1 puff(s) inhaled 2 times a day  Albuterol (Eqv-ProAir HFA) 90 mcg/inh inhalation aerosol: 2 puff(s) inhaled every 6 hours  Aspirin Enteric Coated 81 mg oral delayed release tablet: 1 tab(s) orally once a day  benzonatate 100 mg oral capsule: 1 cap(s) orally 3 times a day, As needed, Cough  cefuroxime 500 mg oral tablet: 1 tab(s) orally 3 times a day  Glucose Test Strips: Provide strips compatible with patients glucometer  metFORMIN 500 mg oral tablet: 0.5 tab(s) orally 2 times a day  metFORMIN 500 mg oral tablet, extended release: 1 tab(s) orally 2 times a day  predniSONE 50 mg oral tablet: 1 tab(s) orally once a day  tamsulosin 0.4 mg oral capsule: 1 cap(s) orally once a day (at bedtime)  tamsulosin 0.4 mg oral capsule: 1 cap(s) orally once a day  Ventolin HFA 90 mcg/inh inhalation aerosol: 2 puff(s) inhaled every 6 hours    acetaminophen 325 mg oral tablet: 2 tab(s) orally every 6 hours, As needed, Temp greater or equal to 38C (100.4F), Mild Pain (1 - 3), Moderate Pain (4 - 6)  Advair Diskus 250 mcg-50 mcg inhalation powder: 1 puff(s) inhaled 2 times a day  Albuterol (Eqv-ProAir HFA) 90 mcg/inh inhalation aerosol: 2 puff(s) inhaled every 6 hours  Aspirin Enteric Coated 81 mg oral delayed release tablet: 1 tab(s) orally once a day  benzonatate 100 mg oral capsule: 1 cap(s) orally 3 times a day, As needed, Cough  Glucose Test Strips: Provide strips compatible with patients glucometer  metFORMIN 500 mg oral tablet: 0.5 tab(s) orally 2 times a day  predniSONE 50 mg oral tablet: 1 tab(s) orally once a day  Protonix 40 mg oral delayed release tablet: 1 tab(s) orally  tamsulosin 0.4 mg oral capsule: 1 cap(s) orally once a day (at bedtime)  Ventolin HFA 90 mcg/inh inhalation aerosol: 2 puff(s) inhaled every 6 hours

## 2024-05-23 NOTE — DISCHARGE NOTE PROVIDER - CARE PROVIDERS DIRECT ADDRESSES
,DirectAddress_Unknown,[email or web address]

## 2024-05-23 NOTE — DISCHARGE NOTE PROVIDER - PROVIDER TOKENS
PROVIDER:[TOKEN:[[id number]],FOLLOWUP:[2 weeks]],PROVIDER:[TOKEN:[[id number]],FOLLOWUP:[2 weeks]] PROVIDER:[TOKEN:[[id number]],FOLLOWUP:[2 weeks]],PROVIDER:[TOKEN:[[id number]]],PROVIDER:[TOKEN:[[id number]]]

## 2024-05-24 ENCOUNTER — TRANSCRIPTION ENCOUNTER (OUTPATIENT)
Age: 84
End: 2024-05-24

## 2024-05-24 VITALS
DIASTOLIC BLOOD PRESSURE: 72 MMHG | TEMPERATURE: 98 F | HEART RATE: 71 BPM | SYSTOLIC BLOOD PRESSURE: 161 MMHG | RESPIRATION RATE: 18 BRPM | OXYGEN SATURATION: 98 %

## 2024-05-24 LAB
ANION GAP SERPL CALC-SCNC: 7 MMOL/L — SIGNIFICANT CHANGE UP (ref 5–17)
BUN SERPL-MCNC: 8 MG/DL — SIGNIFICANT CHANGE UP (ref 7–23)
CALCIUM SERPL-MCNC: 8.7 MG/DL — SIGNIFICANT CHANGE UP (ref 8.5–10.1)
CHLORIDE SERPL-SCNC: 101 MMOL/L — SIGNIFICANT CHANGE UP (ref 96–108)
CO2 SERPL-SCNC: 26 MMOL/L — SIGNIFICANT CHANGE UP (ref 22–31)
CREAT SERPL-MCNC: 0.98 MG/DL — SIGNIFICANT CHANGE UP (ref 0.5–1.3)
EGFR: 76 ML/MIN/1.73M2 — SIGNIFICANT CHANGE UP
GLUCOSE SERPL-MCNC: 164 MG/DL — HIGH (ref 70–99)
HCT VFR BLD CALC: 32.9 % — LOW (ref 39–50)
HGB BLD-MCNC: 11.8 G/DL — LOW (ref 13–17)
MAGNESIUM SERPL-MCNC: 1.9 MG/DL — SIGNIFICANT CHANGE UP (ref 1.6–2.6)
MCHC RBC-ENTMCNC: 32.2 PG — SIGNIFICANT CHANGE UP (ref 27–34)
MCHC RBC-ENTMCNC: 35.9 G/DL — SIGNIFICANT CHANGE UP (ref 32–36)
MCV RBC AUTO: 89.6 FL — SIGNIFICANT CHANGE UP (ref 80–100)
NRBC # BLD: 0 /100 WBCS — SIGNIFICANT CHANGE UP (ref 0–0)
PHOSPHATE SERPL-MCNC: 2.7 MG/DL — SIGNIFICANT CHANGE UP (ref 2.5–4.5)
PLATELET # BLD AUTO: 324 K/UL — SIGNIFICANT CHANGE UP (ref 150–400)
POTASSIUM SERPL-MCNC: 3.9 MMOL/L — SIGNIFICANT CHANGE UP (ref 3.5–5.3)
POTASSIUM SERPL-SCNC: 3.9 MMOL/L — SIGNIFICANT CHANGE UP (ref 3.5–5.3)
RBC # BLD: 3.67 M/UL — LOW (ref 4.2–5.8)
RBC # FLD: 14 % — SIGNIFICANT CHANGE UP (ref 10.3–14.5)
SODIUM SERPL-SCNC: 134 MMOL/L — LOW (ref 135–145)
WBC # BLD: 6.24 K/UL — SIGNIFICANT CHANGE UP (ref 3.8–10.5)
WBC # FLD AUTO: 6.24 K/UL — SIGNIFICANT CHANGE UP (ref 3.8–10.5)

## 2024-05-24 PROCEDURE — 74177 CT ABD & PELVIS W/CONTRAST: CPT | Mod: 26

## 2024-05-24 RX ORDER — PANTOPRAZOLE SODIUM 20 MG/1
1 TABLET, DELAYED RELEASE ORAL
Qty: 30 | Refills: 0
Start: 2024-05-24

## 2024-05-24 RX ORDER — FLUTICASONE PROPIONATE AND SALMETEROL 50; 250 UG/1; UG/1
1 POWDER ORAL; RESPIRATORY (INHALATION)
Refills: 0 | DISCHARGE

## 2024-05-24 RX ORDER — TAMSULOSIN HYDROCHLORIDE 0.4 MG/1
1 CAPSULE ORAL
Refills: 0 | DISCHARGE

## 2024-05-24 RX ORDER — DIATRIZOATE MEGLUMINE 180 MG/ML
90 INJECTION, SOLUTION INTRAVESICAL ONCE
Refills: 0 | Status: COMPLETED | OUTPATIENT
Start: 2024-05-24 | End: 2024-05-24

## 2024-05-24 RX ADMIN — DIATRIZOATE MEGLUMINE 90 MILLILITER(S): 180 INJECTION, SOLUTION INTRAVESICAL at 12:02

## 2024-05-24 RX ADMIN — HEPARIN SODIUM 5000 UNIT(S): 5000 INJECTION INTRAVENOUS; SUBCUTANEOUS at 14:05

## 2024-05-24 RX ADMIN — PANTOPRAZOLE SODIUM 40 MILLIGRAM(S): 20 TABLET, DELAYED RELEASE ORAL at 12:01

## 2024-05-24 RX ADMIN — BUDESONIDE AND FORMOTEROL FUMARATE DIHYDRATE 2 PUFF(S): 160; 4.5 AEROSOL RESPIRATORY (INHALATION) at 05:01

## 2024-05-24 RX ADMIN — BUDESONIDE AND FORMOTEROL FUMARATE DIHYDRATE 2 PUFF(S): 160; 4.5 AEROSOL RESPIRATORY (INHALATION) at 18:04

## 2024-05-24 RX ADMIN — DEXTROSE MONOHYDRATE, SODIUM CHLORIDE, AND POTASSIUM CHLORIDE 75 MILLILITER(S): 50; .745; 4.5 INJECTION, SOLUTION INTRAVENOUS at 12:01

## 2024-05-24 RX ADMIN — HEPARIN SODIUM 5000 UNIT(S): 5000 INJECTION INTRAVENOUS; SUBCUTANEOUS at 05:01

## 2024-05-24 NOTE — PROGRESS NOTE ADULT - SUBJECTIVE AND OBJECTIVE BOX
Case discussed with Dr Garcia  Pt still c/o mild abd discomfort  No guarding/rebound tenderness      MEDICATIONS  (STANDING):  budesonide 160 MICROgram(s)/formoterol 4.5 MICROgram(s) Inhaler 2 Puff(s) Inhalation two times a day  dextrose 5% + sodium chloride 0.45% with potassium chloride 20 mEq/L 1000 milliLiter(s) (75 mL/Hr) IV Continuous <Continuous>  heparin   Injectable 5000 Unit(s) SubCutaneous every 8 hours  pantoprazole  Injectable      pantoprazole  Injectable 40 milliGRAM(s) IV Push daily    MEDICATIONS  (PRN):  albuterol    90 MICROgram(s) HFA Inhaler 2 Puff(s) Inhalation every 6 hours PRN Shortness of Breath  ondansetron Injectable 4 milliGRAM(s) IV Push every 6 hours PRN Nausea      Allergies    No Known Allergies    Intolerances        Vital Signs Last 24 Hrs  T(C): 36.7 (23 May 2024 11:40), Max: 36.7 (23 May 2024 04:54)  T(F): 98 (23 May 2024 11:40), Max: 98.1 (23 May 2024 04:54)  HR: 66 (23 May 2024 11:40) (66 - 73)  BP: 176/74 (23 May 2024 11:40) (162/73 - 189/75)  BP(mean): --  RR: 18 (23 May 2024 11:40) (18 - 20)  SpO2: 99% (23 May 2024 11:40) (99% - 100%)    Parameters below as of 23 May 2024 11:40  Patient On (Oxygen Delivery Method): room air        PHYSICAL EXAM:  General: NAD.  CVS: S1, S2  Chest: air entry bilaterally present  Abd: BS present, soft, non-tender      LABS:                        11.4   5.97  )-----------( 309      ( 23 May 2024 07:20 )             31.3     05-23    133<L>  |  100  |  7   ----------------------------<  153<H>  4.0   |  27  |  0.88    Ca    9.0      23 May 2024 07:20  Phos  2.4     05-23  Mg     1.8     05-23      labs are WNL  The Ct findings don't correlate with the UGI series findings  Since pt continues with abd discomfort will repeat CT Scan Abd      
Patient seen and examined at bedside resting comfortably with family member at bedside.  Offers no complaints at this time. Denies any abdominal pain.   +flatus/BMs.   Denies fever, chills, N/V/D, CP, SOB.     Vital Signs Last 24 Hrs  T(F): 98.4 (05-21-24 @ 12:01), Max: 98.4 (05-21-24 @ 12:01)  HR: 71 (05-21-24 @ 12:01)  BP: 160/75 (05-21-24 @ 12:01)  RR: 17 (05-21-24 @ 12:01)  SpO2: 97% (05-21-24 @ 12:01)  POCT Blood Glucose.: 133 mg/dL (21 May 2024 11:30)    PHYSICAL EXAM:  GENERAL: Alert, NAD  CHEST/LUNG: Clear to auscultation bilaterally, respirations nonlabored  HEART: Regular rate and rhythm; S1 & S2 appreciated  ABDOMEN: + Bowel sounds, soft, Nontender, Nondistended  EXTREMITIES:  no calf tenderness, No edema    I&O's Detail      LABS:                        10.9   7.11  )-----------( 309      ( 21 May 2024 06:50 )             30.7     05-21    130<L>  |  97  |  10  ----------------------------<  108<H>  3.6   |  27  |  0.79    Ca    8.3<L>      21 May 2024 06:50  Phos  3.4     05-21  Mg     1.7     05-21    TPro  8.2  /  Alb  2.9<L>  /  TBili  0.3  /  DBili  x   /  AST  15  /  ALT  15  /  AlkPhos  72  05-20        RADIOLOGY & ADDITIONAL STUDIES:  < from: Xray UGI Single Contrast Study (05.21.24 @ 11:10) >  ACC: [id number] EXAM:  XR UGI SNGL CON STDY   ORDERED BY: Triny Kirkpatrick     PROCEDURE DATE:  05/21/2024          INTERPRETATION:  UGI Series    DATE OF STUDY: 5/21/24    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    Technique: Upper GI examination was performed using Gastrografin; cine   images were obtained.    Findings:  Film of the abdomen shows moderate colonic fecal load in a decompressed   colon; there is a nonobstructive small bowel gas pattern. No free   intraperitoneal air is noted.  Patient was given about 100ccs of Gastrografin.  There is normal transit of contrast through the pharynx and esophagus -   and into the stomach.  The stomach is normally distensible. No gastric ulceration or mass is   noted.  Visualized duodenal bulb, duodenum and jejunum appear within normal   limits.  No extravasation of contrast is noted.  Delayed films 30 minutes show passage of contrast through the small bowel   and into the ascending colon.  The terminal ileum is distensible.    Moderate residual contrast is retained in the stomach and there is reflux   into the esophagus.    Impression:  Nonobstructive bowel gas pattern with no free intraperitoneal air.  No extravasation of contrast was seen on this study.  Moderate residual contrast retained in the stomach with reflux into the   esophagus.  Normal passage of contrast through the small bowel into the right colon.  No jejunal fistula observed.    A/P  83M with CT showing large ulcerating mass communicating with proximal jejunum, ? contained perforation   PMH asthma, DM. Colonoscopy 8 yrs ago, normal per son, has never had upper endoscopy.  UGIS this AM with nonobstructive gas pattern with no free air, extra, or jejunal fistula noted  No leukocytosis. Benign abdominal exam   - advance to clear liquid diet  - GI consult in AM   - PPI  - DVT ppx, OOB/AAT  - d/w Dr. Garcia
Patient seen and examined at bedside with complaints of abdominal pain.   Tolerating full liquid diet.  Admits to flatus/BM.   Denies nausea/ vomiting, chills, SOB, chest pain, calf tenderness.          Vital Signs Last 24 Hrs  T(F): 98 (05-23-24 @ 11:40), Max: 98.1 (05-23-24 @ 04:54)  HR: 66 (05-23-24 @ 11:40)  BP: 176/74 (05-23-24 @ 11:40)  RR: 18 (05-23-24 @ 11:40)  SpO2: 99% (05-23-24 @ 11:40)  Wt(kg): --   CAPILLARY BLOOD GLUCOSE      POCT Blood Glucose.: 187 mg/dL (22 May 2024 11:45)      GENERAL: Alert, NAD  CHEST/LUNG: Respirations non labored, good inspiratory effort  HEART: S1S2  HEENT: NCAT, EOMI, conjunctiva clear   ABDOMEN: Nondistended, + bowel sounds, nontender upon exam   EXTREMITIES:  No calf tenderness, no edema    I&O's Detail      LABS:                        11.4   5.97  )-----------( 309      ( 23 May 2024 07:20 )             31.3     05-23    133<L>  |  100  |  7   ----------------------------<  153<H>  4.0   |  27  |  0.88    Ca    9.0      23 May 2024 07:20  Phos  2.4     05-23  Mg     1.8     05-23          RADIOLOGY & ADDITIONAL STUDIES:      < from: Xray UGI Single Contrast Study (05.21.24 @ 11:10) >    ACC: [id number] EXAM:  XR UGI SNGL CON STDY   ORDERED BY: Triny Kirkpatrick     PROCEDURE DATE:  05/21/2024          INTERPRETATION:  UGI Series    DATE OF STUDY: 5/21/24    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    Technique: Upper GI examination was performed using Gastrografin; cine   images were obtained.    Findings:  Film of the abdomen shows moderate colonic fecal load in a decompressed   colon; there is a nonobstructive small bowel gas pattern. No free   intraperitoneal air is noted.  Patient was given about 100ccs of Gastrografin.  There is normal transit of contrast through the pharynx and esophagus -   and into the stomach.  The stomach is normally distensible. No gastric ulceration or mass is   noted.  Visualized duodenal bulb, duodenum and jejunum appear within normal   limits.  No extravasation of contrast is noted.  Delayed films 30 minutes show passage of contrast through the small bowel   and into the ascending colon.  The terminal ileum is distensible.    Moderate residual contrast is retained in the stomach and there is reflux   into the esophagus.    Impression:  Nonobstructive bowel gas pattern with no free intraperitoneal air.  No extravasation of contrast was seen on this study.  Moderate residual contrast retained in the stomach with reflux into the   esophagus.  Normal passage of contrast through the small bowel into the right colon.  No jejunal fistula observed.    --- End of Report ---    < from: CT Angio Abdomen and Pelvis w/ IV Cont (05.20.24 @ 16:10) >    ACC: [id number] EXAM:  CT ANGIO ABD PELV (W)AW IC   ORDERED BY: Meli Galvez     PROCEDURE DATE:  05/20/2024          INTERPRETATION:  CLINICAL INFORMATION: Abdominal pain for 3 weeks,   worsening with bloody bowel movement, black emesis.    COMPARISON: None. CT chest 12/7/2023    CONTRAST/COMPLICATIONS:  IV Contrast: Omnipaque 350  90 cc administered   10 cc discarded  Oral Contrast: NONE  Complications: None reported at time of study completion    PROCEDURE:  CT of the Abdomen and Pelvis wasperformed.  Precontrast, Arterial and Delayed phases were performed.  Sagittal and coronal reformats were performed.    FINDINGS:  LOWER CHEST: Emphysema. Peripheral right basilar small clustered nodular   opacities are similar to prior CT. These mayreflect small airway   impaction.    LIVER: Within normal limits.  BILE DUCTS: Normal caliber.  GALLBLADDER: Within normal limits.  SPLEEN: Within normal limits.  PANCREAS: Within normal limits.  ADRENALS: Within normal limits.  KIDNEYS/URETERS: Bilateral renal cysts. No hydronephrosis, however there   is moderate left ureteral duct dilatation down to the bladder.    BLADDER: Distended.  REPRODUCTIVE ORGANS: Mildly enlarged prostate.    BOWEL: Large ulcerated mass with tubular internal density communicates   with the proximal jejunum at the ligament of Treitz and measures at least   4.2 cm (axial series 11, image 47 and coronal 13, 44), with associated   wall thickening and inflammatory changes. No evidence of active GI   bleeding. Adjacent subcentimeter short axis lymph node noted (11, 41). No   bowel obstruction. Periampullary duodenal diverticulum. Appendix is   air-filled without evidence of acute appendicitis.  PERITONEUM: No ascites or pneumoperitoneum.  VESSELS: Atherosclerotic disease especially in the infrarenal aorta.   Hepatic and portal veins and SMV are patent.  RETROPERITONEUM/LYMPH NODES: No lymphadenopathy.  ABDOMINAL WALL: Small bilateral fat-containing inguinal hernias.  BONES: Degenerative changes.    IMPRESSION:  Large 4 cm ulcerated mass containing a tubular internal density   communicating with the proximal jejunum at the ligament of Treitz with   associated inflammation. Differential possibilities include a contained   perforation from ingested foreign body or giant small bowel diverticulum   with inflammation. Small adjacent lymph nodes may be reactive but are   nonspecific. Histologic correlation is recommended.    I discussed the findings with Dr. Thorne on 5/20/2024 at 5:00 PM.        --- End of Report ---            ARTEM GALAN MD; Attending Radiologist  This document has been electronically signed. May 20 2024  5:08PM    < end of copied text >            SARAH RODRIGUEZ MD; Attending Radiologist  This document has been electronically signed. May 21 2024  3:22PM    < end of copied text >    
Patient seen and examined bedside resting comfortably.  No complaints offered.   Pt reports abdominal pain has subsided  Denies nausea and vomiting. Tolerating regular diet.  Denies chest pain, dyspnea, cough.    T(F): 97.3 (05-24-24 @ 11:00), Max: 98.8 (05-23-24 @ 17:05)  HR: 76 (05-24-24 @ 11:00) (68 - 78)  BP: 181/72 (05-24-24 @ 11:00) (149/85 - 181/72)  RR: 16 (05-24-24 @ 11:00) (16 - 19)  SpO2: 100% (05-24-24 @ 11:00) (98% - 100%)  Wt(kg): --  CAPILLARY BLOOD GLUCOSE          PHYSICAL EXAM:  General: NAD  Neuro:  Alert & oriented x 3  HEENT: NCAT, EOMI, conjunctiva clear  CV: +S1+S2 regular rate and rhythm  Lung:respirations nonlabored, good inspiratory effort  Abdomen: soft, nondistended, nontender  Extremities: no pedal edema or calf tenderness noted     LABS:                        11.8   6.24  )-----------( 324      ( 24 May 2024 07:44 )             32.9     05-24    134<L>  |  101  |  8   ----------------------------<  164<H>  3.9   |  26  |  0.98    Ca    8.7      24 May 2024 07:44  Phos  2.7     05-24  Mg     1.9     05-24          I&O:     Culture Results:   <10,000 CFU/mL Normal Urogenital Sheryl (05-20 @ 13:46)          Impression: 83y Male admitted with CT showing large ulcerating mass communicating with proximal jejunum, ? contained perforation   CT 5/24: Redemonstrated 4 cm ulcerated mass communicating with the jejunum at the level of the ligament of Treitz with associated inflammation. Enteric contrast is seen entering the collection (602:32). Differential diagnosis again includes contained perforation or inflamed giant diverticulum., unchanged from previous imaging     Plan:  - Discharge planning  - Pt will need outpatient GI follow up   - Discussed with Dr. Garcia 
Pt appears comfortable  c/o mild mid epig discomfort  labs are unremarkeable  Case discussed with Dr Garcia    Given findings from CT Scan would not like to proceed with EGD at this time    MEDICATIONS  (STANDING):  budesonide 160 MICROgram(s)/formoterol 4.5 MICROgram(s) Inhaler 2 Puff(s) Inhalation two times a day  dextrose 5% + sodium chloride 0.45% with potassium chloride 20 mEq/L 1000 milliLiter(s) (75 mL/Hr) IV Continuous <Continuous>  heparin   Injectable 5000 Unit(s) SubCutaneous every 8 hours  pantoprazole  Injectable      pantoprazole  Injectable 40 milliGRAM(s) IV Push daily    MEDICATIONS  (PRN):  albuterol    90 MICROgram(s) HFA Inhaler 2 Puff(s) Inhalation every 6 hours PRN Shortness of Breath  ondansetron Injectable 4 milliGRAM(s) IV Push every 6 hours PRN Nausea      Allergies    No Known Allergies    Intolerances        Vital Signs Last 24 Hrs  T(C): 36.4 (22 May 2024 12:00), Max: 36.7 (22 May 2024 05:10)  T(F): 97.5 (22 May 2024 12:00), Max: 98.1 (22 May 2024 05:10)  HR: 71 (22 May 2024 12:00) (71 - 81)  BP: 169/78 (22 May 2024 12:00) (155/76 - 169/78)  BP(mean): --  RR: 17 (22 May 2024 12:00) (17 - 18)  SpO2: 98% (22 May 2024 12:00) (98% - 98%)    Parameters below as of 22 May 2024 12:00  Patient On (Oxygen Delivery Method): room air        PHYSICAL EXAM:  General: NAD.  CVS: S1, S2  Chest: air entry bilaterally present  Abd: BS present, soft, non-tender      LABS:                        10.9   6.18  )-----------( 320      ( 22 May 2024 06:15 )             30.8     05-22    136  |  103  |  11  ----------------------------<  105<H>  3.4<L>   |  26  |  0.83    Ca    8.7      22 May 2024 06:15  Phos  3.1     05-22  Mg     1.9     05-22      continue clear liquids  GI series does not correlate with CT findings  If patient continues with abd pain would consider repeat CT Scan  continue protonix for now      
Still with mild abd discomfort  Repeat CT Abd ordered and is pending      MEDICATIONS  (STANDING):  budesonide 160 MICROgram(s)/formoterol 4.5 MICROgram(s) Inhaler 2 Puff(s) Inhalation two times a day  dextrose 5% + sodium chloride 0.45% with potassium chloride 20 mEq/L 1000 milliLiter(s) (75 mL/Hr) IV Continuous <Continuous>  diatrizoate meglumine/diatrizoate sodium. 90 milliLiter(s) Oral once  heparin   Injectable 5000 Unit(s) SubCutaneous every 8 hours  pantoprazole  Injectable      pantoprazole  Injectable 40 milliGRAM(s) IV Push daily    MEDICATIONS  (PRN):  albuterol    90 MICROgram(s) HFA Inhaler 2 Puff(s) Inhalation every 6 hours PRN Shortness of Breath  ondansetron Injectable 4 milliGRAM(s) IV Push every 6 hours PRN Nausea      Allergies    No Known Allergies    Intolerances        Vital Signs Last 24 Hrs  T(C): 36.3 (24 May 2024 11:00), Max: 37.1 (23 May 2024 17:05)  T(F): 97.3 (24 May 2024 11:00), Max: 98.8 (23 May 2024 17:05)  HR: 76 (24 May 2024 11:00) (66 - 78)  BP: 181/72 (24 May 2024 11:00) (149/85 - 181/72)  BP(mean): --  RR: 16 (24 May 2024 11:00) (16 - 19)  SpO2: 100% (24 May 2024 11:00) (98% - 100%)    Parameters below as of 24 May 2024 11:00  Patient On (Oxygen Delivery Method): room air        PHYSICAL EXAM:  General: NAD.  CVS: S1, S2  Chest: air entry bilaterally present  Abd: BS present, soft, non-tender      LABS:                        11.8   6.24  )-----------( 324      ( 24 May 2024 07:44 )             32.9     05-24    134<L>  |  101  |  8   ----------------------------<  164<H>  3.9   |  26  |  0.98    Ca    8.7      24 May 2024 07:44  Phos  2.7     05-24  Mg     1.9     05-24      labs stable; Afebrile  await repeat CT Abd

## 2024-05-24 NOTE — DISCHARGE NOTE NURSING/CASE MANAGEMENT/SOCIAL WORK - NSDCFUADDAPPT_GEN_ALL_CORE_FT
follow up with GI within 1week, please call to schedule appointment  follow up with your PCP in 1-2 weeks  follow up with surgeon Dr Garcia in the next 2 weeks

## 2024-05-24 NOTE — DISCHARGE NOTE NURSING/CASE MANAGEMENT/SOCIAL WORK - NSDCPEFALRISK_GEN_ALL_CORE
For information on Fall & Injury Prevention, visit: [email or web address] OR  [email or web address] OR  https://www.cdc.gov/steadi/patient.html

## 2024-05-24 NOTE — DISCHARGE NOTE NURSING/CASE MANAGEMENT/SOCIAL WORK - PATIENT PORTAL LINK FT
You can access the FollowMyHealth Patient Portal offered by [facility] by registering at the following website: [email or web address]. By joining [facility]’s FollowMyHealth portal, you will also be able to view your health information using other applications (apps) compatible with our system.

## 2024-05-25 ENCOUNTER — TRANSCRIPTION ENCOUNTER (OUTPATIENT)
Age: 84
End: 2024-05-25

## 2024-05-26 ENCOUNTER — TRANSCRIPTION ENCOUNTER (OUTPATIENT)
Age: 84
End: 2024-05-26

## 2024-05-31 DIAGNOSIS — K63.2 FISTULA OF INTESTINE: ICD-10-CM

## 2024-05-31 DIAGNOSIS — E43 UNSPECIFIED SEVERE PROTEIN-CALORIE MALNUTRITION: ICD-10-CM

## 2024-05-31 DIAGNOSIS — Z79.82 LONG TERM (CURRENT) USE OF ASPIRIN: ICD-10-CM

## 2024-05-31 DIAGNOSIS — R10.9 UNSPECIFIED ABDOMINAL PAIN: ICD-10-CM

## 2024-05-31 DIAGNOSIS — Z79.84 LONG TERM (CURRENT) USE OF ORAL HYPOGLYCEMIC DRUGS: ICD-10-CM

## 2024-05-31 DIAGNOSIS — J45.909 UNSPECIFIED ASTHMA, UNCOMPLICATED: ICD-10-CM

## 2024-05-31 DIAGNOSIS — N40.0 BENIGN PROSTATIC HYPERPLASIA WITHOUT LOWER URINARY TRACT SYMPTOMS: ICD-10-CM

## 2024-05-31 DIAGNOSIS — E11.9 TYPE 2 DIABETES MELLITUS WITHOUT COMPLICATIONS: ICD-10-CM

## 2024-06-07 ENCOUNTER — APPOINTMENT (OUTPATIENT)
Dept: OTOLARYNGOLOGY | Facility: CLINIC | Age: 84
End: 2024-06-07
Payer: MEDICARE

## 2024-06-07 VITALS
DIASTOLIC BLOOD PRESSURE: 74 MMHG | WEIGHT: 100 LBS | BODY MASS INDEX: 18.4 KG/M2 | HEIGHT: 62 IN | SYSTOLIC BLOOD PRESSURE: 137 MMHG | HEART RATE: 79 BPM

## 2024-06-07 DIAGNOSIS — H90.3 SENSORINEURAL HEARING LOSS, BILATERAL: ICD-10-CM

## 2024-06-07 PROCEDURE — 99203 OFFICE O/P NEW LOW 30 MIN: CPT | Mod: 25

## 2024-06-07 PROCEDURE — 92567 TYMPANOMETRY: CPT

## 2024-06-07 PROCEDURE — 92557 COMPREHENSIVE HEARING TEST: CPT

## 2024-06-07 RX ORDER — TAMSULOSIN HYDROCHLORIDE 0.4 MG/1
0.4 CAPSULE ORAL
Refills: 0 | Status: ACTIVE | COMMUNITY

## 2024-06-07 NOTE — REASON FOR VISIT
[Initial Evaluation] : an initial evaluation for [Spouse] : spouse [Family Member] : family member [FreeTextEntry2] : right otorrhagia

## 2024-06-07 NOTE — ASSESSMENT
[FreeTextEntry1] : 83 year old male with right otitis externa with bleeding 1 month ago. Treated by outside ER. He has asymmetric hearing loss. We will obtain MRI IAC. If clear, referred for hearing aids.

## 2024-06-07 NOTE — HISTORY OF PRESENT ILLNESS
[de-identified] : 83 year old male presents for initial evaluation for right otorrhagia Patient reports scratching his ear with his finger and noticed bleeding 1 month ago.  Followed up at [facility]-discharged home with Augmentin and Ofloxacin with relief  States he also noticed a "whooshing" sound at that time, sound has resolved. Today states hearing in right ear is decreased. No hearing changes in left ear.  No recent otorrhea or otalgia.  Patient denies dizziness, vertigo, headaches related to hearing.

## 2024-06-29 NOTE — DIETITIAN NUTRITION RISK NOTIFICATION - LOSS OF SUBCUTANEOUS FAT
Problem: Adult Behavioral Health Plan of Care  Goal: Patient-Specific Goal (Individualization)  Description: Pt. Will eat at least 70% at each meal.  Pt. Will sleep at least 6 hours each night.  Pt. Will attend at least 50% of groups, when able to wean.  Patient will be able to participate in a logical and appropriate situation    6/28/24: Room in [facility] due to possibility of unpredictable behaviors.   Pt able to wean for 30 minutes per shift if able to:  -Take medications as prescribed  -No self harm and/or aggressive behaviors. Ex: pounding on doors or head banging.  -Be kind and appropriate during interactions with staff     Treatment team to reassess daily.   Outcome: Progressing     Problem: Thought Process Alteration  Goal: Optimal Thought Clarity  Description: Pt. Will have a linear thought process by target date.   Outcome: Progressing     Problem: Suicide Risk  Goal: Absence of Self-Harm  Outcome: Progressing    Pt denies SI/HI and  pain. Pt is medication compliant and VSS. Pt ate 50% of dinner. Pt has a flat  affect. Pt is calm and alert. Pt is using appropriate behavior with staff and peers. Pt isolative and withdrawn to room.  Pt spent most of shift in bed resting.     Face to face report will be communicated to oncoming RN.    Patti Sanchez RN  6/28/2024        Orbital.../Triceps...

## 2024-07-15 ENCOUNTER — INPATIENT (INPATIENT)
Facility: HOSPITAL | Age: 84
LOS: 14 days | Discharge: HOME | DRG: 390 | End: 2024-07-30
Attending: SURGERY | Admitting: SURGERY
Payer: MEDICARE

## 2024-07-15 VITALS
DIASTOLIC BLOOD PRESSURE: 68 MMHG | OXYGEN SATURATION: 95 % | HEART RATE: 89 BPM | WEIGHT: 95.9 LBS | HEIGHT: 63 IN | TEMPERATURE: 98 F | SYSTOLIC BLOOD PRESSURE: 116 MMHG | RESPIRATION RATE: 18 BRPM

## 2024-07-15 DIAGNOSIS — Z98.49 CATARACT EXTRACTION STATUS, UNSPECIFIED EYE: Chronic | ICD-10-CM

## 2024-07-15 LAB
ADD ON TEST-SPECIMEN IN LAB: SIGNIFICANT CHANGE UP
ADD ON TEST-SPECIMEN IN LAB: SIGNIFICANT CHANGE UP
ALBUMIN SERPL ELPH-MCNC: 4 G/DL — SIGNIFICANT CHANGE UP (ref 3.3–5)
ALP SERPL-CCNC: 102 U/L — SIGNIFICANT CHANGE UP (ref 40–120)
ALT FLD-CCNC: 11 U/L — SIGNIFICANT CHANGE UP (ref 10–45)
ANION GAP SERPL CALC-SCNC: 18 MMOL/L — HIGH (ref 5–17)
APTT BLD: 29.7 SEC — SIGNIFICANT CHANGE UP (ref 24.5–35.6)
AST SERPL-CCNC: 21 U/L — SIGNIFICANT CHANGE UP (ref 10–40)
B-OH-BUTYR SERPL-SCNC: 0.1 MMOL/L — SIGNIFICANT CHANGE UP
BASE EXCESS BLDV CALC-SCNC: 4.6 MMOL/L — HIGH (ref -2–3)
BASOPHILS # BLD AUTO: 0 K/UL — SIGNIFICANT CHANGE UP (ref 0–0.2)
BASOPHILS NFR BLD AUTO: 0 % — SIGNIFICANT CHANGE UP (ref 0–2)
BILIRUB SERPL-MCNC: 0.8 MG/DL — SIGNIFICANT CHANGE UP (ref 0.2–1.2)
BUN SERPL-MCNC: 32 MG/DL — HIGH (ref 7–23)
CA-I SERPL-SCNC: 1.18 MMOL/L — SIGNIFICANT CHANGE UP (ref 1.15–1.33)
CALCIUM SERPL-MCNC: 10 MG/DL — SIGNIFICANT CHANGE UP (ref 8.4–10.5)
CHLORIDE BLDV-SCNC: 93 MMOL/L — LOW (ref 96–108)
CHLORIDE SERPL-SCNC: 87 MMOL/L — LOW (ref 96–108)
CO2 BLDV-SCNC: 33 MMOL/L — HIGH (ref 22–26)
CO2 SERPL-SCNC: 24 MMOL/L — SIGNIFICANT CHANGE UP (ref 22–31)
CREAT SERPL-MCNC: 1.23 MG/DL — SIGNIFICANT CHANGE UP (ref 0.5–1.3)
EGFR: 58 ML/MIN/1.73M2 — LOW
EOSINOPHIL # BLD AUTO: 0 K/UL — SIGNIFICANT CHANGE UP (ref 0–0.5)
EOSINOPHIL NFR BLD AUTO: 0 % — SIGNIFICANT CHANGE UP (ref 0–6)
GAS PNL BLDV: 125 MMOL/L — LOW (ref 136–145)
GAS PNL BLDV: SIGNIFICANT CHANGE UP
GLUCOSE BLDV-MCNC: 424 MG/DL — HIGH (ref 70–99)
GLUCOSE SERPL-MCNC: 417 MG/DL — HIGH (ref 70–99)
HCO3 BLDV-SCNC: 31 MMOL/L — HIGH (ref 22–29)
HCT VFR BLD CALC: 40.3 % — SIGNIFICANT CHANGE UP (ref 39–50)
HCT VFR BLDA CALC: 40 % — SIGNIFICANT CHANGE UP (ref 39–51)
HGB BLD CALC-MCNC: 13.2 G/DL — SIGNIFICANT CHANGE UP (ref 12.6–17.4)
HGB BLD-MCNC: 13.2 G/DL — SIGNIFICANT CHANGE UP (ref 13–17)
INR BLD: 1.52 RATIO — HIGH (ref 0.85–1.18)
LACTATE BLDV-MCNC: 3.9 MMOL/L — HIGH (ref 0.5–2)
LIDOCAIN IGE QN: 12 U/L — SIGNIFICANT CHANGE UP (ref 7–60)
LYMPHOCYTES # BLD AUTO: 10.6 % — LOW (ref 13–44)
LYMPHOCYTES # BLD AUTO: 2.24 K/UL — SIGNIFICANT CHANGE UP (ref 1–3.3)
MANUAL SMEAR VERIFICATION: SIGNIFICANT CHANGE UP
MCHC RBC-ENTMCNC: 28.3 PG — SIGNIFICANT CHANGE UP (ref 27–34)
MCHC RBC-ENTMCNC: 32.8 GM/DL — SIGNIFICANT CHANGE UP (ref 32–36)
MCV RBC AUTO: 86.3 FL — SIGNIFICANT CHANGE UP (ref 80–100)
MONOCYTES # BLD AUTO: 0.57 K/UL — SIGNIFICANT CHANGE UP (ref 0–0.9)
MONOCYTES NFR BLD AUTO: 2.7 % — SIGNIFICANT CHANGE UP (ref 2–14)
NEUTROPHILS # BLD AUTO: 18.29 K/UL — HIGH (ref 1.8–7.4)
NEUTROPHILS NFR BLD AUTO: 85.8 % — HIGH (ref 43–77)
NEUTS BAND # BLD: 0.9 % — SIGNIFICANT CHANGE UP (ref 0–8)
PCO2 BLDV: 54 MMHG — SIGNIFICANT CHANGE UP (ref 42–55)
PH BLDV: 7.37 — SIGNIFICANT CHANGE UP (ref 7.32–7.43)
PLAT MORPH BLD: NORMAL — SIGNIFICANT CHANGE UP
PLATELET # BLD AUTO: 283 K/UL — SIGNIFICANT CHANGE UP (ref 150–400)
PO2 BLDV: 47 MMHG — HIGH (ref 25–45)
POTASSIUM BLDV-SCNC: 5 MMOL/L — SIGNIFICANT CHANGE UP (ref 3.5–5.1)
POTASSIUM SERPL-MCNC: 4.9 MMOL/L — SIGNIFICANT CHANGE UP (ref 3.5–5.3)
POTASSIUM SERPL-SCNC: 4.9 MMOL/L — SIGNIFICANT CHANGE UP (ref 3.5–5.3)
PROT SERPL-MCNC: 8.8 G/DL — HIGH (ref 6–8.3)
PROTHROM AB SERPL-ACNC: 16.5 SEC — HIGH (ref 9.5–13)
RBC # BLD: 4.67 M/UL — SIGNIFICANT CHANGE UP (ref 4.2–5.8)
RBC # FLD: 14.8 % — HIGH (ref 10.3–14.5)
RBC BLD AUTO: SIGNIFICANT CHANGE UP
SAO2 % BLDV: 66.3 % — LOW (ref 67–88)
SODIUM SERPL-SCNC: 129 MMOL/L — LOW (ref 135–145)
WBC # BLD: 21.09 K/UL — HIGH (ref 3.8–10.5)
WBC # FLD AUTO: 21.09 K/UL — HIGH (ref 3.8–10.5)

## 2024-07-15 PROCEDURE — 99285 EMERGENCY DEPT VISIT HI MDM: CPT

## 2024-07-15 PROCEDURE — 71045 X-RAY EXAM CHEST 1 VIEW: CPT | Mod: 26

## 2024-07-15 RX ORDER — ONDANSETRON HCL/PF 4 MG/2 ML
4 VIAL (ML) INJECTION ONCE
Refills: 0 | Status: COMPLETED | OUTPATIENT
Start: 2024-07-15 | End: 2024-07-15

## 2024-07-15 RX ORDER — PANTOPRAZOLE SODIUM 20 MG/1
40 TABLET, DELAYED RELEASE ORAL ONCE
Refills: 0 | Status: COMPLETED | OUTPATIENT
Start: 2024-07-15 | End: 2024-07-15

## 2024-07-15 RX ORDER — BACTERIOSTATIC SODIUM CHLORIDE 0.9 %
1000 VIAL (ML) INJECTION ONCE
Refills: 0 | Status: COMPLETED | OUTPATIENT
Start: 2024-07-15 | End: 2024-07-15

## 2024-07-15 RX ADMIN — PANTOPRAZOLE SODIUM 40 MILLIGRAM(S): 20 TABLET, DELAYED RELEASE ORAL at 21:12

## 2024-07-15 RX ADMIN — Medication 1000 MILLILITER(S): at 21:11

## 2024-07-15 RX ADMIN — Medication 4 MILLIGRAM(S): at 14:46

## 2024-07-15 NOTE — ED PROVIDER NOTE - ATTENDING CONTRIBUTION TO CARE
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 pt w/ note from DR. mcnamara and Dr. avila to obtain a ct abd and pelvis w/ and w/o oral and IV contrast, did not have usual protonix today  On exam, Const: appearing stated age, no acute distress  Head: atraumatic, normocephalic  Eyes: no conjunctival injection and no scleral icterus  ENMT: Atraumatic external nose and ears, drt mucus membranes  Neck: Symmetric, trachea midline, no c spine tenderness  BACK: no bruising, no midline t/l spine tenderness  CVS: +S1/S2, dorsalis pedis/radial pulse 2+ bilaterally  RESP: Unlabored respiratory effort, Clear to auscultation bilaterally  GI: mild epigastric tenderness /Nondistended, soft abdomen  MSK: Extremities w/o deformity or ttp   Psych: Awake, Alert, & Orientedx3;  Appropriate mood and affect, cooperative Plan for labs imaging and admission will likely need GI consult/sx consult, given protonix for possible UGI source, pt w/ no hematmesis since 1PM accompanioed by daughter yeis nurse

## 2024-07-15 NOTE — ED PROVIDER NOTE - OBJECTIVE STATEMENT
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 Patient notes that the abdominal pain is dull and constant; not worsened by meals, positional, or associated with bowel movements. The pain is at rest and with exertion. Patient reports that these episodes of hematemesis occurred this AM. A like-episode happened 2x months prior where he presented to the [place] ED and was found to have a 4cm ulcerative mass at the level of jejunum, that was unchanged from a CT A/P taken in 2023. Daughter reports he followed up w/ GI outpatient where he had a barium study (?) done that was found to be negative. Patient has had 1x episode of vomiting yesterday associated with 1x episode of diarrhea. Patient denies fever, cough, SOB, CP, nasal congestion. Patient reports he saw his PCP today and GI doctor, who sent him in for a CT angio w/ IV and oral contrast. 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 Patient notes that the abdominal pain is dull and constant; not worsened by meals, positional, or associated with bowel movements. The pain is at rest and with exertion. Patient reports that these episodes of hematemesis occurred this AM. 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 Daughter reports he followed up w/ GI outpatient where he had a barium study (?) done that was found to be negative. Patient has had 1x episode of vomiting yesterday associated with 1x episode of diarrhea. Patient denies fever, cough, SOB, CP, nasal congestion. Patient reports he saw his PCP today and GI doctor, who sent him in for a CT angio w/ IV and oral contrast.    Daughter ([place]): [phone]  Son ([place]): [phone]

## 2024-07-15 NOTE — ED ADULT TRIAGE NOTE - NS ED TRIAGE AVPU SCALE
Summary of Care Report The Summary of Care report has been created to help improve care coordination. Users with access to [facility] or [address] (Web-based application) may access additional patient information including the Discharge Summary. If you are not currently a [address] user and need more information, please call the number listed below in the [place] [address] section and ask to be connected with Medical Records. Facility Information Name Address Phone [address] [address] [phone] Patient Information Patient Name Sex  Justyn Linn ([id number]) Female 1974 Discharge Information Admitting Provider Service Area Unit Home Saleh DO / [address] [place] Card/Med / [phone] Discharge Provider Discharge Date/Time Discharge Disposition Destination (none) 2017 (Pending) AHR (none) Patient Language Language ENGLISH [13] Hospital Problems as of 2017  Reviewed: 2017  3:30 PM by Home Saleh DO Class Noted - Resolved Last Modified POA Active Problems * (Principal)Asthma with acute exacerbation  2017 - Present 2017 by Darren Tay MD Unknown Entered by MARYLU Giron  
  Obesity  2017 - Present 2017 by Home Saleh DO Unknown Entered by Home Saleh DO Type II diabetes mellitus ([facility] [address]  2017 - Present 2017 by Darren Tay MD Unknown Entered by Darren Tay MD  
  
Non-Hospital Problems as of 2017  Reviewed: 2017  3:30 PM by Home Saleh DO None You are allergic to the following No active allergies Current Discharge Medication List  
  
START taking these medications Dose & Instructions Dispensing Information Comments Diabetic Supplies, [place] 24. Kit
Dose:  1 Each  
1 Each by Does Not Apply route daily. Quantity:  1 Kit Refills:  0  
   
 metFORMIN 850 mg tablet Commonly known as:  GLUCOPHAGE Dose:  850 mg Take 1 Tab by mouth two (2) times daily (with meals). Quantity:  60 Tab Refills:  0  
   
 predniSONE 10 mg dose pack Commonly known as:  STERAPRED DS See administration instruction per 10mg dose pack Quantity:  21 Tab Refills:  0 CONTINUE these medications which have NOT CHANGED Dose & Instructions Dispensing Information Comments  
 albuterol 90 mcg/actuation inhaler Commonly known as:  VENTOLIN HFA Dose:  2 Puff Take 2 Puffs by inhalation every six (6) hours as needed for Wheezing. Quantity:  1 Inhaler Refills:  0  
   
 carisoprodol 350 mg tablet Commonly known as:  SOMA Dose:  350 mg Take 1 Tab by mouth four (4) times daily. Max Daily Amount: 1,400 mg. Quantity:  20 Tab Refills:  0  
   
 multivitamin, tx-iron-ca-min 9 mg iron-400 mcg Tab tablet Commonly known as:  THERA-M w/ IRON Dose:  1 Tab Take 1 Tab by mouth daily. Formulary substitution for DAILY MULTIVITAMIN Refills:  0 Follow-up Information Follow up With Details Comments Contact Info None   None ([phone]) Patient stated that they have no PCP Dr. Joya Gonzalez  In 1 week please make an appointment with her Discharge Instructions Learning About Asthma Triggers What are asthma triggers? When you have asthma, certain things can make your symptoms worse. These are called triggers. Learn what triggers an asthma attack for you, and avoid the triggers when you can. Common triggers include colds, smoke, air pollution, dust, pollen, pets, stress, and cold air. How do asthma triggers affect you? Triggers can make it harder for your lungs to work as they should. They can lead to sudden breathing problems and other symptoms.  When you are
around a trigger, an asthma attack is more likely. If your symptoms are severe, you may need emergency treatment or have to go to the hospital for treatment. What can you do to avoid triggers? The first thing is to know your triggers. When you are having symptoms, note the things around you that might be causing them. Then look for patterns that may be triggering your symptoms. Record your triggers on a piece of paper or in an asthma diary. When you have your list of possible triggers, work with your doctor to find ways to avoid them. Avoid colds and flu. Get a pneumococcal vaccine shot. If you have had one before, ask your doctor whether you need a second dose. Get a flu vaccine every year, as soon as it's available. If you must be around people with colds or the flu, wash your hands often. Here are some ways to avoid a few common triggers. · Do not smoke or allow others to smoke around you. If you need help quitting, talk to your doctor about stop-smoking programs and medicines. These can increase your chances of quitting for good. · If there is a lot of pollution, pollen, or dust outside, stay at home and keep your windows closed. Use an air conditioner or air filter in your home. Check your local weather report or newspaper for air quality and pollen reports. What else should you know? · Take your controller medicine every day, not just when you have symptoms. It helps prevent problems before they occur. · Your doctor may suggest that you check how well your lungs are working by measuring your peak expiratory flow (PEF) throughout the day. Your PEF may drop when you are near things that trigger symptoms. Where can you learn more? Go to http://nadine-pierce.info/. Enter H924 in the search box to learn more about \"Learning About Asthma Triggers. \" Current as of: May 23, 2016 Content Version: 11.2 © 6695-4618 [email or web address], [employer].  Care instructions adapted under
license by [address] (which disclaims liability or warranty for this information). If you have questions about a medical condition or this instruction, always ask your healthcare professional. [address] any warranty or liability for your use of this information. Asthma Attack: Care Instructions Your Care Instructions During an asthma attack, the airways swell and narrow. This makes it hard to breathe. Severe asthma attacks can be life-threatening, but you can help prevent them by keeping your asthma under control and treating symptoms before they get bad. Symptoms include being short of breath, having chest tightness, coughing, and wheezing. Noting and treating these symptoms can also help you avoid future trips to the emergency room. The doctor has checked you carefully, but problems can develop later. If you notice any problems or new symptoms, get medical treatment right away. Follow-up care is a key part of your treatment and safety. Be sure to make and go to all appointments, and call your doctor if you are having problems. It's also a good idea to know your test results and keep a list of the medicines you take. How can you care for yourself at home? · Follow your asthma action plan to prevent and treat attacks. If you don't have an asthma action plan, work with your doctor to create one. · Take your asthma medicines exactly as prescribed. Talk to your doctor right away if you have any questions about how to take them. ¨ Use your quick-relief medicine when you have symptoms of an attack. Quick-relief medicine is usually an albuterol inhaler. Some people need to use quick-relief medicine before they exercise. ¨ Take your controller medicine every day, not just when you have symptoms. Controller medicine is usually an inhaled corticosteroid. The goal is to prevent problems before they occur.  Don't use your controller
medicine to treat an attack that has already started. It doesn't work fast enough to help. ¨ If your doctor prescribed corticosteroid pills to use during an attack, take them exactly as prescribed. It may take hours for the pills to work, but they may make the episode shorter and help you breathe better. ¨ Keep your quick-relief medicine with you at all times. · Talk to your doctor before using other medicines. Some medicines, such as aspirin, can cause asthma attacks in some people. · If you have a peak flow meter, use it to check how well you are breathing. This can help you predict when an asthma attack is going to occur. Then you can take medicine to prevent the asthma attack or make it less severe. · Do not smoke or allow others to smoke around you. Avoid smoky places. Smoking makes asthma worse. If you need help quitting, talk to your doctor about stop-smoking programs and medicines. These can increase your chances of quitting for good. · Learn what triggers an asthma attack for you, and avoid the triggers when you can. Common triggers include colds, smoke, air pollution, dust, pollen, mold, pets, cockroaches, stress, and cold air. · Avoid colds and the flu. Get a pneumococcal vaccine shot. If you have had one before, ask your doctor if you need a second dose. Get a flu vaccine every fall. If you must be around people with colds or the flu, wash your hands often. When should you call for help? Call 911 anytime you think you may need emergency care. For example, call if: 
· You have severe trouble breathing. Call your doctor now or seek immediate medical care if: 
· Your symptoms do not get better after you have followed your asthma action plan. · You have new or worse trouble breathing. · Your coughing and wheezing get worse. · You cough up dark brown or bloody mucus (sputum). · You have a new or higher fever. Watch closely for changes in your health, and be sure to contact your doctor if:
· You need to use quick-relief medicine on more than 2 days a week (unless it is just for exercise). · You cough more deeply or more often, especially if you notice more mucus or a change in the color of your mucus. · You are not getting better as expected. Where can you learn more? Go to http://nadine-pierce.info/. Enter Z604 in the search box to learn more about \"Asthma Attack: Care Instructions. \" Current as of: May 23, 2016 Content Version: 11.2 © 7388-6753 [employer]. Care instructions adapted under license by [employer] (which disclaims liability or warranty for this information). If you have questions about a medical condition or this instruction, always ask your healthcare professional. [address] any warranty or liability for your use of this information. Learning About Diabetes Food Guidelines Your Care Instructions Meal planning is important to manage diabetes. It helps keep your blood sugar at a target level (which you set with your doctor). You don't have to eat special foods. You can eat what your family eats, including sweets once in a while. But you do have to pay attention to how often you eat and how much you eat of certain foods. You may want to work with a dietitian or a certified diabetes educator (CDE) to help you plan meals and snacks. A dietitian or CDE can also help you lose weight if that is one of your goals. What should you know about eating carbs? Managing the amount of carbohydrate (carbs) you eat is an important part of healthy meals when you have diabetes. Carbohydrate is found in many foods. · Learn which foods have carbs. And learn the amounts of carbs in different foods. ¨ Bread, cereal, pasta, and rice have about 15 grams of carbs in a serving. A serving is 1 slice of bread (1 ounce), ½ cup of cooked cereal, or 1/3 cup of cooked pasta or rice.
¨ Fruits have 15 grams of carbs in a serving. A serving is 1 small fresh fruit, such as an apple or orange; ½ of a banana; ½ cup of cooked or canned fruit; ½ cup of fruit juice; 1 cup of melon or raspberries; or 2 tablespoons of dried fruit. ¨ Milk and no-sugar-added yogurt have 15 grams of carbs in a serving. A serving is 1 cup of milk or 2/3 cup of no-sugar-added yogurt. ¨ Starchy vegetables have 15 grams of carbs in a serving. A serving is ½ cup of mashed potatoes or sweet potato; 1 cup winter squash; ½ of a small baked potato; ½ cup of cooked beans; or ½ cup cooked corn or green peas. · Learn how much carbs to eat each day and at each meal. A dietitian or CDE can teach you how to keep track of the amount of carbs you eat. This is called carbohydrate counting. · If you are not sure how to count carbohydrate grams, use the Plate Method to plan meals. It is a good, quick way to make sure that you have a balanced meal. It also helps you spread carbs throughout the day. ¨ Divide your plate by types of foods. Put non-starchy vegetables on half the plate, meat or other protein food on one-quarter of the plate, and a grain or starchy vegetable in the final quarter of the plate. To this you can add a small piece of fruit and 1 cup of milk or yogurt, depending on how many carbs you are supposed to eat at a meal. 
· Try to eat about the same amount of carbs at each meal. Do not \"save up\" your daily allowance of carbs to eat at one meal. 
· Proteins have very little or no carbs per serving. Examples of proteins are beef, chicken, turkey, fish, eggs, tofu, cheese, cottage cheese, and peanut butter. A serving size of meat is 3 ounces, which is about the size of a deck of cards. Examples of meat substitute serving sizes (equal to 1 ounce of meat) are 1/4 cup of cottage cheese, 1 egg, 1 tablespoon of peanut butter, and ½ cup of tofu. How can you eat out and still eat healthy?
· Learn to estimate the serving sizes of foods that have carbohydrate. If you measure food at home, it will be easier to estimate the amount in a serving of restaurant food. · If the meal you order has too much carbohydrate (such as potatoes, corn, or baked beans), ask to have a low-carbohydrate food instead. Ask for a salad or green vegetables. · If you use insulin, check your blood sugar before and after eating out to help you plan how much to eat in the future. · If you eat more carbohydrate at a meal than you had planned, take a walk or do other exercise. This will help lower your blood sugar. What else should you know? · Limit saturated fat, such as the fat from meat and dairy products. This is a healthy choice because people who have diabetes are at higher risk of heart disease. So choose lean cuts of meat and nonfat or low-fat dairy products. Use olive or canola oil instead of butter or shortening when cooking. · Don't skip meals. Your blood sugar may drop too low if you skip meals and take insulin or certain medicines for diabetes. · Check with your doctor before you drink alcohol. Alcohol can cause your blood sugar to drop too low. Alcohol can also cause a bad reaction if you take certain diabetes medicines. Follow-up care is a key part of your treatment and safety. Be sure to make and go to all appointments, and call your doctor if you are having problems. It's also a good idea to know your test results and keep a list of the medicines you take. Where can you learn more? Go to http://nadine-pierce.info/. Enter H673 in the search box to learn more about \"Learning About Diabetes Food Guidelines. \" Current as of: May 23, 2016 Content Version: 11.2 © 9191-0648 [employer]. Care instructions adapted under license by [employer] (which disclaims liability or warranty for this information).  If you have questions about a medical condition or
this instruction, always ask your healthcare professional. [place] [address] any warranty or liability for your use of this information. Home Blood Glucose Test: About This Test 
What is it? A home blood glucose test measures the amount of a type of sugar, called glucose, in your blood. Why is this test done? People who have diabetes need to check the amount of glucose in their blood. A home blood glucose test is an easy way to test your blood at home or when you are away from home. The results help you know when to take action to keep your blood glucose levels in a target range. How can you prepare for the test? 
· Check the expiration date on the bottle of testing strips. Do not use test strips that have . · Match the code number on the testing strips bottle with the number on the meter. If the numbers do not match, follow the directions with the meter for changing the code number. What happens before the test? 
The supplies you will need for testing blood glucose include: · A blood glucose meter. · Testing strips. These are made to be used with a specific model of meter. · Sugar control solutions. Some meters require a specific solution. Many new meters are made to operate without a control solution. · Short needles called lancets for pricking your skin. · A pen-sized augustin for the lancet (lancet device), which positions the lancet and controls how deeply it goes into your skin. · Clean cotton balls. These are used to stop the bleeding from the testing site. What happens during the test? 
A home blood glucose test involves pricking your finger, palm, or forearm with a lancet to collect a drop of blood. The blood drop is placed on a test strip, which you insert into the blood glucose meter. The instructions for testing are slightly different for each blood glucose meter model. Follow the instructions that came with your meter.
· Wash your hands with warm, soapy water. Dry them well with a clean towel. You may also use an alcohol wipe to clean your finger or other site, but make sure your hands are dry before the test. 
· Insert a clean lancet into the lancet device. · Remove a test strip from the test strip bottle. Replace the lid immediately to keep moisture away from the other strips. · Follow the instructions that came with your meter to get it ready. · Use the lancet device to stick the side of your fingertip with the lancet. Do not stick the tip of your finger. Some blood sugar meters use lancet devices that take the blood sample from other sites, such as the palm of the hand or the forearm. But the finger is usually the most accurate place to test blood sugar. · Put a drop of blood on the correct spot on the test strip. · Apply pressure with a clean cotton ball to stop the bleeding. · Follow the directions that came with the meter to get the results. · Write down the results and the time that you tested your blood. Some meters will store the results for you. What else should you know about the test? 
The American Diabetes Association (ADA) recommends that you stay within the following blood glucose level ranges. But depending on your health, you and your doctor may set a different range for you. For nonpregnant adults with diabetes: 
· 80 milligrams per deciliter (mg/dL) to 130 mg/dL before a meal 
· Less than 180 mg/dL 1 to 2 hours after a meal 
For women who have diabetes related to pregnancy (gestational diabetes): · 95 mg/dL or less before breakfast 
· 120 to 140 mg/dL (or lower) 1 to 2 hours after a meal 
How long does the test take? · The blood glucose meter will show the results of the test in a minute or less. Where can you learn more? Go to http://nadine-pierce.info/. Enter T737 in the search box to learn more about \"Home Blood Glucose Test: About This Test.\" Current as of: May 23, 2016
Content Version: 11.2 © 7189-8741 [employer] Care instructions adapted under license by [employer] (which disclaims liability or warranty for this information). If you have questions about a medical condition or this instruction, always ask your healthcare professional. [address] any warranty or liability for your use of this information. Learning About Type 2 Diabetes What is type 2 diabetes? Insulin is a hormone that helps your body use sugar from your food as energy. Type 2 diabetes happens when your body can't use insulin the right way. Over time, the pancreas can't make enough insulin. If you don't have enough insulin, too much sugar stays in your blood. If you are overweight, get little or no exercise, or have type 2 diabetes in your family, you are more likely to have problems with the way insulin works in your body.  Americans, Hispanics, Native Americans,  Americans, and Pacific Islanders have a higher risk for type 2 diabetes. Type 2 diabetes can be prevented or delayed with a healthy lifestyle, which includes staying at a healthy weight, making smart food choices, and getting regular exercise. What can you expect with type 2 diabetes? Venancio Cordero keep hearing about how important it is to keep your blood sugar within a target range. That's because over time, high blood sugar can lead to serious problems. It can: 
· Harm your eyes, nerves, and kidneys. · Damage your blood vessels, leading to heart disease and stroke. · Reduce blood flow and cause nerve damage to parts of your body, especially your feet. This can cause slow healing and pain when you walk. · Make your immune system weak and less able to fight infections. When people hear the word \"diabetes,\" they often think of problems like these. But daily care and treatment can help prevent or delay these problems. The goal is to keep your blood sugar in a target range.  That's
the best way to reduce your chance of having more problems from diabetes. What are the symptoms? Some people who have type 2 diabetes may not have any symptoms early on. Many people with the disease don't even know they have it at first. But with time, diabetes starts to cause symptoms. You experience most symptoms of type 2 diabetes when your blood sugar is either too high or too low. The most common symptoms of high blood sugar include: · Thirst. 
· Frequent urination. · Weight loss. · Blurry vision. The symptoms of low blood sugar include: · Sweating. · Shakiness. · Weakness. · Hunger. · Confusion. How can you prevent type 2 diabetes? The best way to prevent or delay type 2 diabetes is to adopt healthy habits, which include: 
· Staying at a healthy weight. · Exercising regularly. · Eating healthy foods. How is type 2 diabetes treated? If you have type 2 diabetes, here are the most important things you can do. · Take your diabetes medicines. · Check your blood sugar as often as your doctor recommends. Also, get a hemoglobin A1c test at least every 6 months. · Try to eat a variety of foods and to spread carbohydrate throughout the day. Carbohydrate raises blood sugar higher and more quickly than any other nutrient does. Carbohydrate is found in sugar, breads and cereals, fruit, starchy vegetables such as potatoes and corn, and milk and yogurt. · Get at least 30 minutes of exercise on most days of the week. Walking is a good choice. You also may want to do other activities, such as running, swimming, cycling, or playing tennis or team sports. If your doctor says it's okay, do muscle-strengthening exercises at least 2 times a week. · See your doctor for checkups and tests on a regular schedule. · If you have high blood pressure or high cholesterol, take the medicines as prescribed by your doctor. · Do not smoke. Smoking can make health problems worse.  This includes
problems you might have with type 2 diabetes. If you need help quitting, talk to your doctor about stop-smoking programs and medicines. These can increase your chances of quitting for good. Follow-up care is a key part of your treatment and safety. Be sure to make and go to all appointments, and call your doctor if you are having problems. It's also a good idea to know your test results and keep a list of the medicines you take. Where can you learn more? Go to http://nadine-pierce.info/. Enter G354 in the search box to learn more about \"Learning About Type 2 Diabetes. \" Current as of: May 23, 2016 Content Version: 11.2 © 1469-9319 [employer]. Care instructions adapted under license by [employer] (which disclaims liability or warranty for this information). If you have questions about a medical condition or this instruction, always ask your healthcare professional. [place] [address] any warranty or liability for your use of this information. Lab Results Component Value Date/Time Hemoglobin A1c 7.3 04/11/2017 11:26 AM  
 
 
This lab test reflects that your blood sugar has been slightly elevated over the past 3 months and should be evaluated by your primary care provider. An A1C of 5.7-6.4% meets the criteria for pre-diabetes; an A1C of 6.5% or higher meets the criteria for diabetes. Steroids can increase your blood sugar so it is important to follow up with your provider to determine if your blood sugar has returned to normal or needs further treatment. This lab test reflects that your blood sugar averaged 163 mg/dl  over the past 3 months. It is important to follow up with your provider on a routine basis to continue to evaluate your blood sugar and discuss any necessary changes in treatment. Chart Review Routing History No Routing History on File
bilateral upper extremity ROM was WFL (within functional limits)/bilateral lower extremity ROM was WFL (within functional limits)
Alert-The patient is alert, awake and responds to voice. The patient is oriented to time, place, and person. The triage nurse is able to obtain subjective information.

## 2024-07-15 NOTE — ED PROVIDER NOTE - CLINICAL SUMMARY MEDICAL DECISION MAKING FREE TEXT BOX
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 Patient sent in by PCP for CTA A/P w/ IV and oral contrast. Differential includes ulcerative mass vs GIB vs gastritis vs pancreatitis. F/u CT, bloodwork, lipase, VBG. Give 1L NS, protonix.

## 2024-07-15 NOTE — ED ADULT NURSE NOTE - OBJECTIVE STATEMENT
83 y.o. male coming in from home via private car for vomiting blood. pt states that he had an epiosde of bloody emesis this AM, went to his PCP who told him to follow up with GI, pt followed up with GI and states that they gave him a prescription for an abdominal CT, pt was then told to come into the ER for farther evaluation. PMH of an abdominal mass x years that has been known to his PCP, asthma, diabetes. A&Ox3, vss, pt denies CP/SOB/weakness/dizziness/fevers/chills/N/V/D/urinary symptoms, bed in lowest position.

## 2024-07-15 NOTE — ED PROVIDER NOTE - RAPID ASSESSMENT
82 y/o male history of diabetes, asthma, recently found to have large ulcerating mass within the proximal jejunum, reports 1 day of several episodes of of hematemesis with blood-streaked vomit.  Denies coffee-ground emesis, melena or hematochezia.  Reports epigastric discomfort that is constant.  Patient unsure of alleviating or exacerbating factors.  Denies fever, chills, chest pain, shortness of breath    well appearing NAD    Harish PASCUAL PA-C saw patient as a rapid assessment initially in triage. The rest of care to be performed by the primary ED team. Receiving team will follow up on labs, analgesia, any clinical imaging, and perform reassessment and disposition of the patient as clinically indicated. All decisions regarding the progression of care will be made at their discretion.

## 2024-07-15 NOTE — ED ADULT NURSE REASSESSMENT NOTE - NS ED NURSE REASSESS COMMENT FT1
Received report from NAZARIO Seo RN. Patient presenting with hx of vomiting blood and decreased PO intake. Patient without complaints of pain at this time. PO contrast provided to patient for imaging studies. Pending fingerstick following fluid bolus. AOx4 and speaking coherently with daughter at bedside. Pt placed in position of comfort. Bed in lowest position, wheels locked, appropriate side rails raised. Pt denies needs at this time.

## 2024-07-15 NOTE — ED PROVIDER NOTE - PHYSICAL EXAMINATION
PHYSICAL EXAM:  CONSTITUTIONAL: Well appearing, awake, alert, oriented to person, place, time/situation and in no apparent distress.  HEAD: Atraumatic  EYES: Clear bilaterally, pupils equal, round and reactive to light.  ENMT: Airway patent, Nasal mucosa clear. Mouth with normal mucosa. Uvula is midline.   CARDIAC: Normal rate, regular rhythm. +S1/S2. No murmurs, rubs or gallops.  RESPIRATORY: Breathing unlabored. Breath sounds clear and equal bilaterally.  ABDOMEN:  Soft, nondistended. +Pain w/ palpation in the periumbilical area. No mass palpated. No rebound tenderness or guarding.  NEUROLOGICAL: Alert and oriented, no focal deficits, no motor or sensory deficits. CN2-12 intact. Sensation intact x4 extremities.  SKIN: Skin warm and dry. No evidence of rashes or lesions.

## 2024-07-15 NOTE — ED ADULT NURSE NOTE - NS ED NURSE TRANSPORT WITH
PT transported on zoll monitor with continuous cardiac monitoring and pulse oximetry with EDT and SICU provider/Cardiac Monitor/Defib/ACLS/Rescue Kit/O2/BVM/IV pump/pulse ox/ACLS Rescue Kit

## 2024-07-15 NOTE — ED PROVIDER NOTE - INTERNATIONAL TRAVEL
Navya Echeverria called in requesting a script be sent over for BV and Yeast.    Next office visit: 12/7/23  Pharm[facility]: [facility]       No

## 2024-07-16 DIAGNOSIS — K56.609 UNSPECIFIED INTESTINAL OBSTRUCTION, UNSPECIFIED AS TO PARTIAL VERSUS COMPLETE OBSTRUCTION: ICD-10-CM

## 2024-07-16 LAB
A1C WITH ESTIMATED AVERAGE GLUCOSE RESULT: 7.7 % — HIGH (ref 4–5.6)
ADD ON TEST-SPECIMEN IN LAB: SIGNIFICANT CHANGE UP
ALBUMIN SERPL ELPH-MCNC: 2.8 G/DL — LOW (ref 3.3–5)
ALBUMIN SERPL ELPH-MCNC: 3.4 G/DL — SIGNIFICANT CHANGE UP (ref 3.3–5)
ALP SERPL-CCNC: 61 U/L — SIGNIFICANT CHANGE UP (ref 40–120)
ALP SERPL-CCNC: 70 U/L — SIGNIFICANT CHANGE UP (ref 40–120)
ALT FLD-CCNC: 10 U/L — SIGNIFICANT CHANGE UP (ref 10–45)
ALT FLD-CCNC: 9 U/L — LOW (ref 10–45)
ANION GAP SERPL CALC-SCNC: 13 MMOL/L — SIGNIFICANT CHANGE UP (ref 5–17)
ANION GAP SERPL CALC-SCNC: 15 MMOL/L — SIGNIFICANT CHANGE UP (ref 5–17)
ANION GAP SERPL CALC-SCNC: 20 MMOL/L — HIGH (ref 5–17)
APPEARANCE UR: ABNORMAL
APTT BLD: 24.4 SEC — LOW (ref 24.5–35.6)
APTT BLD: 29 SEC — SIGNIFICANT CHANGE UP (ref 24.5–35.6)
AST SERPL-CCNC: 18 U/L — SIGNIFICANT CHANGE UP (ref 10–40)
AST SERPL-CCNC: 20 U/L — SIGNIFICANT CHANGE UP (ref 10–40)
BACTERIA # UR AUTO: NEGATIVE /HPF — SIGNIFICANT CHANGE UP
BASE EXCESS BLDV CALC-SCNC: 2.2 MMOL/L — SIGNIFICANT CHANGE UP (ref -2–3)
BASE EXCESS BLDV CALC-SCNC: 2.2 MMOL/L — SIGNIFICANT CHANGE UP (ref -2–3)
BASE EXCESS BLDV CALC-SCNC: 3.6 MMOL/L — HIGH (ref -2–3)
BASE EXCESS BLDV CALC-SCNC: 4.2 MMOL/L — HIGH (ref -2–3)
BILIRUB DIRECT SERPL-MCNC: 0.2 MG/DL — SIGNIFICANT CHANGE UP (ref 0–0.3)
BILIRUB SERPL-MCNC: 0.4 MG/DL — SIGNIFICANT CHANGE UP (ref 0.2–1.2)
BILIRUB SERPL-MCNC: 0.6 MG/DL — SIGNIFICANT CHANGE UP (ref 0.2–1.2)
BILIRUB UR-MCNC: NEGATIVE — SIGNIFICANT CHANGE UP
BLD GP AB SCN SERPL QL: NEGATIVE — SIGNIFICANT CHANGE UP
BUN SERPL-MCNC: 52 MG/DL — HIGH (ref 7–23)
BUN SERPL-MCNC: 54 MG/DL — HIGH (ref 7–23)
BUN SERPL-MCNC: 56 MG/DL — HIGH (ref 7–23)
CA-I SERPL-SCNC: 1.1 MMOL/L — LOW (ref 1.15–1.33)
CA-I SERPL-SCNC: 1.12 MMOL/L — LOW (ref 1.15–1.33)
CA-I SERPL-SCNC: 1.13 MMOL/L — LOW (ref 1.15–1.33)
CA-I SERPL-SCNC: 1.2 MMOL/L — SIGNIFICANT CHANGE UP (ref 1.15–1.33)
CALCIUM SERPL-MCNC: 8.7 MG/DL — SIGNIFICANT CHANGE UP (ref 8.4–10.5)
CALCIUM SERPL-MCNC: 8.7 MG/DL — SIGNIFICANT CHANGE UP (ref 8.4–10.5)
CALCIUM SERPL-MCNC: 8.8 MG/DL — SIGNIFICANT CHANGE UP (ref 8.4–10.5)
CAST: >63 /LPF — HIGH (ref 0–4)
CHLORIDE BLDV-SCNC: 92 MMOL/L — LOW (ref 96–108)
CHLORIDE BLDV-SCNC: 92 MMOL/L — LOW (ref 96–108)
CHLORIDE BLDV-SCNC: 93 MMOL/L — LOW (ref 96–108)
CHLORIDE BLDV-SCNC: 96 MMOL/L — SIGNIFICANT CHANGE UP (ref 96–108)
CHLORIDE SERPL-SCNC: 89 MMOL/L — LOW (ref 96–108)
CHLORIDE SERPL-SCNC: 93 MMOL/L — LOW (ref 96–108)
CHLORIDE SERPL-SCNC: 95 MMOL/L — LOW (ref 96–108)
CO2 BLDV-SCNC: 29 MMOL/L — HIGH (ref 22–26)
CO2 BLDV-SCNC: 30 MMOL/L — HIGH (ref 22–26)
CO2 BLDV-SCNC: 32 MMOL/L — HIGH (ref 22–26)
CO2 BLDV-SCNC: 33 MMOL/L — HIGH (ref 22–26)
CO2 SERPL-SCNC: 21 MMOL/L — LOW (ref 22–31)
CO2 SERPL-SCNC: 24 MMOL/L — SIGNIFICANT CHANGE UP (ref 22–31)
CO2 SERPL-SCNC: 24 MMOL/L — SIGNIFICANT CHANGE UP (ref 22–31)
COLOR SPEC: SIGNIFICANT CHANGE UP
CREAT SERPL-MCNC: 2.02 MG/DL — HIGH (ref 0.5–1.3)
CREAT SERPL-MCNC: 2.18 MG/DL — HIGH (ref 0.5–1.3)
CREAT SERPL-MCNC: 2.19 MG/DL — HIGH (ref 0.5–1.3)
DIFF PNL FLD: NEGATIVE — SIGNIFICANT CHANGE UP
EGFR: 29 ML/MIN/1.73M2 — LOW
EGFR: 29 ML/MIN/1.73M2 — LOW
EGFR: 32 ML/MIN/1.73M2 — LOW
ESTIMATED AVERAGE GLUCOSE: 174 MG/DL — HIGH (ref 68–114)
GAS PNL BLDV: 127 MMOL/L — LOW (ref 136–145)
GAS PNL BLDV: 128 MMOL/L — LOW (ref 136–145)
GAS PNL BLDV: 128 MMOL/L — LOW (ref 136–145)
GAS PNL BLDV: 129 MMOL/L — LOW (ref 136–145)
GAS PNL BLDV: SIGNIFICANT CHANGE UP
GLUCOSE BLDC GLUCOMTR-MCNC: 130 MG/DL — HIGH (ref 70–99)
GLUCOSE BLDC GLUCOMTR-MCNC: 225 MG/DL — HIGH (ref 70–99)
GLUCOSE BLDC GLUCOMTR-MCNC: 252 MG/DL — HIGH (ref 70–99)
GLUCOSE BLDV-MCNC: 143 MG/DL — HIGH (ref 70–99)
GLUCOSE BLDV-MCNC: 243 MG/DL — HIGH (ref 70–99)
GLUCOSE BLDV-MCNC: 300 MG/DL — HIGH (ref 70–99)
GLUCOSE BLDV-MCNC: 95 MG/DL — SIGNIFICANT CHANGE UP (ref 70–99)
GLUCOSE SERPL-MCNC: 142 MG/DL — HIGH (ref 70–99)
GLUCOSE SERPL-MCNC: 196 MG/DL — HIGH (ref 70–99)
GLUCOSE SERPL-MCNC: 91 MG/DL — SIGNIFICANT CHANGE UP (ref 70–99)
GLUCOSE UR QL: 500 MG/DL
HCO3 BLDV-SCNC: 28 MMOL/L — SIGNIFICANT CHANGE UP (ref 22–29)
HCO3 BLDV-SCNC: 28 MMOL/L — SIGNIFICANT CHANGE UP (ref 22–29)
HCO3 BLDV-SCNC: 30 MMOL/L — HIGH (ref 22–29)
HCO3 BLDV-SCNC: 32 MMOL/L — HIGH (ref 22–29)
HCT VFR BLD CALC: 29.5 % — LOW (ref 39–50)
HCT VFR BLD CALC: 36.2 % — LOW (ref 39–50)
HCT VFR BLD CALC: 36.9 % — LOW (ref 39–50)
HCT VFR BLDA CALC: 29 % — LOW (ref 39–51)
HCT VFR BLDA CALC: 29 % — LOW (ref 39–51)
HCT VFR BLDA CALC: 37 % — LOW (ref 39–51)
HCT VFR BLDA CALC: 37 % — LOW (ref 39–51)
HGB BLD CALC-MCNC: 12.2 G/DL — LOW (ref 12.6–17.4)
HGB BLD CALC-MCNC: 12.4 G/DL — LOW (ref 12.6–17.4)
HGB BLD CALC-MCNC: 9.6 G/DL — LOW (ref 12.6–17.4)
HGB BLD CALC-MCNC: 9.8 G/DL — LOW (ref 12.6–17.4)
HGB BLD-MCNC: 10.1 G/DL — LOW (ref 13–17)
HGB BLD-MCNC: 11.9 G/DL — LOW (ref 13–17)
HGB BLD-MCNC: 11.9 G/DL — LOW (ref 13–17)
HOROWITZ INDEX BLDV+IHG-RTO: 21 — SIGNIFICANT CHANGE UP
HOROWITZ INDEX BLDV+IHG-RTO: 28 — SIGNIFICANT CHANGE UP
HOROWITZ INDEX BLDV+IHG-RTO: 28 — SIGNIFICANT CHANGE UP
HOROWITZ INDEX BLDV+IHG-RTO: SIGNIFICANT CHANGE UP
INR BLD: 1.28 RATIO — HIGH (ref 0.85–1.18)
INR BLD: 1.54 RATIO — HIGH (ref 0.85–1.18)
KETONES UR-MCNC: NEGATIVE MG/DL — SIGNIFICANT CHANGE UP
LACTATE BLDV-MCNC: 2.3 MMOL/L — HIGH (ref 0.5–2)
LACTATE BLDV-MCNC: 2.3 MMOL/L — HIGH (ref 0.5–2)
LACTATE BLDV-MCNC: 2.5 MMOL/L — HIGH (ref 0.5–2)
LACTATE BLDV-MCNC: 3.1 MMOL/L — HIGH (ref 0.5–2)
LACTATE SERPL-SCNC: 5.9 MMOL/L — CRITICAL HIGH (ref 0.5–2)
LEUKOCYTE ESTERASE UR-ACNC: ABNORMAL
MAGNESIUM SERPL-MCNC: 1.8 MG/DL — SIGNIFICANT CHANGE UP (ref 1.6–2.6)
MAGNESIUM SERPL-MCNC: 2.3 MG/DL — SIGNIFICANT CHANGE UP (ref 1.6–2.6)
MAGNESIUM SERPL-MCNC: 2.6 MG/DL — SIGNIFICANT CHANGE UP (ref 1.6–2.6)
MCHC RBC-ENTMCNC: 28.4 PG — SIGNIFICANT CHANGE UP (ref 27–34)
MCHC RBC-ENTMCNC: 28.5 PG — SIGNIFICANT CHANGE UP (ref 27–34)
MCHC RBC-ENTMCNC: 28.9 PG — SIGNIFICANT CHANGE UP (ref 27–34)
MCHC RBC-ENTMCNC: 32.2 GM/DL — SIGNIFICANT CHANGE UP (ref 32–36)
MCHC RBC-ENTMCNC: 32.9 GM/DL — SIGNIFICANT CHANGE UP (ref 32–36)
MCHC RBC-ENTMCNC: 34.2 GM/DL — SIGNIFICANT CHANGE UP (ref 32–36)
MCV RBC AUTO: 84.3 FL — SIGNIFICANT CHANGE UP (ref 80–100)
MCV RBC AUTO: 86.6 FL — SIGNIFICANT CHANGE UP (ref 80–100)
MCV RBC AUTO: 88.1 FL — SIGNIFICANT CHANGE UP (ref 80–100)
NITRITE UR-MCNC: NEGATIVE — SIGNIFICANT CHANGE UP
NRBC # BLD: 0 /100 WBCS — SIGNIFICANT CHANGE UP (ref 0–0)
PCO2 BLDV: 47 MMHG — SIGNIFICANT CHANGE UP (ref 42–55)
PCO2 BLDV: 50 MMHG — SIGNIFICANT CHANGE UP (ref 42–55)
PCO2 BLDV: 55 MMHG — SIGNIFICANT CHANGE UP (ref 42–55)
PCO2 BLDV: 60 MMHG — HIGH (ref 42–55)
PH BLDV: 7.33 — SIGNIFICANT CHANGE UP (ref 7.32–7.43)
PH BLDV: 7.35 — SIGNIFICANT CHANGE UP (ref 7.32–7.43)
PH BLDV: 7.36 — SIGNIFICANT CHANGE UP (ref 7.32–7.43)
PH BLDV: 7.38 — SIGNIFICANT CHANGE UP (ref 7.32–7.43)
PH UR: 5 — SIGNIFICANT CHANGE UP (ref 5–8)
PHOSPHATE SERPL-MCNC: 1.6 MG/DL — LOW (ref 2.5–4.5)
PHOSPHATE SERPL-MCNC: 4.5 MG/DL — SIGNIFICANT CHANGE UP (ref 2.5–4.5)
PHOSPHATE SERPL-MCNC: 6.9 MG/DL — HIGH (ref 2.5–4.5)
PLATELET # BLD AUTO: 177 K/UL — SIGNIFICANT CHANGE UP (ref 150–400)
PLATELET # BLD AUTO: 209 K/UL — SIGNIFICANT CHANGE UP (ref 150–400)
PLATELET # BLD AUTO: 239 K/UL — SIGNIFICANT CHANGE UP (ref 150–400)
PO2 BLDV: 17 MMHG — LOW (ref 25–45)
PO2 BLDV: 31 MMHG — SIGNIFICANT CHANGE UP (ref 25–45)
PO2 BLDV: 47 MMHG — HIGH (ref 25–45)
PO2 BLDV: 52 MMHG — HIGH (ref 25–45)
POTASSIUM BLDV-SCNC: 3.1 MMOL/L — LOW (ref 3.5–5.1)
POTASSIUM BLDV-SCNC: 4.1 MMOL/L — SIGNIFICANT CHANGE UP (ref 3.5–5.1)
POTASSIUM BLDV-SCNC: 4.2 MMOL/L — SIGNIFICANT CHANGE UP (ref 3.5–5.1)
POTASSIUM BLDV-SCNC: 4.7 MMOL/L — SIGNIFICANT CHANGE UP (ref 3.5–5.1)
POTASSIUM SERPL-MCNC: 3.3 MMOL/L — LOW (ref 3.5–5.3)
POTASSIUM SERPL-MCNC: 4.1 MMOL/L — SIGNIFICANT CHANGE UP (ref 3.5–5.3)
POTASSIUM SERPL-MCNC: 4.5 MMOL/L — SIGNIFICANT CHANGE UP (ref 3.5–5.3)
POTASSIUM SERPL-SCNC: 3.3 MMOL/L — LOW (ref 3.5–5.3)
POTASSIUM SERPL-SCNC: 4.1 MMOL/L — SIGNIFICANT CHANGE UP (ref 3.5–5.3)
POTASSIUM SERPL-SCNC: 4.5 MMOL/L — SIGNIFICANT CHANGE UP (ref 3.5–5.3)
PROT SERPL-MCNC: 6.1 G/DL — SIGNIFICANT CHANGE UP (ref 6–8.3)
PROT SERPL-MCNC: 7.2 G/DL — SIGNIFICANT CHANGE UP (ref 6–8.3)
PROT UR-MCNC: 30 MG/DL
PROTHROM AB SERPL-ACNC: 13.3 SEC — HIGH (ref 9.5–13)
PROTHROM AB SERPL-ACNC: 16 SEC — HIGH (ref 9.5–13)
RBC # BLD: 3.5 M/UL — LOW (ref 4.2–5.8)
RBC # BLD: 4.18 M/UL — LOW (ref 4.2–5.8)
RBC # BLD: 4.19 M/UL — LOW (ref 4.2–5.8)
RBC # FLD: 14.9 % — HIGH (ref 10.3–14.5)
RBC # FLD: 15.1 % — HIGH (ref 10.3–14.5)
RBC # FLD: 15.1 % — HIGH (ref 10.3–14.5)
RBC CASTS # UR COMP ASSIST: 3 /HPF — SIGNIFICANT CHANGE UP (ref 0–4)
REVIEW: SIGNIFICANT CHANGE UP
RH IG SCN BLD-IMP: POSITIVE — SIGNIFICANT CHANGE UP
RH IG SCN BLD-IMP: POSITIVE — SIGNIFICANT CHANGE UP
SAO2 % BLDV: 20.5 % — LOW (ref 67–88)
SAO2 % BLDV: 40.4 % — LOW (ref 67–88)
SAO2 % BLDV: 66 % — LOW (ref 67–88)
SAO2 % BLDV: 80.6 % — SIGNIFICANT CHANGE UP (ref 67–88)
SODIUM SERPL-SCNC: 130 MMOL/L — LOW (ref 135–145)
SODIUM SERPL-SCNC: 132 MMOL/L — LOW (ref 135–145)
SODIUM SERPL-SCNC: 132 MMOL/L — LOW (ref 135–145)
SP GR SPEC: 1.02 — SIGNIFICANT CHANGE UP (ref 1–1.03)
SQUAMOUS # UR AUTO: 3 /HPF — SIGNIFICANT CHANGE UP (ref 0–5)
UROBILINOGEN FLD QL: 0.2 MG/DL — SIGNIFICANT CHANGE UP (ref 0.2–1)
WBC # BLD: 11.4 K/UL — HIGH (ref 3.8–10.5)
WBC # BLD: 5.66 K/UL — SIGNIFICANT CHANGE UP (ref 3.8–10.5)
WBC # BLD: 6.91 K/UL — SIGNIFICANT CHANGE UP (ref 3.8–10.5)
WBC # FLD AUTO: 11.4 K/UL — HIGH (ref 3.8–10.5)
WBC # FLD AUTO: 5.66 K/UL — SIGNIFICANT CHANGE UP (ref 3.8–10.5)
WBC # FLD AUTO: 6.91 K/UL — SIGNIFICANT CHANGE UP (ref 3.8–10.5)
WBC UR QL: 2 /HPF — SIGNIFICANT CHANGE UP (ref 0–5)

## 2024-07-16 PROCEDURE — 99222 1ST HOSP IP/OBS MODERATE 55: CPT

## 2024-07-16 PROCEDURE — 99223 1ST HOSP IP/OBS HIGH 75: CPT

## 2024-07-16 PROCEDURE — 71045 X-RAY EXAM CHEST 1 VIEW: CPT | Mod: 26

## 2024-07-16 PROCEDURE — 99291 CRITICAL CARE FIRST HOUR: CPT | Mod: GC

## 2024-07-16 PROCEDURE — 74177 CT ABD & PELVIS W/CONTRAST: CPT | Mod: 26,MC

## 2024-07-16 RX ORDER — POTASSIUM PHOSPHATE, MONOBASIC AND POTASSIUM PHOSPHATE, DIBASIC 224; 236 MG/ML; MG/ML
30 INJECTION, SOLUTION INTRAVENOUS ONCE
Refills: 0 | Status: COMPLETED | OUTPATIENT
Start: 2024-07-16 | End: 2024-07-16

## 2024-07-16 RX ORDER — HEPARIN SODIUM 1000 [USP'U]/ML
5000 INJECTION, SOLUTION INTRAVENOUS; SUBCUTANEOUS EVERY 8 HOURS
Refills: 0 | Status: DISCONTINUED | OUTPATIENT
Start: 2024-07-16 | End: 2024-07-22

## 2024-07-16 RX ORDER — INSULIN LISPRO 100/ML
VIAL (ML) SUBCUTANEOUS EVERY 6 HOURS
Refills: 0 | Status: DISCONTINUED | OUTPATIENT
Start: 2024-07-16 | End: 2024-07-17

## 2024-07-16 RX ORDER — DEXTROSE MONOHYDRATE, SODIUM CHLORIDE, SODIUM LACTATE, CALCIUM CHLORIDE, MAGNESIUM CHLORIDE 1.5; 538; 448; 18.4; 5.08 G/100ML; MG/100ML; MG/100ML; MG/100ML; MG/100ML
1000 SOLUTION INTRAPERITONEAL
Refills: 0 | Status: DISCONTINUED | OUTPATIENT
Start: 2024-07-16 | End: 2024-07-17

## 2024-07-16 RX ORDER — INSULIN LISPRO 100/ML
3 VIAL (ML) SUBCUTANEOUS ONCE
Refills: 0 | Status: DISCONTINUED | OUTPATIENT
Start: 2024-07-16 | End: 2024-07-16

## 2024-07-16 RX ORDER — ACETAMINOPHEN 500 MG
650 TABLET ORAL ONCE
Refills: 0 | Status: COMPLETED | OUTPATIENT
Start: 2024-07-16 | End: 2024-07-16

## 2024-07-16 RX ORDER — DEXTROSE 4 G
12.5 TABLET,CHEWABLE ORAL ONCE
Refills: 0 | Status: DISCONTINUED | OUTPATIENT
Start: 2024-07-16 | End: 2024-07-16

## 2024-07-16 RX ORDER — DEXTROSE MONOHYDRATE, SODIUM CHLORIDE, SODIUM LACTATE, CALCIUM CHLORIDE, MAGNESIUM CHLORIDE 1.5; 538; 448; 18.4; 5.08 G/100ML; MG/100ML; MG/100ML; MG/100ML; MG/100ML
1000 SOLUTION INTRAPERITONEAL ONCE
Refills: 0 | Status: COMPLETED | OUTPATIENT
Start: 2024-07-16 | End: 2024-07-16

## 2024-07-16 RX ORDER — ALBUTEROL 90 MCG
2 AEROSOL REFILL (GRAM) INHALATION EVERY 6 HOURS
Refills: 0 | Status: DISCONTINUED | OUTPATIENT
Start: 2024-07-16 | End: 2024-07-23

## 2024-07-16 RX ORDER — PANTOPRAZOLE SODIUM 20 MG/1
40 TABLET, DELAYED RELEASE ORAL DAILY
Refills: 0 | Status: DISCONTINUED | OUTPATIENT
Start: 2024-07-16 | End: 2024-07-23

## 2024-07-16 RX ORDER — DEXTROSE MONOHYDRATE, SODIUM CHLORIDE, SODIUM LACTATE, CALCIUM CHLORIDE, MAGNESIUM CHLORIDE 1.5; 538; 448; 18.4; 5.08 G/100ML; MG/100ML; MG/100ML; MG/100ML; MG/100ML
1000 SOLUTION INTRAPERITONEAL
Refills: 0 | Status: DISCONTINUED | OUTPATIENT
Start: 2024-07-16 | End: 2024-07-16

## 2024-07-16 RX ORDER — KETOROLAC TROMETHAMINE 10 MG
30 TABLET ORAL ONCE
Refills: 0 | Status: DISCONTINUED | OUTPATIENT
Start: 2024-07-16 | End: 2024-07-16

## 2024-07-16 RX ORDER — CHLORHEXIDINE GLUCONATE 500 MG/1
1 CLOTH TOPICAL DAILY
Refills: 0 | Status: DISCONTINUED | OUTPATIENT
Start: 2024-07-16 | End: 2024-07-16

## 2024-07-16 RX ORDER — DEXTROSE 4 G
25 TABLET,CHEWABLE ORAL ONCE
Refills: 0 | Status: DISCONTINUED | OUTPATIENT
Start: 2024-07-16 | End: 2024-07-16

## 2024-07-16 RX ORDER — ACETAMINOPHEN 500 MG
650 TABLET ORAL ONCE
Refills: 0 | Status: DISCONTINUED | OUTPATIENT
Start: 2024-07-16 | End: 2024-07-16

## 2024-07-16 RX ORDER — ACETAMINOPHEN 500 MG
650 TABLET ORAL EVERY 6 HOURS
Refills: 0 | Status: COMPLETED | OUTPATIENT
Start: 2024-07-16 | End: 2024-07-21

## 2024-07-16 RX ORDER — CHLORHEXIDINE GLUCONATE 500 MG/1
1 CLOTH TOPICAL
Refills: 0 | Status: DISCONTINUED | OUTPATIENT
Start: 2024-07-16 | End: 2024-07-23

## 2024-07-16 RX ORDER — POTASSIUM CHLORIDE 1500 MG/1
10 TABLET, EXTENDED RELEASE ORAL
Refills: 0 | Status: COMPLETED | OUTPATIENT
Start: 2024-07-16 | End: 2024-07-16

## 2024-07-16 RX ORDER — DEXTROSE 4 G
15 TABLET,CHEWABLE ORAL ONCE
Refills: 0 | Status: DISCONTINUED | OUTPATIENT
Start: 2024-07-16 | End: 2024-07-16

## 2024-07-16 RX ORDER — INSULIN LISPRO 100/ML
VIAL (ML) SUBCUTANEOUS EVERY 6 HOURS
Refills: 0 | Status: DISCONTINUED | OUTPATIENT
Start: 2024-07-16 | End: 2024-07-16

## 2024-07-16 RX ORDER — SODIUM PHOSPHATE, MONOBASIC, MONOHYDRATE 276; 142 MG/ML; MG/ML
15 INJECTION, SOLUTION INTRAVENOUS ONCE
Refills: 0 | Status: COMPLETED | OUTPATIENT
Start: 2024-07-16 | End: 2024-07-16

## 2024-07-16 RX ORDER — BUDESONIDE AND FORMOTEROL FUMARATE DIHYDRATE 80; 4.5 UG/1; UG/1
2 AEROSOL RESPIRATORY (INHALATION)
Refills: 0 | Status: DISCONTINUED | OUTPATIENT
Start: 2024-07-16 | End: 2024-07-23

## 2024-07-16 RX ORDER — MAGNESIUM SULFATE 500 MG/ML
2 VIAL (ML) INJECTION ONCE
Refills: 0 | Status: COMPLETED | OUTPATIENT
Start: 2024-07-16 | End: 2024-07-16

## 2024-07-16 RX ORDER — ALBUTEROL 90 MCG
2 AEROSOL REFILL (GRAM) INHALATION EVERY 6 HOURS
Refills: 0 | Status: DISCONTINUED | OUTPATIENT
Start: 2024-07-16 | End: 2024-07-16

## 2024-07-16 RX ORDER — GLUCAGON INJECTION, SOLUTION 0.5 MG/.1ML
1 INJECTION, SOLUTION SUBCUTANEOUS ONCE
Refills: 0 | Status: DISCONTINUED | OUTPATIENT
Start: 2024-07-16 | End: 2024-07-16

## 2024-07-16 RX ADMIN — Medication 200 GRAM(S): at 18:48

## 2024-07-16 RX ADMIN — HEPARIN SODIUM 5000 UNIT(S): 1000 INJECTION, SOLUTION INTRAVENOUS; SUBCUTANEOUS at 22:40

## 2024-07-16 RX ADMIN — HEPARIN SODIUM 5000 UNIT(S): 1000 INJECTION, SOLUTION INTRAVENOUS; SUBCUTANEOUS at 13:06

## 2024-07-16 RX ADMIN — BUDESONIDE AND FORMOTEROL FUMARATE DIHYDRATE 2 PUFF(S): 80; 4.5 AEROSOL RESPIRATORY (INHALATION) at 17:31

## 2024-07-16 RX ADMIN — Medication 2 PUFF(S): at 11:55

## 2024-07-16 RX ADMIN — Medication 4: at 17:58

## 2024-07-16 RX ADMIN — Medication 30 MILLIGRAM(S): at 07:43

## 2024-07-16 RX ADMIN — DEXTROSE MONOHYDRATE, SODIUM CHLORIDE, SODIUM LACTATE, CALCIUM CHLORIDE, MAGNESIUM CHLORIDE 100 MILLILITER(S): 1.5; 538; 448; 18.4; 5.08 SOLUTION INTRAPERITONEAL at 11:42

## 2024-07-16 RX ADMIN — Medication 2 PUFF(S): at 17:31

## 2024-07-16 RX ADMIN — Medication 650 MILLIGRAM(S): at 13:22

## 2024-07-16 RX ADMIN — POTASSIUM PHOSPHATE, MONOBASIC AND POTASSIUM PHOSPHATE, DIBASIC 83.33 MILLIMOLE(S): 224; 236 INJECTION, SOLUTION INTRAVENOUS at 20:12

## 2024-07-16 RX ADMIN — Medication 30 MILLIGRAM(S): at 01:57

## 2024-07-16 RX ADMIN — DEXTROSE MONOHYDRATE, SODIUM CHLORIDE, SODIUM LACTATE, CALCIUM CHLORIDE, MAGNESIUM CHLORIDE 500 MILLILITER(S): 1.5; 538; 448; 18.4; 5.08 SOLUTION INTRAPERITONEAL at 08:45

## 2024-07-16 RX ADMIN — SODIUM PHOSPHATE, MONOBASIC, MONOHYDRATE 255 MILLIMOLE(S): 276; 142 INJECTION, SOLUTION INTRAVENOUS at 15:47

## 2024-07-16 RX ADMIN — DEXTROSE MONOHYDRATE, SODIUM CHLORIDE, SODIUM LACTATE, CALCIUM CHLORIDE, MAGNESIUM CHLORIDE 2000 MILLILITER(S): 1.5; 538; 448; 18.4; 5.08 SOLUTION INTRAPERITONEAL at 11:24

## 2024-07-16 RX ADMIN — POTASSIUM CHLORIDE 100 MILLIEQUIVALENT(S): 1500 TABLET, EXTENDED RELEASE ORAL at 17:59

## 2024-07-16 RX ADMIN — Medication 260 MILLIGRAM(S): at 12:52

## 2024-07-16 RX ADMIN — POTASSIUM CHLORIDE 100 MILLIEQUIVALENT(S): 1500 TABLET, EXTENDED RELEASE ORAL at 15:47

## 2024-07-16 RX ADMIN — PANTOPRAZOLE SODIUM 40 MILLIGRAM(S): 20 TABLET, DELAYED RELEASE ORAL at 11:42

## 2024-07-16 RX ADMIN — POTASSIUM CHLORIDE 100 MILLIEQUIVALENT(S): 1500 TABLET, EXTENDED RELEASE ORAL at 17:02

## 2024-07-16 RX ADMIN — DEXTROSE MONOHYDRATE, SODIUM CHLORIDE, SODIUM LACTATE, CALCIUM CHLORIDE, MAGNESIUM CHLORIDE 80 MILLILITER(S): 1.5; 538; 448; 18.4; 5.08 SOLUTION INTRAPERITONEAL at 08:13

## 2024-07-16 RX ADMIN — Medication 2 PUFF(S): at 23:07

## 2024-07-16 RX ADMIN — Medication 1000 MILLILITER(S): at 02:50

## 2024-07-16 RX ADMIN — Medication 25 GRAM(S): at 11:42

## 2024-07-16 NOTE — PHYSICAL THERAPY INITIAL EVALUATION ADULT - PERTINENT HX OF CURRENT PROBLEM, REHAB EVAL
Pt is 83M admitted 7/16/24 PMhx DM, asthma, no known PSHx recently admitted to [place] in 5/2024 for abdominal pain, found to have 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 Reports abdominal pain improved with episodes of emesis. Last BM/flatus was yesterday.  Patient reports getting colonoscopy 20 years ago, however in [place] note last colonoscopy was 8 years ago per son with non-concerning findings. No history of endoscopy. At [place], no intervention at the time and recommended for outpatient GI followup. Differential diagnosis at the time was contained perforation or inflamed giant diverticulum.     In ED, patient afebrile, hemodynamically stable. Leukocytosis to 21. Elevated lactate 3.9, improved to 3.1 with 1L NS bolus.      CT ABDOMEN/PELVIS: Small bowel obstruction with transition point in the right mid abdomen. Thickening of jejunum in the left lower quadrant. Dilated vessels in the rectum suggests transmural inflammation. No grossly obvious focal mass. Consider acute on chronic inflammatory bowel disease (Crohn's disease).  As there is dilatation of the stomach and distal esophagus, the patient may benefit from orogastric tube decompression. No free air or discrete focal collection. No obvious fistulization. XRAY CHEST: Clear lung. Enteric tube courses past the diaphragm, distal tip not visualized.

## 2024-07-16 NOTE — ED ADULT NURSE REASSESSMENT NOTE - NS ED NURSE REASSESS COMMENT FT1
Report received from MINH Pederson. PT is resting comfortably in bed, breathing unlabored on room air, and speaking in complete sentences. NG tube re-connected to suction at this time. PT fully undressed at this time. Updated PT on plan of care. PT admitted to surgery, awaiting a bed. Safety and comfort maintained. Call bell within reach.

## 2024-07-16 NOTE — PHYSICAL THERAPY INITIAL EVALUATION ADULT - MANUAL MUSCLE TESTING RESULTS, REHAB EVAL
functionally at least 4/5 t/o/grossly assessed due to Cyclophosphamide Counseling:  I discussed with the patient the risks of cyclophosphamide including but not limited to hair loss, hormonal abnormalities, decreased fertility, abdominal pain, diarrhea, nausea and vomiting, bone marrow suppression and infection. The patient understands that monitoring is required while taking this medication.

## 2024-07-16 NOTE — PHYSICAL THERAPY INITIAL EVALUATION ADULT - GAIT DEVIATIONS NOTED, PT EVAL
decreased nikc/increased time in double stance/decreased velocity of limb motion/decreased step length/decreased stride length/decreased weight-shifting ability

## 2024-07-16 NOTE — H&P ADULT - ASSESSMENT
83M, poor historian, PMH of DM, asthma, recently admitted to [place] in 5/2024 for abdominal pain, found to have large ulcerating mass communicating with proximal jejunum, presenting for few days of brown/bloody emesis and abdominal pain. CTAP significant for SBO with TP in R mid abdomen, thickened jejunum, no grossly obvious focal mass, dilated stomach and distal esophagus; Possible Crohn's disease.      PLAN:  - Admit to [facility] under Dr. Chaney  - NG tube placed-> Follow up CXR  - NPO  - IVF  - Repeat lactate and CBC  - Attempted to reach patient's daughter, however no response  - May need diagnostic laparoscopy, will reeval      Discussed with fellow on behalf of attending      [facility] [phone]   83M, poor historian, PMH of DM, asthma, recently admitted to [place] in 5/2024 for abdominal pain, found to have large ulcerating mass communicating with proximal jejunum, presenting for few days of brown/bloody emesis and abdominal pain. CTAP significant for SBO with TP in R mid abdomen, thickened jejunum, no grossly obvious focal mass, dilated stomach and distal esophagus; Possible Crohn's disease.      PLAN:  - Admit to [facility] under Dr. Chaney  - NG tube placed-> Follow up CXR  - NPO  - IVF  - Repeat lactate and CBC at noon  - Confirm prednisone dose/whether still taking for asthma  - Attempted to reach patient's daughter, however no response  - May need diagnostic laparoscopy, will reeval      Discussed with fellow on behalf of attending      [facility] [phone]   83M, poor historian, PMH of DM, asthma, recently admitted to [place] in 5/2024 for abdominal pain, found to have large ulcerating mass communicating with proximal jejunum on CT there but no intervention at that time, presenting for few days of brown/bloody emesis and abdominal pain. Leukocytosis to 21 with lactate 3.9, improved to 3.1 after 1L bolus.  CTAP significant for SBO with TP in R mid abdomen, thickened jejunum, no grossly obvious focal mass, dilated stomach and distal esophagus; possible Crohn's disease.      PLAN:  - Admit to [facility] under Dr. Chaney  - NG tube placed-> Follow up CXR  - NPO  - IVF  - Repeat lactate and CBC at noon  - Confirm prednisone dose/whether still taking for asthma  - Attempted to reach patient's daughter, however no response  - May need diagnostic laparoscopy, will reeval      Discussed with fellow on behalf of attending      [facility] [phone]

## 2024-07-16 NOTE — ED ADULT NURSE REASSESSMENT NOTE - NS ED NURSE REASSESS COMMENT FT1
Size 16 FR Bowens catheter inserted under sterile technique with 2 RN's at the bedside as per MD order. PT tolerated well. Clear, yellow urine draining. UA/UC sent as per MD order. Safety and comfort maintained. Call bell within reach. Family at the bedside.

## 2024-07-16 NOTE — CONSULT NOTE ADULT - ASSESSMENT
83M PMH of Asthma and BPH on Flomax, last colonoscopy around 8-10 years ago (normal per wife/patient) no known PSHx recently admitted to [place] in 5/2024 for abdominal pain, found to have large ulcerating mass communicating with proximal jejunum on CT (discharged home with outpatient GI f/u for concern of contained perforation vs inflamed giant diverticulum) presented to [facility] given hematemesis constipation and abdominal pain. Reports abdominal pain improved with episodes of emesis. Last BM/flatus was yesterday.  TPN team consulted given concern for severe protein calorie malnutrition      83M PMH of Asthma and BPH on Flomax, last colonoscopy around 8-10 years ago (normal per wife/patient) no known PSHx recently admitted to [place] in 5/2024 for abdominal pain, found to have large ulcerating mass communicating with proximal jejunum on CT (discharged home with outpatient GI f/u for concern of contained perforation vs inflamed giant diverticulum) presented to [facility] given hematemesis constipation and abdominal pain. Reports abdominal pain improved with episodes of emesis. Last BM/flatus was yesterday.  TPN team consulted given concern for severe protein calorie malnutrition and anticipated prolonged inadequate caloric intake      - Nutritional Assessment, patient with severe protein calorie malnutrition not meeting nutritional goals enterally due to SBO/NPO status and would likely benefit from TPN for nutritional support.  - TPN plan:  Finalize TPN plan in AM  - TPN access:  Patient will need a dedicated central line if/once TPN approved  - Recommend checking baseline nutrition parameters:  TSH, Prealbumin, Lipids, HgA1c, iCal, Mag, Phos  - Hyponatremia/NAA:  recommend Renal evaluation;  IV fluids per SICU; trend in AM  - Electrolyte Imbalance risk:  recommend to check CMP, Mg, Phos and ionized Ca daily, to be supplemented peripherally per primary team.  - SBO:  monitor NGT output overnight, f/u surgical plan and nutrition parameters in AM  - Recommend strict intake and output/weight checks three times a week  - Risk of glucose dysfunction with TPN:  check HgA1c; would need to initiate finger sticks every 6 hours to monitor glucose trend once TPN starts  - Risk for Hypertriglyceridemia with TPN:  plan to check baseline lipid profile in AM and monitor TG closely once/if TPN starts  - Global care per primary team     Available on TEAMS  TPN spectra [id number] ([phone] when dialing from outside line)  M-F 8A-2P, Weekends and holidays 8/9A-12/1P  Discussed with Dr. Harvey Tee

## 2024-07-16 NOTE — H&P ADULT - ATTENDING COMMENTS
I spoke to the pt and to his wife.  They report 3 recent admissions, one at [facility] and 2 at [facility].  Poor po intake at home, nausea, weight loss.  On exam he is frail and thin  Abdomen soft, distended, minimally tender  NGT irrigated, draining well after  Pt with ?? SB mass, SBO  Admitted to SICU for hemodynamic instability and elevated lactate d/t dehydration, being resuscitated.  TPN consult  He will likely need dx laparoscopy in this admission once optimized and decompressed.

## 2024-07-16 NOTE — PHYSICAL THERAPY INITIAL EVALUATION ADULT - GENERAL OBSERVATIONS, REHAB EVAL
received semisupine in bed, A&Ox3, following simple commands, willing to participate, spouse at bedside, a/w SBO, +NGT to suction, +tobias, +supplemental 02 via NC, +IV, +ICU monitoring. BS reviewed

## 2024-07-16 NOTE — CONSULT NOTE ADULT - SUBJECTIVE AND OBJECTIVE BOX
NUTRITION SUPPORT / TPN CONSULT NOTE    HPI:  83M PMH of Asthma and BPH on Flomax, last colonoscopy around 8-10 years ago (normal per wife/patient) no known PSHx recently admitted to [place] in 5/2024 for abdominal pain, found to have large ulcerating mass communicating with proximal jejunum on CT (discharged home with outpatient GI f/u for concern of contained perforation vs inflamed giant diverticulum) presented to [facility] given hematemesis constipation and abdominal pain. Reports abdominal pain improved with episodes of emesis. Last BM/flatus was yesterday.  TPN team consulted given concern for severe protein calorie malnutrition       24 hour/overnight events:  Patient seen and examined at bedside, chart reviewed and events noted; spouse at bedside.  Patient denies chest pain, shortness of breath, nausea or vomiting, dizziness, chills at time of visit and states his abdominal pain is better since being on NGT.  Patient admits to 25 pounds over a year due to poor po intake, denies any food allergies, denies CHF/diuretic use and reports he is up to date with yearly PMD visits.  Patient being admitted to SICU, IV fluids ordered for NAA and CT a/p shows SBO.       ROS:  Except as noted above, all other systems reviewed and are negative       MEDICATIONS  (STANDING):  budesonide 160 MICROgram(s)/formoterol 4.5 MICROgram(s) Inhaler 2 Puff(s) Inhalation two times a day  dextrose 5%. 1000 milliLiter(s) (100 mL/Hr) IV Continuous <Continuous>  dextrose 5%. 1000 milliLiter(s) (50 mL/Hr) IV Continuous <Continuous>  dextrose 50% Injectable 25 Gram(s) IV Push once  dextrose 50% Injectable 25 Gram(s) IV Push once  dextrose 50% Injectable 12.5 Gram(s) IV Push once  glucagon  Injectable 1 milliGRAM(s) IntraMuscular once  heparin   Injectable 5000 Unit(s) SubCutaneous every 8 hours  insulin lispro (ADMELOG) corrective regimen sliding scale   SubCutaneous every 6 hours  lactated ringers. 1000 milliLiter(s) (80 mL/Hr) IV Continuous <Continuous>  pantoprazole  Injectable 40 milliGRAM(s) IV Push daily    MEDICATIONS  (PRN):  albuterol    90 MICROgram(s) HFA Inhaler 2 Puff(s) Inhalation every 6 hours PRN Shortness of Breath and/or Wheezing  dextrose Oral Gel 15 Gram(s) Oral once PRN Blood Glucose LESS THAN 70 milliGRAM(s)/deciliter      PAST MEDICAL & SURGICAL HISTORY:  Asthma      Diabetes          FAMILY HISTORY:      ALLERGIES  No Known Allergies      REVIEW OF SYSTEMS  --------------------------------------------------------------------------------    Skin: No rashes  Head/Eyes/Ears/Mouth: No headache; Normal hearing; Normal vision w/o blurriness; No sinus pain/discomfort, sore throat  Respiratory: No dyspnea, cough, wheezing, hemoptysis; Asthma  CV: No chest pain, PND, orthopnea  GI: +abdominal pain, +chronic constipation, + nausea, +vomiting, +melena, +hematochezia  : No increased frequency, dysuria, hematuria, nocturia; BPH  MSK: No joint pain/swelling; no back pain; no edema  Neuro: No dizziness/lightheadedness, weakness, seizures, numbness, tingling  Heme: No easy bruising or bleeding  Endo: No heat/cold intolerance  Psych: No significant nervousness, anxiety, stress, depression      VITALS  T(C): 36.7 (07-16-24 @ 07:20), Max: 36.7 (07-16-24 @ 07:20)  HR: 115 (07-16-24 @ 07:20) (81 - 115)  BP: 153/58 (07-16-24 @ 07:20) (111/68 - 160/76)  RR: 18 (07-16-24 @ 07:20) (16 - 20)  SpO2: 94% (07-16-24 @ 07:20) (89% - 100%)  I&O's Detail        LABS:                        11.9   6.91  )-----------( 239      ( 16 Jul 2024 09:00 )             36.9     07-16    130<L>  |  89<L>  |  54<H>  ----------------------------<  196<H>  4.1   |  21<L>  |  2.19<H>    Ca    8.8      16 Jul 2024 09:00  Phos  4.5     07-16  Mg     1.8     07-16    TPro  8.8<H>  /  Alb  4.0  /  TBili  0.8  /  DBili  x   /  AST  21  /  ALT  11  /  AlkPhos  102  07-15    Ionized Calcium Levels:     CAPILLARY BLOOD GLUCOSE      POCT Blood Glucose.: 277 mg/dL (16 Jul 2024 01:27)  CAPILLARY BLOOD GLUCOSE      POCT Blood Glucose.: 277 mg/dL (16 Jul 2024 01:27)    PT/INR - ( 15 Jul 2024 15:26 )   PT: 16.5 sec;   INR: 1.52 ratio         PTT - ( 15 Jul 2024 15:26 )  PTT:29.7 sec        PHYSICAL EXAM  --------------------------------------------------------------------------------    Physical Exam:  Gen: NAD, thin/frail appearing; laying in bed   HEENT: PERRL, supple neck, no JVD; NGT  Chest: non labored breathing, equal chest expansion bilaterally; CTA b/l  CV: RRR, S1S2  Abd: +BS, soft, nontender/nondistended  : No suprapubic tenderness  Ext: Warm, FROM, no edema b/l LE  Neuro: No focal deficits  Psych: Normal affect and mood  Skin: Warm, without rashes

## 2024-07-16 NOTE — H&P ADULT - NSHPLABSRESULTS_GEN_ALL_CORE
Complete Blood Count + Automated Diff (07.15.24 @ 15:26)   WBC Count: 21.09 K/uL  RBC Count: 4.67 M/uL  Hemoglobin: 13.2 g/dL  Hematocrit: 40.3 %  Mean Cell Volume: 86.3 fl  Mean Cell Hemoglobin: 28.3 pg  Mean Cell Hemoglobin Conc: 32.8 gm/dL  Red Cell Distrib Width: 14.8 %  Platelet Count - Automated: 283 K/uL  Auto Neutrophil #: 18.29 K/uL  Auto Lymphocyte #: 2.24 K/uL  Auto Monocyte #: 0.57 K/uL  Auto Eosinophil #: 0.00 K/uL  Auto Basophil #: 0.00 K/uL  Auto Neutrophil %: 85.8: Differential percentages must be correlated with absolute numbers for   clinical significance. %  Auto Lymphocyte %: 10.6 %  Auto Monocyte %: 2.7 %  Auto Eosinophil %: 0.0 %  Auto Basophil %: 0.0 %    Comprehensive Metabolic Panel (07.15.24 @ 15:26)   Sodium: 129 mmol/L  Potassium: 4.9 mmol/L  Chloride: 87 mmol/L  Carbon Dioxide: 24 mmol/L  Anion Gap: 18 mmol/L  Blood Urea Nitrogen: 32 mg/dL  Creatinine: 1.23 mg/dL  Glucose: 417 mg/dL  Calcium: 10.0 mg/dL  Protein Total: 8.8 g/dL  Albumin: 4.0 g/dL  Bilirubin Total: 0.8 mg/dL  Alkaline Phosphatase: 102 U/L  Aspartate Aminotransferase (AST/SGOT): 21: Mild hemolysis results may be falsely elevated U/L  Alanine Aminotransferase (ALT/SGPT): 11 U/L  eGFR: 58: The estimated glomerular filtration rate (eGFR) is calculated using the Blood Gas Venous - Lactate: 3.1:< from: CT Abdomen and Pelvis w/ Oral Cont and w/ IV Cont (07.16.24 @ 01:08) >      IMPRESSION:    Small bowel obstruction with transition point in the right mid abdomen.   Thickening of jejunum in the left lower quadrant. Dilated vessels in the   rectum suggests transmural inflammation. No grossly obvious focal mass.   Consider acute on chronic inflammatory bowel disease (Crohn's disease).    As there is dilatation of the stomach and distal esophagus, the patient   may benefit from orogastric tube decompression. No free air or discrete   focal collection. No obvious fistulization.    < end of copied text >

## 2024-07-16 NOTE — CONSULT NOTE ADULT - SUBJECTIVE AND OBJECTIVE BOX
HISTORY OF PRESENT ILLNESS:  JIMMY CALLAHAN is a 83y Male     PAST MEDICAL HISTORY: Asthma    Diabetes        PAST SURGICAL HISTORY:     FAMILY HISTORY:     SOCIAL HISTORY:    CODE STATUS:     HOME MEDICATIONS:    ALLERGIES: No Known Allergies      VITAL SIGNS:  ICU Vital Signs Last 24 Hrs  T(C): 37.2 (16 Jul 2024 11:16), Max: 37.3 (16 Jul 2024 11:05)  T(F): 99 (16 Jul 2024 11:16), Max: 99.2 (16 Jul 2024 11:05)  HR: 104 (16 Jul 2024 12:00) (81 - 115)  BP: 116/50 (16 Jul 2024 12:00) (85/46 - 160/76)  BP(mean): 72 (16 Jul 2024 12:00) (58 - 104)  ABP: --  ABP(mean): --  RR: 17 (16 Jul 2024 12:00) (16 - 22)  SpO2: 93% (16 Jul 2024 12:00) (89% - 100%)    O2 Parameters below as of 16 Jul 2024 11:58  Patient On (Oxygen Delivery Method): nasal cannula            NEURO  Exam:  acetaminophen   IVPB .. 650 milliGRAM(s) IV Intermittent once      RESPIRATORY  Mechanical Ventilation:   ABG - ( 16 Jul 2024 09:00 )  pH: x     /  pCO2: x     /  pO2: x     / HCO3: x     / Base Excess: x     /  SaO2: x       Lactate: 5.9              Exam:  albuterol    90 MICROgram(s) HFA Inhaler 2 Puff(s) Inhalation every 6 hours  budesonide 160 MICROgram(s)/formoterol 4.5 MICROgram(s) Inhaler 2 Puff(s) Inhalation two times a day      CARDIOVASCULAR  VBG - ( 16 Jul 2024 02:05 )  pH: 7.33  /  pCO2: 60    /  pO2: 31    / HCO3: 32    / Base Excess: 4.2   /  SaO2: 40.4   Lactate: 3.1              Exam:  Cardiac Rhythm:      GI/NUTRITION  Exam:  Diet:  pantoprazole  Injectable 40 milliGRAM(s) IV Push daily      GENITOURINARY/RENAL  lactated ringers. 1000 milliLiter(s) IV Continuous <Continuous>      07-16 @ 07:01  -  07-16 @ 12:50  --------------------------------------------------------  IN:    Enteral Tube Flush: 30 mL  Total IN: 30 mL    OUT:  Total OUT: 0 mL    Total NET: 30 mL          07-16    130<L>  |  89<L>  |  54<H>  ----------------------------<  196<H>  4.1   |  21<L>  |  2.19<H>    Ca    8.8      16 Jul 2024 09:00  Phos  4.5     07-16  Mg     1.8     07-16    TPro  8.8<H>  /  Alb  4.0  /  TBili  0.8  /  DBili  x   /  AST  21  /  ALT  11  /  AlkPhos  102  07-15    [ ] Bowens catheter, indication: urine output monitoring in critically ill patient    HEMATOLOGIC  [ ] VTE Prophylaxis:  heparin   Injectable 5000 Unit(s) SubCutaneous every 8 hours                          11.9   6.91  )-----------( 239      ( 16 Jul 2024 09:00 )             36.9     PT/INR - ( 15 Jul 2024 15:26 )   PT: 16.5 sec;   INR: 1.52 ratio         PTT - ( 15 Jul 2024 15:26 )  PTT:29.7 sec  Transfusion: [ ] PRBC	[ ] Platelets	[ ] FFP	[ ] Cryoprecipitate      INFECTIOUS DISEASES  RECENT CULTURES:      ENDOCRINE  insulin lispro (ADMELOG) corrective regimen sliding scale   SubCutaneous every 6 hours    CAPILLARY BLOOD GLUCOSE      POCT Blood Glucose.: 130 mg/dL (16 Jul 2024 12:01)  POCT Blood Glucose.: 277 mg/dL (16 Jul 2024 01:27)      PATIENT CARE ACCESS DEVICES:  [ ] Peripheral IV  [ ] Central Venous Line	[ ] R	[ ] L	[ ] IJ	[ ] Fem	[ ] SC	Placed:   [ ] Arterial Line		[ ] R	[ ] L	[ ] Fem	[ ] Rad	[ ] Ax	Placed:   [ ] PICC:					[ ] Mediport  [ ] Urinary Catheter, Date Placed:   [x] Necessity of urinary, arterial, and venous catheters discussed    OTHER MEDICATIONS: chlorhexidine 2% Cloths 1 Application(s) Topical <User Schedule>      IMAGING STUDIES: HISTORY OF PRESENT ILLNESS:  JIMMY CALLAHAN is a 83y Male with h/o DM, HTN and 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  CTAP showed high grade SBO.  WBC 21, lactate initially 3.9, but repeat 5.9; Cr 2.19.  Patient bolused 2L in ED.  SICU consulted for resuscitation pre-operatively.  Patient received in the SICU awake, alert, in no acute distress.  Patient appeared hypovolemic on POCUS with 3rd liter bolus infusing.      PAST MEDICAL HISTORY:   Asthma  Diabetes  Last colonoscopy 8 months ago: non-concerning findings as per family     PAST SURGICAL HISTORY: Denies    FAMILY HISTORY:     SOCIAL HISTORY:    CODE STATUS: Full code     HOME MEDICATIONS: ASA, Albuterol, Fluticasone-Salmeterol, Metformin, Prednisone, Protonix, Tamsulosin      ALLERGIES: No Known Allergies      VITAL SIGNS:  ICU Vital Signs Last 24 Hrs  T(C): 37.2 (16 Jul 2024 11:16), Max: 37.3 (16 Jul 2024 11:05)  T(F): 99 (16 Jul 2024 11:16), Max: 99.2 (16 Jul 2024 11:05)  HR: 104 (16 Jul 2024 12:00) (81 - 115)  BP: 116/50 (16 Jul 2024 12:00) (85/46 - 160/76)  BP(mean): 72 (16 Jul 2024 12:00) (58 - 104)  RR: 17 (16 Jul 2024 12:00) (16 - 22)  SpO2: 93% (16 Jul 2024 12:00) (89% - 100%)    O2 Parameters below as of 16 Jul 2024 11:58  Patient On (Oxygen Delivery Method): nasal cannula        NEURO  Exam: Awake, alert, following commands.   acetaminophen   IVPB .. 650 milliGRAM(s) IV Intermittent once      RESPIRATORY  Mechanical Ventilation: N/A  ABG - ( 16 Jul 2024 09:00 )  pH: x     /  pCO2: x     /  pO2: x     / HCO3: x     / Base Excess: x     /  SaO2: x       Lactate: 5.9    Exam: CTA B/L.   albuterol    90 MICROgram(s) HFA Inhaler 2 Puff(s) Inhalation every 6 hours  budesonide 160 MICROgram(s)/formoterol 4.5 MICROgram(s) Inhaler 2 Puff(s) Inhalation two times a day      CARDIOVASCULAR  VBG - ( 16 Jul 2024 02:05 )  pH: 7.33  /  pCO2: 60    /  pO2: 31    / HCO3: 32    / Base Excess: 4.2   /  SaO2: 40.4   Lactate: 3.1    Exam: RRR. +S1, +S2.   Cardiac Rhythm: Sinus       GI/NUTRITION  Exam: Softly distended, non-tender.    Diet: NPO   pantoprazole  Injectable 40 milliGRAM(s) IV Push daily      GENITOURINARY/RENAL  lactated ringers. 1000 milliLiter(s) IV Continuous <Continuous>      07-16 @ 07:01  -  07-16 @ 12:50  --------------------------------------------------------  IN:    Enteral Tube Flush: 30 mL  Total IN: 30 mL    OUT:  Total OUT: 0 mL    Total NET: 30 mL    07-16    130<L>  |  89<L>  |  54<H>  ----------------------------<  196<H>  4.1   |  21<L>  |  2.19<H>    Ca    8.8      16 Jul 2024 09:00  Phos  4.5     07-16  Mg     1.8     07-16    TPro  8.8<H>  /  Alb  4.0  /  TBili  0.8  /  DBili  x   /  AST  21  /  ALT  11  /  AlkPhos  102  07-15    [x ] Bowens catheter, indication: urine output monitoring in critically ill patient    HEMATOLOGIC  [x ] VTE Prophylaxis: heparin   Injectable 5000 Unit(s) SubCutaneous every 8 hours                          11.9   6.91  )-----------( 239      ( 16 Jul 2024 09:00 )             36.9     PT/INR - ( 15 Jul 2024 15:26 )   PT: 16.5 sec;   INR: 1.52 ratio         PTT - ( 15 Jul 2024 15:26 )  PTT:29.7 sec  Transfusion: [ ] PRBC	[ ] Platelets	[ ] FFP	[ ] Cryoprecipitate      INFECTIOUS DISEASES  RECENT CULTURES:      ENDOCRINE  insulin lispro (ADMELOG) corrective regimen sliding scale   SubCutaneous every 6 hours    CAPILLARY BLOOD GLUCOSE  POCT Blood Glucose.: 130 mg/dL (16 Jul 2024 12:01)  POCT Blood Glucose.: 277 mg/dL (16 Jul 2024 01:27)      PATIENT CARE ACCESS DEVICES:  [x ] Peripheral IV  [ ] Central Venous Line	[ ] R	[ ] L	[ ] IJ	[ ] Fem	[ ] SC	Placed:   [ ] Arterial Line		[ ] R	[ ] L	[ ] Fem	[ ] Rad	[ ] Ax	Placed:   [ ] PICC:					[ ] Mediport  [x ] Urinary Catheter, Date Placed:   [x] Necessity of urinary, arterial, and venous catheters discussed    OTHER MEDICATIONS: chlorhexidine 2% Cloths 1 Application(s) Topical <User Schedule>      IMAGING STUDIES:  < from: CT Abdomen and Pelvis w/ Oral Cont and w/ IV Cont (07.16.24 @ 01:08) >  IMPRESSION:    Small bowel obstruction with transition point in the right mid abdomen.   Thickening of jejunum in the left lower quadrant. Dilated vessels in the   rectum suggests transmural inflammation. No grossly obvious focal mass.   Consider acute on chronic inflammatory bowel disease (Crohn's disease).    As there is dilatation of the stomach and distal esophagus, the patient   may benefit from orogastric tube decompression. No free air or discrete   focal collection. No obvious fistulization.        --- End of Report ---      < end of copied text >

## 2024-07-16 NOTE — PHYSICAL THERAPY INITIAL EVALUATION ADULT - ADDITIONAL COMMENTS
Pt resides in private home with spouse, 2 steps to enter, one level within, PTA independent with mobility and ADL's, owns no DME, (+)driving, retired.

## 2024-07-16 NOTE — CONSULT NOTE ADULT - ASSESSMENT
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  CTAP showed high grade SBO.  WBC 21, lactate initially 3.9, but repeat 5.9; Cr 2.19.  Patient bolused 2L in ED.  SICU consulted for resuscitation pre-operatively.    Neuro: pain control  - Tylenol as needed     Resp: h/o asthma  - Continue Duonebs and Budesonide  - O2 prn   - IS     CV:   - Hemodynamic monitoring  - Lactate downtrending after fluid resuscitation, continue to trend q 4hrs  - Repeat POCUS as needed to evaluate volume status     GI: High grade SBO, h/o jejunal mass  - NPO/NGT to LCWS  - Protonix (home medication)   - TPN consult   - f/u primary team for OR planning     /Renal: NAA, hyponatremia  - Cr and Na likely related to hypovolemia, continue to trend   - LR @ 100mL/hr   - s/p 3L in ED.  Appeared hypovolemic on POCUS, will likely repeat after last liter bolus  - Strict I's and O's  - Replete electrolytes as needed     ID:  - No acute need for antibiotics at this time  - Trend WBC    Heme: VTE prophylaxis  - SQH due to Cr Clearance  - SCDs     Endo: h/o DM   - ISS q6hrs  - f/u if patient is taking Prednisone at home and why, may need to continue steroids inpatient     MSK:  - PT/OT consults     Lines:  - PIV    Dispo:  SICU care.  Full code.  Pending OR.  Case discussed with Dr. Balderas.     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  CTAP showed high grade SBO.  WBC 21, lactate initially 3.9, but repeat 5.9; Cr 2.19.  Patient bolused 2L in ED.  SICU consulted for resuscitation pre-operatively.    Neuro: pain control  - Tylenol as needed     Resp: h/o asthma  - Continue Duonebs and Budesonide  - O2 prn   - IS     CV:   - Hemodynamic monitoring  - Lactate downtrending after fluid resuscitation, continue to trend q 4hrs  - Repeat POCUS as needed to evaluate volume status   - Echo     GI: High grade SBO, h/o jejunal mass  - NPO/NGT to LCWS  - Protonix (home medication)   - TPN consult   - f/u primary team for OR planning     /Renal: NAA, hyponatremia  - Cr and Na likely related to hypovolemia, continue to trend   - LR @ 100mL/hr   - s/p 3L in ED.  Appeared hypovolemic on POCUS, will likely repeat after last liter bolus  - Strict I's and O's  - Replete electrolytes as needed     ID:  - No acute need for antibiotics at this time  - Trend WBC    Heme: VTE prophylaxis  - SQH due to Cr Clearance  - SCDs     Endo: h/o DM   - ISS q6hrs  - f/u if patient is taking Prednisone at home and why, may need to continue steroids inpatient     MSK:  - PT/OT consults     Lines:  - PIV    Dispo:  SICU care.  Full code.  Pending OR.  Case discussed with Dr. Balderas.     Marie Cooper PA-C #[id number]

## 2024-07-16 NOTE — H&P ADULT - NSHPPHYSICALEXAM_GEN_ALL_CORE
General: NAD, laying comfortably in bed  HEENT: No gross abnormalities; NG tube placed with output brown gastric contents, not coffee ground or bright red  Cardiac: RRR  Respiratory: Normal work of breathing  Abdominal: Soft, nontender, nondistended, no masses palpated  MSK: Moves all extremities spontaneously  Neuro: Alert, able to answer questions but sometimes confused/poor historian  Skin: No rashes

## 2024-07-16 NOTE — PATIENT PROFILE ADULT - FALL HARM RISK - HARM RISK INTERVENTIONS

## 2024-07-17 ENCOUNTER — RESULT REVIEW (OUTPATIENT)
Age: 84
End: 2024-07-17

## 2024-07-17 LAB
A1C WITH ESTIMATED AVERAGE GLUCOSE RESULT: 7.8 % — HIGH (ref 4–5.6)
ADD ON TEST-SPECIMEN IN LAB: SIGNIFICANT CHANGE UP
ALBUMIN SERPL ELPH-MCNC: 2.7 G/DL — LOW (ref 3.3–5)
ALBUMIN SERPL ELPH-MCNC: 2.8 G/DL — LOW (ref 3.3–5)
ALP SERPL-CCNC: 63 U/L — SIGNIFICANT CHANGE UP (ref 40–120)
ALP SERPL-CCNC: 63 U/L — SIGNIFICANT CHANGE UP (ref 40–120)
ALT FLD-CCNC: 10 U/L — SIGNIFICANT CHANGE UP (ref 10–45)
ALT FLD-CCNC: 10 U/L — SIGNIFICANT CHANGE UP (ref 10–45)
ANION GAP SERPL CALC-SCNC: 15 MMOL/L — SIGNIFICANT CHANGE UP (ref 5–17)
ANION GAP SERPL CALC-SCNC: 15 MMOL/L — SIGNIFICANT CHANGE UP (ref 5–17)
APPEARANCE UR: ABNORMAL
APTT BLD: 32.5 SEC — SIGNIFICANT CHANGE UP (ref 24.5–35.6)
AST SERPL-CCNC: 21 U/L — SIGNIFICANT CHANGE UP (ref 10–40)
AST SERPL-CCNC: 21 U/L — SIGNIFICANT CHANGE UP (ref 10–40)
BACTERIA # UR AUTO: NEGATIVE /HPF — SIGNIFICANT CHANGE UP
BASE EXCESS BLDV CALC-SCNC: 1.7 MMOL/L — SIGNIFICANT CHANGE UP (ref -2–3)
BASE EXCESS BLDV CALC-SCNC: 3.2 MMOL/L — HIGH (ref -2–3)
BASE EXCESS BLDV CALC-SCNC: 3.2 MMOL/L — HIGH (ref -2–3)
BASE EXCESS BLDV CALC-SCNC: 8.2 MMOL/L — HIGH (ref -2–3)
BILIRUB SERPL-MCNC: 0.4 MG/DL — SIGNIFICANT CHANGE UP (ref 0.2–1.2)
BILIRUB SERPL-MCNC: 0.5 MG/DL — SIGNIFICANT CHANGE UP (ref 0.2–1.2)
BILIRUB UR-MCNC: NEGATIVE — SIGNIFICANT CHANGE UP
BUN SERPL-MCNC: 51 MG/DL — HIGH (ref 7–23)
BUN SERPL-MCNC: 55 MG/DL — HIGH (ref 7–23)
CA-I SERPL-SCNC: 1.12 MMOL/L — LOW (ref 1.15–1.33)
CA-I SERPL-SCNC: 1.12 MMOL/L — LOW (ref 1.15–1.33)
CA-I SERPL-SCNC: 1.22 MMOL/L — SIGNIFICANT CHANGE UP (ref 1.15–1.33)
CA-I SERPL-SCNC: 1.22 MMOL/L — SIGNIFICANT CHANGE UP (ref 1.15–1.33)
CALCIUM SERPL-MCNC: 8.5 MG/DL — SIGNIFICANT CHANGE UP (ref 8.4–10.5)
CALCIUM SERPL-MCNC: 8.7 MG/DL — SIGNIFICANT CHANGE UP (ref 8.4–10.5)
CAST: 5 /LPF — HIGH (ref 0–4)
CHLORIDE BLDV-SCNC: 100 MMOL/L — SIGNIFICANT CHANGE UP (ref 96–108)
CHLORIDE BLDV-SCNC: 94 MMOL/L — LOW (ref 96–108)
CHLORIDE BLDV-SCNC: 98 MMOL/L — SIGNIFICANT CHANGE UP (ref 96–108)
CHLORIDE BLDV-SCNC: 98 MMOL/L — SIGNIFICANT CHANGE UP (ref 96–108)
CHLORIDE SERPL-SCNC: 94 MMOL/L — LOW (ref 96–108)
CHLORIDE SERPL-SCNC: 96 MMOL/L — SIGNIFICANT CHANGE UP (ref 96–108)
CHOLEST SERPL-MCNC: 94 MG/DL — SIGNIFICANT CHANGE UP
CO2 BLDV-SCNC: 29 MMOL/L — HIGH (ref 22–26)
CO2 BLDV-SCNC: 29 MMOL/L — HIGH (ref 22–26)
CO2 BLDV-SCNC: 31 MMOL/L — HIGH (ref 22–26)
CO2 BLDV-SCNC: 37 MMOL/L — HIGH (ref 22–26)
CO2 SERPL-SCNC: 23 MMOL/L — SIGNIFICANT CHANGE UP (ref 22–31)
CO2 SERPL-SCNC: 23 MMOL/L — SIGNIFICANT CHANGE UP (ref 22–31)
COLOR SPEC: YELLOW — SIGNIFICANT CHANGE UP
CREAT SERPL-MCNC: 1.76 MG/DL — HIGH (ref 0.5–1.3)
CREAT SERPL-MCNC: 2.02 MG/DL — HIGH (ref 0.5–1.3)
DIFF PNL FLD: ABNORMAL
EGFR: 32 ML/MIN/1.73M2 — LOW
EGFR: 37 ML/MIN/1.73M2 — LOW
ESTIMATED AVERAGE GLUCOSE: 177 MG/DL — HIGH (ref 68–114)
GAS PNL BLDV: 129 MMOL/L — LOW (ref 136–145)
GAS PNL BLDV: 129 MMOL/L — LOW (ref 136–145)
GAS PNL BLDV: 130 MMOL/L — LOW (ref 136–145)
GAS PNL BLDV: 132 MMOL/L — LOW (ref 136–145)
GAS PNL BLDV: SIGNIFICANT CHANGE UP
GLUCOSE BLDC GLUCOMTR-MCNC: 105 MG/DL — HIGH (ref 70–99)
GLUCOSE BLDC GLUCOMTR-MCNC: 120 MG/DL — HIGH (ref 70–99)
GLUCOSE BLDC GLUCOMTR-MCNC: 159 MG/DL — HIGH (ref 70–99)
GLUCOSE BLDC GLUCOMTR-MCNC: 161 MG/DL — HIGH (ref 70–99)
GLUCOSE BLDC GLUCOMTR-MCNC: 91 MG/DL — SIGNIFICANT CHANGE UP (ref 70–99)
GLUCOSE BLDV-MCNC: 109 MG/DL — HIGH (ref 70–99)
GLUCOSE BLDV-MCNC: 62 MG/DL — LOW (ref 70–99)
GLUCOSE BLDV-MCNC: 81 MG/DL — SIGNIFICANT CHANGE UP (ref 70–99)
GLUCOSE BLDV-MCNC: 95 MG/DL — SIGNIFICANT CHANGE UP (ref 70–99)
GLUCOSE SERPL-MCNC: 103 MG/DL — HIGH (ref 70–99)
GLUCOSE SERPL-MCNC: 92 MG/DL — SIGNIFICANT CHANGE UP (ref 70–99)
GLUCOSE UR QL: NEGATIVE MG/DL — SIGNIFICANT CHANGE UP
HCO3 BLDV-SCNC: 28 MMOL/L — SIGNIFICANT CHANGE UP (ref 22–29)
HCO3 BLDV-SCNC: 28 MMOL/L — SIGNIFICANT CHANGE UP (ref 22–29)
HCO3 BLDV-SCNC: 29 MMOL/L — SIGNIFICANT CHANGE UP (ref 22–29)
HCO3 BLDV-SCNC: 35 MMOL/L — HIGH (ref 22–29)
HCT VFR BLD CALC: 28.8 % — LOW (ref 39–50)
HCT VFR BLD CALC: 30.6 % — LOW (ref 39–50)
HCT VFR BLDA CALC: 30 % — LOW (ref 39–51)
HCT VFR BLDA CALC: 31 % — LOW (ref 39–51)
HCT VFR BLDA CALC: 32 % — LOW (ref 39–51)
HCT VFR BLDA CALC: 33 % — LOW (ref 39–51)
HDLC SERPL-MCNC: 31 MG/DL — LOW
HGB BLD CALC-MCNC: 10.1 G/DL — LOW (ref 12.6–17.4)
HGB BLD CALC-MCNC: 10.2 G/DL — LOW (ref 12.6–17.4)
HGB BLD CALC-MCNC: 10.8 G/DL — LOW (ref 12.6–17.4)
HGB BLD CALC-MCNC: 10.9 G/DL — LOW (ref 12.6–17.4)
HGB BLD-MCNC: 10.3 G/DL — LOW (ref 13–17)
HGB BLD-MCNC: 9.6 G/DL — LOW (ref 13–17)
HOROWITZ INDEX BLDV+IHG-RTO: 21 — SIGNIFICANT CHANGE UP
INR BLD: 1.59 RATIO — HIGH (ref 0.85–1.18)
KETONES UR-MCNC: NEGATIVE MG/DL — SIGNIFICANT CHANGE UP
LACTATE BLDV-MCNC: 1.7 MMOL/L — SIGNIFICANT CHANGE UP (ref 0.5–2)
LACTATE BLDV-MCNC: 2 MMOL/L — SIGNIFICANT CHANGE UP (ref 0.5–2)
LACTATE BLDV-MCNC: 2.1 MMOL/L — HIGH (ref 0.5–2)
LACTATE BLDV-MCNC: 2.4 MMOL/L — HIGH (ref 0.5–2)
LEUKOCYTE ESTERASE UR-ACNC: ABNORMAL
LIPID PNL WITH DIRECT LDL SERPL: 47 MG/DL — SIGNIFICANT CHANGE UP
MAGNESIUM SERPL-MCNC: 2.2 MG/DL — SIGNIFICANT CHANGE UP (ref 1.6–2.6)
MAGNESIUM SERPL-MCNC: 2.3 MG/DL — SIGNIFICANT CHANGE UP (ref 1.6–2.6)
MCHC RBC-ENTMCNC: 28.6 PG — SIGNIFICANT CHANGE UP (ref 27–34)
MCHC RBC-ENTMCNC: 28.9 PG — SIGNIFICANT CHANGE UP (ref 27–34)
MCHC RBC-ENTMCNC: 33.3 GM/DL — SIGNIFICANT CHANGE UP (ref 32–36)
MCHC RBC-ENTMCNC: 33.7 GM/DL — SIGNIFICANT CHANGE UP (ref 32–36)
MCV RBC AUTO: 85.7 FL — SIGNIFICANT CHANGE UP (ref 80–100)
MCV RBC AUTO: 85.7 FL — SIGNIFICANT CHANGE UP (ref 80–100)
MRSA PCR RESULT.: SIGNIFICANT CHANGE UP
NITRITE UR-MCNC: NEGATIVE — SIGNIFICANT CHANGE UP
NON HDL CHOLESTEROL: 63 MG/DL — SIGNIFICANT CHANGE UP
NRBC # BLD: 0 /100 WBCS — SIGNIFICANT CHANGE UP (ref 0–0)
NRBC # BLD: 0 /100 WBCS — SIGNIFICANT CHANGE UP (ref 0–0)
PCO2 BLDV: 42 MMHG — SIGNIFICANT CHANGE UP (ref 42–55)
PCO2 BLDV: 49 MMHG — SIGNIFICANT CHANGE UP (ref 42–55)
PCO2 BLDV: 52 MMHG — SIGNIFICANT CHANGE UP (ref 42–55)
PCO2 BLDV: 59 MMHG — HIGH (ref 42–55)
PH BLDV: 7.36 — SIGNIFICANT CHANGE UP (ref 7.32–7.43)
PH BLDV: 7.36 — SIGNIFICANT CHANGE UP (ref 7.32–7.43)
PH BLDV: 7.38 — SIGNIFICANT CHANGE UP (ref 7.32–7.43)
PH BLDV: 7.43 — SIGNIFICANT CHANGE UP (ref 7.32–7.43)
PH UR: 5 — SIGNIFICANT CHANGE UP (ref 5–8)
PHOSPHATE SERPL-MCNC: 5.8 MG/DL — HIGH (ref 2.5–4.5)
PHOSPHATE SERPL-MCNC: 7 MG/DL — HIGH (ref 2.5–4.5)
PLATELET # BLD AUTO: 172 K/UL — SIGNIFICANT CHANGE UP (ref 150–400)
PLATELET # BLD AUTO: 177 K/UL — SIGNIFICANT CHANGE UP (ref 150–400)
PO2 BLDV: 28 MMHG — SIGNIFICANT CHANGE UP (ref 25–45)
PO2 BLDV: 32 MMHG — SIGNIFICANT CHANGE UP (ref 25–45)
PO2 BLDV: 35 MMHG — SIGNIFICANT CHANGE UP (ref 25–45)
PO2 BLDV: 55 MMHG — HIGH (ref 25–45)
POTASSIUM BLDV-SCNC: 3.7 MMOL/L — SIGNIFICANT CHANGE UP (ref 3.5–5.1)
POTASSIUM BLDV-SCNC: 4.2 MMOL/L — SIGNIFICANT CHANGE UP (ref 3.5–5.1)
POTASSIUM BLDV-SCNC: 4.2 MMOL/L — SIGNIFICANT CHANGE UP (ref 3.5–5.1)
POTASSIUM BLDV-SCNC: 4.5 MMOL/L — SIGNIFICANT CHANGE UP (ref 3.5–5.1)
POTASSIUM SERPL-MCNC: 4.3 MMOL/L — SIGNIFICANT CHANGE UP (ref 3.5–5.3)
POTASSIUM SERPL-MCNC: 4.5 MMOL/L — SIGNIFICANT CHANGE UP (ref 3.5–5.3)
POTASSIUM SERPL-SCNC: 4.3 MMOL/L — SIGNIFICANT CHANGE UP (ref 3.5–5.3)
POTASSIUM SERPL-SCNC: 4.5 MMOL/L — SIGNIFICANT CHANGE UP (ref 3.5–5.3)
PREALB SERPL-MCNC: 7 MG/DL — LOW (ref 20–40)
PROCALCITONIN SERPL-MCNC: 27.29 NG/ML — HIGH (ref 0.02–0.1)
PROCALCITONIN SERPL-MCNC: 30.29 NG/ML — HIGH (ref 0.02–0.1)
PROT SERPL-MCNC: 6 G/DL — SIGNIFICANT CHANGE UP (ref 6–8.3)
PROT SERPL-MCNC: 6.1 G/DL — SIGNIFICANT CHANGE UP (ref 6–8.3)
PROT UR-MCNC: 30 MG/DL
PROTHROM AB SERPL-ACNC: 17.3 SEC — HIGH (ref 9.5–13)
RBC # BLD: 3.36 M/UL — LOW (ref 4.2–5.8)
RBC # BLD: 3.57 M/UL — LOW (ref 4.2–5.8)
RBC # FLD: 14.9 % — HIGH (ref 10.3–14.5)
RBC # FLD: 15.4 % — HIGH (ref 10.3–14.5)
RBC CASTS # UR COMP ASSIST: 2 /HPF — SIGNIFICANT CHANGE UP (ref 0–4)
REVIEW: SIGNIFICANT CHANGE UP
S AUREUS DNA NOSE QL NAA+PROBE: SIGNIFICANT CHANGE UP
SAO2 % BLDV: 38.9 % — LOW (ref 67–88)
SAO2 % BLDV: 42.7 % — LOW (ref 67–88)
SAO2 % BLDV: 53.5 % — LOW (ref 67–88)
SAO2 % BLDV: 86.6 % — SIGNIFICANT CHANGE UP (ref 67–88)
SODIUM SERPL-SCNC: 132 MMOL/L — LOW (ref 135–145)
SODIUM SERPL-SCNC: 134 MMOL/L — LOW (ref 135–145)
SP GR SPEC: 1.02 — SIGNIFICANT CHANGE UP (ref 1–1.03)
SQUAMOUS # UR AUTO: 4 /HPF — SIGNIFICANT CHANGE UP (ref 0–5)
TRIGL SERPL-MCNC: 75 MG/DL — SIGNIFICANT CHANGE UP
TSH SERPL-MCNC: 4.94 UIU/ML — HIGH (ref 0.27–4.2)
UROBILINOGEN FLD QL: 0.2 MG/DL — SIGNIFICANT CHANGE UP (ref 0.2–1)
WBC # BLD: 12.74 K/UL — HIGH (ref 3.8–10.5)
WBC # BLD: 12.85 K/UL — HIGH (ref 3.8–10.5)
WBC # FLD AUTO: 12.74 K/UL — HIGH (ref 3.8–10.5)
WBC # FLD AUTO: 12.85 K/UL — HIGH (ref 3.8–10.5)
WBC UR QL: 18 /HPF — HIGH (ref 0–5)

## 2024-07-17 PROCEDURE — 71045 X-RAY EXAM CHEST 1 VIEW: CPT | Mod: 26,76

## 2024-07-17 PROCEDURE — 99232 SBSQ HOSP IP/OBS MODERATE 35: CPT

## 2024-07-17 PROCEDURE — 99233 SBSQ HOSP IP/OBS HIGH 50: CPT

## 2024-07-17 PROCEDURE — 71045 X-RAY EXAM CHEST 1 VIEW: CPT | Mod: 26,77,76

## 2024-07-17 PROCEDURE — 93306 TTE W/DOPPLER COMPLETE: CPT | Mod: 26

## 2024-07-17 RX ORDER — PIPERACILLIN SODIUM, TAZOBACTAM SODIUM 3; .375 G/15ML; G/15ML
3.38 INJECTION, POWDER, LYOPHILIZED, FOR SOLUTION INTRAVENOUS ONCE
Refills: 0 | Status: COMPLETED | OUTPATIENT
Start: 2024-07-17 | End: 2024-07-17

## 2024-07-17 RX ORDER — INSULIN LISPRO 100/ML
VIAL (ML) SUBCUTANEOUS EVERY 6 HOURS
Refills: 0 | Status: DISCONTINUED | OUTPATIENT
Start: 2024-07-17 | End: 2024-07-17

## 2024-07-17 RX ORDER — PIPERACILLIN SODIUM, TAZOBACTAM SODIUM 3; .375 G/15ML; G/15ML
3.38 INJECTION, POWDER, LYOPHILIZED, FOR SOLUTION INTRAVENOUS EVERY 12 HOURS
Refills: 0 | Status: DISCONTINUED | OUTPATIENT
Start: 2024-07-18 | End: 2024-07-23

## 2024-07-17 RX ORDER — DEXTROSE MONOHYDRATE, SODIUM CHLORIDE, SODIUM LACTATE, CALCIUM CHLORIDE, MAGNESIUM CHLORIDE 1.5; 538; 448; 18.4; 5.08 G/100ML; MG/100ML; MG/100ML; MG/100ML; MG/100ML
1000 SOLUTION INTRAPERITONEAL
Refills: 0 | Status: DISCONTINUED | OUTPATIENT
Start: 2024-07-17 | End: 2024-07-19

## 2024-07-17 RX ORDER — BENZOCAINE, BUTAMBEN, AND TETRACAINE HYDROCHLORIDE .028; .004; .004 G/.2G; G/.2G; G/.2G
1 SOLUTION TOPICAL EVERY 6 HOURS
Refills: 0 | Status: DISCONTINUED | OUTPATIENT
Start: 2024-07-17 | End: 2024-07-23

## 2024-07-17 RX ORDER — INSULIN LISPRO 100/ML
VIAL (ML) SUBCUTANEOUS EVERY 4 HOURS
Refills: 0 | Status: DISCONTINUED | OUTPATIENT
Start: 2024-07-17 | End: 2024-07-17

## 2024-07-17 RX ADMIN — HEPARIN SODIUM 5000 UNIT(S): 1000 INJECTION, SOLUTION INTRAVENOUS; SUBCUTANEOUS at 22:01

## 2024-07-17 RX ADMIN — BUDESONIDE AND FORMOTEROL FUMARATE DIHYDRATE 2 PUFF(S): 80; 4.5 AEROSOL RESPIRATORY (INHALATION) at 17:39

## 2024-07-17 RX ADMIN — PIPERACILLIN SODIUM, TAZOBACTAM SODIUM 200 GRAM(S): 3; .375 INJECTION, POWDER, LYOPHILIZED, FOR SOLUTION INTRAVENOUS at 11:00

## 2024-07-17 RX ADMIN — Medication 2 PUFF(S): at 23:21

## 2024-07-17 RX ADMIN — BUDESONIDE AND FORMOTEROL FUMARATE DIHYDRATE 2 PUFF(S): 80; 4.5 AEROSOL RESPIRATORY (INHALATION) at 05:41

## 2024-07-17 RX ADMIN — DEXTROSE MONOHYDRATE, SODIUM CHLORIDE, SODIUM LACTATE, CALCIUM CHLORIDE, MAGNESIUM CHLORIDE 75 MILLILITER(S): 1.5; 538; 448; 18.4; 5.08 SOLUTION INTRAPERITONEAL at 22:01

## 2024-07-17 RX ADMIN — BENZOCAINE, BUTAMBEN, AND TETRACAINE HYDROCHLORIDE 1 SPRAY(S): .028; .004; .004 SOLUTION TOPICAL at 13:02

## 2024-07-17 RX ADMIN — Medication 650 MILLIGRAM(S): at 18:30

## 2024-07-17 RX ADMIN — HEPARIN SODIUM 5000 UNIT(S): 1000 INJECTION, SOLUTION INTRAVENOUS; SUBCUTANEOUS at 05:45

## 2024-07-17 RX ADMIN — Medication 2 PUFF(S): at 17:39

## 2024-07-17 RX ADMIN — Medication 200 GRAM(S): at 04:21

## 2024-07-17 RX ADMIN — Medication 2 PUFF(S): at 11:34

## 2024-07-17 RX ADMIN — Medication 260 MILLIGRAM(S): at 18:04

## 2024-07-17 RX ADMIN — Medication 2: at 13:30

## 2024-07-17 RX ADMIN — Medication 2 PUFF(S): at 05:41

## 2024-07-17 RX ADMIN — DEXTROSE MONOHYDRATE, SODIUM CHLORIDE, SODIUM LACTATE, CALCIUM CHLORIDE, MAGNESIUM CHLORIDE 100 MILLILITER(S): 1.5; 538; 448; 18.4; 5.08 SOLUTION INTRAPERITONEAL at 07:57

## 2024-07-17 RX ADMIN — DEXTROSE MONOHYDRATE, SODIUM CHLORIDE, SODIUM LACTATE, CALCIUM CHLORIDE, MAGNESIUM CHLORIDE 100 MILLILITER(S): 1.5; 538; 448; 18.4; 5.08 SOLUTION INTRAPERITONEAL at 10:44

## 2024-07-17 RX ADMIN — HEPARIN SODIUM 5000 UNIT(S): 1000 INJECTION, SOLUTION INTRAVENOUS; SUBCUTANEOUS at 13:27

## 2024-07-17 RX ADMIN — CHLORHEXIDINE GLUCONATE 1 APPLICATION(S): 500 CLOTH TOPICAL at 05:35

## 2024-07-17 RX ADMIN — PANTOPRAZOLE SODIUM 40 MILLIGRAM(S): 20 TABLET, DELAYED RELEASE ORAL at 11:37

## 2024-07-17 RX ADMIN — Medication 2: at 17:49

## 2024-07-17 RX ADMIN — Medication 260 MILLIGRAM(S): at 05:34

## 2024-07-17 RX ADMIN — PIPERACILLIN SODIUM, TAZOBACTAM SODIUM 25 GRAM(S): 3; .375 INJECTION, POWDER, LYOPHILIZED, FOR SOLUTION INTRAVENOUS at 13:24

## 2024-07-17 NOTE — PROGRESS NOTE ADULT - ASSESSMENT
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 Leukocytosis to 21 with lactate 3.9, improved to 3.1 after 1L bolus.  CTAP significant for SBO with TP in R mid abdomen, thickened jejunum, no grossly obvious focal mass, dilated stomach and distal esophagus; possible Crohn's disease. Admitted to SICU for management of volume resuscitation in high grade SBO.    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 Leukocytosis to 21 with lactate 3.9, improved to 3.1 after 1L bolus.  CTAP significant for SBO with TP in R mid abdomen, thickened jejunum, no grossly obvious focal mass, dilated stomach and distal esophagus; possible Crohn's disease. Admitted to SICU for management of volume resuscitation in high grade SBO. Lactate has dropped to 1.7 this AM. Showing improvement after SICU admission.    Plan  -Monitor NGT output, I/Os  -SICU Care  -OR this admission for nonimproving SBO  -NPO/IVF  -DVT Prophylaxis    Gold Team  [id number]

## 2024-07-17 NOTE — PROGRESS NOTE ADULT - ATTENDING COMMENTS
Denies abd pain  feels better  abdomen soft, NT, mildly distended  The "mass" seen on the May CT at [place] was at the ligament of Treitz and has resolved since. Seems likely to have been a contained perf.   The CT and admission is under name Miko Peterson.  I asked the Radiologist here to compare and issue addendum on the CT from this admission.  Unclear etiology of his current SBO  Will obtain f/u CT tomorrow after he is further decompressed

## 2024-07-17 NOTE — OCCUPATIONAL THERAPY INITIAL EVALUATION ADULT - LIVES WITH, PROFILE
Pt resides in private home with spouse, 2 steps to enter, one level within, PTA independent with mobility and ADL's, owns no DME, (+)driving Pt resides in private home with spouse, 2 steps to enter, one level within, PTA independent with mobility and ADL's, uses rolling walker occasionally, (+)driving

## 2024-07-17 NOTE — PROGRESS NOTE ADULT - ATTENDING COMMENTS
83y Male with h/o DM, HTN with recently diag with jejunal mass admitted with brown/bloody emesis and abdominal pain.  and found to  high grade SBO.     NPO, NGT decompression, XRAY to assess location of tip 83y Male with h/o DM, HTN with recently diag with jejunal mass admitted with brown/bloody emesis and abdominal pain.  and found to  high grade SBO.     NPO, NGT decompression, XRAY to assess location of tip  IVF change to D5LR sec to borderline hypoglycemia  Hemodynamically stable not on pressure  Febrile pancultured, start empiric abx Zosyn  TPN consult, PICC line placement if cultures remain neg X 48 hrs  Pharm DVT ppx SQH    Spoke to his son at bed side

## 2024-07-17 NOTE — PROGRESS NOTE ADULT - SUBJECTIVE AND OBJECTIVE BOX
[facility] NUTRITION SUPPORT-- FOLLOW UP NOTE      24 hour events/subjective:  Patient seen and examined at bedside, chart reviewed and events noted and I's and O's reviewed.  Patient denies chest pain, shortness of breath, nausea or vomiting, dizziness, chills at time of visit and feels much improved since NGT decompression.    Patient's VSS except T max 101.3 this AM and no other acute overnight events noted.   Patient continues to be NPO related to SBO and surgical plan awaiting finalization.  Patient remains critically ill on continuos ICU monitoring.       ROS:  Except as noted above, all other systems reviewed and are negative     Diet:  Diet, NPO (07-16-24 @ 07:17)      PAST HISTORY  --------------------------------------------------------------------------------  PAST MEDICAL & SURGICAL HISTORY:  Asthma      Diabetes        No significant changes to PMH, PSH, FHx, SHx, unless otherwise noted    ALLERGIES & MEDICATIONS  --------------------------------------------------------------------------------  Allergies    No Known Allergies    Intolerances      Standing Inpatient Medications  albuterol    90 MICROgram(s) HFA Inhaler 2 Puff(s) Inhalation every 6 hours  budesonide 160 MICROgram(s)/formoterol 4.5 MICROgram(s) Inhaler 2 Puff(s) Inhalation two times a day  chlorhexidine 2% Cloths 1 Application(s) Topical <User Schedule>  dextrose 5% + lactated ringers. 1000 milliLiter(s) IV Continuous <Continuous>  heparin   Injectable 5000 Unit(s) SubCutaneous every 8 hours  insulin lispro (ADMELOG) corrective regimen sliding scale   SubCutaneous every 4 hours  pantoprazole  Injectable 40 milliGRAM(s) IV Push daily    PRN Inpatient Medications  acetaminophen   IVPB .. 650 milliGRAM(s) IV Intermittent every 6 hours PRN  tetracaine/benzocaine/butamben Spray 1 Spray(s) Topical every 6 hours PRN        VITALS/PHYSICAL EXAM  --------------------------------------------------------------------------------  T(C): 36.6 (07-17-24 @ 11:00), Max: 38.5 (07-17-24 @ 05:00)  HR: 91 (07-17-24 @ 13:00) (80 - 102)  BP: 140/58 (07-17-24 @ 13:00) (98/45 - 140/58)  RR: 17 (07-17-24 @ 13:00) (14 - 24)  SpO2: 97% (07-17-24 @ 13:00) (92% - 99%)  Wt(kg): --      07-16-24 @ 07:01  -  07-17-24 @ 07:00  --------------------------------------------------------  IN: 3239.9 mL / OUT: 2520 mL / NET: 719.9 mL    07-17-24 @ 07:01  -  07-17-24 @ 14:06  --------------------------------------------------------  IN: 730 mL / OUT: 260 mL / NET: 470 mL      I&O's Detail    16 Jul 2024 07:01  -  17 Jul 2024 07:00  --------------------------------------------------------  IN:    Enteral Tube Flush: 60 mL    IV PiggyBack: 350 mL    IV PiggyBack: 829.9 mL    Lactated Ringers: 2000 mL  Total IN: 3239.9 mL    OUT:    Indwelling Catheter - Urethral (mL): 1070 mL    Nasogastric/Oral tube (mL): 1450 mL  Total OUT: 2520 mL    Total NET: 719.9 mL      17 Jul 2024 07:01  -  17 Jul 2024 14:06  --------------------------------------------------------  IN:    dextrose 5% + lactated ringers: 300 mL    Enteral Tube Flush: 30 mL    IV PiggyBack: 100 mL    Lactated Ringers: 300 mL  Total IN: 730 mL    OUT:    Indwelling Catheter - Urethral (mL): 260 mL  Total OUT: 260 mL    Total NET: 470 mL          Physical Exam:  Gen: NAD, thin/frail appearing; laying in bed   HEENT: PERRL, supple neck, no JVD; NGT  Chest: non labored breathing, equal chest expansion bilaterally  Abd: soft, nontender/nondistended  : No suprapubic tenderness  Ext: Warm, FROM, no edema b/l LE  Neuro: No focal deficits  Psych: Normal affect and mood  Skin: Warm, without rashes     LABS/STUDIES  --------------------------------------------------------------------------------              9.6    12.85 >-----------<  172      [07-17-24 @ 11:06]              28.8     134  |  96  |  51  ----------------------------<  103      [07-17-24 @ 11:06]  4.3   |  23  |  1.76        Ca     8.7     [07-17-24 @ 11:06]      Mg     2.3     [07-17-24 @ 11:06]      Phos  5.8     [07-17-24 @ 11:06]    TPro  6.0  /  Alb  2.7  /  TBili  0.4  /  DBili  x   /  AST  21  /  ALT  10  /  AlkPhos  63  [07-17-24 @ 11:06]    PT/INR: PT 17.3 , INR 1.59       [07-17-24 @ 11:06]  PTT: 32.5       [07-17-24 @ 11:06]      Ca ionizedBlood Gas Calcium, Ionized - Venous: 1.22 mmol/L (07-17-24 @ 10:38)  Blood Gas Calcium, Ionized - Venous: 1.12 mmol/L (07-17-24 @ 04:23)  Blood Gas Calcium, Ionized - Venous: 1.12 mmol/L (07-17-24 @ 00:40)  Blood Gas Calcium, Ionized - Venous: 1.20 mmol/L (07-16-24 @ 22:36)  Blood Gas Calcium, Ionized - Venous: 1.10 mmol/L (07-16-24 @ 18:02)  Blood Gas Calcium, Ionized - Venous: 1.12 mmol/L (07-16-24 @ 13:27)  Blood Gas Calcium, Ionized - Venous: 1.13 mmol/L (07-16-24 @ 02:05)  Blood Gas Calcium, Ionized - Venous: 1.18 mmol/L (07-15-24 @ 22:50)    Creatinine Trend:  POC glucoseGlucose: 103 mg/dL (07-17-24 @ 11:06)  Glucose: 92 mg/dL (07-17-24 @ 00:45)  Glucose: 91 mg/dL (07-16-24 @ 22:37)  CAPILLARY BLOOD GLUCOSE      POCT Blood Glucose.: 161 mg/dL (17 Jul 2024 13:26)  POCT Blood Glucose.: 120 mg/dL (17 Jul 2024 09:15)  POCT Blood Glucose.: 91 mg/dL (17 Jul 2024 05:42)  POCT Blood Glucose.: 105 mg/dL (17 Jul 2024 01:01)  POCT Blood Glucose.: 225 mg/dL (16 Jul 2024 17:54)  POCT Blood Glucose.: 252 mg/dL (16 Jul 2024 17:53)    Prealbumin  Triglycerides

## 2024-07-17 NOTE — PROGRESS NOTE ADULT - SUBJECTIVE AND OBJECTIVE BOX
SICU PROGRESS NOTE:    SUBJECTIVE/ROS:  [ ] A ten-point review of systems was otherwise negative except as noted.  [ ] Due to altered mental status/intubation, subjective information were not able to be obtained from the patient. History was obtained, to the extent possible, from review of the chart and collateral sources of information.    NEURO  RASS:     GCS:     CAM ICU:  Exam: awake, alert, oriented  Meds: acetaminophen   IVPB .. 650 milliGRAM(s) IV Intermittent every 6 hours PRN Mild Pain (1 - 3)    [x] Adequacy of sedation and pain control has been assessed and adjusted    RESPIRATORY  RR: 19 (07-17-24 @ 04:00) (15 - 24)  SpO2: 92% (07-17-24 @ 04:00) (89% - 99%)  Wt(kg): --  Exam: unlabored, clear to auscultation bilaterally  Mechanical Ventilation:   ABG - ( 16 Jul 2024 09:00 )  pH: x     /  pCO2: x     /  pO2: x     / HCO3: x     / Base Excess: x     /  SaO2: x       Lactate: 5.9              [N/A] Extubation Readiness Assessed  Meds: albuterol    90 MICROgram(s) HFA Inhaler 2 Puff(s) Inhalation every 6 hours  budesonide 160 MICROgram(s)/formoterol 4.5 MICROgram(s) Inhaler 2 Puff(s) Inhalation two times a day      CARDIOVASCULAR  HR: 92 (07-17-24 @ 04:00) (80 - 115)  BP: 108/53 (07-17-24 @ 04:00) (85/46 - 153/58)  BP(mean): 76 (07-17-24 @ 04:00) (58 - 86)  ABP: --  ABP(mean): --  Wt(kg): --  CVP(cm H2O): --  VBG - ( 17 Jul 2024 04:23 )  pH: 7.43  /  pCO2: 42    /  pO2: 55    / HCO3: 28    / Base Excess: 3.2   /  SaO2: 86.6   Lactate: 1.7        Lactate, Blood: 5.9 mmol/L (07-16 @ 09:00)    Exam: regular rate and rhythm  Cardiac Rhythm: sinus  Perfusion     [x]Adequate   [ ]Inadequate  Mentation   [x]Normal       [ ]Reduced  Extremities  [x]Warm         [ ]Cool  Volume Status [ ]Hypervolemic [x]Euvolemic [ ]Hypovolemic  Meds:     GI/NUTRITION  Exam: soft, nontender, nondistended, incision C/D/I  Diet:  Meds: pantoprazole  Injectable 40 milliGRAM(s) IV Push daily      GENITOURINARY  I&O's Detail    07-16 @ 07:01  -  07-17 @ 04:41  --------------------------------------------------------  IN:    Enteral Tube Flush: 60 mL    IV PiggyBack: 350 mL    IV PiggyBack: 664.9 mL    Lactated Ringers: 1700 mL  Total IN: 2774.9 mL    OUT:    Indwelling Catheter - Urethral (mL): 855 mL    Nasogastric/Oral tube (mL): 850 mL  Total OUT: 1705 mL    Total NET: 1069.9 mL          07-17    132<L>  |  94<L>  |  55<H>  ----------------------------<  92  4.5   |  23  |  2.02<H>    Ca    8.5      17 Jul 2024 00:45  Phos  7.0     07-17  Mg     2.2     07-17    TPro  6.1  /  Alb  2.8<L>  /  TBili  0.5  /  DBili  x   /  AST  21  /  ALT  10  /  AlkPhos  63  07-17    [ ] Bowens catheter, indication: N/A  Meds: lactated ringers. 1000 milliLiter(s) IV Continuous <Continuous>      HEMATOLOGIC  Meds: heparin   Injectable 5000 Unit(s) SubCutaneous every 8 hours    [x] VTE Prophylaxis                        10.3   12.74 )-----------( 177      ( 17 Jul 2024 00:49 )             30.6     PT/INR - ( 16 Jul 2024 22:37 )   PT: 16.0 sec;   INR: 1.54 ratio         PTT - ( 16 Jul 2024 22:37 )  PTT:29.0 sec  Transfusion     [ ] PRBC   [ ] Platelets   [ ] FFP   [ ] Cryoprecipitate    INFECTIOUS DISEASES  WBC Count: 12.74 K/uL (07-17 @ 00:49)  WBC Count: 11.40 K/uL (07-16 @ 22:37)  WBC Count: 5.66 K/uL (07-16 @ 13:37)  WBC Count: 6.91 K/uL (07-16 @ 09:00)    RECENT CULTURES:    Meds:     ENDOCRINE  CAPILLARY BLOOD GLUCOSE      POCT Blood Glucose.: 105 mg/dL (17 Jul 2024 01:01)  POCT Blood Glucose.: 225 mg/dL (16 Jul 2024 17:54)  POCT Blood Glucose.: 252 mg/dL (16 Jul 2024 17:53)  POCT Blood Glucose.: 130 mg/dL (16 Jul 2024 12:01)    Meds: insulin lispro (ADMELOG) corrective regimen sliding scale   SubCutaneous every 6 hours

## 2024-07-17 NOTE — OCCUPATIONAL THERAPY INITIAL EVALUATION ADULT - PERTINENT HX OF CURRENT PROBLEM, REHAB EVAL
83M, poor historian, PMH of DM, asthma, no known PSHx recently admitted to [facility] in 5/2024 for abdominal pain, found to have 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 Reports abdominal pain improved with episodes of emesis. Last BM/flatus was yesterday.  Patient reports getting colonoscopy 20 years ago, however in [facility] note last colonoscopy was 8 years ago per son with non-concerning findings. No history of endoscopy. At [facility], no intervention at the time and recommended for outpatient GI followup. Differential diagnosis at the time was contained perforation or inflamed giant diverticulum. In ED, patient afebrile, hemodynamically stable. Leukocytosis to 21. Elevated lactate 3.9, improved to 3.1 with 1L NS bolus.  CT abdomen-Small bowel obstruction with transition point in the right mid abdomen. Thickening of jejunum in the left lower quadrant. Dilated vessels in the rectum suggests transmural inflammation. No grossly obvious focal mass. Consider acute on chronic inflammatory bowel disease (Crohn's disease).    As there is dilatation of the stomach and distal esophagus, the patient may benefit from orogastric tube decompression. No free air or discrete focal collection. No obvious fistulization.

## 2024-07-17 NOTE — OCCUPATIONAL THERAPY INITIAL EVALUATION ADULT - TRANSFER TRAINING, PT EVAL
Patient will transfer to toilet with DME as needed (I), in 4 weeks. Pt will perform SPT (I) with AD as needed within 4 weeks.

## 2024-07-17 NOTE — OCCUPATIONAL THERAPY INITIAL EVALUATION ADULT - ADL RETRAINING, OT EVAL
Patient will dress lower body (I), AE as needed in 4 weeks. Patient will dress upper body (I) in 4 weeks

## 2024-07-17 NOTE — PROGRESS NOTE ADULT - SUBJECTIVE AND OBJECTIVE BOX
Surgery Progress Note:    OVERNIGHT EVENTS: NAEO    SUBJECTIVE: Pt seen and examined at bedside.     MEDICATIONS  (STANDING):  albuterol    90 MICROgram(s) HFA Inhaler 2 Puff(s) Inhalation every 6 hours  budesonide 160 MICROgram(s)/formoterol 4.5 MICROgram(s) Inhaler 2 Puff(s) Inhalation two times a day  chlorhexidine 2% Cloths 1 Application(s) Topical <User Schedule>  heparin   Injectable 5000 Unit(s) SubCutaneous every 8 hours  insulin lispro (ADMELOG) corrective regimen sliding scale   SubCutaneous every 4 hours  lactated ringers. 1000 milliLiter(s) (100 mL/Hr) IV Continuous <Continuous>  pantoprazole  Injectable 40 milliGRAM(s) IV Push daily    MEDICATIONS  (PRN):  acetaminophen   IVPB .. 650 milliGRAM(s) IV Intermittent every 6 hours PRN Mild Pain (1 - 3)    T(C): 37 (07-17-24 @ 03:00), Max: 37.3 (07-16-24 @ 11:05)  HR: 96 (07-17-24 @ 05:45) (80 - 115)  BP: 108/53 (07-17-24 @ 04:00) (85/46 - 153/58)  RR: 19 (07-17-24 @ 04:00) (15 - 24)  SpO2: 95% (07-17-24 @ 05:45) (92% - 99%)    07-16-24 @ 07:01  -  07-17-24 @ 05:56  --------------------------------------------------------  IN: 2774.9 mL / OUT: 1705 mL / NET: 1069.9 mL      LABS:                        10.3   12.74 )-----------( 177      ( 17 Jul 2024 00:49 )             30.6     07-17    132<L>  |  94<L>  |  55<H>  ----------------------------<  92  4.5   |  23  |  2.02<H>    Ca    8.5      17 Jul 2024 00:45  Phos  7.0     07-17  Mg     2.2     07-17    TPro  6.1  /  Alb  2.8<L>  /  TBili  0.5  /  DBili  x   /  AST  21  /  ALT  10  /  AlkPhos  63  07-17    PT/INR - ( 16 Jul 2024 22:37 )   PT: 16.0 sec;   INR: 1.54 ratio         PTT - ( 16 Jul 2024 22:37 )  PTT:29.0 sec  Urinalysis Basic - ( 17 Jul 2024 00:45 )    Color: x / Appearance: x / SG: x / pH: x  Gluc: 92 mg/dL / Ketone: x  / Bili: x / Urobili: x   Blood: x / Protein: x / Nitrite: x   Leuk Esterase: x / RBC: x / WBC x   Sq Epi: x / Non Sq Epi: x / Bacteria: x      PE:  Gen: NAD  CV: Pulse regular present  Resp: Nonlabored  Abdomen: Soft, nontender Surgery Progress Note:    OVERNIGHT EVENTS: NAEO    SUBJECTIVE: Pt seen and examined at bedside. Pt reports improvement in nausea and distention w/ NG tube. Pt denying nausea/vomiting. No flatus or BMs.    MEDICATIONS  (STANDING):  albuterol    90 MICROgram(s) HFA Inhaler 2 Puff(s) Inhalation every 6 hours  budesonide 160 MICROgram(s)/formoterol 4.5 MICROgram(s) Inhaler 2 Puff(s) Inhalation two times a day  chlorhexidine 2% Cloths 1 Application(s) Topical <User Schedule>  heparin   Injectable 5000 Unit(s) SubCutaneous every 8 hours  insulin lispro (ADMELOG) corrective regimen sliding scale   SubCutaneous every 4 hours  lactated ringers. 1000 milliLiter(s) (100 mL/Hr) IV Continuous <Continuous>  pantoprazole  Injectable 40 milliGRAM(s) IV Push daily    MEDICATIONS  (PRN):  acetaminophen   IVPB .. 650 milliGRAM(s) IV Intermittent every 6 hours PRN Mild Pain (1 - 3)    T(C): 37 (07-17-24 @ 03:00), Max: 37.3 (07-16-24 @ 11:05)  HR: 96 (07-17-24 @ 05:45) (80 - 115)  BP: 108/53 (07-17-24 @ 04:00) (85/46 - 153/58)  RR: 19 (07-17-24 @ 04:00) (15 - 24)  SpO2: 95% (07-17-24 @ 05:45) (92% - 99%)    07-16-24 @ 07:01  -  07-17-24 @ 05:56  --------------------------------------------------------  IN: 2774.9 mL / OUT: 1705 mL / NET: 1069.9 mL      LABS:                        10.3   12.74 )-----------( 177      ( 17 Jul 2024 00:49 )             30.6     07-17    132<L>  |  94<L>  |  55<H>  ----------------------------<  92  4.5   |  23  |  2.02<H>    Ca    8.5      17 Jul 2024 00:45  Phos  7.0     07-17  Mg     2.2     07-17    TPro  6.1  /  Alb  2.8<L>  /  TBili  0.5  /  DBili  x   /  AST  21  /  ALT  10  /  AlkPhos  63  07-17    PT/INR - ( 16 Jul 2024 22:37 )   PT: 16.0 sec;   INR: 1.54 ratio         PTT - ( 16 Jul 2024 22:37 )  PTT:29.0 sec  Urinalysis Basic - ( 17 Jul 2024 00:45 )    Color: x / Appearance: x / SG: x / pH: x  Gluc: 92 mg/dL / Ketone: x  / Bili: x / Urobili: x   Blood: x / Protein: x / Nitrite: x   Leuk Esterase: x / RBC: x / WBC x   Sq Epi: x / Non Sq Epi: x / Bacteria: x      PE:  Gen: NAD  CV: Pulse regular present  Resp: Nonlabored  Abdomen: Soft, nontender, nondistended, NGT in place on continuous suction.

## 2024-07-17 NOTE — PROGRESS NOTE ADULT - ASSESSMENT
83M PMH of Asthma and BPH on Flomax, last colonoscopy around 8-10 years ago (normal per wife/patient) no known PSHx recently admitted to [facility] in 5/2024 for abdominal pain, found to have large ulcerating mass communicating with proximal jejunum on CT (discharged home with outpatient GI f/u for concern of contained perforation vs inflamed giant diverticulum) presented to [facility] given hematemesis constipation and abdominal pain. Reports abdominal pain improved with episodes of emesis. Last BM/flatus was yesterday.  TPN team consulted given concern for severe protein calorie malnutrition and anticipated prolonged inadequate caloric intake      - Nutritional Assessment, patient with severe protein calorie malnutrition not meeting nutritional goals enterally due to SBO/NPO status and would likely benefit from TPN for nutritional support; prealbumin pending - trend closely   - TPN plan:  Approved for TPN as long as unable to resume diet however need to await cultures for PICC access given fevers/pending blood cultures and Procalcitonin of 27 ng/mL on 7/17   - TPN access:  Patient will need a dedicated central line once able to get ID/bacteremia clearance   - Follow up baseline nutrition parameters:  TSH, Prealbumin  - Hyponatremia/NAA:  recommend Renal evaluation;  IV fluids per SICU; trend in AM; recommend urine studies and BNP evaluation; f/u Echo   - Electrolyte Imbalance risk:  recommend to check CMP, Mg, Phos and ionized Ca daily, to be supplemented peripherally per primary team.  - SBO:  monitor NGT output overnight, f/u surgical plan and remaining nutrition parameters in AM  - Recommend strict intake and output/weight checks three times a week  - Risk of glucose dysfunction with TPN:  HgA1c 7.8% on 7/17/24; continue finger sticks with RISS   - Risk for Hypertriglyceridemia with TPN:  baseline lipid profile reviewed; TG 75 mg/dL on 7/17; monitor TG closely once/if TPN starts  - Global care per primary team     Available on TEAMS  TPN spectra [id number] ([phone] when dialing from outside line)  M-F 8A-2P, Weekends and holidays 8/9A-12/1P  Discussed with Dr. Harvey Tee

## 2024-07-18 LAB
ALBUMIN SERPL ELPH-MCNC: 2.8 G/DL — LOW (ref 3.3–5)
ALBUMIN SERPL ELPH-MCNC: 2.9 G/DL — LOW (ref 3.3–5)
ALP SERPL-CCNC: 65 U/L — SIGNIFICANT CHANGE UP (ref 40–120)
ALP SERPL-CCNC: 88 U/L — SIGNIFICANT CHANGE UP (ref 40–120)
ALT FLD-CCNC: 12 U/L — SIGNIFICANT CHANGE UP (ref 10–45)
ALT FLD-CCNC: 13 U/L — SIGNIFICANT CHANGE UP (ref 10–45)
ANION GAP SERPL CALC-SCNC: 11 MMOL/L — SIGNIFICANT CHANGE UP (ref 5–17)
ANION GAP SERPL CALC-SCNC: 14 MMOL/L — SIGNIFICANT CHANGE UP (ref 5–17)
APTT BLD: 36.3 SEC — HIGH (ref 24.5–35.6)
AST SERPL-CCNC: 24 U/L — SIGNIFICANT CHANGE UP (ref 10–40)
AST SERPL-CCNC: 32 U/L — SIGNIFICANT CHANGE UP (ref 10–40)
BASE EXCESS BLDV CALC-SCNC: 6.1 MMOL/L — HIGH (ref -2–3)
BILIRUB SERPL-MCNC: 0.4 MG/DL — SIGNIFICANT CHANGE UP (ref 0.2–1.2)
BILIRUB SERPL-MCNC: 0.5 MG/DL — SIGNIFICANT CHANGE UP (ref 0.2–1.2)
BUN SERPL-MCNC: 26 MG/DL — HIGH (ref 7–23)
BUN SERPL-MCNC: 38 MG/DL — HIGH (ref 7–23)
CA-I SERPL-SCNC: 1.21 MMOL/L — SIGNIFICANT CHANGE UP (ref 1.15–1.33)
CALCIUM SERPL-MCNC: 8.7 MG/DL — SIGNIFICANT CHANGE UP (ref 8.4–10.5)
CALCIUM SERPL-MCNC: 8.7 MG/DL — SIGNIFICANT CHANGE UP (ref 8.4–10.5)
CHLORIDE BLDV-SCNC: 101 MMOL/L — SIGNIFICANT CHANGE UP (ref 96–108)
CHLORIDE SERPL-SCNC: 99 MMOL/L — SIGNIFICANT CHANGE UP (ref 96–108)
CHLORIDE SERPL-SCNC: 99 MMOL/L — SIGNIFICANT CHANGE UP (ref 96–108)
CO2 BLDV-SCNC: 34 MMOL/L — HIGH (ref 22–26)
CO2 SERPL-SCNC: 22 MMOL/L — SIGNIFICANT CHANGE UP (ref 22–31)
CO2 SERPL-SCNC: 28 MMOL/L — SIGNIFICANT CHANGE UP (ref 22–31)
CREAT SERPL-MCNC: 1.2 MG/DL — SIGNIFICANT CHANGE UP (ref 0.5–1.3)
CREAT SERPL-MCNC: 1.54 MG/DL — HIGH (ref 0.5–1.3)
CULTURE RESULTS: ABNORMAL
EGFR: 44 ML/MIN/1.73M2 — LOW
EGFR: 59 ML/MIN/1.73M2 — LOW
GAS PNL BLDV: 134 MMOL/L — LOW (ref 136–145)
GAS PNL BLDV: SIGNIFICANT CHANGE UP
GLUCOSE BLDC GLUCOMTR-MCNC: 162 MG/DL — HIGH (ref 70–99)
GLUCOSE BLDC GLUCOMTR-MCNC: 164 MG/DL — HIGH (ref 70–99)
GLUCOSE BLDC GLUCOMTR-MCNC: 84 MG/DL — SIGNIFICANT CHANGE UP (ref 70–99)
GLUCOSE BLDV-MCNC: 109 MG/DL — HIGH (ref 70–99)
GLUCOSE SERPL-MCNC: 143 MG/DL — HIGH (ref 70–99)
GLUCOSE SERPL-MCNC: 60 MG/DL — LOW (ref 70–99)
HCO3 BLDV-SCNC: 32 MMOL/L — HIGH (ref 22–29)
HCT VFR BLD CALC: 31.5 % — LOW (ref 39–50)
HCT VFR BLDA CALC: 34 % — LOW (ref 39–51)
HGB BLD CALC-MCNC: 11.3 G/DL — LOW (ref 12.6–17.4)
HGB BLD-MCNC: 10.4 G/DL — LOW (ref 13–17)
INR BLD: 1.46 RATIO — HIGH (ref 0.85–1.18)
LACTATE BLDV-MCNC: 1.7 MMOL/L — SIGNIFICANT CHANGE UP (ref 0.5–2)
MAGNESIUM SERPL-MCNC: 2.2 MG/DL — SIGNIFICANT CHANGE UP (ref 1.6–2.6)
MAGNESIUM SERPL-MCNC: 2.4 MG/DL — SIGNIFICANT CHANGE UP (ref 1.6–2.6)
MCHC RBC-ENTMCNC: 28.5 PG — SIGNIFICANT CHANGE UP (ref 27–34)
MCHC RBC-ENTMCNC: 33 GM/DL — SIGNIFICANT CHANGE UP (ref 32–36)
MCV RBC AUTO: 86.3 FL — SIGNIFICANT CHANGE UP (ref 80–100)
NRBC # BLD: 0 /100 WBCS — SIGNIFICANT CHANGE UP (ref 0–0)
NT-PROBNP SERPL-SCNC: 2554 PG/ML — HIGH (ref 0–300)
PCO2 BLDV: 55 MMHG — SIGNIFICANT CHANGE UP (ref 42–55)
PH BLDV: 7.38 — SIGNIFICANT CHANGE UP (ref 7.32–7.43)
PHOSPHATE SERPL-MCNC: 2.7 MG/DL — SIGNIFICANT CHANGE UP (ref 2.5–4.5)
PHOSPHATE SERPL-MCNC: 3.8 MG/DL — SIGNIFICANT CHANGE UP (ref 2.5–4.5)
PLATELET # BLD AUTO: 195 K/UL — SIGNIFICANT CHANGE UP (ref 150–400)
PO2 BLDV: 27 MMHG — SIGNIFICANT CHANGE UP (ref 25–45)
POTASSIUM BLDV-SCNC: 4.2 MMOL/L — SIGNIFICANT CHANGE UP (ref 3.5–5.1)
POTASSIUM SERPL-MCNC: 3.8 MMOL/L — SIGNIFICANT CHANGE UP (ref 3.5–5.3)
POTASSIUM SERPL-MCNC: 4.6 MMOL/L — SIGNIFICANT CHANGE UP (ref 3.5–5.3)
POTASSIUM SERPL-SCNC: 3.8 MMOL/L — SIGNIFICANT CHANGE UP (ref 3.5–5.3)
POTASSIUM SERPL-SCNC: 4.6 MMOL/L — SIGNIFICANT CHANGE UP (ref 3.5–5.3)
PROT SERPL-MCNC: 6.6 G/DL — SIGNIFICANT CHANGE UP (ref 6–8.3)
PROT SERPL-MCNC: 6.9 G/DL — SIGNIFICANT CHANGE UP (ref 6–8.3)
PROTHROM AB SERPL-ACNC: 15.9 SEC — HIGH (ref 9.5–13)
RBC # BLD: 3.65 M/UL — LOW (ref 4.2–5.8)
RBC # FLD: 15.1 % — HIGH (ref 10.3–14.5)
SAO2 % BLDV: 32.4 % — LOW (ref 67–88)
SODIUM SERPL-SCNC: 135 MMOL/L — SIGNIFICANT CHANGE UP (ref 135–145)
SODIUM SERPL-SCNC: 138 MMOL/L — SIGNIFICANT CHANGE UP (ref 135–145)
SPECIMEN SOURCE: SIGNIFICANT CHANGE UP
WBC # BLD: 14.64 K/UL — HIGH (ref 3.8–10.5)
WBC # FLD AUTO: 14.64 K/UL — HIGH (ref 3.8–10.5)

## 2024-07-18 PROCEDURE — ZZZZZ: CPT

## 2024-07-18 PROCEDURE — 99232 SBSQ HOSP IP/OBS MODERATE 35: CPT

## 2024-07-18 PROCEDURE — 99223 1ST HOSP IP/OBS HIGH 75: CPT

## 2024-07-18 PROCEDURE — 99233 SBSQ HOSP IP/OBS HIGH 50: CPT | Mod: GC

## 2024-07-18 PROCEDURE — 74177 CT ABD & PELVIS W/CONTRAST: CPT | Mod: 26

## 2024-07-18 RX ORDER — DEXTROSE MONOHYDRATE, SODIUM CHLORIDE, SODIUM LACTATE, CALCIUM CHLORIDE, MAGNESIUM CHLORIDE 1.5; 538; 448; 18.4; 5.08 G/100ML; MG/100ML; MG/100ML; MG/100ML; MG/100ML
1000 SOLUTION INTRAPERITONEAL
Refills: 0 | Status: DISCONTINUED | OUTPATIENT
Start: 2024-07-18 | End: 2024-07-23

## 2024-07-18 RX ORDER — PREDNISONE 10 MG/1
1 TABLET ORAL
Refills: 0 | DISCHARGE

## 2024-07-18 RX ORDER — DEXTROSE 4 G
15 TABLET,CHEWABLE ORAL ONCE
Refills: 0 | Status: DISCONTINUED | OUTPATIENT
Start: 2024-07-18 | End: 2024-07-23

## 2024-07-18 RX ORDER — GLUCAGON INJECTION, SOLUTION 0.5 MG/.1ML
1 INJECTION, SOLUTION SUBCUTANEOUS ONCE
Refills: 0 | Status: DISCONTINUED | OUTPATIENT
Start: 2024-07-18 | End: 2024-07-23

## 2024-07-18 RX ORDER — ASPIRIN 500 MG
1 TABLET ORAL
Refills: 0 | DISCHARGE

## 2024-07-18 RX ORDER — DEXTROSE MONOHYDRATE, SODIUM CHLORIDE, SODIUM LACTATE, CALCIUM CHLORIDE, MAGNESIUM CHLORIDE 1.5; 538; 448; 18.4; 5.08 G/100ML; MG/100ML; MG/100ML; MG/100ML; MG/100ML
500 SOLUTION INTRAPERITONEAL ONCE
Refills: 0 | Status: COMPLETED | OUTPATIENT
Start: 2024-07-18 | End: 2024-07-18

## 2024-07-18 RX ORDER — INSULIN LISPRO 100/ML
VIAL (ML) SUBCUTANEOUS EVERY 6 HOURS
Refills: 0 | Status: DISCONTINUED | OUTPATIENT
Start: 2024-07-18 | End: 2024-07-23

## 2024-07-18 RX ORDER — DEXTROSE 4 G
12.5 TABLET,CHEWABLE ORAL ONCE
Refills: 0 | Status: DISCONTINUED | OUTPATIENT
Start: 2024-07-18 | End: 2024-07-23

## 2024-07-18 RX ORDER — POTASSIUM CHLORIDE 1500 MG/1
10 TABLET, EXTENDED RELEASE ORAL ONCE
Refills: 0 | Status: COMPLETED | OUTPATIENT
Start: 2024-07-18 | End: 2024-07-18

## 2024-07-18 RX ORDER — DEXTROSE 4 G
25 TABLET,CHEWABLE ORAL ONCE
Refills: 0 | Status: DISCONTINUED | OUTPATIENT
Start: 2024-07-18 | End: 2024-07-23

## 2024-07-18 RX ORDER — SODIUM PHOSPHATE, MONOBASIC, MONOHYDRATE 276; 142 MG/ML; MG/ML
15 INJECTION, SOLUTION INTRAVENOUS ONCE
Refills: 0 | Status: COMPLETED | OUTPATIENT
Start: 2024-07-18 | End: 2024-07-18

## 2024-07-18 RX ADMIN — DEXTROSE MONOHYDRATE, SODIUM CHLORIDE, SODIUM LACTATE, CALCIUM CHLORIDE, MAGNESIUM CHLORIDE 1500 MILLILITER(S): 1.5; 538; 448; 18.4; 5.08 SOLUTION INTRAPERITONEAL at 04:31

## 2024-07-18 RX ADMIN — CHLORHEXIDINE GLUCONATE 1 APPLICATION(S): 500 CLOTH TOPICAL at 06:38

## 2024-07-18 RX ADMIN — Medication 2: at 23:27

## 2024-07-18 RX ADMIN — PANTOPRAZOLE SODIUM 40 MILLIGRAM(S): 20 TABLET, DELAYED RELEASE ORAL at 11:10

## 2024-07-18 RX ADMIN — PIPERACILLIN SODIUM, TAZOBACTAM SODIUM 25 GRAM(S): 3; .375 INJECTION, POWDER, LYOPHILIZED, FOR SOLUTION INTRAVENOUS at 11:10

## 2024-07-18 RX ADMIN — Medication 2 PUFF(S): at 23:09

## 2024-07-18 RX ADMIN — HEPARIN SODIUM 5000 UNIT(S): 1000 INJECTION, SOLUTION INTRAVENOUS; SUBCUTANEOUS at 23:09

## 2024-07-18 RX ADMIN — DEXTROSE MONOHYDRATE, SODIUM CHLORIDE, SODIUM LACTATE, CALCIUM CHLORIDE, MAGNESIUM CHLORIDE 75 MILLILITER(S): 1.5; 538; 448; 18.4; 5.08 SOLUTION INTRAPERITONEAL at 11:09

## 2024-07-18 RX ADMIN — DEXTROSE MONOHYDRATE, SODIUM CHLORIDE, SODIUM LACTATE, CALCIUM CHLORIDE, MAGNESIUM CHLORIDE 75 MILLILITER(S): 1.5; 538; 448; 18.4; 5.08 SOLUTION INTRAPERITONEAL at 18:45

## 2024-07-18 RX ADMIN — SODIUM PHOSPHATE, MONOBASIC, MONOHYDRATE 255 MILLIMOLE(S): 276; 142 INJECTION, SOLUTION INTRAVENOUS at 15:04

## 2024-07-18 RX ADMIN — Medication 2 PUFF(S): at 05:20

## 2024-07-18 RX ADMIN — HEPARIN SODIUM 5000 UNIT(S): 1000 INJECTION, SOLUTION INTRAVENOUS; SUBCUTANEOUS at 13:15

## 2024-07-18 RX ADMIN — Medication 650 MILLIGRAM(S): at 15:15

## 2024-07-18 RX ADMIN — HEPARIN SODIUM 5000 UNIT(S): 1000 INJECTION, SOLUTION INTRAVENOUS; SUBCUTANEOUS at 06:38

## 2024-07-18 RX ADMIN — Medication 2 PUFF(S): at 11:39

## 2024-07-18 RX ADMIN — Medication 260 MILLIGRAM(S): at 15:00

## 2024-07-18 RX ADMIN — PIPERACILLIN SODIUM, TAZOBACTAM SODIUM 25 GRAM(S): 3; .375 INJECTION, POWDER, LYOPHILIZED, FOR SOLUTION INTRAVENOUS at 00:33

## 2024-07-18 RX ADMIN — PIPERACILLIN SODIUM, TAZOBACTAM SODIUM 25 GRAM(S): 3; .375 INJECTION, POWDER, LYOPHILIZED, FOR SOLUTION INTRAVENOUS at 23:10

## 2024-07-18 RX ADMIN — POTASSIUM CHLORIDE 100 MILLIEQUIVALENT(S): 1500 TABLET, EXTENDED RELEASE ORAL at 04:31

## 2024-07-18 RX ADMIN — BUDESONIDE AND FORMOTEROL FUMARATE DIHYDRATE 2 PUFF(S): 80; 4.5 AEROSOL RESPIRATORY (INHALATION) at 05:20

## 2024-07-18 NOTE — DIETITIAN INITIAL EVALUATION ADULT - ADD RECOMMEND
1. Advancement of diet deferred to medical team. If PN warranted, defer to TPN/nutrition assessment for recommendations.   2. Trend electrolytes and replete PRN/prior to advancing diet, as pt at risk for REFEEDING SYNDROME. Consider thiamine 100mg x 5 days if no medication contraindications noted.   3. Malnutrition sticker placed.

## 2024-07-18 NOTE — DIETITIAN NUTRITION RISK NOTIFICATION - PHYSICAL ASSESSMENT TEMPLES
OB f/u. + fetal movement.  No VB, LOF or UC's.  Good phone # [phone]  Preferred pharmacy confirmed.  Pt denies any problems      severe

## 2024-07-18 NOTE — CONSULT NOTE ADULT - ASSESSMENT
Mr. Peterson is an 83 year old male with a past medical history of DM, asthma, admitted to [facility] in 5/2024 for abdominal pain, found to have a large ulcerating mass communicating with the proximal jejunum in the setting of 30 lb weight loss in the past year. Abdominal pain improved after emesis and he was sent home without further intervention. At that time there was a suspicion for a contained perforation or inflamed giant diverticulum. On this presentation, patient had a few days of brown emesis with some blood and severe abdominal pain. CT AP showed high grade SBO. Patient has since had an NGT in place with bilious output, repeat CT A/P shows an unchanged transition point. Also demonstrated obstructing foreign body in the distal jejunum?, contained perforation in the setting of an ulcer versus diverticulitis of a large small bowel diverticulum.    Patient's presentation could certainly  Mr. Peterson is an 83 year old male with a past medical history of DM, asthma, admitted to [facility] in 5/2024 for abdominal pain, found to have a large ulcerating mass communicating with the proximal jejunum in the setting of 30 lb weight loss in the past year. Abdominal pain improved after emesis and he was sent home without further intervention. At that time there was a suspicion for a contained perforation or inflamed giant diverticulum. On this presentation, patient had a few days of brown emesis with some blood and severe abdominal pain. CT AP showed high grade SBO. Patient has since had an NGT in place with bilious output, repeat CT A/P shows an SBO with unchanged transition point. Also demonstrated obstructing foreign body in the distal jejunum?, contained perforation in the setting of an ulcer versus diverticulitis of a large small bowel diverticulum.    Patient's presentation could certainly be consistent with IBD, however, at this time with an active small bowel obstruction it is difficult to determine. More likely diverticulitis vs. ulcer given the patient's age and lack of other symptoms to suggest IBD, however this is certainly possible.    Recommendations:  -Please obtain fecal calprotectin once patient starts passing stool.   -Once bowel obstruction is resolved, dedicated imaging with a CT enterography or MR enterography would be warranted to further elucidate small bowel involvement of a potential IBD.   -After dedicated imaging, could consider further endoscopic evaluation to target area of suspected involvement.   -Appreciate SICU and surgical team excellent care.     Note incomplete until finalized by attending signature/attestation.    Blanca Verma MD  GI/Hepatology Fellow, PGY-4  Long Range Pager: [phone], Short Range Pager: [phone]    MONDAY-FRIDAY 8AM-5PM:  Please message via [employer] or email [email or web address] OR [email or web address]   On Weekends/Holidays (All day) and Weekdays after 5 PM to 8 AM  For nonurgent consults please email:  Please email [email or web address] OR [email or web address]    URGENT CONSULTS:  Please contact on call GI team. See Amion schedule ([facility]), E[employer] system ([facility]), or call hospital  ([facility]/[place])

## 2024-07-18 NOTE — DIETITIAN INITIAL EVALUATION ADULT - ALTERNATE MEANS OF NUTRITION
If PN warranted, may consider following macronutrient goals:   AA 105g, Dextrose 210g and SMOF Lipids 45g. To provide: 1584kcal and 105g protein. Based on dosing wt 43.5kg, provides: 36.4kcal/kg and 2.4g protein/kg.    Non-Protein Calories: 1164kcal (26.8kcal/kg)  Dextrose Infusion Rate: 3.4mg/kg/min (24-hr infusion)  Lipid Infusion Rate: 1.0mg/kg; 0.8mg/kg/min (12-hr infusion)

## 2024-07-18 NOTE — PROGRESS NOTE ADULT - SUBJECTIVE AND OBJECTIVE BOX
Surgery Progress Note:    OVERNIGHT EVENTS: Pt's lactate became elevated overnight and was bolused BNP 2554 but POC Echo showed adequate LV function.    SUBJECTIVE: Pt seen and examined at bedside. Patient reporting continued improvement in bloating and denies nausea and abdominal pain.     MEDICATIONS  (STANDING):  albuterol    90 MICROgram(s) HFA Inhaler 2 Puff(s) Inhalation every 6 hours  budesonide 160 MICROgram(s)/formoterol 4.5 MICROgram(s) Inhaler 2 Puff(s) Inhalation two times a day  chlorhexidine 2% Cloths 1 Application(s) Topical <User Schedule>  dextrose 5% + lactated ringers. 1000 milliLiter(s) (75 mL/Hr) IV Continuous <Continuous>  heparin   Injectable 5000 Unit(s) SubCutaneous every 8 hours  pantoprazole  Injectable 40 milliGRAM(s) IV Push daily  piperacillin/tazobactam IVPB.. 3.375 Gram(s) IV Intermittent every 12 hours    MEDICATIONS  (PRN):  acetaminophen   IVPB .. 650 milliGRAM(s) IV Intermittent every 6 hours PRN Mild Pain (1 - 3)  tetracaine/benzocaine/butamben Spray 1 Spray(s) Topical every 6 hours PRN Sore throat    T(C): 37.3 (07-18-24 @ 07:00), Max: 37.3 (07-18-24 @ 03:00)  HR: 87 (07-18-24 @ 08:00) (75 - 91)  BP: 161/70 (07-18-24 @ 08:00) (105/51 - 176/74)  RR: 15 (07-18-24 @ 08:00) (14 - 29)  SpO2: 95% (07-18-24 @ 08:00) (92% - 99%)    07-17-24 @ 07:01  -  07-18-24 @ 07:00  --------------------------------------------------------  IN: 3655 mL / OUT: 2615 mL / NET: 1040 mL    07-18-24 @ 07:01  -  07-18-24 @ 08:33  --------------------------------------------------------  IN: 75 mL / OUT: 85 mL / NET: -10 mL      LABS:                        10.4   14.64 )-----------( 195      ( 17 Jul 2024 23:52 )             31.5     07-17    138  |  99  |  38<H>  ----------------------------<  60<L>  3.8   |  28  |  1.54<H>    Ca    8.7      17 Jul 2024 23:52  Phos  3.8     07-17  Mg     2.4     07-17    TPro  6.6  /  Alb  2.8<L>  /  TBili  0.4  /  DBili  x   /  AST  24  /  ALT  12  /  AlkPhos  65  07-17    PT/INR - ( 17 Jul 2024 23:52 )   PT: 15.9 sec;   INR: 1.46 ratio         PTT - ( 17 Jul 2024 23:52 )  PTT:36.3 sec  Urinalysis Basic - ( 17 Jul 2024 23:52 )    Color: x / Appearance: x / SG: x / pH: x  Gluc: 60 mg/dL / Ketone: x  / Bili: x / Urobili: x   Blood: x / Protein: x / Nitrite: x   Leuk Esterase: x / RBC: x / WBC x   Sq Epi: x / Non Sq Epi: x / Bacteria: x      PE:  Gen: NAD  CV: Pulse regular present  Resp: Nonlabored  Abdomen: Soft, nontender, mildly distended, NGT in place

## 2024-07-18 NOTE — CONSULT NOTE ADULT - SUBJECTIVE AND OBJECTIVE BOX
HPI:  Mr. Peterson is an 83 year old male with a past medical history of DM, asthma, admitted to [facility] in 5/2024 for abdominal pain, found to have a large ulcerating mass communicating with the proximal jejunum. Abdominal pain improved after emesis and he was sent home without further intervention. At that time there was a suspicion for a contained perforation or inflamed giant diverticulum.     On this presentation, patient had a few days of brown emesis with some blood and severe abdominal pain. CT AP showed high grade SBO. Initially patient was with a WBC 21, lactate initially 3.9, patient was bolused 2L in the ED. SICU was consulted for resuscitation pre-operatively. Patient has since had an NGT in place with bilious output, repeat CT A/P shows an unchanged transition point. Also demonstrated obstructing foreign body in the distal jejunum? There is marked inflammatory change at the level of the duodenojejunal junction surrounding this outpouching, favoring contained perforation in the setting of an ulcer. Presumably secondary to the foreign body, versus diverticulitis of a large small bowel diverticulum. The patient remains on zosyn for treatment of a potential abscess.     The patient has lost 30lbs in the past year. Patient has had constipation for years treated intermittently with miralax, denies any history of IBD in himself or family members. No family history of autoimmune disease. No rashes, arthritis. Denies ever having any hematochezia or melena.     Allergies:  No Known Allergies      Home Medications:    Hospital Medications:  acetaminophen   IVPB .. 650 milliGRAM(s) IV Intermittent every 6 hours PRN  albuterol    90 MICROgram(s) HFA Inhaler 2 Puff(s) Inhalation every 6 hours  budesonide 160 MICROgram(s)/formoterol 4.5 MICROgram(s) Inhaler 2 Puff(s) Inhalation two times a day  chlorhexidine 2% Cloths 1 Application(s) Topical <User Schedule>  dextrose 5% + lactated ringers. 1000 milliLiter(s) IV Continuous <Continuous>  heparin   Injectable 5000 Unit(s) SubCutaneous every 8 hours  pantoprazole  Injectable 40 milliGRAM(s) IV Push daily  piperacillin/tazobactam IVPB.. 3.375 Gram(s) IV Intermittent every 12 hours  tetracaine/benzocaine/butamben Spray 1 Spray(s) Topical every 6 hours PRN      PMHX/PSHX:  Diabetes    Asthma    DM (diabetes mellitus)    BPH (benign prostatic hyperplasia)    Diabetes    Asthma    Asthma    Diabetes    No significant past surgical history    S/P cataract extraction    No significant past surgical history        Family history:  No pertinent family history in first degree relatives    No pertinent family history in first degree relatives        Denies family history of colon cancer/polyps, stomach cancer/polyps, pancreatic cancer/masses, liver cancer/disease, ovarian cancer and endometrial cancer.    Social History:   Tob: Denies  EtOH: Denies  Illicit Drugs: Denies    ROS:     General:  No wt loss, fevers, chills, night sweats, fatigue  Eyes:  Good vision, no reported pain  ENT:  No sore throat, pain, runny nose, dysphagia  CV:  No pain, palpitations, hypo/hypertension  Pulm:  No dyspnea, cough, tachypnea, wheezing  GI:  see HPI  :  No pain, bleeding, incontinence, nocturia  Muscle:  No pain, weakness  Neuro:  No weakness, tingling, memory problems  Psych:  No fatigue, insomnia, mood problems, depression  Endocrine:  No polyuria, polydipsia, cold/heat intolerance  Heme:  No petechiae, ecchymosis, easy bruisability  Skin:  No rash, tattoos, scars, edema    PHYSICAL EXAM:     GENERAL:  No acute distress  HEENT:  NCAT, no scleral icterus   CHEST:  no respiratory distress  HEART:  Regular rate and rhythm  ABDOMEN:  Soft, non-tender, non-distended, normoactive bowel sounds,  no masses, NGT in place with bilious output  EXTREMITIES: No edema  SKIN:  No rash/erythema/ecchymoses/petechiae/wounds/abscess/warm/dry  NEURO:  Alert and oriented x 3, no asterixis    Vital Signs:  Vital Signs Last 24 Hrs  T(C): 36.7 (18 Jul 2024 16:22), Max: 37.4 (18 Jul 2024 11:00)  T(F): 98.1 (18 Jul 2024 16:22), Max: 99.3 (18 Jul 2024 11:00)  HR: 89 (18 Jul 2024 16:22) (75 - 99)  BP: 133/76 (18 Jul 2024 16:22) (113/54 - 192/84)  BP(mean): 101 (18 Jul 2024 15:00) (78 - 120)  RR: 18 (18 Jul 2024 16:22) (14 - 29)  SpO2: 98% (18 Jul 2024 16:22) (92% - 100%)    Parameters below as of 18 Jul 2024 16:22  Patient On (Oxygen Delivery Method): room air      Daily     Daily     LABS:                        10.4   14.64 )-----------( 195      ( 17 Jul 2024 23:52 )             31.5     Mean Cell Volume: 86.3 fl (07-17-24 @ 23:52)    07-18    135  |  99  |  26<H>  ----------------------------<  143<H>  4.6   |  22  |  1.20    Ca    8.7      18 Jul 2024 11:51  Phos  2.7     07-18  Mg     2.2     07-18    TPro  6.9  /  Alb  2.9<L>  /  TBili  0.5  /  DBili  x   /  AST  32  /  ALT  13  /  AlkPhos  88  07-18    LIVER FUNCTIONS - ( 18 Jul 2024 11:51 )  Alb: 2.9 g/dL / Pro: 6.9 g/dL / ALK PHOS: 88 U/L / ALT: 13 U/L / AST: 32 U/L / GGT: x           PT/INR - ( 17 Jul 2024 23:52 )   PT: 15.9 sec;   INR: 1.46 ratio         PTT - ( 17 Jul 2024 23:52 )  PTT:36.3 sec  Urinalysis Basic - ( 18 Jul 2024 11:51 )    Color: x / Appearance: x / SG: x / pH: x  Gluc: 143 mg/dL / Ketone: x  / Bili: x / Urobili: x   Blood: x / Protein: x / Nitrite: x   Leuk Esterase: x / RBC: x / WBC x   Sq Epi: x / Non Sq Epi: x / Bacteria: x                              10.4   14.64 )-----------( 195      ( 17 Jul 2024 23:52 )             31.5                         9.6    12.85 )-----------( 172      ( 17 Jul 2024 11:06 )             28.8                         10.3   12.74 )-----------( 177      ( 17 Jul 2024 00:49 )             30.6                         10.1   11.40 )-----------( 177      ( 16 Jul 2024 22:37 )             29.5                         11.9   5.66  )-----------( 209      ( 16 Jul 2024 13:37 )             36.2

## 2024-07-18 NOTE — PROGRESS NOTE ADULT - ASSESSMENT
83M, poor historian, PMH of DM, asthma, recently admitted to [facility] in 5/2024 for abdominal pain, found to have large ulcerating mass communicating with proximal jejunum on CT there but no intervention at that time, presenting for few days of brown/bloody emesis and abdominal pain. Leukocytosis to 21 with lactate 3.9, improved to 3.1 after 1L bolus.  CTAP significant for SBO with TP in R mid abdomen, thickened jejunum, no grossly obvious focal mass, dilated stomach and distal esophagus; possible Crohn's disease. Admitted to SICU for management of volume resuscitation in high grade SBO. Clinically improving in SICU. CT on admission failed to reveal transition point or obstructive mass.    Plan  -Monitor NGT output, I/Os  -SICU Care  -CTAP w/ oral contrast for assessment of SBO   -NPO/IVF  -DVT Prophylaxis    Gold Team  [id number]

## 2024-07-18 NOTE — DIETITIAN INITIAL EVALUATION ADULT - PHYSICAL ASSESSMENT SHOULDERS
Procedure Type: Therapeutic, prophylactic, or diagnostic injection; subcutaneous or intramuscular; CPT: 76142 Procedure Type: Therapeutic, prophylactic, or diagnostic injection; subcutaneous or intramuscular; CPT: 44519 moderate

## 2024-07-18 NOTE — PROGRESS NOTE ADULT - ATTENDING COMMENTS
Pt seen with wife at bedside  reports + flatus  + abd discomfort  abd soft, NT, mildly distended  CT reviewed with radiology, mass has resolved, concern is for poss Crohn's  Requested GI consult  obtain f/u CT today

## 2024-07-18 NOTE — DIETITIAN INITIAL EVALUATION ADULT - PERTINENT MEDS FT
MEDICATIONS  (STANDING):  albuterol    90 MICROgram(s) HFA Inhaler 2 Puff(s) Inhalation every 6 hours  budesonide 160 MICROgram(s)/formoterol 4.5 MICROgram(s) Inhaler 2 Puff(s) Inhalation two times a day  chlorhexidine 2% Cloths 1 Application(s) Topical <User Schedule>  dextrose 5% + lactated ringers. 1000 milliLiter(s) (75 mL/Hr) IV Continuous <Continuous>  heparin   Injectable 5000 Unit(s) SubCutaneous every 8 hours  pantoprazole  Injectable 40 milliGRAM(s) IV Push daily  piperacillin/tazobactam IVPB.. 3.375 Gram(s) IV Intermittent every 12 hours    MEDICATIONS  (PRN):  acetaminophen   IVPB .. 650 milliGRAM(s) IV Intermittent every 6 hours PRN Mild Pain (1 - 3)  tetracaine/benzocaine/butamben Spray 1 Spray(s) Topical every 6 hours PRN Sore throat

## 2024-07-18 NOTE — PROGRESS NOTE ADULT - ASSESSMENT
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 Reports abdominal pain improved with episodes of emesis. Last BM/flatus was yesterday.  TPN team consulted given concern for severe protein calorie malnutrition and anticipated prolonged inadequate caloric intake .    AA 105g, Dextrose 210g and SMOF Lipids 45g. To provide: 1584kcal and 105g protein. Based on dosing wt 43.5kg, provides: 36.4kcal/kg and 2.4g protein/kg.     - Nutritional Assessment, patient with severe protein calorie malnutrition not meeting nutritional goals enterally due to SBO/NPO status and would likely benefit from TPN for nutritional support; prealbumin only 7 mg/dL - trend weekly   - TPN plan:  Approved for TPN as long as unable to resume diet however need to await cultures for PICC access given fevers/pending blood cultures and Procalcitonin of 27 ng/mL on 7/17   - TPN access:  Patient will need a dedicated central line once able to get ID/bacteremia clearance   - Hyponatremia/NAA:  improving; IV fluids per SICU/Renal; trend in AM; BNP 2554 pg/mL; Echo with EF 55-60%; atrial septal defect with R flow; mod pulm HTN  - Electrolyte Imbalance risk:  recommend to check CMP, Mg, Phos and ionized Ca daily, to be supplemented peripherally per primary team.  - SBO:  monitor NGT output overnight and f/u surgical plan  - Fever:  f/u Blood cx from 7/17 (neg x 24 hrs); on Zosyn   - Recommend strict intake and output/weight checks three times a week  - Risk of glucose dysfunction with TPN:  HgA1c 7.8% on 7/17/24; continue finger sticks with RISS   - Risk for Hypertriglyceridemia with TPN:  baseline lipid profile reviewed; TG 75 mg/dL on 7/17; monitor TG closely once/if TPN starts  - Global care per primary team     Available on TEAMS  TPN spectra [id number] ([phone] when dialing from outside line)  M-F 8A-2P, Weekends and holidays 8/9A-12/1P  Discussed with Dr. Morris Comre

## 2024-07-18 NOTE — DIETITIAN INITIAL EVALUATION ADULT - REASON INDICATOR FOR ASSESSMENT
Nutrition Assessment warranted for length of stay.  Information obtained from: RN, comprehensive chart review, interdisciplinary medical rounds, patient, family at bedside

## 2024-07-18 NOTE — DIETITIAN INITIAL EVALUATION ADULT - REASON FOR ADMISSION
Pt is an 82 yo M with PMH: DM, HTN; recent admission to OSH in May 2024 for jejunal mass (no intervention). Now presented for a few days of brown/bloody emesis and abdominal pain. CTAP showed high grade SBO. NPO with NGT to LCWS. TPN team consulted. Course complicated by NAA, hyponatremia.

## 2024-07-18 NOTE — DIETITIAN INITIAL EVALUATION ADULT - NSFNSADHERENCEPTAFT_GEN_A_CORE
A1c 7.8% (7/16). History of DM noted, history of taking steroid (Prednisone). Insulin discontinued at this time. Currently receiving D5% with Lactated Ringers, infusing at 75ml/hr.

## 2024-07-18 NOTE — DIETITIAN INITIAL EVALUATION ADULT - PERTINENT LABORATORY DATA
07-17    138  |  99  |  38<H>  ----------------------------<  60<L>  3.8   |  28  |  1.54<H>    Ca    8.7      17 Jul 2024 23:52  Phos  3.8     07-17  Mg     2.4     07-17    TPro  6.6  /  Alb  2.8<L>  /  TBili  0.4  /  DBili  x   /  AST  24  /  ALT  12  /  AlkPhos  65  07-17  POCT Blood Glucose.: 84 mg/dL (07-18-24 @ 00:28)  A1C with Estimated Average Glucose Result: 7.8 % (07-16-24 @ 22:37)  A1C with Estimated Average Glucose Result: 7.7 % (07-15-24 @ 22:52)

## 2024-07-18 NOTE — PROGRESS NOTE ADULT - ASSESSMENT
A/P: JIMMY CALLAHAN is a 83y Male with h/o DM, HTN and recent admission to [facility] in May 2024 for jejunal mass (no intervention, recommended to outpatient GI), now presents to ED for a few days of brown/bloody emesis and abdominal pain.  CTAP showed high grade SBO.  WBC 21, lactate initially 3.9, but repeat 5.9; Cr 2.19.  Patient bolused 2L in ED.  SICU consulted for resuscitation pre-operatively.    Neuro: pain control  - Tylenol as needed     Resp: h/o asthma  - Continue Duonebs and Budesonide  - O2 prn   - IS     CV:   - Hemodynamic monitoring  - Lactate downtrending after fluid resuscitation  - Echo     GI: High grade SBO, h/o jejunal mass  - NPO/NGT to LCWS  - Protonix (home medication)   - TPN consult   - f/u primary team for OR planning  - CT with oral contrast on 7/18 for surgical eval      /Renal: NAA, hyponatremia  - Cr and Na likely related to hypovolemia, continue to trend   - LR @ 100mL/hr   - s/p 3L in ED.  Appeared hypovolemic on POCUS  - Strict I's and O's  - Replete electrolytes as needed     ID:  - No acute need for antibiotics at this time  - Trend WBC    Heme: VTE prophylaxis  - SQH due to Cr Clearance  - SCDs     Endo: h/o DM   - ISS q6hrs  - f/u if patient is taking Prednisone at home and why    MSK:  - PT/OT consults     Lines:  - PIV

## 2024-07-18 NOTE — PROGRESS NOTE ADULT - SUBJECTIVE AND OBJECTIVE BOX
[facility] NUTRITION SUPPORT-- FOLLOW UP NOTE      24 hour events/subjective:  Patient seen and examined at bedside, chart reviewed and events noted and I's and O's reviewed; spouse and son/daughter at bedside.  Patient denies chest pain, shortness of breath, nausea or vomiting, dizziness, chills at time of visit; continues with NGT for decompression, 950 mL/24 hrs, fever work up in progress, on Zosyn, kidney functions improving.  Patient remains critically ill on continuos ICU monitoring.  No other acute overnight events noted.         ROS:  Except as noted above, all other systems reviewed and are negative     Diet:  Diet, NPO (07-16-24 @ 07:17)      PAST HISTORY  --------------------------------------------------------------------------------  PAST MEDICAL & SURGICAL HISTORY:  Asthma      Diabetes        No significant changes to PMH, PSH, FHx, SHx, unless otherwise noted    ALLERGIES & MEDICATIONS  --------------------------------------------------------------------------------  Allergies    No Known Allergies    Intolerances      Standing Inpatient Medications  albuterol    90 MICROgram(s) HFA Inhaler 2 Puff(s) Inhalation every 6 hours  budesonide 160 MICROgram(s)/formoterol 4.5 MICROgram(s) Inhaler 2 Puff(s) Inhalation two times a day  chlorhexidine 2% Cloths 1 Application(s) Topical <User Schedule>  dextrose 5% + lactated ringers. 1000 milliLiter(s) IV Continuous <Continuous>  heparin   Injectable 5000 Unit(s) SubCutaneous every 8 hours  pantoprazole  Injectable 40 milliGRAM(s) IV Push daily  piperacillin/tazobactam IVPB.. 3.375 Gram(s) IV Intermittent every 12 hours    PRN Inpatient Medications  acetaminophen   IVPB .. 650 milliGRAM(s) IV Intermittent every 6 hours PRN  tetracaine/benzocaine/butamben Spray 1 Spray(s) Topical every 6 hours PRN        VITALS/PHYSICAL EXAM  --------------------------------------------------------------------------------  T(C): 37.4 (07-18-24 @ 11:00), Max: 37.4 (07-18-24 @ 11:00)  HR: 91 (07-18-24 @ 12:50) (75 - 91)  BP: 172/77 (07-18-24 @ 12:00) (110/52 - 178/79)  RR: 27 (07-18-24 @ 12:50) (14 - 29)  SpO2: 98% (07-18-24 @ 12:50) (92% - 100%)  Wt(kg): --      07-17-24 @ 07:01  -  07-18-24 @ 07:00  --------------------------------------------------------  IN: 3655 mL / OUT: 2615 mL / NET: 1040 mL    07-18-24 @ 07:01  -  07-18-24 @ 13:20  --------------------------------------------------------  IN: 1095 mL / OUT: 380 mL / NET: 715 mL      I&O's Detail    17 Jul 2024 07:01  -  18 Jul 2024 07:00  --------------------------------------------------------  IN:    dextrose 5% + lactated ringers: 1775 mL    Enteral Tube Flush: 30 mL    IV PiggyBack: 650 mL    IV PiggyBack: 400 mL    Lactated Ringers: 300 mL    Lactated Ringers Bolus: 500 mL  Total IN: 3655 mL    OUT:    Indwelling Catheter - Urethral (mL): 1665 mL    Nasogastric/Oral tube (mL): 950 mL  Total OUT: 2615 mL    Total NET: 1040 mL      18 Jul 2024 07:01  -  18 Jul 2024 13:20  --------------------------------------------------------  IN:    dextrose 5% + lactated ringers: 375 mL    Enteral Tube Flush: 720 mL  Total IN: 1095 mL    OUT:    Indwelling Catheter - Urethral (mL): 380 mL  Total OUT: 380 mL    Total NET: 715 mL          Physical Exam:  Gen: NAD, thin/frail appearing; laying in bed   HEENT: supple neck, no JVD; NGT  Chest: non labored breathing, equal chest expansion bilaterally  Abd: soft, nontender/nondistended  : No suprapubic tenderness  Ext: Warm, FROM, no edema b/l LE  Neuro: No focal deficits  Psych: Normal affect and mood  Skin: Warm, without rashes         LABS/STUDIES  --------------------------------------------------------------------------------              10.4   14.64 >-----------<  195      [07-17-24 @ 23:52]              31.5     135  |  99  |  26  ----------------------------<  143      [07-18-24 @ 11:51]  4.6   |  22  |  1.20        Ca     8.7     [07-18-24 @ 11:51]      Mg     2.2     [07-18-24 @ 11:51]      Phos  2.7     [07-18-24 @ 11:51]    TPro  6.9  /  Alb  2.9  /  TBili  0.5  /  DBili  x   /  AST  32  /  ALT  13  /  AlkPhos  88  [07-18-24 @ 11:51]    PT/INR: PT 15.9 , INR 1.46       [07-17-24 @ 23:52]  PTT: 36.3       [07-17-24 @ 23:52]      Ca ionizedBlood Gas Calcium, Ionized - Venous: 1.21 mmol/L (07-18-24 @ 06:24)  Blood Gas Calcium, Ionized - Venous: 1.22 mmol/L (07-17-24 @ 23:49)  Blood Gas Calcium, Ionized - Venous: 1.22 mmol/L (07-17-24 @ 10:38)  Blood Gas Calcium, Ionized - Venous: 1.12 mmol/L (07-17-24 @ 04:23)  Blood Gas Calcium, Ionized - Venous: 1.12 mmol/L (07-17-24 @ 00:40)  Blood Gas Calcium, Ionized - Venous: 1.20 mmol/L (07-16-24 @ 22:36)  Blood Gas Calcium, Ionized - Venous: 1.10 mmol/L (07-16-24 @ 18:02)  Blood Gas Calcium, Ionized - Venous: 1.12 mmol/L (07-16-24 @ 13:27)    Creatinine Trend:  POC glucoseGlucose: 143 mg/dL (07-18-24 @ 11:51)  Glucose: 60 mg/dL (07-17-24 @ 23:52)  CAPILLARY BLOOD GLUCOSE      POCT Blood Glucose.: 84 mg/dL (18 Jul 2024 00:28)  POCT Blood Glucose.: 159 mg/dL (17 Jul 2024 16:57)  POCT Blood Glucose.: 161 mg/dL (17 Jul 2024 13:26)    PrealbuminPrealbumin, Serum: 7 mg/dL (07-16-24 @ 23:00)    Triglycerides

## 2024-07-18 NOTE — DIETITIAN INITIAL EVALUATION ADULT - NSFNSGIIOFT_GEN_A_CORE
-Family reports height is 65" (incorrectly documented in electronic medical record).   -NGT output: (7/18): 550ml (thus far); (7/17): 1000ml.   -As per most recent labs: TG 75; Alb 2.8, Pre-Alb 7.

## 2024-07-18 NOTE — DIETITIAN INITIAL EVALUATION ADULT - ETIOLOGY
Inadequate energy and protein intake in setting of decreased appetite related to altered GI function (jejunal mass with abdominal pain, SBO)

## 2024-07-18 NOTE — DIETITIAN INITIAL EVALUATION ADULT - ORAL INTAKE PTA/DIET HISTORY
Diet: Pt/family reports decreased appetite in setting of abdominal pain with constipation, nausea/emesis x past 4-5 months. Consumes 3 meals (small portions) daily. Admits that pt began taking appetite stimulant (?Megace) x 1 mo ago which has contributed to improving appetite, yet denies subsequent wt gain. Denies food allergies, denies intolerance to chewing/swallowing. Reports taking daily probiotics prior to admission.

## 2024-07-18 NOTE — PROGRESS NOTE ADULT - SUBJECTIVE AND OBJECTIVE BOX
Interval Events:  - no events overnight   - plan for CT with oral contrast tomorrow   - plan for possible OR on this admission     HISTORY OF PRESENT ILLNESS:  JIMMY CALLAHAN is a 83y Male     PAST MEDICAL HISTORY: Asthma    Diabetes    ALLERGIES: No Known Allergies      VITAL SIGNS:  ICU Vital Signs Last 24 Hrs  T(C): 36.8 (17 Jul 2024 23:00), Max: 38.5 (17 Jul 2024 05:00)  T(F): 98.3 (17 Jul 2024 23:00), Max: 101.3 (17 Jul 2024 05:00)  HR: 79 (18 Jul 2024 01:00) (75 - 97)  BP: 141/65 (18 Jul 2024 01:00) (103/52 - 143/64)  BP(mean): 94 (18 Jul 2024 01:00) (72 - 94)  ABP: --  ABP(mean): --  RR: 19 (18 Jul 2024 01:00) (14 - 29)  SpO2: 95% (18 Jul 2024 01:00) (92% - 99%)    O2 Parameters below as of 17 Jul 2024 23:42  Patient On (Oxygen Delivery Method): room air            NEURO  Exam: aaox4  acetaminophen   IVPB .. 650 milliGRAM(s) IV Intermittent every 6 hours PRN Mild Pain (1 - 3)      RESPIRATORY  Mechanical Ventilation:   ABG - ( 16 Jul 2024 09:00 )  pH: x     /  pCO2: x     /  pO2: x     / HCO3: x     / Base Excess: x     /  SaO2: x       Lactate: 5.9      albuterol    90 MICROgram(s) HFA Inhaler 2 Puff(s) Inhalation every 6 hours  budesonide 160 MICROgram(s)/formoterol 4.5 MICROgram(s) Inhaler 2 Puff(s) Inhalation two times a day      CARDIOVASCULAR  VBG - ( 17 Jul 2024 23:49 )  pH: 7.38  /  pCO2: 59    /  pO2: 32    / HCO3: 35    / Base Excess: 8.2   /  SaO2: 42.7   Lactate: 2.4        GI/NUTRITION  Diet: NPO  pantoprazole  Injectable 40 milliGRAM(s) IV Push daily      GENITOURINARY/RENAL  dextrose 5% + lactated ringers. 1000 milliLiter(s) IV Continuous <Continuous>  potassium chloride  10 mEq/100 mL IVPB 10 milliEquivalent(s) IV Intermittent once      07-16 @ 07:01  -  07-17 @ 07:00  --------------------------------------------------------  IN:    Enteral Tube Flush: 60 mL    IV PiggyBack: 350 mL    IV PiggyBack: 829.9 mL    Lactated Ringers: 2000 mL  Total IN: 3239.9 mL    OUT:    Indwelling Catheter - Urethral (mL): 1070 mL    Nasogastric/Oral tube (mL): 1450 mL  Total OUT: 2520 mL    Total NET: 719.9 mL      07-17 @ 07:01 - 07-18 @ 01:17  --------------------------------------------------------  IN:    dextrose 5% + lactated ringers: 1175 mL    Enteral Tube Flush: 30 mL    IV PiggyBack: 200 mL    IV PiggyBack: 650 mL    Lactated Ringers: 300 mL  Total IN: 2355 mL    OUT:    Indwelling Catheter - Urethral (mL): 910 mL    Nasogastric/Oral tube (mL): 400 mL  Total OUT: 1310 mL    Total NET: 1045 mL          07-17    138  |  99  |  38<H>  ----------------------------<  60<L>  3.8   |  28  |  1.54<H>    Ca    8.7      17 Jul 2024 23:52  Phos  3.8     07-17  Mg     2.4     07-17    TPro  6.6  /  Alb  2.8<L>  /  TBili  0.4  /  DBili  x   /  AST  24  /  ALT  12  /  AlkPhos  65  07-17    [ ] Bowens catheter, indication: urine output monitoring in critically ill patient    HEMATOLOGIC  [ ] VTE Prophylaxis:  heparin   Injectable 5000 Unit(s) SubCutaneous every 8 hours                          10.4   14.64 )-----------( 195      ( 17 Jul 2024 23:52 )             31.5     PT/INR - ( 17 Jul 2024 23:52 )   PT: 15.9 sec;   INR: 1.46 ratio         PTT - ( 17 Jul 2024 23:52 )  PTT:36.3 sec  Transfusion: [ ] PRBC	[ ] Platelets	[ ] FFP	[ ] Cryoprecipitate      INFECTIOUS DISEASES  piperacillin/tazobactam IVPB.. 3.375 Gram(s) IV Intermittent every 12 hours    RECENT CULTURES:  Specimen Source: Clean Catch Clean Catch (Midstream)  Date/Time: 07-16 @ 00:47  Culture Results:   10,000 - 49,000 CFU/mL Gram Positive Cocci in Pairs and Chains  <10,000 CFU/ml Normal Urogenital asha present<!>  Gram Stain: --  Organism: --      ENDOCRINE    CAPILLARY BLOOD GLUCOSE      POCT Blood Glucose.: 84 mg/dL (18 Jul 2024 00:28)  POCT Blood Glucose.: 159 mg/dL (17 Jul 2024 16:57)  POCT Blood Glucose.: 161 mg/dL (17 Jul 2024 13:26)  POCT Blood Glucose.: 120 mg/dL (17 Jul 2024 09:15)  POCT Blood Glucose.: 91 mg/dL (17 Jul 2024 05:42)      PATIENT CARE ACCESS DEVICES:  [ ] Peripheral IV  [ ] Central Venous Line	[ ] R	[ ] L	[ ] IJ	[ ] Fem	[ ] SC	Placed:   [ ] Arterial Line		[ ] R	[ ] L	[ ] Fem	[ ] Rad	[ ] Ax	Placed:   [ ] PICC:					[ ] Mediport  [ ] Urinary Catheter, Date Placed:   [x] Necessity of urinary, arterial, and venous catheters discussed    OTHER MEDICATIONS: chlorhexidine 2% Cloths 1 Application(s) Topical <User Schedule>  tetracaine/benzocaine/butamben Spray 1 Spray(s) Topical every 6 hours PRN      IMAGING STUDIES:

## 2024-07-18 NOTE — CHART NOTE - NSCHARTNOTEFT_GEN_A_CORE
SICU POCUS NOTE    : Madison Wright PA-C    INDICATION: rising lactate     PROCEDURE:  [x] LIMITED ECHO  [ ] LIMITED CHEST  [ ] LIMITED RETROPERITONEAL  [ ] LIMITED ABDOMINAL  [ ] LIMITED DVT  [ ] NEEDLE GUIDANCE VASCULAR  [ ] NEEDLE GUIDANCE THORACENTESIS  [ ] NEEDLE GUIDANCE PARACENTESIS  [ ] NEEDLE GUIDANCE PERICARDIOCENTESIS  [ ] OTHER    FINDINGS:  - appropriate LV squeeze  - no pericardial effusion appreciated  - LV hyperdynamic  - IVC with respiratory variability, measuring 1.6cm      INTERPRETATION:  - evidence of mild hypovolemia. Will give 500cc bolus and re-check lactate

## 2024-07-19 DIAGNOSIS — T18.3XXA FOREIGN BODY IN SMALL INTESTINE, INITIAL ENCOUNTER: ICD-10-CM

## 2024-07-19 DIAGNOSIS — K56.609 UNSPECIFIED INTESTINAL OBSTRUCTION, UNSPECIFIED AS TO PARTIAL VERSUS COMPLETE OBSTRUCTION: ICD-10-CM

## 2024-07-19 LAB
ANION GAP SERPL CALC-SCNC: 11 MMOL/L — SIGNIFICANT CHANGE UP (ref 5–17)
BUN SERPL-MCNC: 14 MG/DL — SIGNIFICANT CHANGE UP (ref 7–23)
CALCIUM SERPL-MCNC: 8.5 MG/DL — SIGNIFICANT CHANGE UP (ref 8.4–10.5)
CHLORIDE SERPL-SCNC: 100 MMOL/L — SIGNIFICANT CHANGE UP (ref 96–108)
CO2 SERPL-SCNC: 26 MMOL/L — SIGNIFICANT CHANGE UP (ref 22–31)
CREAT SERPL-MCNC: 1.06 MG/DL — SIGNIFICANT CHANGE UP (ref 0.5–1.3)
EGFR: 69 ML/MIN/1.73M2 — SIGNIFICANT CHANGE UP
GLUCOSE BLDC GLUCOMTR-MCNC: 101 MG/DL — HIGH (ref 70–99)
GLUCOSE BLDC GLUCOMTR-MCNC: 115 MG/DL — HIGH (ref 70–99)
GLUCOSE BLDC GLUCOMTR-MCNC: 160 MG/DL — HIGH (ref 70–99)
GLUCOSE BLDC GLUCOMTR-MCNC: 161 MG/DL — HIGH (ref 70–99)
GLUCOSE BLDC GLUCOMTR-MCNC: 188 MG/DL — HIGH (ref 70–99)
GLUCOSE SERPL-MCNC: 112 MG/DL — HIGH (ref 70–99)
HCT VFR BLD CALC: 35.5 % — LOW (ref 39–50)
HGB BLD-MCNC: 11.2 G/DL — LOW (ref 13–17)
MAGNESIUM SERPL-MCNC: 2 MG/DL — SIGNIFICANT CHANGE UP (ref 1.6–2.6)
MCHC RBC-ENTMCNC: 27.5 PG — SIGNIFICANT CHANGE UP (ref 27–34)
MCHC RBC-ENTMCNC: 31.5 GM/DL — LOW (ref 32–36)
MCV RBC AUTO: 87.2 FL — SIGNIFICANT CHANGE UP (ref 80–100)
NRBC # BLD: 0 /100 WBCS — SIGNIFICANT CHANGE UP (ref 0–0)
PHOSPHATE SERPL-MCNC: 2.7 MG/DL — SIGNIFICANT CHANGE UP (ref 2.5–4.5)
PLATELET # BLD AUTO: 217 K/UL — SIGNIFICANT CHANGE UP (ref 150–400)
POTASSIUM SERPL-MCNC: 3.5 MMOL/L — SIGNIFICANT CHANGE UP (ref 3.5–5.3)
POTASSIUM SERPL-SCNC: 3.5 MMOL/L — SIGNIFICANT CHANGE UP (ref 3.5–5.3)
RBC # BLD: 4.07 M/UL — LOW (ref 4.2–5.8)
RBC # FLD: 15.1 % — HIGH (ref 10.3–14.5)
SODIUM SERPL-SCNC: 137 MMOL/L — SIGNIFICANT CHANGE UP (ref 135–145)
WBC # BLD: 10.47 K/UL — SIGNIFICANT CHANGE UP (ref 3.8–10.5)
WBC # FLD AUTO: 10.47 K/UL — SIGNIFICANT CHANGE UP (ref 3.8–10.5)

## 2024-07-19 PROCEDURE — 99232 SBSQ HOSP IP/OBS MODERATE 35: CPT

## 2024-07-19 PROCEDURE — 71045 X-RAY EXAM CHEST 1 VIEW: CPT | Mod: 26

## 2024-07-19 PROCEDURE — 99233 SBSQ HOSP IP/OBS HIGH 50: CPT

## 2024-07-19 PROCEDURE — 99222 1ST HOSP IP/OBS MODERATE 55: CPT

## 2024-07-19 RX ORDER — CHLORHEXIDINE GLUCONATE 500 MG/1
1 CLOTH TOPICAL
Refills: 0 | Status: DISCONTINUED | OUTPATIENT
Start: 2024-07-19 | End: 2024-07-23

## 2024-07-19 RX ORDER — POTASSIUM PHOSPHATE, MONOBASIC AND POTASSIUM PHOSPHATE, DIBASIC 224; 236 MG/ML; MG/ML
30 INJECTION, SOLUTION INTRAVENOUS ONCE
Refills: 0 | Status: COMPLETED | OUTPATIENT
Start: 2024-07-19 | End: 2024-07-19

## 2024-07-19 RX ORDER — POTASSIUM PHOSPHATE, MONOBASIC AND POTASSIUM PHOSPHATE, DIBASIC 224; 236 MG/ML; MG/ML
30 INJECTION, SOLUTION INTRAVENOUS ONCE
Refills: 0 | Status: DISCONTINUED | OUTPATIENT
Start: 2024-07-19 | End: 2024-07-19

## 2024-07-19 RX ORDER — SODIUM PHOSPHATE,MONO-DIBASIC
1 SOLUTION, ORAL ORAL ONCE
Refills: 0 | Status: COMPLETED | OUTPATIENT
Start: 2024-07-19 | End: 2024-07-19

## 2024-07-19 RX ORDER — BACTERIOSTATIC SODIUM CHLORIDE 0.9 %
10 VIAL (ML) INJECTION
Refills: 0 | Status: DISCONTINUED | OUTPATIENT
Start: 2024-07-19 | End: 2024-07-23

## 2024-07-19 RX ORDER — MULTI-ELECTROLYTE 98; 74.5; 64.6; 25.5; 16.55 MG/ML; MG/ML; MG/ML; MG/ML; MG/ML
1 INJECTION, SOLUTION, CONCENTRATE INTRAVENOUS
Refills: 0 | Status: DISCONTINUED | OUTPATIENT
Start: 2024-07-19 | End: 2024-07-20

## 2024-07-19 RX ADMIN — BUDESONIDE AND FORMOTEROL FUMARATE DIHYDRATE 2 PUFF(S): 80; 4.5 AEROSOL RESPIRATORY (INHALATION) at 17:53

## 2024-07-19 RX ADMIN — MULTI-ELECTROLYTE 1 EACH: 98; 74.5; 64.6; 25.5; 16.55 INJECTION, SOLUTION, CONCENTRATE INTRAVENOUS at 19:24

## 2024-07-19 RX ADMIN — Medication 2 PUFF(S): at 06:05

## 2024-07-19 RX ADMIN — Medication 2 PUFF(S): at 12:41

## 2024-07-19 RX ADMIN — Medication 2 PUFF(S): at 17:53

## 2024-07-19 RX ADMIN — PIPERACILLIN SODIUM, TAZOBACTAM SODIUM 25 GRAM(S): 3; .375 INJECTION, POWDER, LYOPHILIZED, FOR SOLUTION INTRAVENOUS at 23:46

## 2024-07-19 RX ADMIN — HEPARIN SODIUM 5000 UNIT(S): 1000 INJECTION, SOLUTION INTRAVENOUS; SUBCUTANEOUS at 06:06

## 2024-07-19 RX ADMIN — Medication 1 ENEMA: at 10:34

## 2024-07-19 RX ADMIN — Medication 2: at 23:46

## 2024-07-19 RX ADMIN — HEPARIN SODIUM 5000 UNIT(S): 1000 INJECTION, SOLUTION INTRAVENOUS; SUBCUTANEOUS at 14:45

## 2024-07-19 RX ADMIN — Medication 2 PUFF(S): at 23:46

## 2024-07-19 RX ADMIN — POTASSIUM PHOSPHATE, MONOBASIC AND POTASSIUM PHOSPHATE, DIBASIC 83.33 MILLIMOLE(S): 224; 236 INJECTION, SOLUTION INTRAVENOUS at 10:33

## 2024-07-19 RX ADMIN — BUDESONIDE AND FORMOTEROL FUMARATE DIHYDRATE 2 PUFF(S): 80; 4.5 AEROSOL RESPIRATORY (INHALATION) at 06:06

## 2024-07-19 RX ADMIN — Medication 2: at 12:40

## 2024-07-19 RX ADMIN — PIPERACILLIN SODIUM, TAZOBACTAM SODIUM 25 GRAM(S): 3; .375 INJECTION, POWDER, LYOPHILIZED, FOR SOLUTION INTRAVENOUS at 12:39

## 2024-07-19 RX ADMIN — Medication 2: at 17:55

## 2024-07-19 RX ADMIN — CHLORHEXIDINE GLUCONATE 1 APPLICATION(S): 500 CLOTH TOPICAL at 10:36

## 2024-07-19 RX ADMIN — HEPARIN SODIUM 5000 UNIT(S): 1000 INJECTION, SOLUTION INTRAVENOUS; SUBCUTANEOUS at 21:46

## 2024-07-19 RX ADMIN — PANTOPRAZOLE SODIUM 40 MILLIGRAM(S): 20 TABLET, DELAYED RELEASE ORAL at 12:40

## 2024-07-19 NOTE — PROGRESS NOTE ADULT - SUBJECTIVE AND OBJECTIVE BOX
[facility] NUTRITION SUPPORT-- FOLLOW UP NOTE      24 hour events/subjective:  Patient seen and examined at bedside, chart reviewed and events noted and I's and O's reviewed; spouse and family at bedside.  Patient denies chest pain, shortness of breath, nausea or vomiting, dizziness, chills at time of visit.   Patient's VSS and no other acute overnight events noted.   Patient remains NPO pending OR for possible foreign body in GI tract, on NGT decompression with only 150 ml/24 hours, has been transferred out of ICU pending cultures neg x 48 hours for picc insertion       ROS:  Except as noted above, all other systems reviewed and are negative     Diet:  Diet, NPO (07-16-24 @ 07:17)      PAST HISTORY  --------------------------------------------------------------------------------  PAST MEDICAL & SURGICAL HISTORY:  Asthma      DM (diabetes mellitus)      BPH (benign prostatic hyperplasia)      Diabetes      Asthma      Asthma      Diabetes      S/P cataract extraction      No significant past surgical history        No significant changes to PMH, PSH, FHx, SHx, unless otherwise noted    ALLERGIES & MEDICATIONS  --------------------------------------------------------------------------------  Allergies    No Known Allergies    Intolerances      Standing Inpatient Medications  albuterol    90 MICROgram(s) HFA Inhaler 2 Puff(s) Inhalation every 6 hours  budesonide 160 MICROgram(s)/formoterol 4.5 MICROgram(s) Inhaler 2 Puff(s) Inhalation two times a day  chlorhexidine 2% Cloths 1 Application(s) Topical <User Schedule>  dextrose 5% + lactated ringers. 1000 milliLiter(s) IV Continuous <Continuous>  dextrose 5%. 1000 milliLiter(s) IV Continuous <Continuous>  dextrose 5%. 1000 milliLiter(s) IV Continuous <Continuous>  dextrose 50% Injectable 25 Gram(s) IV Push once  dextrose 50% Injectable 25 Gram(s) IV Push once  dextrose 50% Injectable 12.5 Gram(s) IV Push once  glucagon  Injectable 1 milliGRAM(s) IntraMuscular once  heparin   Injectable 5000 Unit(s) SubCutaneous every 8 hours  insulin lispro (ADMELOG) corrective regimen sliding scale   SubCutaneous every 6 hours  pantoprazole  Injectable 40 milliGRAM(s) IV Push daily  Parenteral Nutrition - Adult 1 Each TPN Continuous <Continuous>  piperacillin/tazobactam IVPB.. 3.375 Gram(s) IV Intermittent every 12 hours    PRN Inpatient Medications  acetaminophen   IVPB .. 650 milliGRAM(s) IV Intermittent every 6 hours PRN  dextrose Oral Gel 15 Gram(s) Oral once PRN  tetracaine/benzocaine/butamben Spray 1 Spray(s) Topical every 6 hours PRN        VITALS/PHYSICAL EXAM  --------------------------------------------------------------------------------  T(C): 36.4 (07-19-24 @ 12:14), Max: 37.5 (07-19-24 @ 04:55)  HR: 87 (07-19-24 @ 12:14) (80 - 99)  BP: 171/82 (07-19-24 @ 12:14) (123/89 - 179/81)  RR: 18 (07-19-24 @ 12:14) (18 - 27)  SpO2: 96% (07-19-24 @ 12:14) (96% - 99%)  Wt(kg): --      07-18-24 @ 07:01  -  07-19-24 @ 07:00  --------------------------------------------------------  IN: 1672.5 mL / OUT: 2310 mL / NET: -637.5 mL    07-19-24 @ 07:01  -  07-19-24 @ 13:54  --------------------------------------------------------  IN: 0 mL / OUT: 800 mL / NET: -800 mL      I&O's Detail    18 Jul 2024 07:01  -  19 Jul 2024 07:00  --------------------------------------------------------  IN:    dextrose 5% + lactated ringers: 825 mL    Enteral Tube Flush: 720 mL    IV PiggyBack: 65 mL    IV PiggyBack: 62.5 mL  Total IN: 1672.5 mL    OUT:    Indwelling Catheter - Urethral (mL): 2160 mL    Nasogastric/Oral tube (mL): 150 mL  Total OUT: 2310 mL    Total NET: -637.5 mL      19 Jul 2024 07:01  -  19 Jul 2024 13:54  --------------------------------------------------------  IN:  Total IN: 0 mL    OUT:    Indwelling Catheter - Urethral (mL): 800 mL    Oral Fluid: 0 mL  Total OUT: 800 mL    Total NET: -800 mL          Physical Exam:  Gen: NAD, thin/frail appearing; laying in bed   HEENT: supple neck, no JVD; NGT  Chest: non labored breathing, equal chest expansion bilaterally  Abd: soft, nontender/nondistended  : No suprapubic tenderness  Ext: Warm, FROM, no edema b/l LE  Neuro: No focal deficits  Psych: Normal affect and mood  Skin: Warm, without rashes       LABS/STUDIES  --------------------------------------------------------------------------------              11.2   10.47 >-----------<  217      [07-19-24 @ 07:13]              35.5     137  |  100  |  14  ----------------------------<  112      [07-19-24 @ 07:13]  3.5   |  26  |  1.06        Ca     8.5     [07-19-24 @ 07:13]      Mg     2.0     [07-19-24 @ 07:13]      Phos  2.7     [07-19-24 @ 07:13]    TPro  6.9  /  Alb  2.9  /  TBili  0.5  /  DBili  x   /  AST  32  /  ALT  13  /  AlkPhos  88  [07-18-24 @ 11:51]    PT/INR: PT 15.9 , INR 1.46       [07-17-24 @ 23:52]  PTT: 36.3       [07-17-24 @ 23:52]      Ca ionizedBlood Gas Calcium, Ionized - Venous: 1.21 mmol/L (07-18-24 @ 06:24)  Blood Gas Calcium, Ionized - Venous: 1.22 mmol/L (07-17-24 @ 23:49)    Creatinine Trend:  POC glucoseGlucose: 112 mg/dL (07-19-24 @ 07:13)  CAPILLARY BLOOD GLUCOSE      POCT Blood Glucose.: 160 mg/dL (19 Jul 2024 12:05)  POCT Blood Glucose.: 101 mg/dL (19 Jul 2024 06:58)  POCT Blood Glucose.: 115 mg/dL (19 Jul 2024 06:01)  POCT Blood Glucose.: 162 mg/dL (18 Jul 2024 23:25)  POCT Blood Glucose.: 164 mg/dL (18 Jul 2024 17:13)    PrealbuminPrealbumin, Serum: 7 mg/dL (07-16-24 @ 23:00)    Triglycerides

## 2024-07-19 NOTE — CONSULT NOTE ADULT - SUBJECTIVE AND OBJECTIVE BOX
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 The pt has had sympotoms since being evaluated at [place] in May. At that time, the pt was found to have a possible contained perforation at the DJ junction w/ possible foreign body versus inflamed diverticulum. The pt had a UGI at the time which was negative for a perforation. The pt was discharged with GI followup. The pt now presents to [facility] w/ a small bowel obstruction which appears to be related to a foreign body however the inflammation at the DJ junction appears to be worsened. The pt is now on the floor with a partial SBO related to a foreign body on TPN with possible planned surgical intervention next week. Surgical oncology was consulted to assist in management of the possible contained perforation and worsening inflammation at the duodenal jejunal junction.       PAST MEDICAL HISTORY:  Diabetes    Asthma    DM (diabetes mellitus)    BPH (benign prostatic hyperplasia)    Diabetes    Asthma    Asthma    Diabetes        PAST SURGICAL HISTORY:  No significant past surgical history    S/P cataract extraction    No significant past surgical history        MEDICATIONS:  acetaminophen   IVPB .. 650 milliGRAM(s) IV Intermittent every 6 hours PRN  albuterol    90 MICROgram(s) HFA Inhaler 2 Puff(s) Inhalation every 6 hours  budesonide 160 MICROgram(s)/formoterol 4.5 MICROgram(s) Inhaler 2 Puff(s) Inhalation two times a day  chlorhexidine 2% Cloths 1 Application(s) Topical <User Schedule>  chlorhexidine 4% Liquid 1 Application(s) Topical <User Schedule>  dextrose 5%. 1000 milliLiter(s) IV Continuous <Continuous>  dextrose 5%. 1000 milliLiter(s) IV Continuous <Continuous>  dextrose 50% Injectable 25 Gram(s) IV Push once  dextrose 50% Injectable 25 Gram(s) IV Push once  dextrose 50% Injectable 12.5 Gram(s) IV Push once  dextrose Oral Gel 15 Gram(s) Oral once PRN  glucagon  Injectable 1 milliGRAM(s) IntraMuscular once  heparin   Injectable 5000 Unit(s) SubCutaneous every 8 hours  insulin lispro (ADMELOG) corrective regimen sliding scale   SubCutaneous every 6 hours  pantoprazole  Injectable 40 milliGRAM(s) IV Push daily  Parenteral Nutrition - Adult 1 Each TPN Continuous <Continuous>  piperacillin/tazobactam IVPB.. 3.375 Gram(s) IV Intermittent every 12 hours  sodium chloride 0.9% lock flush 10 milliLiter(s) IV Push every 1 hour PRN  tetracaine/benzocaine/butamben Spray 1 Spray(s) Topical every 6 hours PRN      ALLERGIES:  No Known Allergies      VITALS & I/Os:  Vital Signs Last 24 Hrs  T(C): 36.8 (19 Jul 2024 16:39), Max: 37.5 (19 Jul 2024 04:55)  T(F): 98.3 (19 Jul 2024 16:39), Max: 99.5 (19 Jul 2024 04:55)  HR: 88 (19 Jul 2024 16:39) (80 - 88)  BP: 182/73 (19 Jul 2024 16:39) (123/89 - 182/73)  BP(mean): --  RR: 18 (19 Jul 2024 16:39) (18 - 18)  SpO2: 97% (19 Jul 2024 16:39) (96% - 98%)    Parameters below as of 19 Jul 2024 16:39  Patient On (Oxygen Delivery Method): room air        I&O's Summary    18 Jul 2024 07:01  -  19 Jul 2024 07:00  --------------------------------------------------------  IN: 1672.5 mL / OUT: 2310 mL / NET: -637.5 mL    19 Jul 2024 07:01  -  19 Jul 2024 17:53  --------------------------------------------------------  IN: 0 mL / OUT: 1250 mL / NET: -1250 mL        PHYSICAL EXAM:  General: No acute distress  Respiratory: Nonlabored  Cardiovascular: RRR  Abdominal: Soft, mildly distended, nontender. No rebound or guarding. No organomegaly, no palpable mass.  Extremities: Warm    LABS:                        11.2   10.47 )-----------( 217      ( 19 Jul 2024 07:13 )             35.5     07-19    137  |  100  |  14  ----------------------------<  112<H>  3.5   |  26  |  1.06    Ca    8.5      19 Jul 2024 07:13  Phos  2.7     07-19  Mg     2.0     07-19    TPro  6.9  /  Alb  2.9<L>  /  TBili  0.5  /  DBili  x   /  AST  32  /  ALT  13  /  AlkPhos  88  07-18    Lactate:    PT/INR - ( 17 Jul 2024 23:52 )   PT: 15.9 sec;   INR: 1.46 ratio         PTT - ( 17 Jul 2024 23:52 )  PTT:36.3 sec          Urinalysis Basic - ( 19 Jul 2024 07:13 )    Color: x / Appearance: x / SG: x / pH: x  Gluc: 112 mg/dL / Ketone: x  / Bili: x / Urobili: x   Blood: x / Protein: x / Nitrite: x   Leuk Esterase: x / RBC: x / WBC x   Sq Epi: x / Non Sq Epi: x / Bacteria: x        IMAGING:  < from: CT Abdomen and Pelvis w/ Oral Cont and w/ IV Cont (07.18.24 @ 14:21) >  IMPRESSION:  Interval placement of an enteric tube with partial decompression of the   small bowel and stomach upstream of a transition point in the distal   jejunum at the level of a 3.7 cm ring-shaped foreign body, compatible   with persistentsmall bowel obstruction in the setting of a foreign body.   Transition point is unchanged from 7/16/2024. Enteric contrast progresses   beyond the transition point, suggesting partial obstruction. This   obstructing foreign body in the distal jejunumwas previously located in   an inflamed outpouching at the duodenojejunal junction on 5/20/2024 CT.   There is marked inflammatory change at the level of the duodenojejunal   junction surrounding this outpouching, favoring contained perforation in   the setting of ulcer, presumably secondary to the foreign body, versus   diverticulitis of a large small bowel diverticulum.    New consolidative opacity in the right lower lobe, raising concern for   pneumonia. New small bilateral pleural effusions.    < end of copied text >

## 2024-07-19 NOTE — ADVANCED PRACTICE NURSE CONSULT - REASON FOR CONSULT
Vascular Access Team    Evaluation for: Bedside DL PICC placement  Requested by name: Antonia Cooper (17-Jul-2024 08:11)    Indication for PICC: TPN    Platelets(>20): 177  INR(<3): 1.54   eGFR(>40): 32  Blood cultures sent: Yes- blood cultures sent on 7/17 at 7am  Blood culture negative in 48hrs: Pending   Anticoagulants: Heparin sq  Arms DVT: no  Mastectomy: no   Fistula: no   PPM/Defib: no   IR or Nephrology or ID clearance needed- Pt needs TPN approval     Consent obtained: pending    Pending: Consent and results of blood cultures sent 7/17     Plan: Bedside picc order evaluated. Please obtain PICC placement consent and then call [phone] for the VAT RN to place the bedside picc. Plan to place in 48-72 hours once above criteria is obtained.   
Bedside DL PICC ordered for TPN

## 2024-07-19 NOTE — ADVANCED PRACTICE NURSE CONSULT - ASSESSMENT
Central Line Catheter Insertion Note  Patient or patient representative educated about central line associated blood stream infection prevention practices.    Catheter type: DL PICC  : Bard/ [id number]  Power injectable: Yes  Procedure assisted by: Ann Urbina RN    Informed consent obtained by covering floor team. Time out was preformed, confirming the patient's first and last name, date of birth, procedure, and correct site prior to state of procedure.    Patient was placed with HOB up 30 degrees. Patient placement site was prepped with chlorhexidine solution, then draped using maximum sterile barrier protection. The area was injected with 2 ml of 1% lidocaine. Using the ultrasound, the catheter was introduced. Strict adherence to outline aseptic technique. Upon completion of line placement, the insertion site was covered with a sterile occlusive dressing. Pt tolerated procedure well. Minimal blood loss.     All materials used for catheter insertion, including the intact guide wires, were accounted for at the end of the procedure.    Number of attempts: 1  Complications/Comments: none    Emergency Placement: No  Site: New site  Anatomical Site of insertion: R Basilic  Catheter size/length: 4 Fr., 36 cm.  US guided Bard DL Power PICC placed    Pre-procedure and post procedure vitals WDL    Post procedure verification with CXR as per orders.

## 2024-07-19 NOTE — PROGRESS NOTE ADULT - ASSESSMENT
Mr. 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 Abdominal pain improved after emesis and he was sent home without further intervention. At that time there was a suspicion for a contained perforation or inflamed giant diverticulum. On this presentation, patient had a few days of brown emesis with some blood and severe abdominal pain. CT AP showed high grade SBO. Patient has since had an NGT in place with bilious output, repeat CT A/P shows an SBO with transition point. Also demonstrated obstructing foreign body in the distal jejunum?, contained perforation in the setting of an ulcer versus diverticulitis of a large small bowel diverticulum. Patient has since started to pass gas and small amounts of stool. Surgery holding off on intervention.     Patient's presentation could certainly be consistent with IBD, however, at this time with an active small bowel obstruction it is difficult to determine. More likely diverticulitis vs. ulcer given the patient's age and lack of other symptoms to suggest IBD, however this is certainly possible.    Recommendations:  -Please obtain fecal calprotectin once patient starts passing stool.   -Will talk to radiology about CT A/P.  -Once bowel obstruction is resolved, dedicated imaging with a CT enterography or MR enterography would be warranted to further elucidate small bowel involvement of a potential IBD.   -After dedicated imaging, could consider further endoscopic evaluation to target area of suspected involvement.   -Appreciate SICU and surgical team excellent care.     Note incomplete until finalized by attending signature/attestation.    Blanca Verma MD  GI/Hepatology Fellow, PGY-4  Long Range Pager: [phone], Short Range Pager: [phone]    MONDAY-FRIDAY 8AM-5PM:  Please message via [employer] or email [email or web address] OR [email or web address]   On Weekends/Holidays (All day) and Weekdays after 5 PM to 8 AM  For nonurgent consults please email:  Please email [email or web address] OR [email or web address]    URGENT CONSULTS:  Please contact on call GI team. See Laura lizarraga ([facility]), [employer] paging system ([facility]), or call hospital  ([facility])

## 2024-07-19 NOTE — PROGRESS NOTE ADULT - ASSESSMENT
83M PMH of Asthma and BPH on Flomax, last colonoscopy around 8-10 years ago (normal per wife/patient) no known PSHx recently admitted to [place] in 5/2024 for abdominal pain, found to have large ulcerating mass communicating with proximal jejunum on CT (discharged home with outpatient GI f/u for concern of contained perforation vs inflamed giant diverticulum) presented to [facility] given hematemesis constipation and abdominal pain. Reports abdominal pain improved with episodes of emesis. Last BM/flatus was yesterday.  TPN team consulted given concern for severe protein calorie malnutrition and anticipated prolonged inadequate caloric intake .    AA 105g, Dextrose 210g and SMOF Lipids 45g. To provide: 1584kcal and 105g protein. Based on dosing wt 43.5kg, provides: 36.4kcal/kg and 2.4g protein/kg.     - Nutritional Assessment, patient with severe protein calorie malnutrition not meeting nutritional goals enterally due to SBO/NPO status and would likely benefit from TPN for nutritional support; prealbumin only 7 mg/dL - trend weekly   - TPN plan:  Initiate TPN today at 1/2 goal AA/DEX; limit sodium in TPN given elevated BNP, add 4 units RI and potassium of 80 mEQ given hypokalemia/Na Phos of 60 mm given hypophosphetemia and risk of refeeding syndrome; add Thiamine 100 mg to TPN to further reduce risk of refeeding syndrome.   - TPN access:  Patient will need a dedicated double lumen PICC today for TPN to start; please obtain CXR after placement and order "okay to access central line" once placement confirmed.  Please order chlorhexidine cloth cleanses to maintain PICC; discussed with primary team PA and PICC RN  - Hyponatremia/NAA:  improving; IV fluids per SICU/Renal; trend in AM; BNP 2554 pg/mL; Echo with EF 55-60%; atrial septal defect with R flow; mod pulm HTN; check BNP in AM (previoulsy 2k range)  - Electrolyte Imbalance risk:  recommend to check CMP, Mg, Phos and ionized Ca daily, to be supplemented via TPN  - SBO:  monitor NGT output overnight and f/u surgical plan  - Fever:  f/u Blood cx from 7/17 (neg x 48 hrs); on Zosyn   - Recommend strict intake and output/weight checks three times a week  - Risk of glucose dysfunction with TPN:  HgA1c 7.8% on 7/17/24; continue finger sticks with RISS; add 4 units RI in TPN  - Risk for Hypertriglyceridemia with TPN:  baseline lipid profile reviewed; TG 75 mg/dL on 7/17; monitor TG closely on TPN  - Global care per primary team     Available on TEAMS  TPN spectra [id number] ([phone] when dialing from outside line)  M-F 8A-2P, Weekends and holidays 8/9A-12/1P  Discussed with Dr. Morris Comer

## 2024-07-19 NOTE — CONSULT NOTE ADULT - ASSESSMENT
83M, poor historian, PMH of DM, asthma, no known PSHx recently admitted to [place] in 5/2024 for abdominal pain, found to have a partial small bowel obstruction related to a foreign body w/ inflammation at the duodenal jejunal junction concerning for contained perforation.     -no acute surgical intervention  -attending to review imaging  -will be available with assistance if needed in OR  -will follow    Discussed w/ Dr. Julia Vazquez Surgery

## 2024-07-19 NOTE — PROGRESS NOTE ADULT - ATTENDING COMMENTS
Feeling better  Still decompressing via NGT  GI to follow, consideration for enterography prior to endoscopic evaluation

## 2024-07-19 NOTE — PROGRESS NOTE ADULT - SUBJECTIVE AND OBJECTIVE BOX
Surgery Progress Note     Interval/Subjective:  Patient seen and examined.   __________________________________________________________________  Vitals:  T(C): 36.8 (00:45), Max: 37.4 (11:00)  HR: 81 (00:45) (80 - 99)  BP: 161/71 (00:45) (133/76 - 192/84)  RR: 18 (00:45) (14 - 27)  SpO2: 96% (00:45) (92% - 100%)    I & O's:  IN:    dextrose 5% + lactated ringers: 1775 mL    Enteral Tube Flush: 30 mL    IV PiggyBack: 650 mL    IV PiggyBack: 400 mL    Lactated Ringers: 300 mL    Lactated Ringers Bolus: 500 mL  Total IN: 3655 mL    OUT:    Indwelling Catheter - Urethral (mL): 1665 mL    Nasogastric/Oral tube (mL): 950 mL  Total OUT: 2615 mL        Physical Exam:  General: NAD, resting comfortably in bed  HEENT: Normocephalic atraumatic  Respiratory: Nonlabored respirations  Cardio: pulse present  Abdomen: soft, nontender, nondistended  Vascular: extremities are warm and well perfused.       __________________________________________________________________ Surgery Progress Note     Overnight: NAEO    Interval/Subjective:  Patient seen and examined at the bedside. NGT in place. Pain is well controlled. Denies fever, chills, nausea, and vomiting.   __________________________________________________________________  Vitals:  T(C): 36.8 (00:45), Max: 37.4 (11:00)  HR: 81 (00:45) (80 - 99)  BP: 161/71 (00:45) (133/76 - 192/84)  RR: 18 (00:45) (14 - 27)  SpO2: 96% (00:45) (92% - 100%)    I & O's:  IN:    dextrose 5% + lactated ringers: 1775 mL    Enteral Tube Flush: 30 mL    IV PiggyBack: 650 mL    IV PiggyBack: 400 mL    Lactated Ringers: 300 mL    Lactated Ringers Bolus: 500 mL  Total IN: 3655 mL    OUT:    Indwelling Catheter - Urethral (mL): 1665 mL    Nasogastric/Oral tube (mL): 950 mL  Total OUT: 2615 mL        Physical Exam:  General: NAD, resting comfortably in bed  HEENT: Normocephalic atraumatic  Respiratory: Nonlabored respirations  Cardio: pulse present  Abdomen: soft, nontender, nondistended  Vascular: extremities are warm and well perfused.       __________________________________________________________________

## 2024-07-19 NOTE — CHART NOTE - NSCHARTNOTEFT_GEN_A_CORE
Upon consultation with radiology, appears foreign body has been present since 12/2023 in small bowel, recently moved from diverticulum in duodenum, now in jejunum. GI will continue to follow, eventually warrants EGD to investigate duodenal diverticula. IBD is low on differential at this point, suspect symptoms have likely been related to foreign body presence and recent dislodgement.     Blanca Verma  GI/Hepatology Fellow, PGY-4  Long Range Pager: [phone], Short Range Pager: [phone]    MONDAY-FRIDAY 8AM-5PM:  Please message via [employer] or email [email or web address]   On Weekends/Holidays (All day) and Weekdays after 5 PM to 8 AM  For nonurgent consults please email:  Please email [email or web address]    URGENT CONSULTS:  Please contact on call GI team. See Amion schedule ([facility]), [employer] paging system ([facility]), or call hospital  ([facility]) Upon consultation with radiology, appears foreign body has been present since 12/2023 in small bowel, recently moved from diverticulum in duodenum, now in jejunum.  Patient adamantly swallow of foreign body. GI will continue to follow, eventually warrants EGD to investigate duodenal/jejunal diverticula.  Unclear if enterography will help delineate this difference.    Blanca Verma  GI/Hepatology Fellow, PGY-4  Long Range Pager: [phone], Short Range Pager: [phone]    MONDAY-FRIDAY 8AM-5PM:  Please message via [email or web address] or email [email or web address]   On Weekends/Holidays (All day) and Weekdays after 5 PM to 8 AM  For nonurgent consults please email:  Please email [email or web address]    URGENT CONSULTS:  Please contact on call GI team. See Amion schedule ([facility]), [employer] paging system ([facility]), or call hospital  ([facility])

## 2024-07-19 NOTE — PROGRESS NOTE ADULT - SUBJECTIVE AND OBJECTIVE BOX
House GI Progress Note    Chief Complaint:  Patient is a 83y old  Male who presents with a chief complaint of SBO (19 Jul 2024 02:08)      Interval Events / Subjective:     Mr Kristen reports that he had a small BM yesterday and passed gas yesterday. He denies swallowing any foreign bodies that he knows of. NGT still in place to suction.    MEDICATIONS:   MEDICATIONS  (STANDING):  albuterol    90 MICROgram(s) HFA Inhaler 2 Puff(s) Inhalation every 6 hours  budesonide 160 MICROgram(s)/formoterol 4.5 MICROgram(s) Inhaler 2 Puff(s) Inhalation two times a day  chlorhexidine 2% Cloths 1 Application(s) Topical <User Schedule>  dextrose 5% + lactated ringers. 1000 milliLiter(s) (75 mL/Hr) IV Continuous <Continuous>  dextrose 5%. 1000 milliLiter(s) (50 mL/Hr) IV Continuous <Continuous>  dextrose 5%. 1000 milliLiter(s) (100 mL/Hr) IV Continuous <Continuous>  dextrose 50% Injectable 25 Gram(s) IV Push once  dextrose 50% Injectable 12.5 Gram(s) IV Push once  dextrose 50% Injectable 25 Gram(s) IV Push once  glucagon  Injectable 1 milliGRAM(s) IntraMuscular once  heparin   Injectable 5000 Unit(s) SubCutaneous every 8 hours  insulin lispro (ADMELOG) corrective regimen sliding scale   SubCutaneous every 6 hours  pantoprazole  Injectable 40 milliGRAM(s) IV Push daily  Parenteral Nutrition - Adult 1 Each (75 mL/Hr) TPN Continuous <Continuous>  piperacillin/tazobactam IVPB.. 3.375 Gram(s) IV Intermittent every 12 hours    MEDICATIONS  (PRN):  acetaminophen   IVPB .. 650 milliGRAM(s) IV Intermittent every 6 hours PRN Mild Pain (1 - 3)  dextrose Oral Gel 15 Gram(s) Oral once PRN Blood Glucose LESS THAN 70 milliGRAM(s)/deciliter  tetracaine/benzocaine/butamben Spray 1 Spray(s) Topical every 6 hours PRN Sore throat      ALLERGIES:   Allergies    No Known Allergies    Intolerances        VITAL SIGNS:   Vital Signs Last 24 Hrs  T(C): 36.8 (19 Jul 2024 09:41), Max: 37.5 (19 Jul 2024 04:55)  T(F): 98.3 (19 Jul 2024 09:41), Max: 99.5 (19 Jul 2024 04:55)  HR: 86 (19 Jul 2024 09:41) (80 - 99)  BP: 169/86 (19 Jul 2024 09:41) (123/89 - 192/84)  BP(mean): 101 (18 Jul 2024 15:00) (101 - 120)  RR: 18 (19 Jul 2024 09:41) (18 - 27)  SpO2: 98% (19 Jul 2024 09:41) (96% - 100%)    Parameters below as of 19 Jul 2024 09:41  Patient On (Oxygen Delivery Method): room air      I&O's Summary    18 Jul 2024 07:01  -  19 Jul 2024 07:00  --------------------------------------------------------  IN: 1672.5 mL / OUT: 2310 mL / NET: -637.5 mL        PHYSICAL EXAM:   GENERAL:  Appears stated age, well-groomed, well-nourished, no distress  HEENT:  NC/AT,  conjunctivae clear, sclera -anicteric  CHEST:  Full & symmetric excursion, no increased effort, breath sounds clear  HEART:  Regular rhythm, S1, S2, no murmur/rub/S3/S4,  no edema  ABDOMEN:  Somewhat distended, non tender to palpation, normoactive bowel sounds,  no masses, no hepato-splenomegaly, NGT in place with bilious output.   EXTREMITIES: No cyanosis,clubbing or edema  SKIN:  No rash/erythema/ecchymoses/petechiae/wounds/abscess/warm/dry  NEURO:  Alert, oriented    LABS:  CBC Full  -  ( 19 Jul 2024 07:13 )  WBC Count : 10.47 K/uL  RBC Count : 4.07 M/uL  Hemoglobin : 11.2 g/dL  Hematocrit : 35.5 %  Platelet Count - Automated : 217 K/uL  Mean Cell Volume : 87.2 fl  Mean Cell Hemoglobin : 27.5 pg  Mean Cell Hemoglobin Concentration : 31.5 gm/dL  Auto Neutrophil # : x  Auto Lymphocyte # : x  Auto Monocyte # : x  Auto Eosinophil # : x  Auto Basophil # : x  Auto Neutrophil % : x  Auto Lymphocyte % : x  Auto Monocyte % : x  Auto Eosinophil % : x  Auto Basophil % : x    07-19    137  |  100  |  14  ----------------------------<  112<H>  3.5   |  26  |  1.06    Ca    8.5      19 Jul 2024 07:13  Phos  2.7     07-19  Mg     2.0     07-19    TPro  6.9  /  Alb  2.9<L>  /  TBili  0.5  /  DBili  x   /  AST  32  /  ALT  13  /  AlkPhos  88  07-18    LIVER FUNCTIONS - ( 18 Jul 2024 11:51 )  Alb: 2.9 g/dL / Pro: 6.9 g/dL / ALK PHOS: 88 U/L / ALT: 13 U/L / AST: 32 U/L / GGT: x           PT/INR - ( 17 Jul 2024 23:52 )   PT: 15.9 sec;   INR: 1.46 ratio         PTT - ( 17 Jul 2024 23:52 )  PTT:36.3 sec  Urinalysis Basic - ( 19 Jul 2024 07:13 )    Color: x / Appearance: x / SG: x / pH: x  Gluc: 112 mg/dL / Ketone: x  / Bili: x / Urobili: x   Blood: x / Protein: x / Nitrite: x   Leuk Esterase: x / RBC: x / WBC x   Sq Epi: x / Non Sq Epi: x / Bacteria: x        Culture - Blood (collected 17 Jul 2024 07:12)  Source: .Blood Blood  Preliminary Report (18 Jul 2024 12:01):    No growth at 24 hours    Culture - Blood (collected 17 Jul 2024 07:12)  Source: .Blood Blood  Preliminary Report (18 Jul 2024 12:01):    No growth at 24 hours        IMAGING:

## 2024-07-19 NOTE — PROGRESS NOTE ADULT - ASSESSMENT
83M, poor historian, PMH of DM, asthma, recently admitted to [place] in 5/2024 for abdominal pain, found to have large ulcerating mass communicating with proximal jejunum on CT there but no intervention at that time, presenting for few days of brown/bloody emesis and abdominal pain. Leukocytosis to 21 with lactate 3.9, improved to 3.1 after 1L bolus.  CTAP significant for SBO with TP in R mid abdomen, thickened jejunum, no grossly obvious focal mass, dilated stomach and distal esophagus; possible Crohn's disease. Admitted to SICU for management of volume resuscitation in high grade SBO. Clinically improving in SICU. CT on admission failed to reveal transition point or obstructive mass. Recent CT s/o 3.7 cm ring shaped foreign body in the distal jejunum which has moved since the last imaging.    Plan  -Monitor NGT output, I/Os  -CTAP w/ oral contrast for assessment of SBO   -NPO/IVF  -DVT Prophylaxis    Gold Team  [id number] 83M, poor historian, PMH of DM, asthma, recently admitted to [place] in 5/2024 for abdominal pain, found to have large ulcerating mass communicating with proximal jejunum on CT there but no intervention at that time, presenting for few days of brown/bloody emesis and abdominal pain. Leukocytosis to 21 with lactate 3.9, improved to 3.1 after 1L bolus.  CTAP significant for SBO with TP in R mid abdomen, thickened jejunum, no grossly obvious focal mass, dilated stomach and distal esophagus; possible Crohn's disease. Admitted to SICU for management of volume resuscitation in high grade SBO. Clinically improving in SICU. CT on admission failed to reveal transition point or obstructive mass. Recent CT s/o 3.7 cm ring shaped foreign body in the distal jejunum which has moved since the last imaging.    Plan  -Monitor NGT output, I/Os  -NPO/IVF  -DVT Prophylaxis  -PICC line  -Reach out to TPN Nutrition  -Fleet enemas    Gold Team  [id number]

## 2024-07-19 NOTE — PROGRESS NOTE ADULT - ATTENDING COMMENTS
CT results noted  Today he denies pain  abd soft, NT, ND  D/w daughter at bedside - he will need surgery to remove the FB (enterotomy vs SBR).  Need to review CT with radiology regarding the DJ jx - ideally we would not have to intervene surgically for that area in this very debilitated pt  needs TPN  PT consult  looking for OR time next week

## 2024-07-20 LAB
ALBUMIN SERPL ELPH-MCNC: 2.9 G/DL — LOW (ref 3.3–5)
ALP SERPL-CCNC: 67 U/L — SIGNIFICANT CHANGE UP (ref 40–120)
ALT FLD-CCNC: 10 U/L — SIGNIFICANT CHANGE UP (ref 10–45)
ANION GAP SERPL CALC-SCNC: 10 MMOL/L — SIGNIFICANT CHANGE UP (ref 5–17)
AST SERPL-CCNC: 16 U/L — SIGNIFICANT CHANGE UP (ref 10–40)
BASOPHILS # BLD AUTO: 0.02 K/UL — SIGNIFICANT CHANGE UP (ref 0–0.2)
BASOPHILS NFR BLD AUTO: 0.2 % — SIGNIFICANT CHANGE UP (ref 0–2)
BILIRUB SERPL-MCNC: 0.4 MG/DL — SIGNIFICANT CHANGE UP (ref 0.2–1.2)
BUN SERPL-MCNC: 12 MG/DL — SIGNIFICANT CHANGE UP (ref 7–23)
CA-I BLD-SCNC: 1.13 MMOL/L — LOW (ref 1.15–1.33)
CALCIUM SERPL-MCNC: 8.3 MG/DL — LOW (ref 8.4–10.5)
CHLORIDE SERPL-SCNC: 100 MMOL/L — SIGNIFICANT CHANGE UP (ref 96–108)
CK MB CFR SERPL CALC: 1.3 NG/ML — SIGNIFICANT CHANGE UP (ref 0–6.7)
CO2 SERPL-SCNC: 26 MMOL/L — SIGNIFICANT CHANGE UP (ref 22–31)
CREAT SERPL-MCNC: 0.87 MG/DL — SIGNIFICANT CHANGE UP (ref 0.5–1.3)
EGFR: 85 ML/MIN/1.73M2 — SIGNIFICANT CHANGE UP
EOSINOPHIL # BLD AUTO: 0.15 K/UL — SIGNIFICANT CHANGE UP (ref 0–0.5)
EOSINOPHIL NFR BLD AUTO: 1.6 % — SIGNIFICANT CHANGE UP (ref 0–6)
GLUCOSE BLDC GLUCOMTR-MCNC: 165 MG/DL — HIGH (ref 70–99)
GLUCOSE BLDC GLUCOMTR-MCNC: 176 MG/DL — HIGH (ref 70–99)
GLUCOSE BLDC GLUCOMTR-MCNC: 203 MG/DL — HIGH (ref 70–99)
GLUCOSE BLDC GLUCOMTR-MCNC: 218 MG/DL — HIGH (ref 70–99)
GLUCOSE SERPL-MCNC: 152 MG/DL — HIGH (ref 70–99)
HCT VFR BLD CALC: 33.2 % — LOW (ref 39–50)
HGB BLD-MCNC: 10.6 G/DL — LOW (ref 13–17)
IMM GRANULOCYTES NFR BLD AUTO: 1 % — HIGH (ref 0–0.9)
LYMPHOCYTES # BLD AUTO: 3.08 K/UL — SIGNIFICANT CHANGE UP (ref 1–3.3)
LYMPHOCYTES # BLD AUTO: 32.5 % — SIGNIFICANT CHANGE UP (ref 13–44)
MAGNESIUM SERPL-MCNC: 2.1 MG/DL — SIGNIFICANT CHANGE UP (ref 1.6–2.6)
MCHC RBC-ENTMCNC: 27.7 PG — SIGNIFICANT CHANGE UP (ref 27–34)
MCHC RBC-ENTMCNC: 31.9 GM/DL — LOW (ref 32–36)
MCV RBC AUTO: 86.7 FL — SIGNIFICANT CHANGE UP (ref 80–100)
MONOCYTES # BLD AUTO: 0.79 K/UL — SIGNIFICANT CHANGE UP (ref 0–0.9)
MONOCYTES NFR BLD AUTO: 8.3 % — SIGNIFICANT CHANGE UP (ref 2–14)
NEUTROPHILS # BLD AUTO: 5.34 K/UL — SIGNIFICANT CHANGE UP (ref 1.8–7.4)
NEUTROPHILS NFR BLD AUTO: 56.4 % — SIGNIFICANT CHANGE UP (ref 43–77)
NRBC # BLD: 0 /100 WBCS — SIGNIFICANT CHANGE UP (ref 0–0)
NT-PROBNP SERPL-SCNC: 4168 PG/ML — HIGH (ref 0–300)
PHOSPHATE SERPL-MCNC: 3.8 MG/DL — SIGNIFICANT CHANGE UP (ref 2.5–4.5)
PLATELET # BLD AUTO: 255 K/UL — SIGNIFICANT CHANGE UP (ref 150–400)
POTASSIUM SERPL-MCNC: 3.6 MMOL/L — SIGNIFICANT CHANGE UP (ref 3.5–5.3)
POTASSIUM SERPL-SCNC: 3.6 MMOL/L — SIGNIFICANT CHANGE UP (ref 3.5–5.3)
PROT SERPL-MCNC: 6.5 G/DL — SIGNIFICANT CHANGE UP (ref 6–8.3)
RBC # BLD: 3.83 M/UL — LOW (ref 4.2–5.8)
RBC # FLD: 15 % — HIGH (ref 10.3–14.5)
SODIUM SERPL-SCNC: 136 MMOL/L — SIGNIFICANT CHANGE UP (ref 135–145)
TRIGL SERPL-MCNC: 175 MG/DL — HIGH
TROPONIN T, HIGH SENSITIVITY RESULT: 32 NG/L — SIGNIFICANT CHANGE UP (ref 0–51)
WBC # BLD: 9.47 K/UL — SIGNIFICANT CHANGE UP (ref 3.8–10.5)
WBC # FLD AUTO: 9.47 K/UL — SIGNIFICANT CHANGE UP (ref 3.8–10.5)

## 2024-07-20 PROCEDURE — 93010 ELECTROCARDIOGRAM REPORT: CPT

## 2024-07-20 PROCEDURE — 99232 SBSQ HOSP IP/OBS MODERATE 35: CPT

## 2024-07-20 RX ORDER — FISH OIL 0.1 G/ML
8.3 INJECTION, EMULSION INTRAVENOUS
Qty: 20 | Refills: 0 | Status: DISCONTINUED | OUTPATIENT
Start: 2024-07-20 | End: 2024-07-21

## 2024-07-20 RX ORDER — MULTI-ELECTROLYTE 98; 74.5; 64.6; 25.5; 16.55 MG/ML; MG/ML; MG/ML; MG/ML; MG/ML
1 INJECTION, SOLUTION, CONCENTRATE INTRAVENOUS
Refills: 0 | Status: DISCONTINUED | OUTPATIENT
Start: 2024-07-20 | End: 2024-07-21

## 2024-07-20 RX ADMIN — Medication 2: at 05:57

## 2024-07-20 RX ADMIN — Medication 2 PUFF(S): at 23:22

## 2024-07-20 RX ADMIN — BUDESONIDE AND FORMOTEROL FUMARATE DIHYDRATE 2 PUFF(S): 80; 4.5 AEROSOL RESPIRATORY (INHALATION) at 05:58

## 2024-07-20 RX ADMIN — Medication 4: at 18:33

## 2024-07-20 RX ADMIN — Medication 2 PUFF(S): at 11:37

## 2024-07-20 RX ADMIN — Medication 2 PUFF(S): at 05:57

## 2024-07-20 RX ADMIN — CHLORHEXIDINE GLUCONATE 1 APPLICATION(S): 500 CLOTH TOPICAL at 11:36

## 2024-07-20 RX ADMIN — PIPERACILLIN SODIUM, TAZOBACTAM SODIUM 25 GRAM(S): 3; .375 INJECTION, POWDER, LYOPHILIZED, FOR SOLUTION INTRAVENOUS at 11:35

## 2024-07-20 RX ADMIN — BUDESONIDE AND FORMOTEROL FUMARATE DIHYDRATE 2 PUFF(S): 80; 4.5 AEROSOL RESPIRATORY (INHALATION) at 18:35

## 2024-07-20 RX ADMIN — HEPARIN SODIUM 5000 UNIT(S): 1000 INJECTION, SOLUTION INTRAVENOUS; SUBCUTANEOUS at 16:01

## 2024-07-20 RX ADMIN — HEPARIN SODIUM 5000 UNIT(S): 1000 INJECTION, SOLUTION INTRAVENOUS; SUBCUTANEOUS at 23:22

## 2024-07-20 RX ADMIN — Medication 2 PUFF(S): at 18:35

## 2024-07-20 RX ADMIN — HEPARIN SODIUM 5000 UNIT(S): 1000 INJECTION, SOLUTION INTRAVENOUS; SUBCUTANEOUS at 05:57

## 2024-07-20 RX ADMIN — PIPERACILLIN SODIUM, TAZOBACTAM SODIUM 25 GRAM(S): 3; .375 INJECTION, POWDER, LYOPHILIZED, FOR SOLUTION INTRAVENOUS at 23:21

## 2024-07-20 RX ADMIN — MULTI-ELECTROLYTE 1 EACH: 98; 74.5; 64.6; 25.5; 16.55 INJECTION, SOLUTION, CONCENTRATE INTRAVENOUS at 07:31

## 2024-07-20 RX ADMIN — MULTI-ELECTROLYTE 1 EACH: 98; 74.5; 64.6; 25.5; 16.55 INJECTION, SOLUTION, CONCENTRATE INTRAVENOUS at 19:14

## 2024-07-20 RX ADMIN — FISH OIL 8.3 ML/HR: 0.1 INJECTION, EMULSION INTRAVENOUS at 19:15

## 2024-07-20 RX ADMIN — PANTOPRAZOLE SODIUM 40 MILLIGRAM(S): 20 TABLET, DELAYED RELEASE ORAL at 11:35

## 2024-07-20 NOTE — PROVIDER CONTACT NOTE (OTHER) - BACKGROUND
Admit note: "presenting for few days of brown/bloody emesis and abdominal pain. Leukocytosis to 21 with lactate 3.9, improved to 3.1 after 1L bolus.  CTAP significant for SBO"
7/16 SBO

## 2024-07-20 NOTE — PROGRESS NOTE ADULT - ASSESSMENT
83M, poor historian, PM of DM, asthma, recently admitted to [place] in 5/2024 for abdominal pain, found to have large ulcerating mass communicating with proximal jejunum on CT there but no intervention at that time, presenting for few days of brown/bloody emesis and abdominal pain. Leukocytosis to 21 with lactate 3.9, improved to 3.1 after 1L bolus.  CTAP significant for SBO with TP in R mid abdomen, thickened jejunum, no grossly obvious focal mass, dilated stomach and distal esophagus; possible Crohn's disease. Admitted to SICU for management of volume resuscitation in high grade SBO. Clinically improving in SICU. CT on admission failed to reveal transition point or obstructive mass. Recent CT s/o 3.7 cm ring shaped foreign body in the distal jejunum which has moved since the last imaging.    Plan  -Monitor NGT output, I/Os  -NPO/IVF  -DVT Prophylaxis  -Fleet enemas    Gold Team  [id number] 83M, poor historian, PM of DM, asthma, recently admitted to [place] in 5/2024 for abdominal pain, found to have large ulcerating mass communicating with proximal jejunum on CT there but no intervention at that time, presenting for few days of brown/bloody emesis and abdominal pain. Leukocytosis to 21 with lactate 3.9, improved to 3.1 after 1L bolus.  CTAP significant for SBO with TP in R mid abdomen, thickened jejunum, no grossly obvious focal mass, dilated stomach and distal esophagus; possible Crohn's disease. Admitted to SICU for management of volume resuscitation in high grade SBO. Clinically improving in SICU. CT on admission failed to reveal transition point or obstructive mass. Recent CT s/o 3.7 cm ring shaped foreign body in the distal jejunum which has moved since the last imaging.    Plan  -Monitor NGT output, I/Os  -NPO/IVF  -Appreciate GI recs, fecal calprotectin ordered  -Appreciate Surg Onc recs, available for intra op assistance given DJ region inflammation  -DVT Prophylaxis  -Fleet enemas    Gold Team  [id number] 83M, poor historian, PMH of DM, asthma, recently admitted to [place] in 5/2024 for abdominal pain, found to have large ulcerating mass communicating with proximal jejunum on CT there but no intervention at that time, presenting for few days of brown/bloody emesis and abdominal pain. Leukocytosis to 21 with lactate 3.9, improved to 3.1 after 1L bolus.  CTAP significant for SBO with TP in R mid abdomen, thickened jejunum, no grossly obvious focal mass, dilated stomach and distal esophagus; possible Crohn's disease. Admitted to SICU for management of volume resuscitation in high grade SBO. Clinically improving in SICU. CT on admission failed to reveal transition point or obstructive mass. Recent CT s/o 3.7 cm ring shaped foreign body in the distal jejunum which has moved since the last imaging.    Plan  -Monitor NGT output, I/Os  -NPO/IVF/TPN  -Appreciate GI recs, fecal calprotectin ordered  -Appreciate Surg Onc recs, available for intra op assistance given DJ region inflammation  -DVT Prophylaxis  -Fleet enemas    Gold Team  [id number] 83M, poor historian, PMH of DM, asthma, recently admitted to [place] in 5/2024 for abdominal pain, found to have large ulcerating mass communicating with proximal jejunum on CT there but no intervention at that time, presenting for few days of brown/bloody emesis and abdominal pain. Leukocytosis to 21 with lactate 3.9, improved to 3.1 after 1L bolus.  CTAP significant for SBO with TP in R mid abdomen, thickened jejunum, no grossly obvious focal mass, dilated stomach and distal esophagus; possible Crohn's disease. Admitted to SICU for management of volume resuscitation in high grade SBO. Clinically improving in SICU. CT on admission failed to reveal transition point or obstructive mass. Recent CT s/o 3.7 cm ring shaped foreign body in the distal jejunum which has moved since the last imaging.    Plan  -Monitor NGT output, I/Os  -NPO/IVF/TPN  - IV Zosyn   -Appreciate GI recs, fecal calprotectin ordered  -Appreciate Surg Onc recs, available for intra op assistance given DJ region inflammation  -DVT Prophylaxis  -Fleet enemas    Gold Team  [id number]

## 2024-07-20 NOTE — PROGRESS NOTE ADULT - ASSESSMENT
83M PMH of Asthma and BPH on Flomax, last colonoscopy around 8-10 years ago (normal per wife/patient) no known PSHx recently admitted to [place] in 5/2024 for abdominal pain, found to have large ulcerating mass communicating with proximal jejunum on CT (discharged home with outpatient GI f/u for concern of contained perforation vs inflamed giant diverticulum) presented to [facility] given hematemesis constipation and abdominal pain. Reports abdominal pain improved with episodes of emesis. Last BM/flatus was yesterday.  TPN team consulted given concern for severe protein calorie malnutrition and anticipated prolonged inadequate caloric intake .    AA 105g, Dextrose 210g and SMOF Lipids 45g. To provide: 1584kcal and 105g protein. Based on dosing wt 43.5kg, provides: 36.4kcal/kg and 2.4g protein/kg.     - Increase TPN to goal with 1/2 lipids:  105g AA, 210g dextrose, 20g SMOF  - hyperglycemia - increase insulin in TPN to -------------------------------------  - hypokalemia --------------------  - Hyponatremia/NAA:  improving; IV fluids per SICU/Renal; trend in AM; BNP 2554 pg/mL; Echo with EF 55-60%; atrial septal defect with R flow; mod pulm HTN; check BNP in AM (previoulsy 2k range)  - Electrolyte Imbalance risk:  recommend to check CMP, Mg, Phos and ionized Ca daily, to be supplemented via TPN  - Recommend strict intake and output/weight checks three times a week  - Risk for Hypertriglyceridemia with TPN:  check TG daily x 3 days at goal lipids  - care per primary team    TPN spectra [id number]  M-F 8:30A-2:30P, Weekends and holidays 8A-12P  discussed with Dr. Tee 83M PMH of Asthma and BPH on Flomax, last colonoscopy around 8-10 years ago (normal per wife/patient) no known PSHx recently admitted to [place] in 5/2024 for abdominal pain, found to have large ulcerating mass communicating with proximal jejunum on CT (discharged home with outpatient GI f/u for concern of contained perforation vs inflamed giant diverticulum) presented to [facility] given hematemesis constipation and abdominal pain. Reports abdominal pain improved with episodes of emesis. Last BM/flatus was yesterday.  TPN team consulted given concern for severe protein calorie malnutrition and anticipated prolonged inadequate caloric intake .    AA 105g, Dextrose 210g and SMOF Lipids 45g. To provide: 1584kcal and 105g protein. Based on dosing wt 43.5kg, provides: 36.4kcal/kg and 2.4g protein/kg.     - Increase TPN to goal with 1/2 lipids:  105g AA, 210g dextrose, 20g SMOF  - hyperglycemia - increase to 10U insulin in TPN.  Check FS q 6 and cover with ISS  - hypokalemia - KCl increased from 80 to 100meq  - Hyponatremia/NAA:  improving; IV fluids per SICU/Renal; trend in AM; BNP 2554 pg/mL; Echo with EF 55-60%; atrial septal defect with R flow; mod pulm HTN; check BNP in AM (previoulsy 2k range)  - Electrolyte Imbalance risk:  recommend to check CMP, Mg, Phos and ionized Ca daily, to be supplemented via TPN  - Recommend strict intake and output/weight checks three times a week  - Risk for Hypertriglyceridemia with TPN:  check TG daily x 3 days at goal lipids  - care per primary team    TPN spectra [id number]  M-F 8:30A-2:30P, Weekends and holidays 8A-12P  discussed with Dr. Tee

## 2024-07-20 NOTE — PROGRESS NOTE ADULT - ATTENDING COMMENTS
denies pain. + flatus, BMs  abd soft, NT, ND  D/w pt and son-in-law at bedside - he will need surgery to remove the FB (enterotomy vs SBR), looking for time next week.  I reviewed the CT with Radiology Dr Lucie Wells regarding the DJ jx pathology - there is a large ulcer there (likely a pressure ulcer from the FB situated there back in May) - Dr Wells feels the surrounding inflammation is worse in the most recent CT.   Ideally I would prefer a staged approach with plan to remove the FB next week as it is causing a partial SBO and follow the DJ ulcer with imaging and eventual endoscopy. The DJ jx is an area hard to access surgically and surgery for that ulcer would be very extensive for this very debilitated pt. However there is concern as Dr Wells sees increasing inflammation in the area.  I have placed a consult for Surg Onc for input in addressing the DJ jx pathology. Awaiting consult from Dr Dumont.   Continue NGT, TPN.

## 2024-07-20 NOTE — PROVIDER CONTACT NOTE (OTHER) - ASSESSMENT
pt blood pressure is 172/76. pt does not have any signs of chest pain, sob, or any other signs of distress
pt AOX3. Forgetful at times. pt denies SOB or chest pain. Vs completed. No s/s of discomfort. Daughter at bedside being supportive of patient. Pt is NPO/NGT. /74 Temp 98.8 ; HR 70 ; on room air 98% RR18.

## 2024-07-20 NOTE — PROGRESS NOTE ADULT - SUBJECTIVE AND OBJECTIVE BOX
Surgery Progress Note     Interval/Subjective:  Patient seen and examined.   __________________________________________________________________  Vitals:  T(C): 36.4 (01:02), Max: 37.5 (04:55)  HR: 72 (01:02) (72 - 88)  BP: 172/76 (03:00) (123/89 - 182/73)  RR: 18 (01:02) (18 - 18)  SpO2: 96% (01:02) (96% - 98%)    I & O's:  IN:    dextrose 5% + lactated ringers: 825 mL    Enteral Tube Flush: 720 mL    IV PiggyBack: 65 mL    IV PiggyBack: 62.5 mL  Total IN: 1672.5 mL    OUT:    Indwelling Catheter - Urethral (mL): 2160 mL    Nasogastric/Oral tube (mL): 150 mL  Total OUT: 2310 mL        Physical Exam:  General: NAD, resting comfortably in bed  HEENT: Normocephalic atraumatic  Respiratory: Nonlabored respirations  Cardio: pulse present  Abdomen: soft, nontender, nondistended  Vascular: extremities are warm and well perfused.       __________________________________________________________________ Surgery Progress Note     Interval/Subjective:  Patient seen and examined. Pt passing flatus and BMs. Pt denies n/v.   __________________________________________________________________  Vitals:  T(C): 36.4 (01:02), Max: 37.5 (04:55)  HR: 72 (01:02) (72 - 88)  BP: 172/76 (03:00) (123/89 - 182/73)  RR: 18 (01:02) (18 - 18)  SpO2: 96% (01:02) (96% - 98%)    I & O's:  IN:    dextrose 5% + lactated ringers: 825 mL    Enteral Tube Flush: 720 mL    IV PiggyBack: 65 mL    IV PiggyBack: 62.5 mL  Total IN: 1672.5 mL    OUT:    Indwelling Catheter - Urethral (mL): 2160 mL    Nasogastric/Oral tube (mL): 150 mL  Total OUT: 2310 mL        Physical Exam:  General: NAD, resting comfortably in bed  HEENT: Normocephalic atraumatic  Respiratory: Nonlabored respirations  Cardio: pulse present  Abdomen: soft, nontender, nondistended  Vascular: extremities are warm and well perfused.       __________________________________________________________________

## 2024-07-20 NOTE — PROGRESS NOTE ADULT - SUBJECTIVE AND OBJECTIVE BOX
[facility] NUTRITION SUPPORT / TPN -- FOLLOW UP NOTE  --------------------------------------------------------------------------------    24 hour events/subjective:  PICC placed, TPN started at half goal without lipids.  No acute overnight events.  Remains NPO/NGT awaiting OR plan.  Denies SOB/CP.    Diet:  Diet, NPO (07-16-24 @ 07:17)      PAST HISTORY  --------------------------------------------------------------------------------  No significant changes to PMH, PSH, FHx, SHx, unless otherwise noted    ALLERGIES & MEDICATIONS  --------------------------------------------------------------------------------  Allergies    No Known Allergies    Intolerances      Standing Inpatient Medications  albuterol    90 MICROgram(s) HFA Inhaler 2 Puff(s) Inhalation every 6 hours  budesonide 160 MICROgram(s)/formoterol 4.5 MICROgram(s) Inhaler 2 Puff(s) Inhalation two times a day  chlorhexidine 2% Cloths 1 Application(s) Topical <User Schedule>  chlorhexidine 4% Liquid 1 Application(s) Topical <User Schedule>  dextrose 5%. 1000 milliLiter(s) IV Continuous <Continuous>  dextrose 5%. 1000 milliLiter(s) IV Continuous <Continuous>  dextrose 50% Injectable 25 Gram(s) IV Push once  dextrose 50% Injectable 12.5 Gram(s) IV Push once  dextrose 50% Injectable 25 Gram(s) IV Push once  glucagon  Injectable 1 milliGRAM(s) IntraMuscular once  heparin   Injectable 5000 Unit(s) SubCutaneous every 8 hours  insulin lispro (ADMELOG) corrective regimen sliding scale   SubCutaneous every 6 hours  pantoprazole  Injectable 40 milliGRAM(s) IV Push daily  Parenteral Nutrition - Adult 1 Each TPN Continuous <Continuous>  piperacillin/tazobactam IVPB.. 3.375 Gram(s) IV Intermittent every 12 hours    PRN Inpatient Medications  acetaminophen   IVPB .. 650 milliGRAM(s) IV Intermittent every 6 hours PRN  dextrose Oral Gel 15 Gram(s) Oral once PRN  sodium chloride 0.9% lock flush 10 milliLiter(s) IV Push every 1 hour PRN  tetracaine/benzocaine/butamben Spray 1 Spray(s) Topical every 6 hours PRN      VITALS/PHYSICAL EXAM  --------------------------------------------------------------------------------  T(C): 36.4 (07-20-24 @ 08:47), Max: 37.1 (07-19-24 @ 20:51)  HR: 73 (07-20-24 @ 08:47) (72 - 88)  BP: 138/77 (07-20-24 @ 08:47) (138/77 - 182/73)  RR: 18 (07-20-24 @ 08:47) (18 - 18)  SpO2: 99% (07-20-24 @ 08:47) (96% - 100%)  Wt(kg): --        07-19-24 @ 07:01  -  07-20-24 @ 07:00  --------------------------------------------------------  IN: 250 mL / OUT: 2700 mL / NET: -2450 mL           Physical Exam:    Gen: NAD, thin/frail appearing; laying in bed   HEENT: supple neck, no JVD; NGT  Chest: non labored breathing, equal chest expansion bilaterally  Abd: soft, nontender/nondistended  : No suprapubic tenderness  Ext: Warm, FROM, no edema b/l LE  Neuro: No focal deficits  Psych: Normal affect and mood  Skin: Warm, without rashes         LABS/STUDIES  --------------------------------------------------------------------------------              10.6   9.47  >-----------<  255      [07-20-24 @ 07:28]              33.2     136  |  100  |  12  ----------------------------<  152      [07-20-24 @ 07:25]  3.6   |  26  |  0.87        Ca     8.3     [07-20-24 @ 07:25]      iCa    1.13     [07-20 @ 07:56]      Mg     2.1     [07-20-24 @ 07:25]      Phos  3.8     [07-20-24 @ 07:25]    TPro  6.5  /  Alb  2.9  /  TBili  0.4  /  DBili  x   /  AST  16  /  ALT  10  /  AlkPhos  67  [07-20-24 @ 07:25]          Ca ionized  Creatinine Trend:  POC glucoseGlucose: 152 mg/dL (07-20-24 @ 07:25)  CAPILLARY BLOOD GLUCOSE      POCT Blood Glucose.: 176 mg/dL (20 Jul 2024 05:31)  POCT Blood Glucose.: 188 mg/dL (19 Jul 2024 23:43)  POCT Blood Glucose.: 161 mg/dL (19 Jul 2024 17:18)  POCT Blood Glucose.: 160 mg/dL (19 Jul 2024 12:05)    PrealbuminPrealbumin, Serum: 7 mg/dL (07-16-24 @ 23:00)    Triglycerides

## 2024-07-21 LAB
ALBUMIN SERPL ELPH-MCNC: 3 G/DL — LOW (ref 3.3–5)
ALP SERPL-CCNC: 69 U/L — SIGNIFICANT CHANGE UP (ref 40–120)
ALT FLD-CCNC: 10 U/L — SIGNIFICANT CHANGE UP (ref 10–45)
ANION GAP SERPL CALC-SCNC: 13 MMOL/L — SIGNIFICANT CHANGE UP (ref 5–17)
AST SERPL-CCNC: 16 U/L — SIGNIFICANT CHANGE UP (ref 10–40)
BASOPHILS # BLD AUTO: 0.05 K/UL — SIGNIFICANT CHANGE UP (ref 0–0.2)
BASOPHILS NFR BLD AUTO: 0.6 % — SIGNIFICANT CHANGE UP (ref 0–2)
BILIRUB SERPL-MCNC: 0.3 MG/DL — SIGNIFICANT CHANGE UP (ref 0.2–1.2)
BUN SERPL-MCNC: 18 MG/DL — SIGNIFICANT CHANGE UP (ref 7–23)
CA-I BLD-SCNC: 1.16 MMOL/L — SIGNIFICANT CHANGE UP (ref 1.15–1.33)
CALCIUM SERPL-MCNC: 8.5 MG/DL — SIGNIFICANT CHANGE UP (ref 8.4–10.5)
CHLORIDE SERPL-SCNC: 97 MMOL/L — SIGNIFICANT CHANGE UP (ref 96–108)
CO2 SERPL-SCNC: 23 MMOL/L — SIGNIFICANT CHANGE UP (ref 22–31)
CREAT SERPL-MCNC: 0.86 MG/DL — SIGNIFICANT CHANGE UP (ref 0.5–1.3)
EGFR: 85 ML/MIN/1.73M2 — SIGNIFICANT CHANGE UP
EOSINOPHIL # BLD AUTO: 0.42 K/UL — SIGNIFICANT CHANGE UP (ref 0–0.5)
EOSINOPHIL NFR BLD AUTO: 4.7 % — SIGNIFICANT CHANGE UP (ref 0–6)
GLUCOSE BLDC GLUCOMTR-MCNC: 167 MG/DL — HIGH (ref 70–99)
GLUCOSE BLDC GLUCOMTR-MCNC: 177 MG/DL — HIGH (ref 70–99)
GLUCOSE BLDC GLUCOMTR-MCNC: 190 MG/DL — HIGH (ref 70–99)
GLUCOSE BLDC GLUCOMTR-MCNC: 198 MG/DL — HIGH (ref 70–99)
GLUCOSE BLDC GLUCOMTR-MCNC: 204 MG/DL — HIGH (ref 70–99)
GLUCOSE SERPL-MCNC: 224 MG/DL — HIGH (ref 70–99)
HCT VFR BLD CALC: 32.8 % — LOW (ref 39–50)
HGB BLD-MCNC: 10.6 G/DL — LOW (ref 13–17)
IMM GRANULOCYTES NFR BLD AUTO: 0.9 % — SIGNIFICANT CHANGE UP (ref 0–0.9)
LYMPHOCYTES # BLD AUTO: 2.5 K/UL — SIGNIFICANT CHANGE UP (ref 1–3.3)
LYMPHOCYTES # BLD AUTO: 28.1 % — SIGNIFICANT CHANGE UP (ref 13–44)
MAGNESIUM SERPL-MCNC: 2 MG/DL — SIGNIFICANT CHANGE UP (ref 1.6–2.6)
MCHC RBC-ENTMCNC: 28.3 PG — SIGNIFICANT CHANGE UP (ref 27–34)
MCHC RBC-ENTMCNC: 32.3 GM/DL — SIGNIFICANT CHANGE UP (ref 32–36)
MCV RBC AUTO: 87.5 FL — SIGNIFICANT CHANGE UP (ref 80–100)
MONOCYTES # BLD AUTO: 0.96 K/UL — HIGH (ref 0–0.9)
MONOCYTES NFR BLD AUTO: 10.8 % — SIGNIFICANT CHANGE UP (ref 2–14)
NEUTROPHILS # BLD AUTO: 4.88 K/UL — SIGNIFICANT CHANGE UP (ref 1.8–7.4)
NEUTROPHILS NFR BLD AUTO: 54.9 % — SIGNIFICANT CHANGE UP (ref 43–77)
NRBC # BLD: 0 /100 WBCS — SIGNIFICANT CHANGE UP (ref 0–0)
PHOSPHATE SERPL-MCNC: 4.1 MG/DL — SIGNIFICANT CHANGE UP (ref 2.5–4.5)
PLATELET # BLD AUTO: 276 K/UL — SIGNIFICANT CHANGE UP (ref 150–400)
POTASSIUM SERPL-MCNC: 4.7 MMOL/L — SIGNIFICANT CHANGE UP (ref 3.5–5.3)
POTASSIUM SERPL-SCNC: 4.7 MMOL/L — SIGNIFICANT CHANGE UP (ref 3.5–5.3)
PROT SERPL-MCNC: 6.7 G/DL — SIGNIFICANT CHANGE UP (ref 6–8.3)
RBC # BLD: 3.75 M/UL — LOW (ref 4.2–5.8)
RBC # FLD: 14.6 % — HIGH (ref 10.3–14.5)
SODIUM SERPL-SCNC: 133 MMOL/L — LOW (ref 135–145)
TRIGL SERPL-MCNC: 229 MG/DL — HIGH
WBC # BLD: 8.89 K/UL — SIGNIFICANT CHANGE UP (ref 3.8–10.5)
WBC # FLD AUTO: 8.89 K/UL — SIGNIFICANT CHANGE UP (ref 3.8–10.5)

## 2024-07-21 PROCEDURE — 99232 SBSQ HOSP IP/OBS MODERATE 35: CPT

## 2024-07-21 RX ORDER — FISH OIL 0.1 G/ML
18.7 INJECTION, EMULSION INTRAVENOUS
Qty: 45 | Refills: 0 | Status: DISCONTINUED | OUTPATIENT
Start: 2024-07-21 | End: 2024-07-22

## 2024-07-21 RX ORDER — MULTI-ELECTROLYTE 98; 74.5; 64.6; 25.5; 16.55 MG/ML; MG/ML; MG/ML; MG/ML; MG/ML
1 INJECTION, SOLUTION, CONCENTRATE INTRAVENOUS
Refills: 0 | Status: DISCONTINUED | OUTPATIENT
Start: 2024-07-21 | End: 2024-07-21

## 2024-07-21 RX ADMIN — PANTOPRAZOLE SODIUM 40 MILLIGRAM(S): 20 TABLET, DELAYED RELEASE ORAL at 11:01

## 2024-07-21 RX ADMIN — CHLORHEXIDINE GLUCONATE 1 APPLICATION(S): 500 CLOTH TOPICAL at 10:28

## 2024-07-21 RX ADMIN — Medication 2 PUFF(S): at 11:01

## 2024-07-21 RX ADMIN — Medication 2: at 06:31

## 2024-07-21 RX ADMIN — HEPARIN SODIUM 5000 UNIT(S): 1000 INJECTION, SOLUTION INTRAVENOUS; SUBCUTANEOUS at 21:45

## 2024-07-21 RX ADMIN — Medication 260 MILLIGRAM(S): at 10:25

## 2024-07-21 RX ADMIN — BUDESONIDE AND FORMOTEROL FUMARATE DIHYDRATE 2 PUFF(S): 80; 4.5 AEROSOL RESPIRATORY (INHALATION) at 05:03

## 2024-07-21 RX ADMIN — MULTI-ELECTROLYTE 1 EACH: 98; 74.5; 64.6; 25.5; 16.55 INJECTION, SOLUTION, CONCENTRATE INTRAVENOUS at 17:59

## 2024-07-21 RX ADMIN — CHLORHEXIDINE GLUCONATE 1 APPLICATION(S): 500 CLOTH TOPICAL at 10:31

## 2024-07-21 RX ADMIN — Medication 650 MILLIGRAM(S): at 10:55

## 2024-07-21 RX ADMIN — Medication 2 PUFF(S): at 17:59

## 2024-07-21 RX ADMIN — MULTI-ELECTROLYTE 1 EACH: 98; 74.5; 64.6; 25.5; 16.55 INJECTION, SOLUTION, CONCENTRATE INTRAVENOUS at 07:24

## 2024-07-21 RX ADMIN — HEPARIN SODIUM 5000 UNIT(S): 1000 INJECTION, SOLUTION INTRAVENOUS; SUBCUTANEOUS at 05:01

## 2024-07-21 RX ADMIN — PIPERACILLIN SODIUM, TAZOBACTAM SODIUM 25 GRAM(S): 3; .375 INJECTION, POWDER, LYOPHILIZED, FOR SOLUTION INTRAVENOUS at 11:01

## 2024-07-21 RX ADMIN — HEPARIN SODIUM 5000 UNIT(S): 1000 INJECTION, SOLUTION INTRAVENOUS; SUBCUTANEOUS at 13:29

## 2024-07-21 RX ADMIN — FISH OIL 18.7 ML/HR: 0.1 INJECTION, EMULSION INTRAVENOUS at 17:59

## 2024-07-21 RX ADMIN — Medication 2 PUFF(S): at 23:46

## 2024-07-21 RX ADMIN — MULTI-ELECTROLYTE 1 EACH: 98; 74.5; 64.6; 25.5; 16.55 INJECTION, SOLUTION, CONCENTRATE INTRAVENOUS at 19:29

## 2024-07-21 RX ADMIN — Medication 2: at 18:42

## 2024-07-21 RX ADMIN — Medication 2: at 23:46

## 2024-07-21 RX ADMIN — BUDESONIDE AND FORMOTEROL FUMARATE DIHYDRATE 2 PUFF(S): 80; 4.5 AEROSOL RESPIRATORY (INHALATION) at 17:59

## 2024-07-21 RX ADMIN — Medication 4: at 12:21

## 2024-07-21 RX ADMIN — Medication 2 PUFF(S): at 05:03

## 2024-07-21 RX ADMIN — FISH OIL 18.7 ML/HR: 0.1 INJECTION, EMULSION INTRAVENOUS at 19:30

## 2024-07-21 RX ADMIN — PIPERACILLIN SODIUM, TAZOBACTAM SODIUM 25 GRAM(S): 3; .375 INJECTION, POWDER, LYOPHILIZED, FOR SOLUTION INTRAVENOUS at 23:45

## 2024-07-21 RX ADMIN — Medication 2: at 01:40

## 2024-07-21 NOTE — PROGRESS NOTE ADULT - SUBJECTIVE AND OBJECTIVE BOX
SURGERY DAILY PROGRESS NOTE:     Overnight Events:  Hypertensice 170s SBP overnight. Patient denied CP, SOB, N, V, LH, Dizziness. ECG was done in setting of previous BNP ~4100. ECG concerning for possible ischemia or stress in anterior leads. Trops and CKMB sent. Both normal. Patient asymptomatic still. Ptt HR was 70s during this time. Satting well.        OBJECTIVE:  Vital Signs Last 24 Hrs  T(C): 36.3 (21 Jul 2024 01:21), Max: 37.1 (20 Jul 2024 21:12)  T(F): 97.4 (21 Jul 2024 01:21), Max: 98.8 (20 Jul 2024 21:12)  HR: 73 (21 Jul 2024 01:21) (69 - 73)  BP: 168/66 (21 Jul 2024 01:21) (138/77 - 168/66)  BP(mean): --  RR: 18 (21 Jul 2024 01:21) (18 - 18)  SpO2: 100% (21 Jul 2024 01:21) (98% - 100%)    Parameters below as of 21 Jul 2024 01:21  Patient On (Oxygen Delivery Method): room air      I&O's Detail    19 Jul 2024 07:01  -  20 Jul 2024 07:00  --------------------------------------------------------  IN:    IV PiggyBack: 100 mL    Other (mL): 150 mL  Total IN: 250 mL    OUT:    Indwelling Catheter - Urethral (mL): 2250 mL    Nasogastric/Oral tube (mL): 100 mL    Oral Fluid: 0 mL    Other (mL): 350 mL  Total OUT: 2700 mL    Total NET: -2450 mL      20 Jul 2024 07:01  -  21 Jul 2024 03:01  --------------------------------------------------------  IN:  Total IN: 0 mL    OUT:    Indwelling Catheter - Urethral (mL): 1450 mL    Nasogastric/Oral tube (mL): 150 mL    Oral Fluid: 0 mL  Total OUT: 1600 mL    Total NET: -1600 mL        Daily     Daily     LABS:                        10.6   9.47  )-----------( 255      ( 20 Jul 2024 07:28 )             33.2     07-20    136  |  100  |  12  ----------------------------<  152<H>  3.6   |  26  |  0.87    Ca    8.3<L>      20 Jul 2024 07:25  Phos  3.8     07-20  Mg     2.1     07-20    TPro  6.5  /  Alb  2.9<L>  /  TBili  0.4  /  DBili  x   /  AST  16  /  ALT  10  /  AlkPhos  67  07-20      Urinalysis Basic - ( 20 Jul 2024 07:25 )    Color: x / Appearance: x / SG: x / pH: x  Gluc: 152 mg/dL / Ketone: x  / Bili: x / Urobili: x   Blood: x / Protein: x / Nitrite: x   Leuk Esterase: x / RBC: x / WBC x   Sq Epi: x / Non Sq Epi: x / Bacteria: x      Physical Exam:  General: NAD, resting comfortably in bed  HEENT: Normocephalic atraumatic  Respiratory: Nonlabored respirations  Cardio: pulse present  Abdomen: soft, nontender, nondistended  Vascular: extremities are warm and well perfused.

## 2024-07-21 NOTE — PROGRESS NOTE ADULT - ASSESSMENT
83M PMH of Asthma and BPH on Flomax, last colonoscopy around 8-10 years ago (normal per wife/patient) no known PSHx recently admitted to [place] in 5/2024 for abdominal pain, found to have large ulcerating mass communicating with proximal jejunum on CT (discharged home with outpatient GI f/u for concern of contained perforation vs inflamed giant diverticulum) presented to [facility] given hematemesis constipation and abdominal pain. Reports abdominal pain improved with episodes of emesis. Last BM/flatus was yesterday.  TPN team consulted given concern for severe protein calorie malnutrition and anticipated prolonged inadequate caloric intake .    AA 105g, Dextrose 210g and SMOF Lipids 45g. To provide: 1584kcal and 105g protein. Based on dosing wt 43.5kg, provides: 36.4kcal/kg and 2.4g protein/kg.     - Increase TPN to goal with goal lipids:  105g AA, 210g dextrose, 45g SMOF  - hyperglycemia - increase to ----------------- insulin in TPN.  Check FS q 6 and cover with ISS  - uptrending K - KCl decreased from -----------------  - Electrolyte Imbalance risk:  daily CMP, Mg, Phos and ionized Ca daily  - Recommend strict intake and output/weight checks three times a week  - Risk for Hypertriglyceridemia with TPN:  check TG daily x 3 days at goal lipids  - care per primary team    TPN spectra [id number]  M-F 8:30A-2:30P, Weekends and holidays 8A-12P  discussed with Dr. Tee     83M PMH of Asthma and BPH on Flomax, last colonoscopy around 8-10 years ago (normal per wife/patient) no known PSHx recently admitted to [place] in 5/2024 for abdominal pain, found to have large ulcerating mass communicating with proximal jejunum on CT (discharged home with outpatient GI f/u for concern of contained perforation vs inflamed giant diverticulum) presented to [facility] given hematemesis constipation and abdominal pain. Reports abdominal pain improved with episodes of emesis. Last BM/flatus was yesterday.  TPN team consulted given concern for severe protein calorie malnutrition and anticipated prolonged inadequate caloric intake .    AA 105g, Dextrose 210g and SMOF Lipids 45g. To provide: 1584kcal and 105g protein. Based on dosing wt 43.5kg, provides: 36.4kcal/kg and 2.4g protein/kg.     - Increase TPN to goal with goal lipids:  105g AA, 210g dextrose, 45g SMOF  - hyperglycemia - increase to 12 insulin in TPN.  Check FS q 6 and cover with ISS  - uptrending K - KCl decreased from 100 to 60 meq  - Electrolyte Imbalance risk:  daily CMP, Mg, Phos and ionized Ca daily  - Recommend strict intake and output/weight checks three times a week  - Risk for Hypertriglyceridemia with TPN:  check TG daily x 3 days at goal lipids  - care per primary team    TPN spectra [id number]  M-F 8:30A-2:30P, Weekends and holidays 8A-12P  discussed with Dr. Tee

## 2024-07-21 NOTE — PROGRESS NOTE ADULT - ASSESSMENT
Assessment and Plan:   Assessment	  83M, poor historian, [facility] of DM, asthma, recently admitted to [place] in 5/2024 for abdominal pain, found to have large ulcerating mass communicating with proximal jejunum on CT there but no intervention at that time, presenting for few days of brown/bloody emesis and abdominal pain. Leukocytosis to 21 with lactate 3.9, improved to 3.1 after 1L bolus.  CTAP significant for SBO with TP in R mid abdomen, thickened jejunum, no grossly obvious focal mass, dilated stomach and distal esophagus; possible Crohn's disease. Admitted to SICU for management of volume resuscitation in high grade SBO. Clinically improving in SICU. CT on admission failed to reveal transition point or obstructive mass. Recent CT s/o 3.7 cm ring shaped foreign body in the distal jejunum which has moved since the last imaging. Transferred to floor. On TPN. MI workup overnight negative.     Plan  -Monitor NGT output, I/Os  -NPO/IVF/TPN  - IV Zosyn   -Appreciate GI recs, f/u fecal calprotectin   -Appreciate Surg Onc recs, available for intra op assistance given DJ region inflammation  -DVT Prophylaxis  -Fleet enemas  - monitor abdominal exam  -nutrition reccs for tpn appreciated    Gold Team  [id number]

## 2024-07-21 NOTE — PROGRESS NOTE ADULT - SUBJECTIVE AND OBJECTIVE BOX
[facility] NUTRITION SUPPORT / TPN -- FOLLOW UP NOTE  --------------------------------------------------------------------------------    24 hour events/subjective:  No acute overnight events.  Denies SOB/CP/dizziness/palpitations.  Remains NPO/NGT on TPN.    Diet:  Diet, NPO (07-16-24 @ 07:17)      PAST HISTORY  --------------------------------------------------------------------------------  No significant changes to PMH, PSH, FHx, SHx, unless otherwise noted    ALLERGIES & MEDICATIONS  --------------------------------------------------------------------------------  Allergies    No Known Allergies    Intolerances      Standing Inpatient Medications  albuterol    90 MICROgram(s) HFA Inhaler 2 Puff(s) Inhalation every 6 hours  budesonide 160 MICROgram(s)/formoterol 4.5 MICROgram(s) Inhaler 2 Puff(s) Inhalation two times a day  chlorhexidine 2% Cloths 1 Application(s) Topical <User Schedule>  chlorhexidine 4% Liquid 1 Application(s) Topical <User Schedule>  dextrose 5%. 1000 milliLiter(s) IV Continuous <Continuous>  dextrose 5%. 1000 milliLiter(s) IV Continuous <Continuous>  dextrose 50% Injectable 12.5 Gram(s) IV Push once  dextrose 50% Injectable 25 Gram(s) IV Push once  dextrose 50% Injectable 25 Gram(s) IV Push once  glucagon  Injectable 1 milliGRAM(s) IntraMuscular once  heparin   Injectable 5000 Unit(s) SubCutaneous every 8 hours  insulin lispro (ADMELOG) corrective regimen sliding scale   SubCutaneous every 6 hours  lipid, fat emulsion (Fish Oil and Plant Based) 20% Infusion 8.3 mL/Hr IV Continuous <Continuous>  pantoprazole  Injectable 40 milliGRAM(s) IV Push daily  Parenteral Nutrition - Adult 1 Each TPN Continuous <Continuous>  piperacillin/tazobactam IVPB.. 3.375 Gram(s) IV Intermittent every 12 hours    PRN Inpatient Medications  acetaminophen   IVPB .. 650 milliGRAM(s) IV Intermittent every 6 hours PRN  dextrose Oral Gel 15 Gram(s) Oral once PRN  sodium chloride 0.9% lock flush 10 milliLiter(s) IV Push every 1 hour PRN  tetracaine/benzocaine/butamben Spray 1 Spray(s) Topical every 6 hours PRN      VITALS/PHYSICAL EXAM  --------------------------------------------------------------------------------  T(C): 36.3 (07-21-24 @ 08:41), Max: 37.1 (07-20-24 @ 21:12)  HR: 70 (07-21-24 @ 08:41) (69 - 73)  BP: 159/68 (07-21-24 @ 08:41) (159/68 - 169/67)  RR: 18 (07-21-24 @ 08:41) (18 - 18)  SpO2: 98% (07-21-24 @ 08:41) (98% - 100%)  Wt(kg): --        07-20-24 @ 07:01  -  07-21-24 @ 07:00  --------------------------------------------------------  IN: 0 mL / OUT: 2300 mL / NET: -2300 mL          Physical Exam:    Gen: NAD, thin/frail appearing; laying in bed   HEENT: supple neck, no JVD; NGT  Chest: non labored breathing, equal chest expansion bilaterally  Abd: soft, nontender/nondistended  : No suprapubic tenderness  Ext: Warm, FROM, no edema b/l LE  Neuro: No focal deficits  Psych: Normal affect and mood  Skin: Warm, without rashes       LABS/STUDIES  --------------------------------------------------------------------------------              10.6   8.89  >-----------<  276      [07-21-24 @ 07:28]              32.8     133  |  97  |  18  ----------------------------<  224      [07-21-24 @ 07:32]  4.7   |  23  |  0.86        Ca     8.5     [07-21-24 @ 07:32]      iCa    1.16     [07-21 @ 07:25]      Mg     2.0     [07-21-24 @ 07:32]      Phos  4.1     [07-21-24 @ 07:32]    TPro  6.7  /  Alb  3.0  /  TBili  0.3  /  DBili  x   /  AST  16  /  ALT  10  /  AlkPhos  69  [07-21-24 @ 07:32]          Ca ionized  Creatinine Trend:  POC glucoseGlucose: 224 mg/dL (07-21-24 @ 07:32)  CAPILLARY BLOOD GLUCOSE      POCT Blood Glucose.: 177 mg/dL (21 Jul 2024 05:49)  POCT Blood Glucose.: 167 mg/dL (21 Jul 2024 01:25)  POCT Blood Glucose.: 218 mg/dL (20 Jul 2024 18:32)  POCT Blood Glucose.: 203 mg/dL (20 Jul 2024 17:07)  POCT Blood Glucose.: 165 mg/dL (20 Jul 2024 11:59)    PrealbuminPrealbumin, Serum: 7 mg/dL (07-16-24 @ 23:00)    Triglycerides

## 2024-07-21 NOTE — CONSULT NOTE ADULT - NS ATTEND AMEND GEN_ALL_CORE FT
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
Patient care and plan discussed and reviewed with Advanced Care Provider. Plan as outlined above edited by me to reflect our discussion.

## 2024-07-21 NOTE — PROGRESS NOTE ADULT - ATTENDING COMMENTS
reports some abd pain  + flatus  abd soft, NT, ND  stable  daughter at the bedside, discussed plan to operate for the FB in the SB and observe the DJ jx ulceration  OR next week, date tbd

## 2024-07-21 NOTE — CHART NOTE - NSCHARTNOTEFT_GEN_A_CORE
NUTRITION FOLLOW UP NOTE    PATIENT SEEN FOR: Nutrition Follow Up / TPN Follow Up    SOURCE: [x] Patient  [x] Current Medical Record  [x] RN  [] Family/support person at bedside  [] Patient unavailable/inappropriate  [x] Other: interdisciplinary medical team    CHART REVIEWED/EVENTS NOTED.  [] No changes to nutrition care plan to note  [x] Nutrition Status:   -Admitted to SICU for management of volume resuscitation in high grade SBO. Now transferred to medicine. TPN initiated, now at goal.   -Hyperglycemia addressed with 12u insulin via TPN. Prescribed insulin lisro sliding scale, FS q 6 hrs. A1c 7.8%.   -Uptrending potassium noted; KCl decreased from 100 to 60mEq.   - (); 175 (). Team to continue to trend.   -Continues on antibiotics x 7 days.     DIET ORDER:   Diet, NPO (24)    CURRENT DIET ORDER IS:  [] Appropriate:  [] Inadequate:  [x] Other: Receiving TPN as primary source of nutrition/hydration at this time. Remains NPO.     NUTRITION INTAKE/PROVISION:  [] PO:  [] Enteral Nutrition:  [x] Parenteral Nutrition:    PARENTERAL NUTRITION  [] CPN (central PN)  [] PPN (peripheral PN; max 900 mOsm/L)  [] Premixed/Multi Chamber Bags     GOAL PN: AA 105g, Dextrose 210g and SMOF Lipids 45g. To provide: 1584kcal and 105g protein. Based on dosing wt 43.5kg, provides: 36.4kcal/kg and 2.4g protein/kg.    Non-Protein Calories: 1164kcal (26.8kcal/kg)  Dextrose Infusion Rate: 3.4mg/kg/min (24-hr infusion)  Lipid Infusion Rate: 1.0mg/kg; 0.8mg/kg/min (12-hr infusion)    Total Volume/Rate ordered: (date)  [x] 24-hour infusion: 1.8L @ 75ml/hr  [] Compressed: xL x 12-hours    Electrolytes and Additives ordered: ():   NaCl (mEq):  20  Na acetate (mEq): 20    Na Phos (mmol): 60  KCL (mEq):  60  Calcium gluconate (mEq): 10  Mg sulfate (mEq):  12  MTE-4 concentrate (ml): 1  MVI (ml): 10  Insulin (units): 12  Thiamine (mg): 100 x 5 days     ANTHROPOMETRICS:  Drug Dosing Weight  Height (cm): 160 (15 Jul 2024 12:00)  Weight (kg): 43.5 (15 Jul 2024 12:00)  BMI (kg/m2): 17 (15 Jul 2024 12:00)  Weights:   Daily Weight in k.3 (). Will continue to monitor/trend.     NUTRITIONALLY PERTINENT MEDICATIONS:  MEDICATIONS  (STANDING):  dextrose 5%.  dextrose 5%.  dextrose 50% Injectable  dextrose 50% Injectable  dextrose 50% Injectable  glucagon  Injectable  insulin lispro (ADMELOG) corrective regimen sliding scale  lipid, fat emulsion (Fish Oil and Plant Based) 20% Infusion  pantoprazole  Injectable  Parenteral Nutrition - Adult  Parenteral Nutrition - Adult  piperacillin/tazobactam IVPB..     NUTRITIONALLY PERTINENT LABS:   Na133 mmol/L<L> Glu 224 mg/dL<H> K+ 4.7 mmol/L Cr  0.86 mg/dL BUN 18 mg/dL  Phos 4.1 mg/dL  Alb 3.0 g/dL<L>  PAB 7 mg/dL<L>  Chol --    LDL --    HDL --    Trig 229 mg/dL<H> ALT 10 U/L AST 16 U/L Alkaline Phosphatase 69 U/L  24 @ 22:37 a1c 7.8  07-15-24 @ 22:52 a1c 7.7    A1C with Estimated Average Glucose Result: 7.8 % (24 @ 22:37)  A1C with Estimated Average Glucose Result: 7.7 % (07-15-24 @ 22:52)    Finger Sticks:  POCT Blood Glucose.: 204 mg/dL ( @ 11:52)  POCT Blood Glucose.: 177 mg/dL ( @ 05:49)  POCT Blood Glucose.: 167 mg/dL ( @ 01:25)  POCT Blood Glucose.: 218 mg/dL ( @ 18:32)  POCT Blood Glucose.: 203 mg/dL ( @ 17:07)    NUTRITIONALLY PERTINENT MEDICATIONS/LABS:  [x] Reviewed  [] Relevant notes on medications/labs:    EDEMA:  [x] Reviewed - none noted  [] Relevant notes:    GI/ I&O:  [x] Reviewed  -Last BM (): x 3  -NGT output (): 400ml; (): 160ml.   [] Relevant notes:  [x] Other: continues on IV Protonix.     SKIN:   [x] No pressure injuries documented, per nursing flowsheet  [] Pressure injury previously noted  [] Change in pressure injury documentation:  [] Other:    ESTIMATED NEEDS:  [x] No change:  [] Updated:  Energy: 1061-3094  kcal/day (35-40 kcal/kg)  Protein:   g/day (2.0-2.5 g/kg)  Fluid:   ml/day or [x] defer to team  Based on: dosing wt 43.5kg, considering BMI 17, on TPN    NUTRITION DIAGNOSIS:  [x] Prior Dx: underwent, chronic severe protein calorie malnutrition  [] New Dx:    EDUCATION:  [] Yes:  [x] Not appropriate/warranted    NUTRITION CARE PLAN:  1. Diet: TPN as per TPN team, nutrition assessment. Defer advancement of diet to medical team.   2. Supplements: N/A  3. Multivitamin/mineral supplementation: Continue thiamine 100mg x 5 days.     MONITORING AND EVALUATION:   RD remains available upon request and will follow up per protocol.    Alisha Calloway RD, available via TEAMS

## 2024-07-21 NOTE — CONSULT NOTE ADULT - SUBJECTIVE AND OBJECTIVE BOX
DATE OF SERVICE: 07-21-24 @ 18:14    CHIEF COMPLAINT:Patient is a 83y old  Male who presents with a chief complaint of SBO (21 Jul 2024 09:44)      HISTORY OF PRESENT ILLNESS:HPI:  83M, poor historian, PMH of DM, asthma, no known PSHx recently admitted to [place] in 5/2024 for abdominal pain, found to have 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 Reports abdominal pain improved with episodes of emesis. Last BM/flatus was yesterday.  Patient reports getting colonoscopy 20 years ago, however in [place] note last colonoscopy was 8 years ago per son with non-concerning findings. No history of endoscopy. At [place], no intervention at the time and recommended for outpatient GI followup. Differential diagnosis at the time was contained perforation or inflamed giant diverticulum.     In ED, patient afebrile, hemodynamically stable. Leukocytosis to 21. Elevated lactate 3.9, improved to 3.1 with 1L NS bolus. (16 Jul 2024 06:50)      PAST MEDICAL & SURGICAL HISTORY:  Asthma      DM (diabetes mellitus)      BPH (benign prostatic hyperplasia)      Diabetes      Asthma      Asthma      Diabetes      S/P cataract extraction      No significant past surgical history              MEDICATIONS:  heparin   Injectable 5000 Unit(s) SubCutaneous every 8 hours    piperacillin/tazobactam IVPB.. 3.375 Gram(s) IV Intermittent every 12 hours    albuterol    90 MICROgram(s) HFA Inhaler 2 Puff(s) Inhalation every 6 hours  budesonide 160 MICROgram(s)/formoterol 4.5 MICROgram(s) Inhaler 2 Puff(s) Inhalation two times a day      pantoprazole  Injectable 40 milliGRAM(s) IV Push daily    dextrose 50% Injectable 25 Gram(s) IV Push once  dextrose 50% Injectable 12.5 Gram(s) IV Push once  dextrose 50% Injectable 25 Gram(s) IV Push once  dextrose Oral Gel 15 Gram(s) Oral once PRN  glucagon  Injectable 1 milliGRAM(s) IntraMuscular once  insulin lispro (ADMELOG) corrective regimen sliding scale   SubCutaneous every 6 hours    chlorhexidine 2% Cloths 1 Application(s) Topical <User Schedule>  chlorhexidine 4% Liquid 1 Application(s) Topical <User Schedule>  dextrose 5%. 1000 milliLiter(s) IV Continuous <Continuous>  dextrose 5%. 1000 milliLiter(s) IV Continuous <Continuous>  lipid, fat emulsion (Fish Oil and Plant Based) 20% Infusion 18.7 mL/Hr IV Continuous <Continuous>  Parenteral Nutrition - Adult 1 Each TPN Continuous <Continuous>  Parenteral Nutrition - Adult 1 Each TPN Continuous <Continuous>  sodium chloride 0.9% lock flush 10 milliLiter(s) IV Push every 1 hour PRN  tetracaine/benzocaine/butamben Spray 1 Spray(s) Topical every 6 hours PRN      FAMILY HISTORY:      Non-contributory    SOCIAL HISTORY: not a current smoker    Allergies    No Known Allergies    Intolerances    	    REVIEW OF SYSTEMS:  CONSTITUTIONAL: No fever  EYES: No eye pain, visual disturbances, or discharge  ENMT:  No difficulty hearing, tinnitus  NECK: No pain or stiffness  RESPIRATORY: No cough, wheezing,  CARDIOVASCULAR: No chest pain, palpitations, passing out, dizziness, or leg swelling  GASTROINTESTINAL:  No nausea, vomiting, diarrhea or constipation. No melena.  GENITOURINARY: No dysuria, hematuria  NEUROLOGICAL: No stroke like symptoms  SKIN: No burning or lesions   ENDOCRINE: No heat or cold intolerance  MUSCULOSKELETAL: No joint pain or swelling  PSYCHIATRIC: No  anxiety, mood swings  HEME/LYMPH: No bleeding gums  ALLERGY AND IMMUNOLOGIC: No hives or eczema	    All other ROS negative    PHYSICAL EXAM:  T(C): 36.7 (07-21-24 @ 13:04), Max: 37.1 (07-20-24 @ 21:12)  HR: 72 (07-21-24 @ 13:04) (69 - 73)  BP: 153/65 (07-21-24 @ 13:04) (153/65 - 169/67)  RR: 18 (07-21-24 @ 13:04) (18 - 18)  SpO2: 96% (07-21-24 @ 13:04) (96% - 100%)  Wt(kg): --  I&O's Summary    20 Jul 2024 07:01  -  21 Jul 2024 07:00  --------------------------------------------------------  IN: 0 mL / OUT: 2300 mL / NET: -2300 mL    21 Jul 2024 07:01  -  21 Jul 2024 18:14  --------------------------------------------------------  IN: 165 mL / OUT: 600 mL / NET: -435 mL        Appearance: Normal	  HEENT:   Normal oral mucosa, EOMI	  Cardiovascular:  S1 S2, No JVD,    Respiratory: Lungs clear to auscultation	  Psychiatry: Alert  Gastrointestinal:  Soft, Non-tender, + BS	  Skin: No rashes   Neurologic: Non-focal  Extremities:  No edema  Vascular: Peripheral pulses palpable    	    	  	  CARDIAC MARKERS:  Labs personally reviewed by me                                  10.6   8.89  )-----------( 276      ( 21 Jul 2024 07:28 )             32.8     07-21    133<L>  |  97  |  18  ----------------------------<  224<H>  4.7   |  23  |  0.86    Ca    8.5      21 Jul 2024 07:32  Phos  4.1     07-21  Mg     2.0     07-21    TPro  6.7  /  Alb  3.0<L>  /  TBili  0.3  /  DBili  x   /  AST  16  /  ALT  10  /  AlkPhos  69  07-21          EKG: Personally reviewed by me - SR anterior wall twi  Radiology: Personally reviewed by me -   < from: Xray Chest 1 View- PORTABLE-Urgent (Xray Chest 1 View- PORTABLE-Urgent .) (07.17.24 @ 15:42) >  IMPRESSION:  Right lower lung field patchy opacity.  Enteric tube with side-port in the stomach after advancement.      < from: CT Abdomen and Pelvis w/ Oral Cont and w/ IV Cont (07.16.24 @ 01:08) >  IMPRESSION:    Small bowel obstruction with transition point in the right mid abdomen.   Thickening of jejunum in the left lower quadrant. Dilated vessels in the   rectum suggests transmural inflammation. No grossly obvious focal mass.   Consider acute on chronic inflammatory bowel disease (Crohn's disease).    As there is dilatation of the stomach and distal esophagus, the patient   may benefit from orogastric tube decompression. No free air or discrete   focal collection. No obvious fistulization.      < from: TTE W or WO Ultrasound Enhancing Agent (07.17.24 @ 08:52) >  CONCLUSIONS:      1. Left ventricular cavity is normal in size. Left ventricular wall thickness is normal. Left ventricular systolic function is normal with an ejection fraction visually estimated at 55 to 60 %. There are no regional wall motion abnormalities seen.   2. Probable secundum atrial septal defect with left to right flow.   3. Mild to moderate pulmonary hypertension.              Assessment /Plan:     83M, poor historian, PMH of DM, asthma, no known PSHx recently admitted to [place] in 5/2024 for abdominal pain, found to have 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 Reports abdominal pain improved with episodes of emesis. Last BM/flatus was yesterday.        1. HTN  - SBP 170s overnight 7/20  - EKG biphasic anterior wall twi   - trops neg  - TTE 7/17 normal EF 55-60%, atrial septal defect  - check new TTE  - bp systolic now 150s-160s.  Will start oral anti hypertensives when no longer npo    2. High Grade SBO  - as seen on CTA  - management per sugery    3. DVT ppx  c/w subq heparin      Differential diagnosis and plan of care discussed with patient after the evaluation. Counseling on diet, nutritional counseling, weight management, exercise and medication compliance was done.   Advanced care planning/advanced directives discussed with patient/family. DNR status including forceful chest compressions to attempt to restart the heart, ventilator support/artificial breathing, electric shock, artificial nutrition, health care proxy, Molst form all discussed with pt. Pt wishes to consider. More than fifteen minutes spent on discussing advanced directives.     Iolani Behrbom, AG-NP  Leonid Sal DO [facility]  Cardiovascular Medicine  [address], [address]  Available for call or text via [employer]  Office [phone]   DATE OF SERVICE: 07-21-24 @ 18:14    CHIEF COMPLAINT:Patient is a 83y old  Male who presents with a chief complaint of SBO (21 Jul 2024 09:44)      HISTORY OF PRESENT ILLNESS:HPI:  83M, poor historian, PMH of DM, asthma, no known PSHx recently admitted to [place] in 5/2024 for abdominal pain, found to have 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 Reports abdominal pain improved with episodes of emesis. Last BM/flatus was yesterday.  Patient reports getting colonoscopy 20 years ago, however in [place] note last colonoscopy was 8 years ago per son with non-concerning findings. No history of endoscopy. At [place], no intervention at the time and recommended for outpatient GI followup. Differential diagnosis at the time was contained perforation or inflamed giant diverticulum.     In ED, patient afebrile, hemodynamically stable. Leukocytosis to 21. Elevated lactate 3.9, improved to 3.1 with 1L NS bolus. (16 Jul 2024 06:50)      PAST MEDICAL & SURGICAL HISTORY:  Asthma      DM (diabetes mellitus)      BPH (benign prostatic hyperplasia)      Diabetes      Asthma      Asthma      Diabetes      S/P cataract extraction      No significant past surgical history              MEDICATIONS:  heparin   Injectable 5000 Unit(s) SubCutaneous every 8 hours    piperacillin/tazobactam IVPB.. 3.375 Gram(s) IV Intermittent every 12 hours    albuterol    90 MICROgram(s) HFA Inhaler 2 Puff(s) Inhalation every 6 hours  budesonide 160 MICROgram(s)/formoterol 4.5 MICROgram(s) Inhaler 2 Puff(s) Inhalation two times a day      pantoprazole  Injectable 40 milliGRAM(s) IV Push daily    dextrose 50% Injectable 25 Gram(s) IV Push once  dextrose 50% Injectable 12.5 Gram(s) IV Push once  dextrose 50% Injectable 25 Gram(s) IV Push once  dextrose Oral Gel 15 Gram(s) Oral once PRN  glucagon  Injectable 1 milliGRAM(s) IntraMuscular once  insulin lispro (ADMELOG) corrective regimen sliding scale   SubCutaneous every 6 hours    chlorhexidine 2% Cloths 1 Application(s) Topical <User Schedule>  chlorhexidine 4% Liquid 1 Application(s) Topical <User Schedule>  dextrose 5%. 1000 milliLiter(s) IV Continuous <Continuous>  dextrose 5%. 1000 milliLiter(s) IV Continuous <Continuous>  lipid, fat emulsion (Fish Oil and Plant Based) 20% Infusion 18.7 mL/Hr IV Continuous <Continuous>  Parenteral Nutrition - Adult 1 Each TPN Continuous <Continuous>  Parenteral Nutrition - Adult 1 Each TPN Continuous <Continuous>  sodium chloride 0.9% lock flush 10 milliLiter(s) IV Push every 1 hour PRN  tetracaine/benzocaine/butamben Spray 1 Spray(s) Topical every 6 hours PRN      FAMILY HISTORY:      Non-contributory    SOCIAL HISTORY: not a current smoker    Allergies    No Known Allergies    Intolerances    	    REVIEW OF SYSTEMS:  CONSTITUTIONAL: No fever  EYES: No eye pain, visual disturbances, or discharge  ENMT:  No difficulty hearing, tinnitus  NECK: No pain or stiffness  RESPIRATORY: No cough, wheezing,  CARDIOVASCULAR: No chest pain, palpitations, passing out, dizziness, or leg swelling  GASTROINTESTINAL:  No nausea, vomiting, diarrhea or constipation. No melena.  GENITOURINARY: No dysuria, hematuria  NEUROLOGICAL: No stroke like symptoms  SKIN: No burning or lesions   ENDOCRINE: No heat or cold intolerance  MUSCULOSKELETAL: No joint pain or swelling  PSYCHIATRIC: No  anxiety, mood swings  HEME/LYMPH: No bleeding gums  ALLERGY AND IMMUNOLOGIC: No hives or eczema	    All other ROS negative    PHYSICAL EXAM:  T(C): 36.7 (07-21-24 @ 13:04), Max: 37.1 (07-20-24 @ 21:12)  HR: 72 (07-21-24 @ 13:04) (69 - 73)  BP: 153/65 (07-21-24 @ 13:04) (153/65 - 169/67)  RR: 18 (07-21-24 @ 13:04) (18 - 18)  SpO2: 96% (07-21-24 @ 13:04) (96% - 100%)  Wt(kg): --  I&O's Summary    20 Jul 2024 07:01  -  21 Jul 2024 07:00  --------------------------------------------------------  IN: 0 mL / OUT: 2300 mL / NET: -2300 mL    21 Jul 2024 07:01  -  21 Jul 2024 18:14  --------------------------------------------------------  IN: 165 mL / OUT: 600 mL / NET: -435 mL        Appearance: Normal	  HEENT:   Normal oral mucosa, EOMI	  Cardiovascular:  S1 S2, No JVD,    Respiratory: Lungs clear to auscultation	  Psychiatry: Alert  Gastrointestinal:  Soft, Non-tender, + BS	  Skin: No rashes   Neurologic: Non-focal  Extremities:  No edema  Vascular: Peripheral pulses palpable    	    	  	  CARDIAC MARKERS:  Labs personally reviewed by me                                  10.6   8.89  )-----------( 276      ( 21 Jul 2024 07:28 )             32.8     07-21    133<L>  |  97  |  18  ----------------------------<  224<H>  4.7   |  23  |  0.86    Ca    8.5      21 Jul 2024 07:32  Phos  4.1     07-21  Mg     2.0     07-21    TPro  6.7  /  Alb  3.0<L>  /  TBili  0.3  /  DBili  x   /  AST  16  /  ALT  10  /  AlkPhos  69  07-21          EKG: Personally reviewed by me - SR anterior wall twi  Radiology: Personally reviewed by me -   < from: Xray Chest 1 View- PORTABLE-Urgent (Xray Chest 1 View- PORTABLE-Urgent .) (07.17.24 @ 15:42) >  IMPRESSION:  Right lower lung field patchy opacity.  Enteric tube with side-port in the stomach after advancement.      < from: CT Abdomen and Pelvis w/ Oral Cont and w/ IV Cont (07.16.24 @ 01:08) >  IMPRESSION:    Small bowel obstruction with transition point in the right mid abdomen.   Thickening of jejunum in the left lower quadrant. Dilated vessels in the   rectum suggests transmural inflammation. No grossly obvious focal mass.   Consider acute on chronic inflammatory bowel disease (Crohn's disease).    As there is dilatation of the stomach and distal esophagus, the patient   may benefit from orogastric tube decompression. No free air or discrete   focal collection. No obvious fistulization.      < from: TTE W or WO Ultrasound Enhancing Agent (07.17.24 @ 08:52) >  CONCLUSIONS:      1. Left ventricular cavity is normal in size. Left ventricular wall thickness is normal. Left ventricular systolic function is normal with an ejection fraction visually estimated at 55 to 60 %. There are no regional wall motion abnormalities seen.   2. Probable secundum atrial septal defect with left to right flow.   3. Mild to moderate pulmonary hypertension.              Assessment /Plan:     83M, poor historian, PMH of DM, asthma, no known PSHx recently admitted to [place] in 5/2024 for abdominal pain, found to have 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 Reports abdominal pain improved with episodes of emesis. Last BM/flatus was yesterday.        1. HTN  - SBP 170s overnight 7/20  - EKG biphasic anterior wall twi   - trops neg  - TTE 7/17 normal EF 55-60%, atrial septal defect  - check new TTE  - bp systolic now 150s-160s.  Will start oral anti hypertensives when no longer npo    2. High Grade SBO  - as seen on CTA  - management per sugyael    3. Abnormal EKG - Denies ant cardiac Hx, CP or SOB  - Advise OP elective ischemic work up  - TTE with preserved EF    4. ASD - Probable secundum atrial septal defect with left to right flow. Mild to moderate pulmonary hypertension.  - OP cardiac MRI or ALYSSA to further evaluate          Differential diagnosis and plan of care discussed with patient after the evaluation. Counseling on diet, nutritional counseling, weight management, exercise and medication compliance was done.   Advanced care planning/advanced directives discussed with patient/family. DNR status including forceful chest compressions to attempt to restart the heart, ventilator support/artificial breathing, electric shock, artificial nutrition, health care proxy, Molst form all discussed with pt. Pt wishes to consider. More than fifteen minutes spent on discussing advanced directives.     Iolani Behrbom, AG-NP  Leonid Sal DO [facility]  Cardiovascular Medicine  [address] [place] [address] Dr, [address]  Available for call or text via [employer]  Office [phone]

## 2024-07-22 ENCOUNTER — RESULT REVIEW (OUTPATIENT)
Age: 84
End: 2024-07-22

## 2024-07-22 LAB
ALBUMIN SERPL ELPH-MCNC: 3.2 G/DL — LOW (ref 3.3–5)
ALP SERPL-CCNC: 80 U/L — SIGNIFICANT CHANGE UP (ref 40–120)
ALT FLD-CCNC: 11 U/L — SIGNIFICANT CHANGE UP (ref 10–45)
ANION GAP SERPL CALC-SCNC: 15 MMOL/L — SIGNIFICANT CHANGE UP (ref 5–17)
APTT BLD: 31.5 SEC — SIGNIFICANT CHANGE UP (ref 24.5–35.6)
AST SERPL-CCNC: 19 U/L — SIGNIFICANT CHANGE UP (ref 10–40)
BASOPHILS # BLD AUTO: 0.05 K/UL — SIGNIFICANT CHANGE UP (ref 0–0.2)
BASOPHILS NFR BLD AUTO: 0.5 % — SIGNIFICANT CHANGE UP (ref 0–2)
BILIRUB SERPL-MCNC: 0.3 MG/DL — SIGNIFICANT CHANGE UP (ref 0.2–1.2)
BUN SERPL-MCNC: 25 MG/DL — HIGH (ref 7–23)
CA-I BLD-SCNC: 1.17 MMOL/L — SIGNIFICANT CHANGE UP (ref 1.15–1.33)
CALCIUM SERPL-MCNC: 8.8 MG/DL — SIGNIFICANT CHANGE UP (ref 8.4–10.5)
CHLORIDE SERPL-SCNC: 96 MMOL/L — SIGNIFICANT CHANGE UP (ref 96–108)
CO2 SERPL-SCNC: 21 MMOL/L — LOW (ref 22–31)
CREAT SERPL-MCNC: 0.84 MG/DL — SIGNIFICANT CHANGE UP (ref 0.5–1.3)
CULTURE RESULTS: SIGNIFICANT CHANGE UP
CULTURE RESULTS: SIGNIFICANT CHANGE UP
EGFR: 85 ML/MIN/1.73M2 — SIGNIFICANT CHANGE UP
EOSINOPHIL # BLD AUTO: 0.65 K/UL — HIGH (ref 0–0.5)
EOSINOPHIL NFR BLD AUTO: 6.4 % — HIGH (ref 0–6)
GLUCOSE BLDC GLUCOMTR-MCNC: 176 MG/DL — HIGH (ref 70–99)
GLUCOSE BLDC GLUCOMTR-MCNC: 205 MG/DL — HIGH (ref 70–99)
GLUCOSE BLDC GLUCOMTR-MCNC: 241 MG/DL — HIGH (ref 70–99)
GLUCOSE BLDC GLUCOMTR-MCNC: 241 MG/DL — HIGH (ref 70–99)
GLUCOSE SERPL-MCNC: 174 MG/DL — HIGH (ref 70–99)
HCT VFR BLD CALC: 34.2 % — LOW (ref 39–50)
HGB BLD-MCNC: 11.1 G/DL — LOW (ref 13–17)
IMM GRANULOCYTES NFR BLD AUTO: 2.4 % — HIGH (ref 0–0.9)
INR BLD: 1.24 RATIO — HIGH (ref 0.85–1.18)
LYMPHOCYTES # BLD AUTO: 2.44 K/UL — SIGNIFICANT CHANGE UP (ref 1–3.3)
LYMPHOCYTES # BLD AUTO: 24.1 % — SIGNIFICANT CHANGE UP (ref 13–44)
MAGNESIUM SERPL-MCNC: 2.2 MG/DL — SIGNIFICANT CHANGE UP (ref 1.6–2.6)
MCHC RBC-ENTMCNC: 27.6 PG — SIGNIFICANT CHANGE UP (ref 27–34)
MCHC RBC-ENTMCNC: 32.5 GM/DL — SIGNIFICANT CHANGE UP (ref 32–36)
MCV RBC AUTO: 85.1 FL — SIGNIFICANT CHANGE UP (ref 80–100)
MONOCYTES # BLD AUTO: 1.07 K/UL — HIGH (ref 0–0.9)
MONOCYTES NFR BLD AUTO: 10.6 % — SIGNIFICANT CHANGE UP (ref 2–14)
NEUTROPHILS # BLD AUTO: 5.68 K/UL — SIGNIFICANT CHANGE UP (ref 1.8–7.4)
NEUTROPHILS NFR BLD AUTO: 56 % — SIGNIFICANT CHANGE UP (ref 43–77)
NRBC # BLD: 0 /100 WBCS — SIGNIFICANT CHANGE UP (ref 0–0)
PHOSPHATE SERPL-MCNC: 3.6 MG/DL — SIGNIFICANT CHANGE UP (ref 2.5–4.5)
PLATELET # BLD AUTO: 293 K/UL — SIGNIFICANT CHANGE UP (ref 150–400)
POTASSIUM SERPL-MCNC: 4.2 MMOL/L — SIGNIFICANT CHANGE UP (ref 3.5–5.3)
POTASSIUM SERPL-SCNC: 4.2 MMOL/L — SIGNIFICANT CHANGE UP (ref 3.5–5.3)
PROT SERPL-MCNC: 7.1 G/DL — SIGNIFICANT CHANGE UP (ref 6–8.3)
PROTHROM AB SERPL-ACNC: 12.9 SEC — SIGNIFICANT CHANGE UP (ref 9.5–13)
RBC # BLD: 4.02 M/UL — LOW (ref 4.2–5.8)
RBC # FLD: 14.5 % — SIGNIFICANT CHANGE UP (ref 10.3–14.5)
SODIUM SERPL-SCNC: 132 MMOL/L — LOW (ref 135–145)
SPECIMEN SOURCE: SIGNIFICANT CHANGE UP
SPECIMEN SOURCE: SIGNIFICANT CHANGE UP
TRIGL SERPL-MCNC: 158 MG/DL — HIGH
WBC # BLD: 10.13 K/UL — SIGNIFICANT CHANGE UP (ref 3.8–10.5)
WBC # FLD AUTO: 10.13 K/UL — SIGNIFICANT CHANGE UP (ref 3.8–10.5)

## 2024-07-22 PROCEDURE — 99232 SBSQ HOSP IP/OBS MODERATE 35: CPT | Mod: 57

## 2024-07-22 PROCEDURE — 93308 TTE F-UP OR LMTD: CPT | Mod: 26

## 2024-07-22 PROCEDURE — 93321 DOPPLER ECHO F-UP/LMTD STD: CPT | Mod: 26

## 2024-07-22 RX ORDER — FISH OIL 0.1 G/ML
18.7 INJECTION, EMULSION INTRAVENOUS
Qty: 45 | Refills: 0 | Status: DISCONTINUED | OUTPATIENT
Start: 2024-07-22 | End: 2024-07-23

## 2024-07-22 RX ORDER — MULTI-ELECTROLYTE 98; 74.5; 64.6; 25.5; 16.55 MG/ML; MG/ML; MG/ML; MG/ML; MG/ML
1 INJECTION, SOLUTION, CONCENTRATE INTRAVENOUS
Refills: 0 | Status: DISCONTINUED | OUTPATIENT
Start: 2024-07-22 | End: 2024-07-23

## 2024-07-22 RX ORDER — ALTEPLASE 100 MG
2 KIT INTRAVENOUS ONCE
Refills: 0 | Status: DISCONTINUED | OUTPATIENT
Start: 2024-07-22 | End: 2024-07-22

## 2024-07-22 RX ORDER — ALTEPLASE 100 MG
2 KIT INTRAVENOUS ONCE
Refills: 0 | Status: COMPLETED | OUTPATIENT
Start: 2024-07-22 | End: 2024-07-22

## 2024-07-22 RX ADMIN — FISH OIL 18.7 ML/HR: 0.1 INJECTION, EMULSION INTRAVENOUS at 19:16

## 2024-07-22 RX ADMIN — PANTOPRAZOLE SODIUM 40 MILLIGRAM(S): 20 TABLET, DELAYED RELEASE ORAL at 11:26

## 2024-07-22 RX ADMIN — BUDESONIDE AND FORMOTEROL FUMARATE DIHYDRATE 2 PUFF(S): 80; 4.5 AEROSOL RESPIRATORY (INHALATION) at 17:19

## 2024-07-22 RX ADMIN — PIPERACILLIN SODIUM, TAZOBACTAM SODIUM 25 GRAM(S): 3; .375 INJECTION, POWDER, LYOPHILIZED, FOR SOLUTION INTRAVENOUS at 11:27

## 2024-07-22 RX ADMIN — Medication 4: at 05:20

## 2024-07-22 RX ADMIN — Medication 2 PUFF(S): at 17:20

## 2024-07-22 RX ADMIN — Medication 2 PUFF(S): at 11:29

## 2024-07-22 RX ADMIN — ALTEPLASE 2 MILLIGRAM(S): KIT at 10:15

## 2024-07-22 RX ADMIN — MULTI-ELECTROLYTE 1 EACH: 98; 74.5; 64.6; 25.5; 16.55 INJECTION, SOLUTION, CONCENTRATE INTRAVENOUS at 19:16

## 2024-07-22 RX ADMIN — BUDESONIDE AND FORMOTEROL FUMARATE DIHYDRATE 2 PUFF(S): 80; 4.5 AEROSOL RESPIRATORY (INHALATION) at 05:19

## 2024-07-22 RX ADMIN — MULTI-ELECTROLYTE 1 EACH: 98; 74.5; 64.6; 25.5; 16.55 INJECTION, SOLUTION, CONCENTRATE INTRAVENOUS at 17:26

## 2024-07-22 RX ADMIN — HEPARIN SODIUM 5000 UNIT(S): 1000 INJECTION, SOLUTION INTRAVENOUS; SUBCUTANEOUS at 05:19

## 2024-07-22 RX ADMIN — Medication 2 PUFF(S): at 05:19

## 2024-07-22 RX ADMIN — FISH OIL 18.7 ML/HR: 0.1 INJECTION, EMULSION INTRAVENOUS at 17:26

## 2024-07-22 RX ADMIN — Medication 4: at 17:19

## 2024-07-22 RX ADMIN — CHLORHEXIDINE GLUCONATE 1 APPLICATION(S): 500 CLOTH TOPICAL at 17:27

## 2024-07-22 NOTE — PROGRESS NOTE ADULT - SUBJECTIVE AND OBJECTIVE BOX
DATE OF SERVICE: 07-22-24 @ 15:49    Patient is a 83y old  Male who presents with a chief complaint of SBO (22 Jul 2024 14:08)      INTERVAL HISTORY: No acute events    REVIEW OF SYSTEMS:  CONSTITUTIONAL: No weakness  EYES/ENT: No visual changes;  No throat pain   NECK: No pain or stiffness  RESPIRATORY: No cough, wheezing; No shortness of breath  CARDIOVASCULAR: No chest pain or palpitations  GASTROINTESTINAL: No abdominal  pain. No nausea, vomiting, or hematemesis  GENITOURINARY: No dysuria, frequency or hematuria  NEUROLOGICAL: No stroke like symptoms  SKIN: No rashes      	  MEDICATIONS:        PHYSICAL EXAM:  T(C): 36.3 (07-22-24 @ 13:08), Max: 36.8 (07-21-24 @ 18:35)  HR: 80 (07-22-24 @ 13:08) (70 - 80)  BP: 114/70 (07-22-24 @ 13:08) (114/70 - 150/74)  RR: 18 (07-22-24 @ 13:08) (18 - 18)  SpO2: 99% (07-22-24 @ 13:08) (96% - 99%)  Wt(kg): --  I&O's Summary    21 Jul 2024 07:01  -  22 Jul 2024 07:00  --------------------------------------------------------  IN: 2208.1 mL / OUT: 2500 mL / NET: -291.9 mL    22 Jul 2024 07:01  -  22 Jul 2024 15:49  --------------------------------------------------------  IN: 0 mL / OUT: 700 mL / NET: -700 mL          Appearance: In no distress	  HEENT:    PERRL, EOMI	  Cardiovascular:  S1 S2, No JVD  Respiratory: Lungs clear to auscultation	  Gastrointestinal:  Soft, Non-tender, + BS	  Vascularature:  No edema of LE  Psychiatric: Appropriate affect   Neuro: no acute focal deficits                               11.1   10.13 )-----------( 293      ( 22 Jul 2024 07:56 )             34.2     07-22    132<L>  |  96  |  25<H>  ----------------------------<  174<H>  4.2   |  21<L>  |  0.84    Ca    8.8      22 Jul 2024 07:56  Phos  3.6     07-22  Mg     2.2     07-22    TPro  7.1  /  Alb  3.2<L>  /  TBili  0.3  /  DBili  x   /  AST  19  /  ALT  11  /  AlkPhos  80  07-22        Labs personally reviewed      ASSESSMENT/PLAN: 	  83M, poor historian, PMH of DM, asthma, no known PSHx recently admitted to [place] in 5/2024 for abdominal pain, found to have 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 Reports abdominal pain improved with episodes of emesis. Last BM/flatus was yesterday.        1. HTN  - SBP 170s overnight 7/20  - EKG biphasic anterior wall twi   - trops neg  - TTE 7/17 normal EF 55-60%, atrial septal defect  - check new TTE  - bp systolic now 150s-160s.  Will start oral anti hypertensives when no longer npo    2. High Grade SBO  - as seen on CTA  - management per alexia    3. Abnormal EKG - Denies ant cardiac Hx, CP or SOB  - Advise OP elective ischemic work up  - TTE with preserved EF    4. ASD - Probable secundum atrial septal defect with left to right flow. Mild to moderate pulmonary hypertension.  - OP cardiac MRI or ALYSSA to further evaluate          DOMO Ramos DO [facility]  Cardiovascular Medicine  [address], [address]  Available via call or text on [employer]  Office: [phone]   DATE OF SERVICE: 07-22-24 @ 15:49    Patient is a 83y old  Male who presents with a chief complaint of SBO (22 Jul 2024 14:08)      INTERVAL HISTORY: No acute events    REVIEW OF SYSTEMS:  CONSTITUTIONAL: No weakness  EYES/ENT: No visual changes;  No throat pain   NECK: No pain or stiffness  RESPIRATORY: No cough, wheezing; No shortness of breath  CARDIOVASCULAR: No chest pain or palpitations  GASTROINTESTINAL: No abdominal  pain. No nausea, vomiting, or hematemesis  GENITOURINARY: No dysuria, frequency or hematuria  NEUROLOGICAL: No stroke like symptoms  SKIN: No rashes      	  MEDICATIONS:        PHYSICAL EXAM:  T(C): 36.3 (07-22-24 @ 13:08), Max: 36.8 (07-21-24 @ 18:35)  HR: 80 (07-22-24 @ 13:08) (70 - 80)  BP: 114/70 (07-22-24 @ 13:08) (114/70 - 150/74)  RR: 18 (07-22-24 @ 13:08) (18 - 18)  SpO2: 99% (07-22-24 @ 13:08) (96% - 99%)  Wt(kg): --  I&O's Summary    21 Jul 2024 07:01  -  22 Jul 2024 07:00  --------------------------------------------------------  IN: 2208.1 mL / OUT: 2500 mL / NET: -291.9 mL    22 Jul 2024 07:01  -  22 Jul 2024 15:49  --------------------------------------------------------  IN: 0 mL / OUT: 700 mL / NET: -700 mL          Appearance: In no distress	  HEENT:    PERRL, EOMI	  Cardiovascular:  S1 S2, No JVD  Respiratory: Lungs clear to auscultation	  Gastrointestinal:  Soft, Non-tender, + BS	  Vascularature:  No edema of LE  Psychiatric: Appropriate affect   Neuro: no acute focal deficits                               11.1   10.13 )-----------( 293      ( 22 Jul 2024 07:56 )             34.2     07-22    132<L>  |  96  |  25<H>  ----------------------------<  174<H>  4.2   |  21<L>  |  0.84    Ca    8.8      22 Jul 2024 07:56  Phos  3.6     07-22  Mg     2.2     07-22    TPro  7.1  /  Alb  3.2<L>  /  TBili  0.3  /  DBili  x   /  AST  19  /  ALT  11  /  AlkPhos  80  07-22        Labs personally reviewed      ASSESSMENT/PLAN: 	  83M, poor historian, PMH of DM, asthma, no known PSHx recently admitted to [place] in 5/2024 for abdominal pain, found to have 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 Reports abdominal pain improved with episodes of emesis. Last BM/flatus was yesterday.        1. HTN  - SBP 170s overnight 7/20  - EKG biphasic anterior wall twi   - trops neg  - TTE 7/17 normal EF 55-60%, atrial septal defect  - check new TTE  - bp systolic now 150s-160s.  Will start oral anti hypertensives when no longer npo    2. High Grade SBO  - as seen on CTA  - management per alexia    3. Abnormal EKG - Denies ant cardiac Hx, CP or SOB  - Advise OP elective ischemic work up  - TTE with preserved EF    4. ASD - Probable secundum atrial septal defect with left to right flow. Mild to moderate pulmonary hypertension.  - OP cardiac MRI or ALYSSA to further evaluate    5. Cardiac Risk Stratification  - Patient is mod risk for mod risk  for laparoscopic removal of foreign body in intestine, laparoscopic removal of foreign body in intestine. No contraindication to proceed  - Patient not in decompensated HF  - Mp hx of tachy/shabbir arrhythmia  - No hx of severe MS/AS      DOMO Ramos DO [facility]  Cardiovascular Medicine  [address], [address]  Available via call or text on [employer] TE[employer]  Office: [phone]   DATE OF SERVICE: 07-22-24 @ 15:49    Patient is a 83y old  Male who presents with a chief complaint of SBO (22 Jul 2024 14:08)      INTERVAL HISTORY: No acute events    REVIEW OF SYSTEMS:  CONSTITUTIONAL: No weakness  EYES/ENT: No visual changes;  No throat pain   NECK: No pain or stiffness  RESPIRATORY: No cough, wheezing; No shortness of breath  CARDIOVASCULAR: No chest pain or palpitations  GASTROINTESTINAL: No abdominal  pain. No nausea, vomiting, or hematemesis  GENITOURINARY: No dysuria, frequency or hematuria  NEUROLOGICAL: No stroke like symptoms  SKIN: No rashes      	  MEDICATIONS:        PHYSICAL EXAM:  T(C): 36.3 (07-22-24 @ 13:08), Max: 36.8 (07-21-24 @ 18:35)  HR: 80 (07-22-24 @ 13:08) (70 - 80)  BP: 114/70 (07-22-24 @ 13:08) (114/70 - 150/74)  RR: 18 (07-22-24 @ 13:08) (18 - 18)  SpO2: 99% (07-22-24 @ 13:08) (96% - 99%)  Wt(kg): --  I&O's Summary    21 Jul 2024 07:01  -  22 Jul 2024 07:00  --------------------------------------------------------  IN: 2208.1 mL / OUT: 2500 mL / NET: -291.9 mL    22 Jul 2024 07:01  -  22 Jul 2024 15:49  --------------------------------------------------------  IN: 0 mL / OUT: 700 mL / NET: -700 mL          Appearance: In no distress	  HEENT:    PERRL, EOMI	  Cardiovascular:  S1 S2, No JVD  Respiratory: Lungs clear to auscultation	  Gastrointestinal:  Soft, Non-tender, + BS	  Vascularature:  No edema of LE  Psychiatric: Appropriate affect   Neuro: no acute focal deficits                               11.1   10.13 )-----------( 293      ( 22 Jul 2024 07:56 )             34.2     07-22    132<L>  |  96  |  25<H>  ----------------------------<  174<H>  4.2   |  21<L>  |  0.84    Ca    8.8      22 Jul 2024 07:56  Phos  3.6     07-22  Mg     2.2     07-22    TPro  7.1  /  Alb  3.2<L>  /  TBili  0.3  /  DBili  x   /  AST  19  /  ALT  11  /  AlkPhos  80  07-22        Labs personally reviewed      ASSESSMENT/PLAN: 	  83M, poor historian, PMH of DM, asthma, no known PSHx recently admitted to [place] in 5/2024 for abdominal pain, found to have 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 Reports abdominal pain improved with episodes of emesis. Last BM/flatus was yesterday.        1. HTN  - SBP 170s overnight 7/20  - EKG biphasic anterior wall twi   - trops neg  - TTE 7/17 normal EF 55-60%, atrial septal defect  - check new TTE  - bp systolic now 150s-160s.  Will start oral anti hypertensives when no longer npo    2. High Grade SBO  - as seen on CTA  - management per alexia    3. Abnormal EKG - Denies ant cardiac Hx, CP or SOB  - Advise OP elective ischemic work up  - TTE with preserved EF    4. ASD - Probable secundum atrial septal defect with left to right flow. Mild to moderate pulmonary hypertension.  - OP cardiac MRI or ALYSSA to further evaluate    5. Cardiac Risk Stratification  - Patient is mod risk for mod risk  for laparoscopic removal of foreign body in intestine, laparoscopic removal of foreign body in intestine. No contraindication to proceed  - Patient not in decompensated HF  - Mp hx of tachy/shabbir arrhythmia  - No hx of severe MS/AS            DOMO Ramos DO [facility]  Cardiovascular Medicine  [address], [address]  Available via call or text on [employer] TE[employer]  Office: [phone]

## 2024-07-22 NOTE — PROGRESS NOTE ADULT - SUBJECTIVE AND OBJECTIVE BOX
House GI Progress Note    Chief Complaint:  Patient is a 83y old  Male who presents with a chief complaint of SBO (19 Jul 2024 02:08)    Interval Events / Subjective:   - this AM, patient reports no BMs or flatus for several days. Remains on NGT with suction  - surgery planning laparoscopic removal of foreign body in intestine, possible open, possible bowel resection tomorrow (7/23/24)    MEDICATIONS:   MEDICATIONS  (STANDING):  albuterol    90 MICROgram(s) HFA Inhaler 2 Puff(s) Inhalation every 6 hours  budesonide 160 MICROgram(s)/formoterol 4.5 MICROgram(s) Inhaler 2 Puff(s) Inhalation two times a day  chlorhexidine 2% Cloths 1 Application(s) Topical <User Schedule>  dextrose 5% + lactated ringers. 1000 milliLiter(s) (75 mL/Hr) IV Continuous <Continuous>  dextrose 5%. 1000 milliLiter(s) (50 mL/Hr) IV Continuous <Continuous>  dextrose 5%. 1000 milliLiter(s) (100 mL/Hr) IV Continuous <Continuous>  dextrose 50% Injectable 25 Gram(s) IV Push once  dextrose 50% Injectable 12.5 Gram(s) IV Push once  dextrose 50% Injectable 25 Gram(s) IV Push once  glucagon  Injectable 1 milliGRAM(s) IntraMuscular once  heparin   Injectable 5000 Unit(s) SubCutaneous every 8 hours  insulin lispro (ADMELOG) corrective regimen sliding scale   SubCutaneous every 6 hours  pantoprazole  Injectable 40 milliGRAM(s) IV Push daily  Parenteral Nutrition - Adult 1 Each (75 mL/Hr) TPN Continuous <Continuous>  piperacillin/tazobactam IVPB.. 3.375 Gram(s) IV Intermittent every 12 hours    MEDICATIONS  (PRN):  acetaminophen   IVPB .. 650 milliGRAM(s) IV Intermittent every 6 hours PRN Mild Pain (1 - 3)  dextrose Oral Gel 15 Gram(s) Oral once PRN Blood Glucose LESS THAN 70 milliGRAM(s)/deciliter  tetracaine/benzocaine/butamben Spray 1 Spray(s) Topical every 6 hours PRN Sore throat      ALLERGIES:   Allergies    No Known Allergies    Intolerances        VITAL SIGNS:   Vital Signs Last 24 Hrs  T(C): 36.8 (19 Jul 2024 09:41), Max: 37.5 (19 Jul 2024 04:55)  T(F): 98.3 (19 Jul 2024 09:41), Max: 99.5 (19 Jul 2024 04:55)  HR: 86 (19 Jul 2024 09:41) (80 - 99)  BP: 169/86 (19 Jul 2024 09:41) (123/89 - 192/84)  BP(mean): 101 (18 Jul 2024 15:00) (101 - 120)  RR: 18 (19 Jul 2024 09:41) (18 - 27)  SpO2: 98% (19 Jul 2024 09:41) (96% - 100%)    Parameters below as of 19 Jul 2024 09:41  Patient On (Oxygen Delivery Method): room air      I&O's Summary    18 Jul 2024 07:01  -  19 Jul 2024 07:00  --------------------------------------------------------  IN: 1672.5 mL / OUT: 2310 mL / NET: -637.5 mL        PHYSICAL EXAM:   GENERAL:  Appears stated age, well-groomed, well-nourished, no distress  HEENT:  NC/AT,  conjunctivae clear, sclera -anicteric  CHEST:  Full & symmetric excursion, no increased effort, breath sounds clear  HEART:  Regular rhythm, S1, S2, no murmur/rub/S3/S4,  no edema  ABDOMEN:  soft, non-distended, non tender, hypoactive bowel sounds,  NGT in place with bilious output.   EXTREMITIES: No cyanosis,clubbing or edema  SKIN:  No rash/erythema/ecchymoses/petechiae/wounds/abscess/warm/dry  NEURO:  Alert, oriented    LABS:  CBC Full  -  ( 19 Jul 2024 07:13 )  WBC Count : 10.47 K/uL  RBC Count : 4.07 M/uL  Hemoglobin : 11.2 g/dL  Hematocrit : 35.5 %  Platelet Count - Automated : 217 K/uL  Mean Cell Volume : 87.2 fl  Mean Cell Hemoglobin : 27.5 pg  Mean Cell Hemoglobin Concentration : 31.5 gm/dL  Auto Neutrophil # : x  Auto Lymphocyte # : x  Auto Monocyte # : x  Auto Eosinophil # : x  Auto Basophil # : x  Auto Neutrophil % : x  Auto Lymphocyte % : x  Auto Monocyte % : x  Auto Eosinophil % : x  Auto Basophil % : x    07-19    137  |  100  |  14  ----------------------------<  112<H>  3.5   |  26  |  1.06    Ca    8.5      19 Jul 2024 07:13  Phos  2.7     07-19  Mg     2.0     07-19    TPro  6.9  /  Alb  2.9<L>  /  TBili  0.5  /  DBili  x   /  AST  32  /  ALT  13  /  AlkPhos  88  07-18    LIVER FUNCTIONS - ( 18 Jul 2024 11:51 )  Alb: 2.9 g/dL / Pro: 6.9 g/dL / ALK PHOS: 88 U/L / ALT: 13 U/L / AST: 32 U/L / GGT: x           PT/INR - ( 17 Jul 2024 23:52 )   PT: 15.9 sec;   INR: 1.46 ratio         PTT - ( 17 Jul 2024 23:52 )  PTT:36.3 sec  Urinalysis Basic - ( 19 Jul 2024 07:13 )    Color: x / Appearance: x / SG: x / pH: x  Gluc: 112 mg/dL / Ketone: x  / Bili: x / Urobili: x   Blood: x / Protein: x / Nitrite: x   Leuk Esterase: x / RBC: x / WBC x   Sq Epi: x / Non Sq Epi: x / Bacteria: x        Culture - Blood (collected 17 Jul 2024 07:12)  Source: .Blood Blood  Preliminary Report (18 Jul 2024 12:01):    No growth at 24 hours    Culture - Blood (collected 17 Jul 2024 07:12)  Source: .Blood Blood  Preliminary Report (18 Jul 2024 12:01):    No growth at 24 hours        IMAGING:

## 2024-07-22 NOTE — PRE PROCEDURE NOTE - PRE PROCEDURE EVALUATION
SURGERY PRE-OP NOTE    Preop Diagnosis: foreign body in the distal jejunum  Planned Procedure: 7/23/24    Pertinent Labs:                        11.1   10.13 )-----------( 293      ( 22 Jul 2024 07:56 )             34.2       07-22    132<L>  |  96  |  25<H>  ----------------------------<  174<H>  4.2   |  21<L>  |  0.84    Ca    8.8      22 Jul 2024 07:56  Phos  3.6     07-22  Mg     2.2     07-22    TPro  7.1  /  Alb  3.2<L>  /  TBili  0.3  /  DBili  x   /  AST  19  /  ALT  11  /  AlkPhos  80  07-22        ---------------------------------------------------  Blood: On hold for OR  ---------------------------------------------------  Type & Screen:    ---------------------------------------------------    U/A:  ---------------------------------------------------  uHCG: N/A  ---------------------------------------------------  CXR:    ---------------------------------------------------  EKG:    ---------------------------------------------------      Plan:  - NPO/IVF after midnight  - Consent to be obtained            SURGERY PRE-OP NOTE    Preop Diagnosis: foreign body in the distal jejunum  Planned Procedure: 7/23/24    Pertinent Labs:                        11.1   10.13 )-----------( 293      ( 22 Jul 2024 07:56 )             34.2       07-22    132<L>  |  96  |  25<H>  ----------------------------<  174<H>  4.2   |  21<L>  |  0.84    Ca    8.8      22 Jul 2024 07:56  Phos  3.6     07-22  Mg     2.2     07-22    TPro  7.1  /  Alb  3.2<L>  /  TBili  0.3  /  DBili  x   /  AST  19  /  ALT  11  /  AlkPhos  80  07-22        ---------------------------------------------------  Type & Screen:  Type + Screen (07.16.24 @ 13:50)   ABO Interpretation: O   Rh Interpretation: Positive   Antibody Screen: Negative      ---------------------------------------------------  U/A:  Urinalysis Basic - ( 22 Jul 2024 07:56 )    Color: x / Appearance: x / SG: x / pH: x  Gluc: 174 mg/dL / Ketone: x  / Bili: x / Urobili: x   Blood: x / Protein: x / Nitrite: x   Leuk Esterase: x / RBC: x / WBC x   Sq Epi: x / Non Sq Epi: x / Bacteria: x  ---------------------------------------------------  uHCG: N/A  ---------------------------------------------------  CXR:  < from: Xray Chest 1 View- PORTABLE-Urgent (Xray Chest 1 View- PORTABLE-Urgent .) (07.19.24 @ 15:37) >  IMPRESSION: Right upper cavity PICC line in good position without   pneumothorax seen. Unchanged patchy right lower lobe pneumonia.    < end of copied text >      ---------------------------------------------------  EKG:  < from: 12 Lead ECG (07.20.24 @ 21:28) >  Ventricular Rate 69 BPM    Atrial Rate 69 BPM    P-R Interval 148 ms    QRS Duration 126 ms    Q-T Interval 420 ms    QTC Calculation(Bazett) 450 ms    P Axis 72 degrees    R Axis -2 degrees    T Axis 85 degrees    Diagnosis Line NORMAL SINUS RHYTHM  NON-SPECIFIC INTRA-VENTRICULAR CONDUCTION BLOCK  MINIMAL VOLTAGE CRITERIA FOR LVH, MAY BE NORMAL VARIANT ( [place] product )    ABNORMAL ECG  NO PREVIOUS ECGS AVAILABLE  Confirmed by JENIFER MADSEN MD ([id number]) on 7/21/2024 6:26:19 AM    < end of copied text >      TTE:  < from: TTE W or WO Ultrasound Enhancing Agent (07.17.24 @ 08:52) >  CONCLUSIONS:      1. Left ventricular cavity is normal in size. Left ventricular wall thickness is normal. Left ventricular systolic function is normal with an ejection fraction visually estimated at 55 to 60 %. There are no regional wall motion abnormalities seen.   2. Probable secundum atrial septal defect with left to right flow.   3. Mild to moderate pulmonary hypertension.    < end of copied text >    ---------------------------------------------------      Plan:  - NPO/IVF after midnight  - Consent to be obtained            SURGERY PRE-OP NOTE    Preop Diagnosis: foreign body in the distal jejunum  Planned Procedure: laparoscopic removal of foreign body in intestine, possible open, possible bowel resection tomorrow (7/23/24)    Pertinent Labs:                        11.1   10.13 )-----------( 293      ( 22 Jul 2024 07:56 )             34.2       07-22    132<L>  |  96  |  25<H>  ----------------------------<  174<H>  4.2   |  21<L>  |  0.84    Ca    8.8      22 Jul 2024 07:56  Phos  3.6     07-22  Mg     2.2     07-22    TPro  7.1  /  Alb  3.2<L>  /  TBili  0.3  /  DBili  x   /  AST  19  /  ALT  11  /  AlkPhos  80  07-22        ---------------------------------------------------  Type & Screen:  Type + Screen (07.16.24 @ 13:50)   ABO Interpretation: O   Rh Interpretation: Positive   Antibody Screen: Negative      ---------------------------------------------------  U/A:  Urinalysis Basic - ( 22 Jul 2024 07:56 )    Color: x / Appearance: x / SG: x / pH: x  Gluc: 174 mg/dL / Ketone: x  / Bili: x / Urobili: x   Blood: x / Protein: x / Nitrite: x   Leuk Esterase: x / RBC: x / WBC x   Sq Epi: x / Non Sq Epi: x / Bacteria: x  ---------------------------------------------------  uHCG: N/A  ---------------------------------------------------  CXR:  < from: Xray Chest 1 View- PORTABLE-Urgent (Xray Chest 1 View- PORTABLE-Urgent .) (07.19.24 @ 15:37) >  IMPRESSION: Right upper cavity PICC line in good position without   pneumothorax seen. Unchanged patchy right lower lobe pneumonia.    < end of copied text >      ---------------------------------------------------  EKG:  < from: 12 Lead ECG (07.20.24 @ 21:28) >  Ventricular Rate 69 BPM    Atrial Rate 69 BPM    P-R Interval 148 ms    QRS Duration 126 ms    Q-T Interval 420 ms    QTC Calculation(Bazett) 450 ms    P Axis 72 degrees    R Axis -2 degrees    T Axis 85 degrees    Diagnosis Line NORMAL SINUS RHYTHM  NON-SPECIFIC INTRA-VENTRICULAR CONDUCTION BLOCK  MINIMAL VOLTAGE CRITERIA FOR LVH, MAY BE NORMAL VARIANT ( [place] product )    ABNORMAL ECG  NO PREVIOUS ECGS AVAILABLE  Confirmed by JENIFER MADSEN MD ([id number]) on 7/21/2024 6:26:19 AM    < end of copied text >      TTE:  < from: TTE W or WO Ultrasound Enhancing Agent (07.17.24 @ 08:52) >  CONCLUSIONS:      1. Left ventricular cavity is normal in size. Left ventricular wall thickness is normal. Left ventricular systolic function is normal with an ejection fraction visually estimated at 55 to 60 %. There are no regional wall motion abnormalities seen.   2. Probable secundum atrial septal defect with left to right flow.   3. Mild to moderate pulmonary hypertension.    < end of copied text >    ---------------------------------------------------    Plan:  - NPO/IVF after midnight  - Consent to be obtained            SURGERY PRE-OP NOTE    Preop Diagnosis: foreign body in the distal jejunum  Planned Procedure: laparoscopic removal of foreign body in intestine, possible open, possible bowel resection tomorrow (7/23/24)    Pertinent Labs:                        11.1   10.13 )-----------( 293      ( 22 Jul 2024 07:56 )             34.2       07-22    132<L>  |  96  |  25<H>  ----------------------------<  174<H>  4.2   |  21<L>  |  0.84    Ca    8.8      22 Jul 2024 07:56  Phos  3.6     07-22  Mg     2.2     07-22    TPro  7.1  /  Alb  3.2<L>  /  TBili  0.3  /  DBili  x   /  AST  19  /  ALT  11  /  AlkPhos  80  07-22        ---------------------------------------------------  Type & Screen:  Type + Screen (07.16.24 @ 13:50)   ABO Interpretation: O   Rh Interpretation: Positive   Antibody Screen: Negative      ---------------------------------------------------  U/A:  Urinalysis Basic - ( 22 Jul 2024 07:56 )    Color: x / Appearance: x / SG: x / pH: x  Gluc: 174 mg/dL / Ketone: x  / Bili: x / Urobili: x   Blood: x / Protein: x / Nitrite: x   Leuk Esterase: x / RBC: x / WBC x   Sq Epi: x / Non Sq Epi: x / Bacteria: x  ---------------------------------------------------  uHCG: N/A  ---------------------------------------------------  CXR:  < from: Xray Chest 1 View- PORTABLE-Urgent (Xray Chest 1 View- PORTABLE-Urgent .) (07.19.24 @ 15:37) >  IMPRESSION: Right upper cavity PICC line in good position without   pneumothorax seen. Unchanged patchy right lower lobe pneumonia.    < end of copied text >      ---------------------------------------------------  EKG:  < from: 12 Lead ECG (07.20.24 @ 21:28) >  Ventricular Rate 69 BPM    Atrial Rate 69 BPM    P-R Interval 148 ms    QRS Duration 126 ms    Q-T Interval 420 ms    QTC Calculation(Bazett) 450 ms    P Axis 72 degrees    R Axis -2 degrees    T Axis 85 degrees    Diagnosis Line NORMAL SINUS RHYTHM  NON-SPECIFIC INTRA-VENTRICULAR CONDUCTION BLOCK  MINIMAL VOLTAGE CRITERIA FOR LVH, MAY BE NORMAL VARIANT ( [place] product )    ABNORMAL ECG  NO PREVIOUS ECGS AVAILABLE  Confirmed by JENIFER MADSEN MD ([id number]) on 7/21/2024 6:26:19 AM    < end of copied text >      TTE:  < from: TTE W or WO Ultrasound Enhancing Agent (07.17.24 @ 08:52) >  CONCLUSIONS:      1. Left ventricular cavity is normal in size. Left ventricular wall thickness is normal. Left ventricular systolic function is normal with an ejection fraction visually estimated at 55 to 60 %. There are no regional wall motion abnormalities seen.   2. Probable secundum atrial septal defect with left to right flow.   3. Mild to moderate pulmonary hypertension.    < end of copied text >    ---------------------------------------------------    Plan:  - NPO/IVF after midnight  - Consent in chart  - Cardio clearance to be documented      [facility]  [id number]           SURGERY PRE-OP NOTE    Preop Diagnosis: foreign body in the distal jejunum  Planned Procedure: laparoscopic removal of foreign body in intestine, possible open, possible bowel resection tomorrow (7/23/24)    Pertinent Labs:                        11.1   10.13 )-----------( 293      ( 22 Jul 2024 07:56 )             34.2       07-22    132<L>  |  96  |  25<H>  ----------------------------<  174<H>  4.2   |  21<L>  |  0.84    Ca    8.8      22 Jul 2024 07:56  Phos  3.6     07-22  Mg     2.2     07-22    TPro  7.1  /  Alb  3.2<L>  /  TBili  0.3  /  DBili  x   /  AST  19  /  ALT  11  /  AlkPhos  80  07-22        ---------------------------------------------------  Type & Screen:  Type + Screen (07.16.24 @ 13:50)   ABO Interpretation: O   Rh Interpretation: Positive   Antibody Screen: Negative      ---------------------------------------------------  U/A:  Urinalysis Basic - ( 22 Jul 2024 07:56 )    Color: x / Appearance: x / SG: x / pH: x  Gluc: 174 mg/dL / Ketone: x  / Bili: x / Urobili: x   Blood: x / Protein: x / Nitrite: x   Leuk Esterase: x / RBC: x / WBC x   Sq Epi: x / Non Sq Epi: x / Bacteria: x  ---------------------------------------------------  uHCG: N/A  ---------------------------------------------------  CXR:  < from: Xray Chest 1 View- PORTABLE-Urgent (Xray Chest 1 View- PORTABLE-Urgent .) (07.19.24 @ 15:37) >  IMPRESSION: Right upper cavity PICC line in good position without   pneumothorax seen. Unchanged patchy right lower lobe pneumonia.    < end of copied text >      ---------------------------------------------------  EKG:  < from: 12 Lead ECG (07.20.24 @ 21:28) >  Ventricular Rate 69 BPM    Atrial Rate 69 BPM    P-R Interval 148 ms    QRS Duration 126 ms    Q-T Interval 420 ms    QTC Calculation(Bazett) 450 ms    P Axis 72 degrees    R Axis -2 degrees    T Axis 85 degrees    Diagnosis Line NORMAL SINUS RHYTHM  NON-SPECIFIC INTRA-VENTRICULAR CONDUCTION BLOCK  MINIMAL VOLTAGE CRITERIA FOR LVH, MAY BE NORMAL VARIANT ( [place] product )    ABNORMAL ECG  NO PREVIOUS ECGS AVAILABLE  Confirmed by JENIFER MADSEN MD ([id number]) on 7/21/2024 6:26:19 AM    < end of copied text >      TTE:  < from: TTE W or WO Ultrasound Enhancing Agent (07.17.24 @ 08:52) >  CONCLUSIONS:      1. Left ventricular cavity is normal in size. Left ventricular wall thickness is normal. Left ventricular systolic function is normal with an ejection fraction visually estimated at 55 to 60 %. There are no regional wall motion abnormalities seen.   2. Probable secundum atrial septal defect with left to right flow.   3. Mild to moderate pulmonary hypertension.    < end of copied text >    ---------------------------------------------------    Plan:  - NPO/IVF after midnight  - Consent in chart  - Cardio clearance to documented on 7/22      [facility]  [id number]

## 2024-07-22 NOTE — PROGRESS NOTE ADULT - ASSESSMENT
83M, poor historian, PM of DM, asthma, recently admitted to [place] in 5/2024 for abdominal pain, found to have large ulcerating mass communicating with proximal jejunum on CT there but no intervention at that time, presenting for few days of brown/bloody emesis and abdominal pain. Leukocytosis to 21 with lactate 3.9, improved to 3.1 after 1L bolus.  CTAP significant for SBO with TP in R mid abdomen, thickened jejunum, no grossly obvious focal mass, dilated stomach and distal esophagus; possible Crohn's disease. Admitted to SICU for management of volume resuscitation in high grade SBO. Clinically improving in SICU. CT on admission failed to reveal transition point or obstructive mass. Recent CT s/o 3.7 cm ring shaped foreign body in the distal jejunum which has moved since the last imaging. Transferred to floor. On TPN. MI workup  negative.     Plan  -Monitor NGT output, I/Os  -NPO/IVF/TPN  - IV Zosyn   -Appreciate GI recs, f/u fecal calprotectin   -Appreciate Surg Onc recs, available for intra op assistance given DJ region inflammation  -DVT Prophylaxis  -Fleet enemas  - monitor abdominal exam  -nutrition reccs for tpn appreciated    [facility]  [id number] 83M, poor historian, PM of DM, asthma, recently admitted to [place] in 5/2024 for abdominal pain, found to have large ulcerating mass communicating with proximal jejunum on CT there but no intervention at that time, presenting for few days of brown/bloody emesis and abdominal pain. Leukocytosis to 21 with lactate 3.9, improved to 3.1 after 1L bolus.  CTAP significant for SBO with TP in R mid abdomen, thickened jejunum, no grossly obvious focal mass, dilated stomach and distal esophagus; possible Crohn's disease. Admitted to SICU for management of volume resuscitation in high grade SBO. Clinically improving in SICU. CT on admission failed to reveal transition point or obstructive mass. Recent CT s/o 3.7 cm ring shaped foreign body in the distal jejunum which has moved since the last imaging. Transferred to floor. On TPN.    Plan  -Monitor NGT output, I/Os  -NPO/IVF/TPN  -nutrition reccs for tpn appreciated  - IV Zosyn   -Appreciate GI recs, f/u fecal calprotectin   -Appreciate Surg Onc recs, available for intra op assistance given DJ region inflammation  -DVT Prophylaxis  -monitor abdominal exam  -OOB+A as tolerated  -Prep for OR tomorrow     [facility]  [id number]

## 2024-07-22 NOTE — PROGRESS NOTE ADULT - SUBJECTIVE AND OBJECTIVE BOX
Surgery Progress Note     Interval/Subjective:  Patient seen and examined.   __________________________________________________________________  Vitals:  T(C): 36.8 (00:13), Max: 36.8 (18:35)  HR: 75 (00:13) (69 - 75)  BP: 145/66 (00:13) (124/76 - 169/67)  RR: 18 (00:13) (18 - 18)  SpO2: 96% (00:13) (96% - 99%)    I & O's:  IN:  Total IN: 0 mL    OUT:    Indwelling Catheter - Urethral (mL): 1850 mL    Nasogastric/Oral tube (mL): 450 mL    Oral Fluid: 0 mL  Total OUT: 2300 mL        Physical Exam:  General: NAD, resting comfortably in bed  HEENT: Normocephalic atraumatic  Respiratory: Nonlabored respirations  Cardio: pulse present  Abdomen: soft, nontender, nondistended  Vascular: extremities are warm and well perfused.       __________________________________________________________________ Surgery Progress Note     Interval/Subjective:  Patient seen and examined at bedside. Pt denies n/v or abdominal pain.    __________________________________________________________________  Vitals:  T(C): 36.8 (00:13), Max: 36.8 (18:35)  HR: 75 (00:13) (69 - 75)  BP: 145/66 (00:13) (124/76 - 169/67)  RR: 18 (00:13) (18 - 18)  SpO2: 96% (00:13) (96% - 99%)    I & O's:  IN:  Total IN: 0 mL    OUT:    Indwelling Catheter - Urethral (mL): 1850 mL    Nasogastric/Oral tube (mL): 450 mL    Oral Fluid: 0 mL  Total OUT: 2300 mL        Physical Exam:  General: NAD, resting comfortably in bed  HEENT: Normocephalic atraumatic, NGT in place and on suction  Respiratory: Nonlabored respirations  Cardio: pulse present  Abdomen: soft, nontender, nondistended  Vascular: extremities are warm and well perfused.       __________________________________________________________________

## 2024-07-22 NOTE — PROGRESS NOTE ADULT - ASSESSMENT
Mr. Peterson is an 83 year old male with a past medical history of DM, asthma, admitted to [place] in 5/2024 for abdominal pain, found to have a large ulcerating mass communicating with the proximal jejunum in the setting of 30 lb weight loss in the past year. Abdominal pain improved after emesis and he was sent home without further intervention. At that time there was a suspicion for a contained perforation or inflamed giant diverticulum. On this presentation, patient had a few days of brown emesis with some blood and severe abdominal pain. CT AP showed high grade SBO. Patient has since had an NGT in place with bilious output, repeat CT A/P shows an SBO with transition point. Also demonstrated obstructing foreign body in the distal jejunum?. Surgery planning laparoscopic removal of foreign body in intestine, possible open, possible bowel resection tomorrow (7/23/24).     Recommendations:  -Patient scheduled for laparoscopic removal of foreign body in intestine, possible open, possible bowel resection tomorrow (7/23/24)  -Please obtain fecal calprotectin once patient starts passing stool.     Note incomplete until finalized by attending signature/attestation.    Michael Abdul, PGY-4  Gastroenterology & Hepatology Fellow  Available on TEAMS  Long range pager #: [phone]  Short range pager#: [phone]    For non-urgent consults, please send email to [email or web address] (for [facility]) or [email or web address] (for [facility])   Mr. Peterson is an 83 year old male with a past medical history of DM, asthma, admitted to [place] in 5/2024 for abdominal pain, found to have a large ulcerating mass communicating with the proximal jejunum in the setting of 30 lb weight loss in the past year. Abdominal pain improved after emesis and he was sent home without further intervention. At that time there was a suspicion for a contained perforation or inflamed giant diverticulum. On this presentation, patient had a few days of brown emesis with some blood and severe abdominal pain. CT AP showed high grade SBO. Patient has since had an NGT in place with bilious output, repeat CT A/P shows an SBO with transition point. Also demonstrated obstructing foreign body in the distal jejunum?. Surgery planning laparoscopic removal of foreign body in intestine, possible open, possible bowel resection tomorrow (7/23/24).     Recommendations:  -Patient scheduled for laparoscopic removal of foreign body in intestine, possible open, possible bowel resection tomorrow (7/23/24)  -Please obtain fecal calprotectin once patient starts passing stool.   -will sign off at this time, however please call back with questions or should any worsening/persistent issues arise.  Please provide patient with Gastroenterology Clinic to confirm appointment; [phone] ([facility] at [address] [facility]) or [phone] ([facility] at [address] [place] [address]) or [phone] ([facility] at [address] [facility]).    Note incomplete until finalized by attending signature/attestation.    Michael Abdul, PGY-4  Gastroenterology & Hepatology Fellow  Available on TEAMS  Long range pager #: [phone]  Short range pager#: [phone]    For non-urgent consults, please send email to [email or web address] (for [facility]) or [email or web address] (for [facility])

## 2024-07-22 NOTE — PROGRESS NOTE ADULT - ASSESSMENT
83M PMH of Asthma and BPH on Flomax, last colonoscopy around 8-10 years ago (normal per wife/patient) no known PSHx recently admitted to [place] in 5/2024 for abdominal pain, found to have large ulcerating mass communicating with proximal jejunum on CT (discharged home with outpatient GI f/u for concern of contained perforation vs inflamed giant diverticulum) presented to [facility] given hematemesis constipation and abdominal pain. Reports abdominal pain improved with episodes of emesis. Last BM/flatus was yesterday.  TPN team consulted given concern for severe protein calorie malnutrition and anticipated prolonged inadequate caloric intake .    AA 105g, Dextrose 210g and SMOF Lipids 45g. To provide: 1584kcal and 105g protein. Based on dosing wt 43.5kg, provides: 36.4kcal/kg and 2.4g protein/kg.     - Increase TPN to goal with goal lipids:  105g AA, 210g dextrose, 45g SMOF  - hyperglycemia -------------------- increase to------------------insulin in TPN.  Check FS q 6 and cover with ISS  - continue 60 meq KCl  - Electrolyte Imbalance risk:  daily CMP, Mg, Phos and ionized Ca daily  - Recommend strict intake and output/weight checks three times a week  - Risk for Hypertriglyceridemia with TPN:  check TG daily x 3 days at goal lipids  - care per primary team    TPN spectra [id number]  M-F 8:30A-2:30P, Weekends and holidays 8A-12P  discussed with Dr. Tee   83M PMH of Asthma and BPH on Flomax, last colonoscopy around 8-10 years ago (normal per wife/patient) no known PSHx recently admitted to [place] in 5/2024 for abdominal pain, found to have large ulcerating mass communicating with proximal jejunum on CT (discharged home with outpatient GI f/u for concern of contained perforation vs inflamed giant diverticulum) presented to [facility] given hematemesis constipation and abdominal pain. Reports abdominal pain improved with episodes of emesis. Last BM/flatus was yesterday.  TPN team consulted given concern for severe protein calorie malnutrition and anticipated prolonged inadequate caloric intake .    AA 105g, Dextrose 210g and SMOF Lipids 45g. To provide: 1584kcal and 105g protein. Based on dosing wt 43.5kg, provides: 36.4kcal/kg and 2.4g protein/kg.     - Continue TPN at goal with goal lipids:  105g AA, 210g dextrose, 45g SMOF  - hyperglycemia -------------------- increase to------------------insulin in TPN.  Check FS q 6 and cover with ISS  - continue 60 meq KCl  - Electrolyte Imbalance risk:  daily CMP, Mg, Phos and ionized Ca daily  - Recommend strict intake and output/weight checks three times a week  - Risk for Hypertriglyceridemia with TPN:  check TG daily x 3 days at goal lipids  - care per primary team    TPN spectra [id number]  M-F 8:30A-2:30P, Weekends and holidays 8A-12P  discussed with Dr. Tee   83M PMH of Asthma and BPH on Flomax, last colonoscopy around 8-10 years ago (normal per wife/patient) no known PSHx recently admitted to [place] in 5/2024 for abdominal pain, found to have large ulcerating mass communicating with proximal jejunum on CT (discharged home with outpatient GI f/u for concern of contained perforation vs inflamed giant diverticulum) presented to [facility] given hematemesis constipation and abdominal pain. Reports abdominal pain improved with episodes of emesis. Last BM/flatus was yesterday.  TPN team consulted given concern for severe protein calorie malnutrition and anticipated prolonged inadequate caloric intake .    AA 105g, Dextrose 210g and SMOF Lipids 45g. To provide: 1584kcal and 105g protein. Based on dosing wt 43.5kg, provides: 36.4kcal/kg and 2.4g protein/kg.     - Continue TPN at goal with goal lipids:  105g AA, 210g dextrose, 45g SMOF  - hyperglycemia - FS trend reviewed.  Insulin in TPN increased to 18U.  Check FS q 6 and cover with ISS  - continue 60 meq KCl  - Electrolyte Imbalance risk:  daily CMP, Mg, Phos and ionized Ca daily  - Recommend strict intake and output/weight checks three times a week  - Risk for Hypertriglyceridemia with TPN:  check TG daily x 3 days at goal lipids  - care per primary team    TPN spectra [id number]  M-F 8:30A-2:30P, Weekends and holidays 8A-12P  discussed with Dr. Tee

## 2024-07-22 NOTE — PROGRESS NOTE ADULT - SUBJECTIVE AND OBJECTIVE BOX
[facility] NUTRITION SUPPORT / TPN -- FOLLOW UP NOTE  --------------------------------------------------------------------------------    24 hour events/subjective:  No acute overnight events.  Remains NPO/on TPN, awaiting surgery.  Pain controlled.  Denies SOB/CP.    Diet:  Diet, NPO (07-16-24 @ 07:17)      PAST HISTORY  --------------------------------------------------------------------------------  No significant changes to PMH, PSH, FHx, SHx, unless otherwise noted    ALLERGIES & MEDICATIONS  --------------------------------------------------------------------------------  Allergies    No Known Allergies    Intolerances      Standing Inpatient Medications  albuterol    90 MICROgram(s) HFA Inhaler 2 Puff(s) Inhalation every 6 hours  alteplase for catheter clearance 2 milliGRAM(s) Catheter once  budesonide 160 MICROgram(s)/formoterol 4.5 MICROgram(s) Inhaler 2 Puff(s) Inhalation two times a day  chlorhexidine 2% Cloths 1 Application(s) Topical <User Schedule>  chlorhexidine 4% Liquid 1 Application(s) Topical <User Schedule>  dextrose 5%. 1000 milliLiter(s) IV Continuous <Continuous>  dextrose 5%. 1000 milliLiter(s) IV Continuous <Continuous>  dextrose 50% Injectable 25 Gram(s) IV Push once  dextrose 50% Injectable 25 Gram(s) IV Push once  dextrose 50% Injectable 12.5 Gram(s) IV Push once  glucagon  Injectable 1 milliGRAM(s) IntraMuscular once  heparin   Injectable 5000 Unit(s) SubCutaneous every 8 hours  insulin lispro (ADMELOG) corrective regimen sliding scale   SubCutaneous every 6 hours  lipid, fat emulsion (Fish Oil and Plant Based) 20% Infusion 18.7 mL/Hr IV Continuous <Continuous>  pantoprazole  Injectable 40 milliGRAM(s) IV Push daily  Parenteral Nutrition - Adult 1 Each TPN Continuous <Continuous>  piperacillin/tazobactam IVPB.. 3.375 Gram(s) IV Intermittent every 12 hours    PRN Inpatient Medications  dextrose Oral Gel 15 Gram(s) Oral once PRN  sodium chloride 0.9% lock flush 10 milliLiter(s) IV Push every 1 hour PRN  tetracaine/benzocaine/butamben Spray 1 Spray(s) Topical every 6 hours PRN      VITALS/PHYSICAL EXAM  --------------------------------------------------------------------------------  T(C): 36.8 (07-22-24 @ 08:42), Max: 36.8 (07-21-24 @ 18:35)  HR: 73 (07-22-24 @ 08:42) (70 - 75)  BP: 122/62 (07-22-24 @ 08:42) (122/62 - 153/65)  RR: 18 (07-22-24 @ 08:42) (18 - 18)  SpO2: 96% (07-22-24 @ 08:42) (96% - 99%)  Wt(kg): --        07-21-24 @ 07:01  -  07-22-24 @ 07:00  --------------------------------------------------------  IN: 2208.1 mL / OUT: 2500 mL / NET: -291.9 mL    07-22-24 @ 07:01  -  07-22-24 @ 09:50  --------------------------------------------------------  IN: 0 mL / OUT: 450 mL / NET: -450 mL        Physical Exam:    Gen: NAD, thin/frail appearing; laying in bed   HEENT: supple neck, no JVD; NGT  Chest: non labored breathing, equal chest expansion bilaterally  Abd: soft, nontender/nondistended  : No suprapubic tenderness  Ext: Warm, FROM, no edema b/l LE  Neuro: No focal deficits  Psych: Normal affect and mood  Skin: Warm, without rashes           LABS/STUDIES  --------------------------------------------------------------------------------              11.1   10.13 >-----------<  293      [07-22-24 @ 07:56]              34.2     132  |  96  |  25  ----------------------------<  174      [07-22-24 @ 07:56]  4.2   |  21  |  0.84        Ca     8.8     [07-22-24 @ 07:56]      iCa    1.17     [07-22 @ 06:00]      Mg     2.2     [07-22-24 @ 07:56]      Phos  3.6     [07-22-24 @ 07:56]    TPro  7.1  /  Alb  3.2  /  TBili  0.3  /  DBili  x   /  AST  19  /  ALT  11  /  AlkPhos  80  [07-22-24 @ 07:56]          Ca ionized  Creatinine Trend:  POC glucoseGlucose: 174 mg/dL (07-22-24 @ 07:56)  CAPILLARY BLOOD GLUCOSE      POCT Blood Glucose.: 205 mg/dL (22 Jul 2024 05:01)  POCT Blood Glucose.: 190 mg/dL (21 Jul 2024 23:43)  POCT Blood Glucose.: 198 mg/dL (21 Jul 2024 18:37)  POCT Blood Glucose.: 204 mg/dL (21 Jul 2024 11:52)    PrealbuminPrealbumin, Serum: 7 mg/dL (07-16-24 @ 23:00)    Triglycerides

## 2024-07-23 ENCOUNTER — RESULT REVIEW (OUTPATIENT)
Age: 84
End: 2024-07-23

## 2024-07-23 ENCOUNTER — APPOINTMENT (OUTPATIENT)
Dept: SURGERY | Facility: HOSPITAL | Age: 84
End: 2024-07-23

## 2024-07-23 LAB
ALBUMIN SERPL ELPH-MCNC: 3.2 G/DL — LOW (ref 3.3–5)
ALP SERPL-CCNC: 90 U/L — SIGNIFICANT CHANGE UP (ref 40–120)
ALT FLD-CCNC: 13 U/L — SIGNIFICANT CHANGE UP (ref 10–45)
ANION GAP SERPL CALC-SCNC: 12 MMOL/L — SIGNIFICANT CHANGE UP (ref 5–17)
AST SERPL-CCNC: 19 U/L — SIGNIFICANT CHANGE UP (ref 10–40)
BASOPHILS # BLD AUTO: 0.04 K/UL — SIGNIFICANT CHANGE UP (ref 0–0.2)
BASOPHILS NFR BLD AUTO: 0.4 % — SIGNIFICANT CHANGE UP (ref 0–2)
BILIRUB SERPL-MCNC: 0.3 MG/DL — SIGNIFICANT CHANGE UP (ref 0.2–1.2)
BLD GP AB SCN SERPL QL: NEGATIVE — SIGNIFICANT CHANGE UP
BUN SERPL-MCNC: 30 MG/DL — HIGH (ref 7–23)
CA-I BLD-SCNC: 1.18 MMOL/L — SIGNIFICANT CHANGE UP (ref 1.15–1.33)
CALCIUM SERPL-MCNC: 8.6 MG/DL — SIGNIFICANT CHANGE UP (ref 8.4–10.5)
CHLORIDE SERPL-SCNC: 97 MMOL/L — SIGNIFICANT CHANGE UP (ref 96–108)
CO2 SERPL-SCNC: 24 MMOL/L — SIGNIFICANT CHANGE UP (ref 22–31)
CREAT SERPL-MCNC: 0.89 MG/DL — SIGNIFICANT CHANGE UP (ref 0.5–1.3)
EGFR: 84 ML/MIN/1.73M2 — SIGNIFICANT CHANGE UP
EOSINOPHIL # BLD AUTO: 0.73 K/UL — HIGH (ref 0–0.5)
EOSINOPHIL NFR BLD AUTO: 6.5 % — HIGH (ref 0–6)
GLUCOSE BLDC GLUCOMTR-MCNC: 118 MG/DL — HIGH (ref 70–99)
GLUCOSE BLDC GLUCOMTR-MCNC: 174 MG/DL — HIGH (ref 70–99)
GLUCOSE BLDC GLUCOMTR-MCNC: 191 MG/DL — HIGH (ref 70–99)
GLUCOSE BLDC GLUCOMTR-MCNC: 224 MG/DL — HIGH (ref 70–99)
GLUCOSE BLDC GLUCOMTR-MCNC: 317 MG/DL — HIGH (ref 70–99)
GLUCOSE BLDC GLUCOMTR-MCNC: 582 MG/DL — CRITICAL HIGH (ref 70–99)
GLUCOSE SERPL-MCNC: 195 MG/DL — HIGH (ref 70–99)
HCT VFR BLD CALC: 29.9 % — LOW (ref 39–50)
HGB BLD-MCNC: 10.1 G/DL — LOW (ref 13–17)
IMM GRANULOCYTES NFR BLD AUTO: 2.2 % — HIGH (ref 0–0.9)
LYMPHOCYTES # BLD AUTO: 1.88 K/UL — SIGNIFICANT CHANGE UP (ref 1–3.3)
LYMPHOCYTES # BLD AUTO: 16.8 % — SIGNIFICANT CHANGE UP (ref 13–44)
MAGNESIUM SERPL-MCNC: 2.1 MG/DL — SIGNIFICANT CHANGE UP (ref 1.6–2.6)
MCHC RBC-ENTMCNC: 28.4 PG — SIGNIFICANT CHANGE UP (ref 27–34)
MCHC RBC-ENTMCNC: 33.8 GM/DL — SIGNIFICANT CHANGE UP (ref 32–36)
MCV RBC AUTO: 84 FL — SIGNIFICANT CHANGE UP (ref 80–100)
MONOCYTES # BLD AUTO: 1.11 K/UL — HIGH (ref 0–0.9)
MONOCYTES NFR BLD AUTO: 9.9 % — SIGNIFICANT CHANGE UP (ref 2–14)
NEUTROPHILS # BLD AUTO: 7.19 K/UL — SIGNIFICANT CHANGE UP (ref 1.8–7.4)
NEUTROPHILS NFR BLD AUTO: 64.2 % — SIGNIFICANT CHANGE UP (ref 43–77)
NRBC # BLD: 0 /100 WBCS — SIGNIFICANT CHANGE UP (ref 0–0)
PHOSPHATE SERPL-MCNC: 4.1 MG/DL — SIGNIFICANT CHANGE UP (ref 2.5–4.5)
PLATELET # BLD AUTO: 323 K/UL — SIGNIFICANT CHANGE UP (ref 150–400)
POTASSIUM SERPL-MCNC: 4 MMOL/L — SIGNIFICANT CHANGE UP (ref 3.5–5.3)
POTASSIUM SERPL-SCNC: 4 MMOL/L — SIGNIFICANT CHANGE UP (ref 3.5–5.3)
PROT SERPL-MCNC: 6.9 G/DL — SIGNIFICANT CHANGE UP (ref 6–8.3)
RBC # BLD: 3.56 M/UL — LOW (ref 4.2–5.8)
RBC # FLD: 14.4 % — SIGNIFICANT CHANGE UP (ref 10.3–14.5)
RH IG SCN BLD-IMP: POSITIVE — SIGNIFICANT CHANGE UP
SODIUM SERPL-SCNC: 133 MMOL/L — LOW (ref 135–145)
TRIGL SERPL-MCNC: 175 MG/DL — HIGH
WBC # BLD: 11.2 K/UL — HIGH (ref 3.8–10.5)
WBC # FLD AUTO: 11.2 K/UL — HIGH (ref 3.8–10.5)

## 2024-07-23 PROCEDURE — 99232 SBSQ HOSP IP/OBS MODERATE 35: CPT

## 2024-07-23 PROCEDURE — 88300 SURGICAL PATH GROSS: CPT | Mod: 26,59

## 2024-07-23 PROCEDURE — 44202 LAP ENTERECTOMY: CPT

## 2024-07-23 PROCEDURE — 88307 TISSUE EXAM BY PATHOLOGIST: CPT | Mod: 26

## 2024-07-23 DEVICE — STAPLER COVIDIEN TA 90 BLUE: Type: IMPLANTABLE DEVICE | Status: FUNCTIONAL

## 2024-07-23 DEVICE — TISSEEL 4ML: Type: IMPLANTABLE DEVICE | Status: FUNCTIONAL

## 2024-07-23 DEVICE — STAPLER COVIDIEN GIA 80-3.0MM PURPLE RELOAD: Type: IMPLANTABLE DEVICE | Status: FUNCTIONAL

## 2024-07-23 DEVICE — STAPLER COVIDIEN GIA 80-3.0MM PURPLE: Type: IMPLANTABLE DEVICE | Status: FUNCTIONAL

## 2024-07-23 RX ORDER — MULTI-ELECTROLYTE 98; 74.5; 64.6; 25.5; 16.55 MG/ML; MG/ML; MG/ML; MG/ML; MG/ML
1 INJECTION, SOLUTION, CONCENTRATE INTRAVENOUS
Refills: 0 | Status: DISCONTINUED | OUTPATIENT
Start: 2024-07-23 | End: 2024-07-24

## 2024-07-23 RX ORDER — ONDANSETRON HCL/PF 4 MG/2 ML
4 VIAL (ML) INJECTION EVERY 6 HOURS
Refills: 0 | Status: DISCONTINUED | OUTPATIENT
Start: 2024-07-23 | End: 2024-07-24

## 2024-07-23 RX ORDER — OXYCODONE HYDROCHLORIDE 30 MG/1
10 TABLET ORAL
Refills: 0 | Status: DISCONTINUED | OUTPATIENT
Start: 2024-07-23 | End: 2024-07-23

## 2024-07-23 RX ORDER — NALBUPHINE HCL 100 %
2.5 POWDER (GRAM) MISCELLANEOUS EVERY 6 HOURS
Refills: 0 | Status: DISCONTINUED | OUTPATIENT
Start: 2024-07-23 | End: 2024-07-24

## 2024-07-23 RX ORDER — BACTERIOSTATIC SODIUM CHLORIDE 0.9 %
10 VIAL (ML) INJECTION
Refills: 0 | Status: DISCONTINUED | OUTPATIENT
Start: 2024-07-23 | End: 2024-07-30

## 2024-07-23 RX ORDER — BUDESONIDE AND FORMOTEROL FUMARATE DIHYDRATE 80; 4.5 UG/1; UG/1
2 AEROSOL RESPIRATORY (INHALATION)
Refills: 0 | Status: DISCONTINUED | OUTPATIENT
Start: 2024-07-23 | End: 2024-07-30

## 2024-07-23 RX ORDER — DEXTROSE 4 G
12.5 TABLET,CHEWABLE ORAL ONCE
Refills: 0 | Status: DISCONTINUED | OUTPATIENT
Start: 2024-07-23 | End: 2024-07-30

## 2024-07-23 RX ORDER — CHLORHEXIDINE GLUCONATE 500 MG/1
1 CLOTH TOPICAL
Refills: 0 | Status: DISCONTINUED | OUTPATIENT
Start: 2024-07-23 | End: 2024-07-30

## 2024-07-23 RX ORDER — PANTOPRAZOLE SODIUM 20 MG/1
40 TABLET, DELAYED RELEASE ORAL DAILY
Refills: 0 | Status: DISCONTINUED | OUTPATIENT
Start: 2024-07-23 | End: 2024-07-30

## 2024-07-23 RX ORDER — ONDANSETRON HCL/PF 4 MG/2 ML
4 VIAL (ML) INJECTION ONCE
Refills: 0 | Status: DISCONTINUED | OUTPATIENT
Start: 2024-07-23 | End: 2024-07-23

## 2024-07-23 RX ORDER — DEXTROSE 4 G
15 TABLET,CHEWABLE ORAL ONCE
Refills: 0 | Status: DISCONTINUED | OUTPATIENT
Start: 2024-07-23 | End: 2024-07-30

## 2024-07-23 RX ORDER — ALBUTEROL 90 MCG
2 AEROSOL REFILL (GRAM) INHALATION EVERY 6 HOURS
Refills: 0 | Status: DISCONTINUED | OUTPATIENT
Start: 2024-07-23 | End: 2024-07-30

## 2024-07-23 RX ORDER — HYDROMORPHONE HCL IN 0.9% NACL 0.2 MG/ML
0.25 PLASTIC BAG, INJECTION (ML) INTRAVENOUS
Refills: 0 | Status: DISCONTINUED | OUTPATIENT
Start: 2024-07-23 | End: 2024-07-23

## 2024-07-23 RX ORDER — INSULIN LISPRO 100/ML
VIAL (ML) SUBCUTANEOUS EVERY 6 HOURS
Refills: 0 | Status: DISCONTINUED | OUTPATIENT
Start: 2024-07-23 | End: 2024-07-27

## 2024-07-23 RX ORDER — DEXTROSE 4 G
25 TABLET,CHEWABLE ORAL ONCE
Refills: 0 | Status: DISCONTINUED | OUTPATIENT
Start: 2024-07-23 | End: 2024-07-30

## 2024-07-23 RX ORDER — NALOXONE HYDROCHLORIDE 0.4 MG/ML
0.1 INJECTION, SOLUTION INTRAMUSCULAR; INTRAVENOUS; SUBCUTANEOUS
Refills: 0 | Status: DISCONTINUED | OUTPATIENT
Start: 2024-07-23 | End: 2024-07-24

## 2024-07-23 RX ORDER — OXYCODONE HYDROCHLORIDE 30 MG/1
5 TABLET ORAL
Refills: 0 | Status: DISCONTINUED | OUTPATIENT
Start: 2024-07-23 | End: 2024-07-23

## 2024-07-23 RX ORDER — GLUCAGON INJECTION, SOLUTION 0.5 MG/.1ML
1 INJECTION, SOLUTION SUBCUTANEOUS ONCE
Refills: 0 | Status: DISCONTINUED | OUTPATIENT
Start: 2024-07-23 | End: 2024-07-30

## 2024-07-23 RX ORDER — FISH OIL 0.1 G/ML
18.7 INJECTION, EMULSION INTRAVENOUS
Qty: 45 | Refills: 0 | Status: DISCONTINUED | OUTPATIENT
Start: 2024-07-23 | End: 2024-07-24

## 2024-07-23 RX ORDER — DEXTROSE MONOHYDRATE, SODIUM CHLORIDE, SODIUM LACTATE, CALCIUM CHLORIDE, MAGNESIUM CHLORIDE 1.5; 538; 448; 18.4; 5.08 G/100ML; MG/100ML; MG/100ML; MG/100ML; MG/100ML
1000 SOLUTION INTRAPERITONEAL
Refills: 0 | Status: DISCONTINUED | OUTPATIENT
Start: 2024-07-23 | End: 2024-07-30

## 2024-07-23 RX ORDER — HEPARIN SODIUM 1000 [USP'U]/ML
5000 INJECTION, SOLUTION INTRAVENOUS; SUBCUTANEOUS EVERY 8 HOURS
Refills: 0 | Status: DISCONTINUED | OUTPATIENT
Start: 2024-07-23 | End: 2024-07-30

## 2024-07-23 RX ADMIN — Medication 8: at 17:51

## 2024-07-23 RX ADMIN — Medication 2 PUFF(S): at 05:34

## 2024-07-23 RX ADMIN — MULTI-ELECTROLYTE 1 EACH: 98; 74.5; 64.6; 25.5; 16.55 INJECTION, SOLUTION, CONCENTRATE INTRAVENOUS at 18:23

## 2024-07-23 RX ADMIN — MULTI-ELECTROLYTE 1 EACH: 98; 74.5; 64.6; 25.5; 16.55 INJECTION, SOLUTION, CONCENTRATE INTRAVENOUS at 19:13

## 2024-07-23 RX ADMIN — Medication 2 PUFF(S): at 00:12

## 2024-07-23 RX ADMIN — Medication 2 PUFF(S): at 17:52

## 2024-07-23 RX ADMIN — PIPERACILLIN SODIUM, TAZOBACTAM SODIUM 25 GRAM(S): 3; .375 INJECTION, POWDER, LYOPHILIZED, FOR SOLUTION INTRAVENOUS at 00:12

## 2024-07-23 RX ADMIN — Medication 0.25 MILLIGRAM(S): at 15:19

## 2024-07-23 RX ADMIN — HEPARIN SODIUM 5000 UNIT(S): 1000 INJECTION, SOLUTION INTRAVENOUS; SUBCUTANEOUS at 22:18

## 2024-07-23 RX ADMIN — FISH OIL 18.7 ML/HR: 0.1 INJECTION, EMULSION INTRAVENOUS at 19:14

## 2024-07-23 RX ADMIN — Medication 0.25 MILLIGRAM(S): at 15:34

## 2024-07-23 RX ADMIN — Medication 4: at 05:53

## 2024-07-23 RX ADMIN — Medication 4: at 00:12

## 2024-07-23 RX ADMIN — FISH OIL 18.7 ML/HR: 0.1 INJECTION, EMULSION INTRAVENOUS at 18:23

## 2024-07-23 RX ADMIN — PANTOPRAZOLE SODIUM 40 MILLIGRAM(S): 20 TABLET, DELAYED RELEASE ORAL at 17:52

## 2024-07-23 RX ADMIN — MULTI-ELECTROLYTE 1 EACH: 98; 74.5; 64.6; 25.5; 16.55 INJECTION, SOLUTION, CONCENTRATE INTRAVENOUS at 07:18

## 2024-07-23 RX ADMIN — BUDESONIDE AND FORMOTEROL FUMARATE DIHYDRATE 2 PUFF(S): 80; 4.5 AEROSOL RESPIRATORY (INHALATION) at 05:33

## 2024-07-23 RX ADMIN — BUDESONIDE AND FORMOTEROL FUMARATE DIHYDRATE 2 PUFF(S): 80; 4.5 AEROSOL RESPIRATORY (INHALATION) at 17:52

## 2024-07-23 NOTE — CHART NOTE - NSCHARTNOTEFT_GEN_A_CORE
Post Operative Note  Patient: JIMMY CALLAHAN 83y (1940) Male   MRN: [id number]  Location: [facility] 2MON 223 D1  Visit: 07-16-24 Inpatient  Date: 07-23-24 @ 22:56    Procedure: S/P diagnostic laparoscopy f/b foreign body removal and jejunal resection    Subjective: Patient seen and examined post operatively. Reports pain as controlled. Denies nausea, vomiting, fever, chills, chest pain, SOB, cough.      Objective:  Vitals: T(F): 98.2 (07-23-24 @ 19:31), Max: 98.2 (07-23-24 @ 17:23)  HR: 81 (07-23-24 @ 19:31)  BP: 134/52 (07-23-24 @ 19:31) (117/62 - 169/72)  RR: 18 (07-23-24 @ 19:31)  SpO2: 100% (07-23-24 @ 19:31)  Vent Settings:     Physical Examination:  General: NAD, resting comfortably in bed  HEENT: Normocephalic atraumatic  Respiratory: Nonlabored respirations, normal CW expansion.  Cardio: S1S2, regular rate and rhythm.  Abdomen: softly distended, appropriately tender, surgical incisions are c/d/i. NGT present  Vascular: extremities are warm and well perfused.     Imaging:  No post-op imaging studies    Assessment:  83yMale patient S/P diagnostic laparoscopy f/b foreign body removal and jejunal resection    Plan:  - IV Abx:   - Pain: morphine PF Spinal; nalbuphine Injectable PRN; ondansetron Injectable PRN  - Diet: TPN  - Bowens present  - DVT ppx: SCD's & heparin   Injectable 5000 every 8 hours    Date/Time: 07-23-24 @ 22:56

## 2024-07-23 NOTE — BRIEF OPERATIVE NOTE - OPERATION/FINDINGS
Diagnostic laparoscopy  Small bowel inspected laparoscopically until area of proximal dilation encountered in jejunum    Jejunum exteriorized through midline incision   Foreign body palpated, enterotomy made, foreign body removed   12cm of jejunum containing enterotomy resected using COLLIN 80mm x 2, mesentery ligated w ligasure, defect closed w vicryl   Side to side fx end to end anastomosis w COLLIN 80 and TA stapler   Fascial closure w 0 Looped PDS, penrose in midline SubQ

## 2024-07-23 NOTE — PROGRESS NOTE ADULT - SUBJECTIVE AND OBJECTIVE BOX
Surgery Progress Note     Interval/Subjective:  Patient seen and examined.   __________________________________________________________________  Vitals:  T(C): 36.4 (00:44), Max: 36.8 (08:42)  HR: 73 (00:44) (65 - 80)  BP: 133/65 (00:44) (114/70 - 160/70)  RR: 18 (00:44) (18 - 18)  SpO2: 97% (00:44) (95% - 99%)    I & O's:  IN:    Fat Emulsion (Fish Oil &amp; Plant Based) 20% Infusion: 243.1 mL    IV PiggyBack: 165 mL    TPN (Total Parenteral Nutrition): 1800 mL  Total IN: 2208.1 mL    OUT:    Indwelling Catheter - Urethral (mL): 2000 mL    Nasogastric/Oral tube (mL): 500 mL    Oral Fluid: 0 mL  Total OUT: 2500 mL        Physical Exam:  General: NAD, resting comfortably in bed  HEENT: Normocephalic atraumatic  Respiratory: Nonlabored respirations  Cardio: pulse present  Abdomen: soft, nontender, nondistended  Vascular: extremities are warm and well perfused.       __________________________________________________________________ Surgery Progress Note     Overnight: NAEO    Interval/Subjective:  Patient seen and examined by the team at bedside. No bowel movements. NGT in place. Pain is well controlled. Denies nausea, fever, and chills.   NGT - 650 mL  __________________________________________________________________  Vitals:  T(C): 36.4 (00:44), Max: 36.8 (08:42)  HR: 73 (00:44) (65 - 80)  BP: 133/65 (00:44) (114/70 - 160/70)  RR: 18 (00:44) (18 - 18)  SpO2: 97% (00:44) (95% - 99%)    I & O's:  IN:    Fat Emulsion (Fish Oil &amp; Plant Based) 20% Infusion: 243.1 mL    IV PiggyBack: 165 mL    TPN (Total Parenteral Nutrition): 1800 mL  Total IN: 2208.1 mL    OUT:    Indwelling Catheter - Urethral (mL): 2000 mL    Nasogastric/Oral tube (mL): 500 mL    Oral Fluid: 0 mL  Total OUT: 2500 mL        Physical Exam:  General: NAD, resting comfortably in bed  HEENT: Normocephalic atraumatic  Respiratory: Nonlabored respirations  Cardio: pulse present  Abdomen: soft, nontender, nondistended  Vascular: extremities are warm and well perfused.       __________________________________________________________________

## 2024-07-23 NOTE — PROGRESS NOTE ADULT - SUBJECTIVE AND OBJECTIVE BOX
[facility] NUTRITION SUPPORT-- FOLLOW UP NOTE      24 hour events/subjective:  Chart reviewed and events noted and I's and O's reviewed; patient not seen as in OR.  Patient's VSS and no other acute overnight events noted.   Patient continues on TPN and glucose trend is within appropriate range.      ROS:  Except as noted above, all other systems reviewed and are negative     Diet:      PAST HISTORY  --------------------------------------------------------------------------------  PAST MEDICAL & SURGICAL HISTORY:  Asthma      DM (diabetes mellitus)      BPH (benign prostatic hyperplasia)      Diabetes      Asthma      Asthma      Diabetes      S/P cataract extraction      No significant past surgical history        No significant changes to PMH, PSH, FHx, SHx, unless otherwise noted    ALLERGIES & MEDICATIONS  --------------------------------------------------------------------------------  Allergies    No Known Allergies    Intolerances      Standing Inpatient Medications  lipid, fat emulsion (Fish Oil and Plant Based) 20% Infusion 18.7 mL/Hr IV Continuous <Continuous>  Parenteral Nutrition - Adult 1 Each TPN Continuous <Continuous>  Parenteral Nutrition - Adult 1 Each TPN Continuous <Continuous>    PRN Inpatient Medications        VITALS/PHYSICAL EXAM  --------------------------------------------------------------------------------  T(C): 36.5 (07-23-24 @ 11:00), Max: 36.7 (07-22-24 @ 20:06)  HR: 74 (07-23-24 @ 11:00) (65 - 78)  BP: 117/62 (07-23-24 @ 11:00) (117/62 - 160/70)  RR: 16 (07-23-24 @ 11:00) (16 - 18)  SpO2: 100% (07-23-24 @ 11:00) (95% - 100%)  Wt(kg): --  Height (cm): 160 (07-23-24 @ 11:00)  Weight (kg): 43.5 (07-23-24 @ 11:00)  BMI (kg/m2): 17 (07-23-24 @ 11:00)  BSA (m2): 1.41 (07-23-24 @ 11:00)    07-22-24 @ 07:01  -  07-23-24 @ 07:00  --------------------------------------------------------  IN: 1224.4 mL / OUT: 2050 mL / NET: -825.6 mL      I&O's Detail    22 Jul 2024 07:01  -  23 Jul 2024 07:00  --------------------------------------------------------  IN:    Fat Emulsion (Fish Oil &amp; Plant Based) 20% Infusion: 224.4 mL    IV PiggyBack: 100 mL    TPN (Total Parenteral Nutrition): 900 mL  Total IN: 1224.4 mL    OUT:    Indwelling Catheter - Urethral (mL): 1300 mL    Nasogastric/Oral tube (mL): 750 mL  Total OUT: 2050 mL    Total NET: -825.6 mL          Physical Exam:  Not seen; in OR      LABS/STUDIES  --------------------------------------------------------------------------------              10.1   11.20 >-----------<  323      [07-23-24 @ 03:47]              29.9     133  |  97  |  30  ----------------------------<  195      [07-23-24 @ 03:47]  4.0   |  24  |  0.89        Ca     8.6     [07-23-24 @ 03:47]      iCa    1.18     [07-23 @ 04:02]      Mg     2.1     [07-23-24 @ 03:47]      Phos  4.1     [07-23-24 @ 03:47]    TPro  6.9  /  Alb  3.2  /  TBili  0.3  /  DBili  x   /  AST  19  /  ALT  13  /  AlkPhos  90  [07-23-24 @ 03:47]    PT/INR: PT 12.9 , INR 1.24       [07-22-24 @ 12:58]  PTT: 31.5       [07-22-24 @ 12:58]      Ca ionized  Creatinine Trend:  POC glucoseGlucose: 195 mg/dL (07-23-24 @ 03:47)  CAPILLARY BLOOD GLUCOSE      POCT Blood Glucose.: 118 mg/dL (23 Jul 2024 10:43)  POCT Blood Glucose.: 224 mg/dL (23 Jul 2024 05:48)  POCT Blood Glucose.: 241 mg/dL (22 Jul 2024 23:43)  POCT Blood Glucose.: 241 mg/dL (22 Jul 2024 17:06)    PrealbuminPrealbumin, Serum: 7 mg/dL (07-16-24 @ 23:00)    Triglycerides

## 2024-07-23 NOTE — PROGRESS NOTE ADULT - ASSESSMENT
83M PMH of Asthma and BPH on Flomax, last colonoscopy around 8-10 years ago (normal per wife/patient) no known PSHx recently admitted to [place] in 5/2024 for abdominal pain, found to have large ulcerating mass communicating with proximal jejunum on CT (discharged home with outpatient GI f/u for concern of contained perforation vs inflamed giant diverticulum) presented to [facility] given hematemesis constipation and abdominal pain. Reports abdominal pain improved with episodes of emesis. Last BM/flatus was yesterday.  TPN team consulted given concern for severe protein calorie malnutrition and anticipated prolonged inadequate caloric intake .    AA 105g, Dextrose 210g and SMOF Lipids 45g. To provide: 1584kcal and 105g protein. Based on dosing wt 43.5kg, provides: 36.4kcal/kg and 2.4g protein/kg.     - Nutritional Assessment, patient with severe protein calorie malnutrition not meeting nutritional goals enterally due to SBO/NPO status and would benefit from TPN for nutritional support; prealbumin only 7 mg/dL - trend weekly   - TPN plan:  continue goal TPN with limited sodium in TPN given elevated BNP, potassium of 60 mEQ given hypokalemia/Na Phos of 60 mm given hypophosphetemia and risk of refeeding syndrome; continue Thiamine 100 mg to TPN to further reduce risk of refeeding syndrome.   - TPN access:  PICC in place; maintain per protocol; reserve one picc port for TPN only   - Hyponatremia/NAA:  stable; trend in AM; BNP 2554 pg/mL; Echo with EF 55-60%; atrial septal defect with R flow; mod pulm HTN; check BNP in AM (previoulsy 2k range)  - Electrolyte Imbalance risk:  recommend to check CMP, Mg, Phos and ionized Ca daily, to be supplemented via TPN  - SBO:  monitor NGT output overnight and f/u post-op status  - Fever:  f/u Blood cx from 7/17 (neg to date); on Zosyn   - Recommend strict intake and output/weight checks three times a week  - Risk of glucose dysfunction with TPN:  HgA1c 7.8% on 7/17/24; continue finger sticks with RISS; glucose trend reviewed; increase to 32 units RI in TPN given hyperglycemia   - Risk for Hypertriglyceridemia with TPN:  baseline lipid profile reviewed; TG 75 mg/dL on 7/17 --> 175 mg/dL from 7/23; monitor TG closely on TPN  - Global care per primary team     Available on TEAMS  TPN spectra [id number] ([phone] when dialing from outside line)  M-F 8A-2P, Weekends and holidays 8/9A-12/1P  Discussed with Dr. Tee

## 2024-07-23 NOTE — PROGRESS NOTE ADULT - SUBJECTIVE AND OBJECTIVE BOX
DATE OF SERVICE: 07-23-24 @ 22:19    Patient is a 83y old  Male who presents with a chief complaint of SBO (23 Jul 2024 13:30)      INTERVAL HISTORY: feels ok, POD 0      PHYSICAL EXAM:  T(C): 36.8 (07-23-24 @ 19:31), Max: 36.8 (07-23-24 @ 17:23)  HR: 81 (07-23-24 @ 19:31) (73 - 83)  BP: 134/52 (07-23-24 @ 19:31) (117/62 - 169/72)  RR: 18 (07-23-24 @ 19:31) (13 - 18)  SpO2: 100% (07-23-24 @ 19:31) (97% - 100%)  Wt(kg): --  I&O's Summary    22 Jul 2024 07:01  -  23 Jul 2024 07:00  --------------------------------------------------------  IN: 1224.4 mL / OUT: 2050 mL / NET: -825.6 mL    23 Jul 2024 07:01  -  23 Jul 2024 22:19  --------------------------------------------------------  IN: 918.7 mL / OUT: 340 mL / NET: 578.7 mL      Height (cm): 160 (07-23 @ 11:00)  Weight (kg): 43.5 (07-23 @ 11:00)  BMI (kg/m2): 17 (07-23 @ 11:00)  BSA (m2): 1.41 (07-23 @ 11:00)    Appearance: In no distress	  HEENT:    PERRL, EOMI	  Cardiovascular:  S1 S2, No JVD  Respiratory: Lungs clear to auscultation	  Gastrointestinal:  Soft, Non-tender, + BS	  Vascularature:  No edema of LE  Psychiatric: Appropriate affect   Neuro: no acute focal deficits                               10.1   11.20 )-----------( 323      ( 23 Jul 2024 03:47 )             29.9     07-23    133<L>  |  97  |  30<H>  ----------------------------<  195<H>  4.0   |  24  |  0.89    Ca    8.6      23 Jul 2024 03:47  Phos  4.1     07-23  Mg     2.1     07-23    TPro  6.9  /  Alb  3.2<L>  /  TBili  0.3  /  DBili  x   /  AST  19  /  ALT  13  /  AlkPhos  90  07-23        Labs personally reviewed      ASSESSMENT/PLAN: 	  83M, poor historian, PMH of DM, asthma, no known PSHx recently admitted to [place] in 5/2024 for abdominal pain, found to have 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 Reports abdominal pain improved with episodes of emesis. Last BM/flatus was yesterday.        1. HTN  - SBP 170s overnight 7/20  - EKG biphasic anterior wall twi   - trops neg  - TTE 7/17 normal EF 55-60%, atrial septal defect  - check new TTE  - bp systolic now 150s-160s.  Will start oral anti hypertensives when no longer npo    2. High Grade SBO  - as seen on CTA  - management per alexia    3. Abnormal EKG - Denies ant cardiac Hx, CP or SOB  - Advise OP elective ischemic work up  - TTE with preserved EF    4. ASD - Probable secundum atrial septal defect with left to right flow. Mild to moderate pulmonary hypertension.  - OP cardiac MRI or ALYSSA to further evaluate    5. Cardiac Risk Stratification  - Patient is mod risk for mod risk for laparoscopic removal of foreign body in intestine, laparoscopic removal of foreign body in intestine. No contraindication to proceed  - Patient not in decompensated HF  - No tachy/shabbir arrhythmia  - No hx of severe MS/AS            Leonid Sal DO [facility]  Cardiovascular Medicine  [address], [address]  Office: [phone]  Available via Text/call on [employer] Teams

## 2024-07-23 NOTE — BRIEF OPERATIVE NOTE - NSICDXBRIEFPROCEDURE_GEN_ALL_CORE_FT
PROCEDURES:  Diagnostic laparoscopy 23-Jul-2024 14:49:59  Ronnie Alex  Small bowel resection 23-Jul-2024 14:50:07  Ronnie Alex  Open removal of foreign body from small intestine 23-Jul-2024 14:50:20  Ronnie Alex

## 2024-07-24 LAB
ALBUMIN SERPL ELPH-MCNC: 3.2 G/DL — LOW (ref 3.3–5)
ALP SERPL-CCNC: 89 U/L — SIGNIFICANT CHANGE UP (ref 40–120)
ALT FLD-CCNC: 16 U/L — SIGNIFICANT CHANGE UP (ref 10–45)
ANION GAP SERPL CALC-SCNC: 13 MMOL/L — SIGNIFICANT CHANGE UP (ref 5–17)
AST SERPL-CCNC: 19 U/L — SIGNIFICANT CHANGE UP (ref 10–40)
BASOPHILS # BLD AUTO: 0.03 K/UL — SIGNIFICANT CHANGE UP (ref 0–0.2)
BASOPHILS NFR BLD AUTO: 0.2 % — SIGNIFICANT CHANGE UP (ref 0–2)
BILIRUB SERPL-MCNC: 0.2 MG/DL — SIGNIFICANT CHANGE UP (ref 0.2–1.2)
BUN SERPL-MCNC: 38 MG/DL — HIGH (ref 7–23)
CA-I BLD-SCNC: 1.15 MMOL/L — SIGNIFICANT CHANGE UP (ref 1.15–1.33)
CALCIUM SERPL-MCNC: 8.9 MG/DL — SIGNIFICANT CHANGE UP (ref 8.4–10.5)
CHLORIDE SERPL-SCNC: 97 MMOL/L — SIGNIFICANT CHANGE UP (ref 96–108)
CO2 SERPL-SCNC: 22 MMOL/L — SIGNIFICANT CHANGE UP (ref 22–31)
CREAT SERPL-MCNC: 0.95 MG/DL — SIGNIFICANT CHANGE UP (ref 0.5–1.3)
EGFR: 78 ML/MIN/1.73M2 — SIGNIFICANT CHANGE UP
EOSINOPHIL # BLD AUTO: 0 K/UL — SIGNIFICANT CHANGE UP (ref 0–0.5)
EOSINOPHIL NFR BLD AUTO: 0 % — SIGNIFICANT CHANGE UP (ref 0–6)
GLUCOSE BLDC GLUCOMTR-MCNC: 136 MG/DL — HIGH (ref 70–99)
GLUCOSE BLDC GLUCOMTR-MCNC: 158 MG/DL — HIGH (ref 70–99)
GLUCOSE BLDC GLUCOMTR-MCNC: 291 MG/DL — HIGH (ref 70–99)
GLUCOSE BLDC GLUCOMTR-MCNC: 308 MG/DL — HIGH (ref 70–99)
GLUCOSE SERPL-MCNC: 266 MG/DL — HIGH (ref 70–99)
HCT VFR BLD CALC: 28.7 % — LOW (ref 39–50)
HGB BLD-MCNC: 9.5 G/DL — LOW (ref 13–17)
IMM GRANULOCYTES NFR BLD AUTO: 1.4 % — HIGH (ref 0–0.9)
LYMPHOCYTES # BLD AUTO: 1.81 K/UL — SIGNIFICANT CHANGE UP (ref 1–3.3)
LYMPHOCYTES # BLD AUTO: 9.5 % — LOW (ref 13–44)
MAGNESIUM SERPL-MCNC: 2.4 MG/DL — SIGNIFICANT CHANGE UP (ref 1.6–2.6)
MCHC RBC-ENTMCNC: 28.9 PG — SIGNIFICANT CHANGE UP (ref 27–34)
MCHC RBC-ENTMCNC: 33.1 GM/DL — SIGNIFICANT CHANGE UP (ref 32–36)
MCV RBC AUTO: 87.2 FL — SIGNIFICANT CHANGE UP (ref 80–100)
MONOCYTES # BLD AUTO: 1.28 K/UL — HIGH (ref 0–0.9)
MONOCYTES NFR BLD AUTO: 6.7 % — SIGNIFICANT CHANGE UP (ref 2–14)
NEUTROPHILS # BLD AUTO: 15.75 K/UL — HIGH (ref 1.8–7.4)
NEUTROPHILS NFR BLD AUTO: 82.2 % — HIGH (ref 43–77)
NRBC # BLD: 0 /100 WBCS — SIGNIFICANT CHANGE UP (ref 0–0)
NT-PROBNP SERPL-SCNC: 336 PG/ML — HIGH (ref 0–300)
PHOSPHATE SERPL-MCNC: 4 MG/DL — SIGNIFICANT CHANGE UP (ref 2.5–4.5)
PLATELET # BLD AUTO: 368 K/UL — SIGNIFICANT CHANGE UP (ref 150–400)
POTASSIUM SERPL-MCNC: 4.8 MMOL/L — SIGNIFICANT CHANGE UP (ref 3.5–5.3)
POTASSIUM SERPL-SCNC: 4.8 MMOL/L — SIGNIFICANT CHANGE UP (ref 3.5–5.3)
PROT SERPL-MCNC: 6.9 G/DL — SIGNIFICANT CHANGE UP (ref 6–8.3)
RBC # BLD: 3.29 M/UL — LOW (ref 4.2–5.8)
RBC # FLD: 14.9 % — HIGH (ref 10.3–14.5)
SODIUM SERPL-SCNC: 132 MMOL/L — LOW (ref 135–145)
WBC # BLD: 19.13 K/UL — HIGH (ref 3.8–10.5)
WBC # FLD AUTO: 19.13 K/UL — HIGH (ref 3.8–10.5)

## 2024-07-24 PROCEDURE — 99232 SBSQ HOSP IP/OBS MODERATE 35: CPT

## 2024-07-24 RX ORDER — TAMSULOSIN HCL 0.4 MG
0.4 CAPSULE ORAL AT BEDTIME
Refills: 0 | Status: DISCONTINUED | OUTPATIENT
Start: 2024-07-24 | End: 2024-07-30

## 2024-07-24 RX ORDER — HYDROMORPHONE HCL IN 0.9% NACL 0.2 MG/ML
0.5 PLASTIC BAG, INJECTION (ML) INTRAVENOUS EVERY 4 HOURS
Refills: 0 | Status: DISCONTINUED | OUTPATIENT
Start: 2024-07-24 | End: 2024-07-28

## 2024-07-24 RX ORDER — HYDROMORPHONE HCL IN 0.9% NACL 0.2 MG/ML
0.25 PLASTIC BAG, INJECTION (ML) INTRAVENOUS EVERY 4 HOURS
Refills: 0 | Status: DISCONTINUED | OUTPATIENT
Start: 2024-07-24 | End: 2024-07-28

## 2024-07-24 RX ORDER — ACETAMINOPHEN 500 MG
650 TABLET ORAL EVERY 6 HOURS
Refills: 0 | Status: DISCONTINUED | OUTPATIENT
Start: 2024-07-24 | End: 2024-07-24

## 2024-07-24 RX ORDER — MULTI-ELECTROLYTE 98; 74.5; 64.6; 25.5; 16.55 MG/ML; MG/ML; MG/ML; MG/ML; MG/ML
1 INJECTION, SOLUTION, CONCENTRATE INTRAVENOUS
Refills: 0 | Status: DISCONTINUED | OUTPATIENT
Start: 2024-07-24 | End: 2024-07-25

## 2024-07-24 RX ORDER — FISH OIL 0.1 G/ML
18.7 INJECTION, EMULSION INTRAVENOUS
Qty: 45 | Refills: 0 | Status: DISCONTINUED | OUTPATIENT
Start: 2024-07-24 | End: 2024-07-25

## 2024-07-24 RX ORDER — ACETAMINOPHEN 500 MG
650 TABLET ORAL EVERY 6 HOURS
Refills: 0 | Status: COMPLETED | OUTPATIENT
Start: 2024-07-24 | End: 2024-07-25

## 2024-07-24 RX ADMIN — Medication 650 MILLIGRAM(S): at 12:12

## 2024-07-24 RX ADMIN — Medication 650 MILLIGRAM(S): at 23:42

## 2024-07-24 RX ADMIN — Medication 2 PUFF(S): at 17:45

## 2024-07-24 RX ADMIN — BUDESONIDE AND FORMOTEROL FUMARATE DIHYDRATE 2 PUFF(S): 80; 4.5 AEROSOL RESPIRATORY (INHALATION) at 05:15

## 2024-07-24 RX ADMIN — Medication 650 MILLIGRAM(S): at 18:13

## 2024-07-24 RX ADMIN — PANTOPRAZOLE SODIUM 40 MILLIGRAM(S): 20 TABLET, DELAYED RELEASE ORAL at 11:42

## 2024-07-24 RX ADMIN — BUDESONIDE AND FORMOTEROL FUMARATE DIHYDRATE 2 PUFF(S): 80; 4.5 AEROSOL RESPIRATORY (INHALATION) at 17:45

## 2024-07-24 RX ADMIN — Medication 260 MILLIGRAM(S): at 11:42

## 2024-07-24 RX ADMIN — Medication 0.4 MILLIGRAM(S): at 21:16

## 2024-07-24 RX ADMIN — Medication 2 PUFF(S): at 00:05

## 2024-07-24 RX ADMIN — MULTI-ELECTROLYTE 1 EACH: 98; 74.5; 64.6; 25.5; 16.55 INJECTION, SOLUTION, CONCENTRATE INTRAVENOUS at 17:43

## 2024-07-24 RX ADMIN — MULTI-ELECTROLYTE 1 EACH: 98; 74.5; 64.6; 25.5; 16.55 INJECTION, SOLUTION, CONCENTRATE INTRAVENOUS at 07:19

## 2024-07-24 RX ADMIN — Medication 2 PUFF(S): at 05:15

## 2024-07-24 RX ADMIN — CHLORHEXIDINE GLUCONATE 1 APPLICATION(S): 500 CLOTH TOPICAL at 11:42

## 2024-07-24 RX ADMIN — Medication 6: at 00:03

## 2024-07-24 RX ADMIN — Medication 2: at 11:41

## 2024-07-24 RX ADMIN — MULTI-ELECTROLYTE 1 EACH: 98; 74.5; 64.6; 25.5; 16.55 INJECTION, SOLUTION, CONCENTRATE INTRAVENOUS at 19:23

## 2024-07-24 RX ADMIN — Medication 0.5 MILLIGRAM(S): at 06:36

## 2024-07-24 RX ADMIN — HEPARIN SODIUM 5000 UNIT(S): 1000 INJECTION, SOLUTION INTRAVENOUS; SUBCUTANEOUS at 05:11

## 2024-07-24 RX ADMIN — Medication 260 MILLIGRAM(S): at 23:12

## 2024-07-24 RX ADMIN — HEPARIN SODIUM 5000 UNIT(S): 1000 INJECTION, SOLUTION INTRAVENOUS; SUBCUTANEOUS at 13:59

## 2024-07-24 RX ADMIN — Medication 2 PUFF(S): at 11:43

## 2024-07-24 RX ADMIN — FISH OIL 18.7 ML/HR: 0.1 INJECTION, EMULSION INTRAVENOUS at 19:24

## 2024-07-24 RX ADMIN — FISH OIL 18.7 ML/HR: 0.1 INJECTION, EMULSION INTRAVENOUS at 17:43

## 2024-07-24 RX ADMIN — HEPARIN SODIUM 5000 UNIT(S): 1000 INJECTION, SOLUTION INTRAVENOUS; SUBCUTANEOUS at 21:16

## 2024-07-24 RX ADMIN — Medication 260 MILLIGRAM(S): at 17:43

## 2024-07-24 RX ADMIN — Medication 8: at 05:10

## 2024-07-24 NOTE — PROGRESS NOTE ADULT - ATTENDING COMMENTS
Has some incisional pain  abd soft, NT, ND  stable  give Flomax today, plan to d/c Bowens tomorrow  NGT clamping trial

## 2024-07-24 NOTE — PROGRESS NOTE ADULT - ASSESSMENT
83yMale patient S/P diagnostic laparoscopy f/b foreign body removal and jejunal resection    Plan:  - IV Abx:   - Pain: morphine PF Spinal; nalbuphine Injectable PRN; ondansetron Injectable PRN  - Diet: TPN  - Bowens present  - DVT ppx: SCD's & heparin   Injectable 5000 every 8 hours\      [facility]  [id number] 83M, poor historian, PMH of DM, asthma, recently admitted to [place] in 5/2024 for abdominal pain, found to have large ulcerating mass communicating with proximal jejunum on CT there but no intervention at that time, presenting for few days of brown/bloody emesis and abdominal pain. Leukocytosis to 21 with lactate 3.9, improved to 3.1 after 1L bolus.  CTAP significant for SBO with TP in R mid abdomen, thickened jejunum, no grossly obvious focal mass, dilated stomach and distal esophagus; possible Crohn's disease. Admitted to SICU for management of volume resuscitation in high grade SBO. Clinically improving in SICU. CT on admission failed to reveal transition point or obstructive mass. Recent CT s/o 3.7 cm ring shaped foreign body in the distal jejunum which has moved since the last imaging. Transferred to floor. On TPN.     POD1 diagnostic lap, small bowel resection, open removal of foreign body from small intestine      Plan:  - IV Abx  - Pain control  - Diet: TPN  - Bowens present  - DVT ppx  - Monitor I/Os  - Appreciate GI recs, f/u fecal calprotectin  - Apprec nutrition recs for TPN  - OOB  - Flomax  - TOV tomorrow  - Clamp trial    [facility]  [id number]

## 2024-07-24 NOTE — PROGRESS NOTE ADULT - SUBJECTIVE AND OBJECTIVE BOX
Surgery Progress Note     Interval/Subjective:  Patient seen and examined.   __________________________________________________________________  Vitals:  T(C): 36.7 (00:23), Max: 36.8 (17:23)  HR: 79 (00:23) (74 - 83)  BP: 137/57 (00:23) (117/62 - 169/72)  RR: 18 (00:23) (13 - 18)  SpO2: 97% (00:23) (97% - 100%)    I & O's:  IN:    Fat Emulsion (Fish Oil &amp; Plant Based) 20% Infusion: 224.4 mL    IV PiggyBack: 100 mL    TPN (Total Parenteral Nutrition): 900 mL  Total IN: 1224.4 mL    OUT:    Indwelling Catheter - Urethral (mL): 1300 mL    Nasogastric/Oral tube (mL): 750 mL  Total OUT: 2050 mL        Physical Exam:  General: NAD, resting comfortably in bed  HEENT: Normocephalic atraumatic  Respiratory: Nonlabored respirations  Cardio: pulse present  Abdomen: soft, nontender, nondistended  Vascular: extremities are warm and well perfused.       __________________________________________________________________ Surgery Progress Note     Overnight: NAEO    Interval/Subjective:  Patient seen and examined at bedside. Patient is doing well. Pain is well controlled. Denies fever, nausea, chills, and vomiting. No yet passing flatus or having BMs. Dressing changed.   __________________________________________________________________  Vitals:  T(C): 36.7 (00:23), Max: 36.8 (17:23)  HR: 79 (00:23) (74 - 83)  BP: 137/57 (00:23) (117/62 - 169/72)  RR: 18 (00:23) (13 - 18)  SpO2: 97% (00:23) (97% - 100%)    I & O's:  IN:    Fat Emulsion (Fish Oil &amp; Plant Based) 20% Infusion: 224.4 mL    IV PiggyBack: 100 mL    TPN (Total Parenteral Nutrition): 900 mL  Total IN: 1224.4 mL    OUT:    Indwelling Catheter - Urethral (mL): 1300 mL    Nasogastric/Oral tube (mL): 750 mL  Total OUT: 2050 mL        Physical Exam:  General: NAD, resting comfortably in bed  HEENT: Normocephalic atraumatic  Respiratory: Nonlabored respirations  Cardio: pulse present  Abdomen: soft, tender, nondistended, tense  Vascular: extremities are warm and well perfused.       __________________________________________________________________ Surgery Progress Note     Overnight: NAEO    Interval/Subjective:  Patient seen and examined at bedside. Patient is doing well. Pain is well controlled. Denies fever, nausea, chills, and vomiting. No yet passing flatus or having BMs. Dressing changed.   NGT - 200 mL  __________________________________________________________________  Vitals:  T(C): 36.7 (00:23), Max: 36.8 (17:23)  HR: 79 (00:23) (74 - 83)  BP: 137/57 (00:23) (117/62 - 169/72)  RR: 18 (00:23) (13 - 18)  SpO2: 97% (00:23) (97% - 100%)    I & O's:  IN:    Fat Emulsion (Fish Oil &amp; Plant Based) 20% Infusion: 224.4 mL    IV PiggyBack: 100 mL    TPN (Total Parenteral Nutrition): 900 mL  Total IN: 1224.4 mL    OUT:    Indwelling Catheter - Urethral (mL): 1300 mL    Nasogastric/Oral tube (mL): 750 mL  Total OUT: 2050 mL        Physical Exam:  General: NAD, resting comfortably in bed  HEENT: Normocephalic atraumatic  Respiratory: Nonlabored respirations  Cardio: pulse present  Abdomen: soft, tender, nondistended, tense  Vascular: extremities are warm and well perfused.       __________________________________________________________________

## 2024-07-24 NOTE — PROGRESS NOTE ADULT - SUBJECTIVE AND OBJECTIVE BOX
Day 1 of Anesthesia Pain Management Service    SUBJECTIVE: Doing ok  Pain Scale Score:          [X] Refer to charted pain scores    THERAPY:    s/p    100 mcg PF morphine on 7\23\2024       MEDICATIONS  (STANDING):  acetaminophen   IVPB .. 650 milliGRAM(s) IV Intermittent every 6 hours  albuterol    90 MICROgram(s) HFA Inhaler 2 Puff(s) Inhalation every 6 hours  budesonide 160 MICROgram(s)/formoterol 4.5 MICROgram(s) Inhaler 2 Puff(s) Inhalation two times a day  chlorhexidine 2% Cloths 1 Application(s) Topical <User Schedule>  chlorhexidine 4% Liquid 1 Application(s) Topical <User Schedule>  dextrose 5%. 1000 milliLiter(s) (100 mL/Hr) IV Continuous <Continuous>  dextrose 5%. 1000 milliLiter(s) (50 mL/Hr) IV Continuous <Continuous>  dextrose 50% Injectable 12.5 Gram(s) IV Push once  dextrose 50% Injectable 25 Gram(s) IV Push once  dextrose 50% Injectable 25 Gram(s) IV Push once  glucagon  Injectable 1 milliGRAM(s) IntraMuscular once  heparin   Injectable 5000 Unit(s) SubCutaneous every 8 hours  insulin lispro (ADMELOG) corrective regimen sliding scale   SubCutaneous every 6 hours  lipid, fat emulsion (Fish Oil and Plant Based) 20% Infusion 18.7 mL/Hr (18.7 mL/Hr) IV Continuous <Continuous>  morphine PF Spinal 0.1 milliGRAM(s) IntraThecal. once  pantoprazole  Injectable 40 milliGRAM(s) IV Push daily  Parenteral Nutrition - Adult 1 Each (75 mL/Hr) TPN Continuous <Continuous>  tamsulosin 0.4 milliGRAM(s) Oral at bedtime    MEDICATIONS  (PRN):  dextrose Oral Gel 15 Gram(s) Oral once PRN Blood Glucose LESS THAN 70 milliGRAM(s)/deciliter  HYDROmorphone  Injectable 0.25 milliGRAM(s) IV Push every 4 hours PRN Moderate Pain (4 - 6)  HYDROmorphone  Injectable 0.5 milliGRAM(s) IV Push every 4 hours PRN Severe Pain (7 - 10)  nalbuphine Injectable 2.5 milliGRAM(s) IV Push every 6 hours PRN Pruritus  naloxone Injectable 0.1 milliGRAM(s) IV Push every 3 minutes PRN For ANY of the following changes in patient status:  A. RR LESS THAN 10 breaths per minute, B. Oxygen saturation LESS THAN 90%, C. Sedation score of 6  ondansetron Injectable 4 milliGRAM(s) IV Push every 6 hours PRN Nausea  sodium chloride 0.9% lock flush 10 milliLiter(s) IV Push every 1 hour PRN Pre/post blood products, medications, blood draw, and to maintain line patency      OBJECTIVE:    Sedation:        	[X] Alert	 [ ] Drowsy	[ ] Arousable      [ ] Asleep       [ ] Unresponsive    Side Effects:	[X] None 	[ ] Nausea	[ ] Vomiting         [ ] Pruritus  		[ ] Weakness            [ ] Numbness	          [ ] Other:    Vital Signs Last 24 Hrs  T(C): 36.7 (24 Jul 2024 08:39), Max: 36.8 (23 Jul 2024 17:23)  T(F): 98.1 (24 Jul 2024 08:39), Max: 98.2 (23 Jul 2024 17:23)  HR: 77 (24 Jul 2024 08:39) (74 - 83)  BP: 123/61 (24 Jul 2024 08:39) (117/62 - 169/72)  BP(mean): 102 (23 Jul 2024 16:00) (94 - 104)  RR: 18 (24 Jul 2024 08:39) (13 - 18)  SpO2: 98% (24 Jul 2024 08:39) (97% - 100%)    Parameters below as of 24 Jul 2024 08:39  Patient On (Oxygen Delivery Method): nasal cannula  O2 Flow (L/min): 2      ASSESSMENT/ PLAN  [X] Patient to be transitioned to prn analgesics after 24 hours +NGT  [X] Pain management per primary service, pain service to sign off   [X]Documentation and Verification of current medications

## 2024-07-24 NOTE — PROGRESS NOTE ADULT - SUBJECTIVE AND OBJECTIVE BOX
DATE OF SERVICE: 07-24-24 @ 13:15    Patient is a 83y old  Male who presents with a chief complaint of SBO (24 Jul 2024 10:09)      INTERVAL HISTORY: Feels ok. Denies CP or SOB.     REVIEW OF SYSTEMS:  CONSTITUTIONAL: No weakness  EYES/ENT: No visual changes;  No throat pain   NECK: No pain or stiffness  RESPIRATORY: No cough, wheezing; No shortness of breath  CARDIOVASCULAR: No chest pain or palpitations  GASTROINTESTINAL: No abdominal  pain. No nausea, vomiting, or hematemesis  GENITOURINARY: No dysuria, frequency or hematuria  NEUROLOGICAL: No stroke like symptoms  SKIN: No rashes    	  MEDICATIONS:        PHYSICAL EXAM:  T(C): 36.8 (07-24-24 @ 12:12), Max: 36.8 (07-23-24 @ 17:23)  HR: 74 (07-24-24 @ 12:12) (74 - 83)  BP: 133/52 (07-24-24 @ 12:12) (123/61 - 169/72)  RR: 18 (07-24-24 @ 12:12) (13 - 18)  SpO2: 97% (07-24-24 @ 12:12) (97% - 100%)  Wt(kg): --  I&O's Summary    23 Jul 2024 07:01  -  24 Jul 2024 07:00  --------------------------------------------------------  IN: 2043.1 mL / OUT: 1540 mL / NET: 503.1 mL    24 Jul 2024 07:01  -  24 Jul 2024 13:15  --------------------------------------------------------  IN: 65 mL / OUT: 375 mL / NET: -310 mL          Appearance: In no distress	  HEENT:    PERRL, EOMI	  Cardiovascular:  S1 S2, No JVD  Respiratory: Lungs clear to auscultation	  Gastrointestinal:  Soft, Non-tender, + BS	  Vascularature:  No edema of LE  Psychiatric: Appropriate affect   Neuro: no acute focal deficits                               9.5    19.13 )-----------( 368      ( 24 Jul 2024 07:28 )             28.7     07-24    132<L>  |  97  |  38<H>  ----------------------------<  266<H>  4.8   |  22  |  0.95    Ca    8.9      24 Jul 2024 07:30  Phos  4.0     07-24  Mg     2.4     07-24    TPro  6.9  /  Alb  3.2<L>  /  TBili  0.2  /  DBili  x   /  AST  19  /  ALT  16  /  AlkPhos  89  07-24        Labs personally reviewed  < from: TTE W or WO Ultrasound Enhancing Agent (07.22.24 @ 15:37) >      1. Left ventricular systolic function is normal with an ejection fraction visually estimated at 55 to 60 %.   2. No pericardial effusion seen.    < end of copied text >      ASSESSMENT/PLAN: 	    83M, poor historian, PMH of DM, asthma, no known PSHx recently admitted to [facility] in 5/2024 for abdominal pain, found to have 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 Reports abdominal pain improved with episodes of emesis. Last BM/flatus was yesterday.        1. HTN  - SBP 170s overnight 7/20  - EKG biphasic anterior wall twi   - trops neg  - TTE 7/17 normal EF 55-60%, atrial septal defect  - TTE shows preserved EF, no pericardial effusion  - bp systolic now 130s-160s.  Will start oral anti hypertensives when no longer npo    2. High Grade SBO  - as seen on CTA  - management per sugyael    3. Abnormal EKG - Denies ant cardiac Hx, CP or SOB  - Advise OP elective ischemic work up  - TTE with preserved EF    4. ASD - Probable secundum atrial septal defect with left to right flow. Mild to moderate pulmonary hypertension.  - OP cardiac MRI or ALYSSA to further evaluate    5. Cardiac Risk Stratification  - Patient is mod risk for mod risk for laparoscopic removal of foreign body in intestine, laparoscopic removal of foreign body in intestine. No contraindication to proceed  - Patient not in decompensated HF  - No tachy/shabbir arrhythmia  - No hx of severe MS/AS          Marcela Ramos, SITA-NP   Leonid Sal DO [facility]  Cardiovascular Medicine  [address], [address]  Available through call or text on [employer] TE[employer]  Office: [phone]   DATE OF SERVICE: 07-24-24 @ 13:15    Patient is a 83y old  Male who presents with a chief complaint of SBO (24 Jul 2024 10:09)      INTERVAL HISTORY: Feels ok. Denies CP or SOB.     REVIEW OF SYSTEMS:  CONSTITUTIONAL: No weakness  EYES/ENT: No visual changes;  No throat pain   NECK: No pain or stiffness  RESPIRATORY: No cough, wheezing; No shortness of breath  CARDIOVASCULAR: No chest pain or palpitations  GASTROINTESTINAL: No abdominal  pain. No nausea, vomiting, or hematemesis  GENITOURINARY: No dysuria, frequency or hematuria  NEUROLOGICAL: No stroke like symptoms  SKIN: No rashes    	  MEDICATIONS:        PHYSICAL EXAM:  T(C): 36.8 (07-24-24 @ 12:12), Max: 36.8 (07-23-24 @ 17:23)  HR: 74 (07-24-24 @ 12:12) (74 - 83)  BP: 133/52 (07-24-24 @ 12:12) (123/61 - 169/72)  RR: 18 (07-24-24 @ 12:12) (13 - 18)  SpO2: 97% (07-24-24 @ 12:12) (97% - 100%)  Wt(kg): --  I&O's Summary    23 Jul 2024 07:01  -  24 Jul 2024 07:00  --------------------------------------------------------  IN: 2043.1 mL / OUT: 1540 mL / NET: 503.1 mL    24 Jul 2024 07:01  -  24 Jul 2024 13:15  --------------------------------------------------------  IN: 65 mL / OUT: 375 mL / NET: -310 mL          Appearance: In no distress	  HEENT:    PERRL, EOMI	  Cardiovascular:  S1 S2, No JVD  Respiratory: Lungs clear to auscultation	  Gastrointestinal:  Soft, Non-tender, + BS	  Vascularature:  No edema of LE  Psychiatric: Appropriate affect   Neuro: no acute focal deficits                               9.5    19.13 )-----------( 368      ( 24 Jul 2024 07:28 )             28.7     07-24    132<L>  |  97  |  38<H>  ----------------------------<  266<H>  4.8   |  22  |  0.95    Ca    8.9      24 Jul 2024 07:30  Phos  4.0     07-24  Mg     2.4     07-24    TPro  6.9  /  Alb  3.2<L>  /  TBili  0.2  /  DBili  x   /  AST  19  /  ALT  16  /  AlkPhos  89  07-24        Labs personally reviewed  < from: TTE W or WO Ultrasound Enhancing Agent (07.22.24 @ 15:37) >      1. Left ventricular systolic function is normal with an ejection fraction visually estimated at 55 to 60 %.   2. No pericardial effusion seen.    < end of copied text >      ASSESSMENT/PLAN: 	    83M, poor historian, PMH of DM, asthma, no known PSHx recently admitted to [place] in 5/2024 for abdominal pain, found to have 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 Reports abdominal pain improved with episodes of emesis. Last BM/flatus was yesterday.        1. HTN  - SBP 170s overnight 7/20  - EKG biphasic anterior wall twi   - trops neg  - TTE 7/17 normal EF 55-60%, atrial septal defect  - TTE shows preserved EF, no pericardial effusion  - bp systolic now 130s-160s.  Will start oral anti hypertensives when no longer npo    2. High Grade SBO  - as seen on CTA  - management per sugyael    3. Abnormal EKG - Denies ant cardiac Hx, CP or SOB  - Advise OP elective ischemic work up  - TTE with preserved EF    4. ASD - Probable secundum atrial septal defect with left to right flow. Mild to moderate pulmonary hypertension.  - OP cardiac MRI or ALYSSA to further evaluate    5. Cardiac Risk Stratification  - Patient is mod risk for mod risk for laparoscopic removal of foreign body in intestine, laparoscopic removal of foreign body in intestine. No contraindication to proceed  - Patient not in decompensated HF  - No tachy/shabbir arrhythmia  - No hx of severe MS/AS  - Tolerated procedure well           Marcela Ramos, SITA-NP   Leonid Sal DO [facility]  Cardiovascular Medicine  [address], [address]  Available through call or text on [employer] TEAM[employer]  Office: [phone]

## 2024-07-24 NOTE — PROGRESS NOTE ADULT - ASSESSMENT
83M PMH of Asthma and BPH on Flomax, last colonoscopy around 8-10 years ago (normal per wife/patient) no known PSHx recently admitted to [facility] in 5/2024 for abdominal pain, found to have large ulcerating mass communicating with proximal jejunum on CT (discharged home with outpatient GI f/u for concern of contained perforation vs inflamed giant diverticulum) presented to [facility] given hematemesis constipation and abdominal pain. Reports abdominal pain improved with episodes of emesis. Last BM/flatus was yesterday.  TPN team consulted given concern for severe protein calorie malnutrition and anticipated prolonged inadequate caloric intake .    AA 105g, Dextrose 210g and SMOF Lipids 45g. To provide: 1584kcal and 105g protein. Based on dosing wt 43.5kg, provides: 36.4kcal/kg and 2.4g protein/kg.     - Nutritional Assessment, patient with severe protein calorie malnutrition not meeting nutritional goals enterally due to SBO/NPO status and would benefit from TPN for nutritional support; prealbumin only 7 mg/dL - trend weekly   - TPN plan:  continue goal TPN with limited sodium in TPN given elevated BNP, potassium of 60 mEQ given hypokalemia/Na Phos of 60 mm given hypophosphetemia and risk of refeeding syndrome; continue Thiamine 100 mg to TPN to further reduce risk of refeeding syndrome.   - TPN access:  PICC in place; maintain per protocol; reserve one picc port for TPN only   - Hyponatremia/NAA:  stable; trend in AM; BNP 2554 pg/mL; Echo with EF 55-60%; atrial septal defect with R flow; mod pulm HTN; check BNP in AM (previoulsy 2k range)  - Electrolyte Imbalance risk:  recommend to check CMP, Mg, Phos and ionized Ca daily, to be supplemented via TPN  - SBO:  monitor NGT output overnight and f/u post-op status  - Fever:  f/u Blood cx from 7/17 (neg to date); on Zosyn   - Recommend strict intake and output/weight checks three times a week  - Risk of glucose dysfunction with TPN:  HgA1c 7.8% on 7/17/24; continue finger sticks with RISS; glucose trend reviewed; increase to 32 units RI in TPN given hyperglycemia   - Risk for Hypertriglyceridemia with TPN:  baseline lipid profile reviewed; TG 75 mg/dL on 7/17 --> 175 mg/dL from 7/23; monitor TG closely on TPN  - Global care per primary team     Available on TEAMS  TPN spectra [id number] ([phone] when dialing from outside line)  M-F 8A-2P, Weekends and holidays 8/9A-12/1P  Discussed with Dr. Harvey Tee and Dr. Morris Comer  83M PMH of Asthma and BPH on Flomax, last colonoscopy around 8-10 years ago (normal per wife/patient) no known PSHx recently admitted to [place] in 5/2024 for abdominal pain, found to have large ulcerating mass communicating with proximal jejunum on CT (discharged home with outpatient GI f/u for concern of contained perforation vs inflamed giant diverticulum) presented to [facility] given hematemesis constipation and abdominal pain. Reports abdominal pain improved with episodes of emesis. Last BM/flatus was yesterday.  TPN team consulted given concern for severe protein calorie malnutrition and anticipated prolonged inadequate caloric intake .    AA 105g, Dextrose 210g and SMOF Lipids 45g. To provide: 1584kcal and 105g protein. Based on dosing wt 43.5kg, provides: 36.4kcal/kg and 2.4g protein/kg.     - Nutritional Assessment, patient with severe protein calorie malnutrition not meeting nutritional goals enterally due to SBO/NPO status and would benefit from TPN for nutritional support; prealbumin only 7 mg/dL - trend weekly   - TPN plan:  continue goal TPN with limited sodium in TPN given elevated BNP, potassium of 40 mEQ given hypokalemia/Na Phos of 60 mm given hypophosphatemia and risk of refeeding syndrome; continue Thiamine 100 mg to TPN to further reduce risk of refeeding syndrome.   - TPN access:  PICC in place; maintain per protocol; reserve one picc port for TPN only   - Hyponatremia/NAA:  stable; trend in AM; BNP 2554 pg/mL; Echo with EF 55-60%; atrial septal defect with R flow; mod pulm HTN; BNP only 336 pg/mL on 7/24 (previoulsy 2k range)  - Anemia:  check Iron studies   - Electrolyte Imbalance risk:  recommend to check CMP, Mg, Phos and ionized Ca daily, to be supplemented via TPN  - SBO:  NGT removed today; monitor diet advancement s/p FB removal and jejunal resection on 7/23  - Fever:  f/u Blood cx from 7/17 (neg to date); on Zosyn   - Recommend strict intake and output/weight checks three times a week  - Risk of glucose dysfunction with TPN:  HgA1c 7.8% on 7/17/24; continue finger sticks with RISS; glucose trend reviewed; increase to 58 units RI in TPN given hyperglycemia   - Risk for Hypertriglyceridemia with TPN:  baseline lipid profile reviewed; TG 75 mg/dL on 7/17 --> 175 mg/dL from 7/23; monitor TG closely on TPN  - Global care per primary team     Available on TEAMS  TPN spectra [id number] ([phone] when dialing from outside line)  M-F 8A-2P, Weekends and holidays 8/9A-12/1P  Discussed with Dr. Harvey Tee and Dr. Morris Comer  83M PMH of Asthma and BPH on Flomax, last colonoscopy around 8-10 years ago (normal per wife/patient) no known PSHx recently admitted to [place] in 5/2024 for abdominal pain, found to have large ulcerating mass communicating with proximal jejunum on CT (discharged home with outpatient GI f/u for concern of contained perforation vs inflamed giant diverticulum) presented to [facility] given hematemesis constipation and abdominal pain. Reports abdominal pain improved with episodes of emesis. Last BM/flatus was yesterday.  TPN team consulted given concern for severe protein calorie malnutrition and anticipated prolonged inadequate caloric intake .    AA 105g, Dextrose 210g and SMOF Lipids 45g. To provide: 1584kcal and 105g protein. Based on dosing wt 43.5kg, provides: 36.4kcal/kg and 2.4g protein/kg.     - Nutritional Assessment, patient with severe protein calorie malnutrition not meeting nutritional goals enterally due to SBO/NPO status and would benefit from TPN for nutritional support; prealbumin only 7 mg/dL - trend weekly   - TPN plan:  continue goal TPN with limited sodium in TPN given elevated BNP, potassium of 40 mEQ given hypokalemia/Na Phos of 60 mm given hypophosphatemia and risk of refeeding syndrome; continue Thiamine 100 mg to TPN to further reduce risk of refeeding syndrome.   - TPN access:  PICC in place; maintain per protocol; reserve one picc port for TPN only   - Hyponatremia/NAA:  stable; trend in AM; BNP 2554 pg/mL; Echo with EF 55-60%; atrial septal defect with R flow; mod pulm HTN; BNP only 336 pg/mL on 7/24 (previoulsy 2k range); Na corrected for glucose 136 today  - Anemia:  check Iron studies   - Electrolyte Imbalance risk:  recommend to check CMP, Mg, Phos and ionized Ca daily, to be supplemented via TPN  - SBO:  NGT removed today; monitor diet advancement s/p FB removal and jejunal resection on 7/23  - Fever:  f/u Blood cx from 7/17 (neg to date); on Zosyn   - Recommend strict intake and output/weight checks three times a week  - Risk of glucose dysfunction with TPN:  HgA1c 7.8% on 7/17/24; continue finger sticks with RISS; glucose trend reviewed; increase to 58 units RI in TPN given hyperglycemia   - Risk for Hypertriglyceridemia with TPN:  baseline lipid profile reviewed; TG 75 mg/dL on 7/17 --> 175 mg/dL from 7/23; monitor TG closely on TPN  - Global care per primary team     Available on TEAMS  TPN spectra [id number] ([phone] when dialing from outside line)  M-F 8A-2P, Weekends and holidays 8/9A-12/1P  Discussed with Dr. Harvey Tee and Dr. Morris Comer

## 2024-07-24 NOTE — PROGRESS NOTE ADULT - SUBJECTIVE AND OBJECTIVE BOX
[facility] NUTRITION SUPPORT-- FOLLOW UP NOTE      24 hour events/subjective:  Patient seen and examined at bedside, chart reviewed and events noted and I's and O's reviewed.  Patient denies chest pain, shortness of breath, nausea or vomiting, dizziness, chills at time of visit.  Patient's VSS and no other acute overnight events noted.   Patient continues on TPN and glucose trend is elevated.  Patient getting NGT clamp trial s/p FB removal; WBC elevated but no fevers.       ROS:  Except as noted above, all other systems reviewed and are negative     Diet:  Diet, NPO (07-23-24 @ 15:02)      PAST HISTORY  --------------------------------------------------------------------------------  PAST MEDICAL & SURGICAL HISTORY:  Asthma      DM (diabetes mellitus)      BPH (benign prostatic hyperplasia)      Diabetes      Asthma      Asthma      Diabetes      S/P cataract extraction      No significant past surgical history        No significant changes to PMH, PSH, FHx, SHx, unless otherwise noted    ALLERGIES & MEDICATIONS  --------------------------------------------------------------------------------  Allergies    No Known Allergies    Intolerances      Standing Inpatient Medications  acetaminophen   IVPB .. 650 milliGRAM(s) IV Intermittent every 6 hours  albuterol    90 MICROgram(s) HFA Inhaler 2 Puff(s) Inhalation every 6 hours  budesonide 160 MICROgram(s)/formoterol 4.5 MICROgram(s) Inhaler 2 Puff(s) Inhalation two times a day  chlorhexidine 2% Cloths 1 Application(s) Topical <User Schedule>  chlorhexidine 4% Liquid 1 Application(s) Topical <User Schedule>  dextrose 5%. 1000 milliLiter(s) IV Continuous <Continuous>  dextrose 5%. 1000 milliLiter(s) IV Continuous <Continuous>  dextrose 50% Injectable 12.5 Gram(s) IV Push once  dextrose 50% Injectable 25 Gram(s) IV Push once  dextrose 50% Injectable 25 Gram(s) IV Push once  glucagon  Injectable 1 milliGRAM(s) IntraMuscular once  heparin   Injectable 5000 Unit(s) SubCutaneous every 8 hours  insulin lispro (ADMELOG) corrective regimen sliding scale   SubCutaneous every 6 hours  lipid, fat emulsion (Fish Oil and Plant Based) 20% Infusion 18.7 mL/Hr IV Continuous <Continuous>  pantoprazole  Injectable 40 milliGRAM(s) IV Push daily  Parenteral Nutrition - Adult 1 Each TPN Continuous <Continuous>  Parenteral Nutrition - Adult 1 Each TPN Continuous <Continuous>  tamsulosin 0.4 milliGRAM(s) Oral at bedtime    PRN Inpatient Medications  dextrose Oral Gel 15 Gram(s) Oral once PRN  HYDROmorphone  Injectable 0.25 milliGRAM(s) IV Push every 4 hours PRN  HYDROmorphone  Injectable 0.5 milliGRAM(s) IV Push every 4 hours PRN  sodium chloride 0.9% lock flush 10 milliLiter(s) IV Push every 1 hour PRN        VITALS/PHYSICAL EXAM  --------------------------------------------------------------------------------  T(C): 36.8 (07-24-24 @ 12:12), Max: 36.8 (07-23-24 @ 17:23)  HR: 74 (07-24-24 @ 12:12) (74 - 83)  BP: 133/52 (07-24-24 @ 12:12) (123/61 - 169/72)  RR: 18 (07-24-24 @ 12:12) (13 - 18)  SpO2: 97% (07-24-24 @ 12:12) (97% - 100%)  Wt(kg): --  Height (cm): 160 (07-23-24 @ 11:00)  Weight (kg): 43.5 (07-23-24 @ 11:00)  BMI (kg/m2): 17 (07-23-24 @ 11:00)  BSA (m2): 1.41 (07-23-24 @ 11:00)    07-23-24 @ 07:01  -  07-24-24 @ 07:00  --------------------------------------------------------  IN: 2043.1 mL / OUT: 1540 mL / NET: 503.1 mL    07-24-24 @ 07:01  -  07-24-24 @ 14:05  --------------------------------------------------------  IN: 65 mL / OUT: 375 mL / NET: -310 mL      I&O's Detail    23 Jul 2024 07:01  -  24 Jul 2024 07:00  --------------------------------------------------------  IN:    Fat Emulsion (Fish Oil &amp; Plant Based) 20% Infusion: 243.1 mL    TPN (Total Parenteral Nutrition): 1800 mL  Total IN: 2043.1 mL    OUT:    Indwelling Catheter - Urethral (mL): 1340 mL    Nasogastric/Oral tube (mL): 200 mL  Total OUT: 1540 mL    Total NET: 503.1 mL      24 Jul 2024 07:01  -  24 Jul 2024 14:05  --------------------------------------------------------  IN:    IV PiggyBack: 65 mL  Total IN: 65 mL    OUT:    Indwelling Catheter - Urethral (mL): 325 mL    Nasogastric/Oral tube (mL): 50 mL  Total OUT: 375 mL    Total NET: -310 mL          Physical Exam:  Gen: NAD, thin/frail appearing; laying in bed   HEENT: supple neck, no JVD; NGT  Chest: non labored breathing, equal chest expansion bilaterally  Abd: soft, nontender/nondistended  : No suprapubic tenderness  Ext: Warm, FROM, no edema b/l LE  Neuro: No focal deficits  Psych: Normal affect and mood  Skin: Warm, without rashes       LABS/STUDIES  --------------------------------------------------------------------------------              9.5    19.13 >-----------<  368      [07-24-24 @ 07:28]              28.7     132  |  97  |  38  ----------------------------<  266      [07-24-24 @ 07:30]  4.8   |  22  |  0.95        Ca     8.9     [07-24-24 @ 07:30]      iCa    1.15     [07-24 @ 07:28]      Mg     2.4     [07-24-24 @ 07:30]      Phos  4.0     [07-24-24 @ 07:30]    TPro  6.9  /  Alb  3.2  /  TBili  0.2  /  DBili  x   /  AST  19  /  ALT  16  /  AlkPhos  89  [07-24-24 @ 07:30]          Ca ionized  Creatinine Trend:  POC glucoseGlucose: 266 mg/dL (07-24-24 @ 07:30)  CAPILLARY BLOOD GLUCOSE      POCT Blood Glucose.: 158 mg/dL (24 Jul 2024 11:39)  POCT Blood Glucose.: 308 mg/dL (24 Jul 2024 05:06)  POCT Blood Glucose.: 291 mg/dL (24 Jul 2024 00:00)  POCT Blood Glucose.: 317 mg/dL (23 Jul 2024 17:08)  POCT Blood Glucose.: 174 mg/dL (23 Jul 2024 14:55)    PrealbuminPrealbumin, Serum: 7 mg/dL (07-16-24 @ 23:00)    Triglycerides

## 2024-07-25 LAB
ALBUMIN SERPL ELPH-MCNC: 2.9 G/DL — LOW (ref 3.3–5)
ALP SERPL-CCNC: 110 U/L — SIGNIFICANT CHANGE UP (ref 40–120)
ALT FLD-CCNC: 28 U/L — SIGNIFICANT CHANGE UP (ref 10–45)
ANION GAP SERPL CALC-SCNC: 12 MMOL/L — SIGNIFICANT CHANGE UP (ref 5–17)
AST SERPL-CCNC: 39 U/L — SIGNIFICANT CHANGE UP (ref 10–40)
BASOPHILS # BLD AUTO: 0.05 K/UL — SIGNIFICANT CHANGE UP (ref 0–0.2)
BASOPHILS NFR BLD AUTO: 0.3 % — SIGNIFICANT CHANGE UP (ref 0–2)
BILIRUB SERPL-MCNC: 0.2 MG/DL — SIGNIFICANT CHANGE UP (ref 0.2–1.2)
BUN SERPL-MCNC: 35 MG/DL — HIGH (ref 7–23)
CA-I BLD-SCNC: 1.11 MMOL/L — LOW (ref 1.15–1.33)
CALCIUM SERPL-MCNC: 8.9 MG/DL — SIGNIFICANT CHANGE UP (ref 8.4–10.5)
CHLORIDE SERPL-SCNC: 100 MMOL/L — SIGNIFICANT CHANGE UP (ref 96–108)
CO2 SERPL-SCNC: 24 MMOL/L — SIGNIFICANT CHANGE UP (ref 22–31)
CREAT SERPL-MCNC: 0.85 MG/DL — SIGNIFICANT CHANGE UP (ref 0.5–1.3)
EGFR: 85 ML/MIN/1.73M2 — SIGNIFICANT CHANGE UP
EOSINOPHIL # BLD AUTO: 0.37 K/UL — SIGNIFICANT CHANGE UP (ref 0–0.5)
EOSINOPHIL NFR BLD AUTO: 2.2 % — SIGNIFICANT CHANGE UP (ref 0–6)
FERRITIN SERPL-MCNC: 161 NG/ML — SIGNIFICANT CHANGE UP (ref 30–400)
GLUCOSE BLDC GLUCOMTR-MCNC: 110 MG/DL — HIGH (ref 70–99)
GLUCOSE BLDC GLUCOMTR-MCNC: 124 MG/DL — HIGH (ref 70–99)
GLUCOSE BLDC GLUCOMTR-MCNC: 130 MG/DL — HIGH (ref 70–99)
GLUCOSE BLDC GLUCOMTR-MCNC: 153 MG/DL — HIGH (ref 70–99)
GLUCOSE BLDC GLUCOMTR-MCNC: 293 MG/DL — HIGH (ref 70–99)
GLUCOSE BLDC GLUCOMTR-MCNC: 70 MG/DL — SIGNIFICANT CHANGE UP (ref 70–99)
GLUCOSE BLDC GLUCOMTR-MCNC: 77 MG/DL — SIGNIFICANT CHANGE UP (ref 70–99)
GLUCOSE SERPL-MCNC: 91 MG/DL — SIGNIFICANT CHANGE UP (ref 70–99)
HCT VFR BLD CALC: 26.7 % — LOW (ref 39–50)
HGB BLD-MCNC: 9 G/DL — LOW (ref 13–17)
IMM GRANULOCYTES NFR BLD AUTO: 1 % — HIGH (ref 0–0.9)
IRON SATN MFR SERPL: 17 UG/DL — LOW (ref 45–165)
IRON SATN MFR SERPL: 6 % — LOW (ref 16–55)
LYMPHOCYTES # BLD AUTO: 13.3 % — SIGNIFICANT CHANGE UP (ref 13–44)
LYMPHOCYTES # BLD AUTO: 2.22 K/UL — SIGNIFICANT CHANGE UP (ref 1–3.3)
MAGNESIUM SERPL-MCNC: 2.2 MG/DL — SIGNIFICANT CHANGE UP (ref 1.6–2.6)
MCHC RBC-ENTMCNC: 29.7 PG — SIGNIFICANT CHANGE UP (ref 27–34)
MCHC RBC-ENTMCNC: 33.7 GM/DL — SIGNIFICANT CHANGE UP (ref 32–36)
MCV RBC AUTO: 88.1 FL — SIGNIFICANT CHANGE UP (ref 80–100)
MONOCYTES # BLD AUTO: 1.06 K/UL — HIGH (ref 0–0.9)
MONOCYTES NFR BLD AUTO: 6.3 % — SIGNIFICANT CHANGE UP (ref 2–14)
NEUTROPHILS # BLD AUTO: 12.89 K/UL — HIGH (ref 1.8–7.4)
NEUTROPHILS NFR BLD AUTO: 76.9 % — SIGNIFICANT CHANGE UP (ref 43–77)
NRBC # BLD: 0 /100 WBCS — SIGNIFICANT CHANGE UP (ref 0–0)
PHOSPHATE SERPL-MCNC: 4.4 MG/DL — SIGNIFICANT CHANGE UP (ref 2.5–4.5)
PLATELET # BLD AUTO: 378 K/UL — SIGNIFICANT CHANGE UP (ref 150–400)
POTASSIUM SERPL-MCNC: 4 MMOL/L — SIGNIFICANT CHANGE UP (ref 3.5–5.3)
POTASSIUM SERPL-SCNC: 4 MMOL/L — SIGNIFICANT CHANGE UP (ref 3.5–5.3)
PROT SERPL-MCNC: 6.5 G/DL — SIGNIFICANT CHANGE UP (ref 6–8.3)
RBC # BLD: 3.03 M/UL — LOW (ref 4.2–5.8)
RBC # BLD: 3.03 M/UL — LOW (ref 4.2–5.8)
RBC # FLD: 15.4 % — HIGH (ref 10.3–14.5)
RETICS #: 72.4 K/UL — SIGNIFICANT CHANGE UP (ref 25–125)
RETICS/RBC NFR: 2.4 % — SIGNIFICANT CHANGE UP (ref 0.5–2.5)
SODIUM SERPL-SCNC: 136 MMOL/L — SIGNIFICANT CHANGE UP (ref 135–145)
TIBC SERPL-MCNC: 286 UG/DL — SIGNIFICANT CHANGE UP (ref 220–430)
TRANSFERRIN SERPL-MCNC: 213 MG/DL — SIGNIFICANT CHANGE UP (ref 200–360)
UIBC SERPL-MCNC: 269 UG/DL — SIGNIFICANT CHANGE UP (ref 110–370)
WBC # BLD: 16.75 K/UL — HIGH (ref 3.8–10.5)
WBC # FLD AUTO: 16.75 K/UL — HIGH (ref 3.8–10.5)

## 2024-07-25 PROCEDURE — 99232 SBSQ HOSP IP/OBS MODERATE 35: CPT

## 2024-07-25 RX ORDER — DEXTROSE 4 G
25 TABLET,CHEWABLE ORAL ONCE
Refills: 0 | Status: COMPLETED | OUTPATIENT
Start: 2024-07-25 | End: 2024-07-25

## 2024-07-25 RX ORDER — IRON SUCROSE 20 MG/ML
300 INJECTION, SOLUTION INTRAVENOUS
Refills: 0 | Status: COMPLETED | OUTPATIENT
Start: 2024-07-25 | End: 2024-07-27

## 2024-07-25 RX ORDER — ACETAMINOPHEN 500 MG
650 TABLET ORAL EVERY 8 HOURS
Refills: 0 | Status: COMPLETED | OUTPATIENT
Start: 2024-07-25 | End: 2024-07-26

## 2024-07-25 RX ORDER — FISH OIL 0.1 G/ML
18.7 INJECTION, EMULSION INTRAVENOUS
Qty: 45 | Refills: 0 | Status: DISCONTINUED | OUTPATIENT
Start: 2024-07-25 | End: 2024-07-26

## 2024-07-25 RX ORDER — ACETAMINOPHEN 500 MG
1000 TABLET ORAL EVERY 6 HOURS
Refills: 0 | Status: DISCONTINUED | OUTPATIENT
Start: 2024-07-25 | End: 2024-07-25

## 2024-07-25 RX ORDER — MULTI-ELECTROLYTE 98; 74.5; 64.6; 25.5; 16.55 MG/ML; MG/ML; MG/ML; MG/ML; MG/ML
1 INJECTION, SOLUTION, CONCENTRATE INTRAVENOUS
Refills: 0 | Status: DISCONTINUED | OUTPATIENT
Start: 2024-07-25 | End: 2024-07-26

## 2024-07-25 RX ADMIN — HEPARIN SODIUM 5000 UNIT(S): 1000 INJECTION, SOLUTION INTRAVENOUS; SUBCUTANEOUS at 21:28

## 2024-07-25 RX ADMIN — Medication 0.25 MILLIGRAM(S): at 09:38

## 2024-07-25 RX ADMIN — Medication 260 MILLIGRAM(S): at 11:51

## 2024-07-25 RX ADMIN — Medication 2 PUFF(S): at 11:49

## 2024-07-25 RX ADMIN — CHLORHEXIDINE GLUCONATE 1 APPLICATION(S): 500 CLOTH TOPICAL at 09:39

## 2024-07-25 RX ADMIN — FISH OIL 18.7 ML/HR: 0.1 INJECTION, EMULSION INTRAVENOUS at 19:15

## 2024-07-25 RX ADMIN — FISH OIL 18.7 ML/HR: 0.1 INJECTION, EMULSION INTRAVENOUS at 17:29

## 2024-07-25 RX ADMIN — Medication 2 PUFF(S): at 17:29

## 2024-07-25 RX ADMIN — MULTI-ELECTROLYTE 1 EACH: 98; 74.5; 64.6; 25.5; 16.55 INJECTION, SOLUTION, CONCENTRATE INTRAVENOUS at 07:09

## 2024-07-25 RX ADMIN — Medication 650 MILLIGRAM(S): at 12:21

## 2024-07-25 RX ADMIN — MULTI-ELECTROLYTE 1 EACH: 98; 74.5; 64.6; 25.5; 16.55 INJECTION, SOLUTION, CONCENTRATE INTRAVENOUS at 19:14

## 2024-07-25 RX ADMIN — Medication 260 MILLIGRAM(S): at 21:28

## 2024-07-25 RX ADMIN — Medication 0.25 MILLIGRAM(S): at 09:53

## 2024-07-25 RX ADMIN — Medication 260 MILLIGRAM(S): at 05:22

## 2024-07-25 RX ADMIN — Medication 650 MILLIGRAM(S): at 22:00

## 2024-07-25 RX ADMIN — MULTI-ELECTROLYTE 1 EACH: 98; 74.5; 64.6; 25.5; 16.55 INJECTION, SOLUTION, CONCENTRATE INTRAVENOUS at 17:28

## 2024-07-25 RX ADMIN — HEPARIN SODIUM 5000 UNIT(S): 1000 INJECTION, SOLUTION INTRAVENOUS; SUBCUTANEOUS at 05:22

## 2024-07-25 RX ADMIN — Medication 2 PUFF(S): at 05:23

## 2024-07-25 RX ADMIN — Medication 25 GRAM(S): at 17:52

## 2024-07-25 RX ADMIN — PANTOPRAZOLE SODIUM 40 MILLIGRAM(S): 20 TABLET, DELAYED RELEASE ORAL at 11:50

## 2024-07-25 RX ADMIN — BUDESONIDE AND FORMOTEROL FUMARATE DIHYDRATE 2 PUFF(S): 80; 4.5 AEROSOL RESPIRATORY (INHALATION) at 05:23

## 2024-07-25 RX ADMIN — Medication 2 PUFF(S): at 00:12

## 2024-07-25 RX ADMIN — Medication 0.4 MILLIGRAM(S): at 21:28

## 2024-07-25 RX ADMIN — IRON SUCROSE 176.67 MILLIGRAM(S): 20 INJECTION, SOLUTION INTRAVENOUS at 15:09

## 2024-07-25 RX ADMIN — HEPARIN SODIUM 5000 UNIT(S): 1000 INJECTION, SOLUTION INTRAVENOUS; SUBCUTANEOUS at 15:09

## 2024-07-25 RX ADMIN — BUDESONIDE AND FORMOTEROL FUMARATE DIHYDRATE 2 PUFF(S): 80; 4.5 AEROSOL RESPIRATORY (INHALATION) at 17:30

## 2024-07-25 NOTE — PROGRESS NOTE ADULT - SUBJECTIVE AND OBJECTIVE BOX
Surgery Progress Note     Interval/Subjective:  Patient seen and examined.   __________________________________________________________________  Vitals:  T(C): 36.7 (01:04), Max: 36.8 (12:12)  HR: 89 (01:04) (74 - 89)  BP: 128/70 (01:04) (123/61 - 164/65)  RR: 18 (01:04) (18 - 18)  SpO2: 99% (01:04) (97% - 100%)    I & O's:  IN:    Fat Emulsion (Fish Oil &amp; Plant Based) 20% Infusion: 243.1 mL    TPN (Total Parenteral Nutrition): 1800 mL  Total IN: 2043.1 mL    OUT:    Indwelling Catheter - Urethral (mL): 1340 mL    Nasogastric/Oral tube (mL): 200 mL  Total OUT: 1540 mL        Physical Exam:  General: NAD, resting comfortably in bed  HEENT: Normocephalic atraumatic  Respiratory: Nonlabored respirations  Cardio: pulse present  Abdomen: soft, nontender, nondistended  Vascular: extremities are warm and well perfused.       __________________________________________________________________ Surgery Progress Note     Overnight: NAEO    Interval/Subjective:  Patient seen and examined at bedside. Patient is doing well and is comfortable. AOx4. Pain is well controlled. Not yet having BMs or passing flatus. Encouraged to ambulate. Denies fever, chills, nausea, and vomiting.  __________________________________________________________________  Vitals:  T(C): 36.7 (01:04), Max: 36.8 (12:12)  HR: 89 (01:04) (74 - 89)  BP: 128/70 (01:04) (123/61 - 164/65)  RR: 18 (01:04) (18 - 18)  SpO2: 99% (01:04) (97% - 100%)    I & O's:  IN:    Fat Emulsion (Fish Oil &amp; Plant Based) 20% Infusion: 243.1 mL    TPN (Total Parenteral Nutrition): 1800 mL  Total IN: 2043.1 mL    OUT:    Indwelling Catheter - Urethral (mL): 1340 mL    Nasogastric/Oral tube (mL): 200 mL  Total OUT: 1540 mL        Physical Exam:  General: NAD, resting comfortably in bed  HEENT: Normocephalic atraumatic  Respiratory: Nonlabored respirations  Cardio: pulse present  Abdomen: soft, nontender, nondistended  Vascular: extremities are warm and well perfused.       __________________________________________________________________

## 2024-07-25 NOTE — PROGRESS NOTE ADULT - SUBJECTIVE AND OBJECTIVE BOX
[facility] NUTRITION SUPPORT-- FOLLOW UP NOTE      24 hour events/subjective:  Patient seen and examined at bedside, chart reviewed and events noted and I's and O's reviewed; daughter at bedside.  Patient denies chest pain, shortness of breath, nausea or vomiting, dizziness, chills at time of visit.  Patient's VSS and no other acute overnight events noted.   Patient continues on TPN while NPO and glucose trend is within appropriate range.      ROS:  Except as noted above, all other systems reviewed and are negative     Diet:  Diet, NPO:   Except Medications (07-24-24 @ 20:56)      PAST HISTORY  --------------------------------------------------------------------------------  PAST MEDICAL & SURGICAL HISTORY:  Asthma      DM (diabetes mellitus)      BPH (benign prostatic hyperplasia)      Diabetes      Asthma      Asthma      Diabetes      S/P cataract extraction      No significant past surgical history        No significant changes to PMH, PSH, FHx, SHx, unless otherwise noted    ALLERGIES & MEDICATIONS  --------------------------------------------------------------------------------  Allergies    No Known Allergies    Intolerances      Standing Inpatient Medications  acetaminophen   IVPB .. 650 milliGRAM(s) IV Intermittent every 8 hours  albuterol    90 MICROgram(s) HFA Inhaler 2 Puff(s) Inhalation every 6 hours  budesonide 160 MICROgram(s)/formoterol 4.5 MICROgram(s) Inhaler 2 Puff(s) Inhalation two times a day  chlorhexidine 2% Cloths 1 Application(s) Topical <User Schedule>  chlorhexidine 4% Liquid 1 Application(s) Topical <User Schedule>  dextrose 5%. 1000 milliLiter(s) IV Continuous <Continuous>  dextrose 5%. 1000 milliLiter(s) IV Continuous <Continuous>  dextrose 50% Injectable 12.5 Gram(s) IV Push once  dextrose 50% Injectable 25 Gram(s) IV Push once  dextrose 50% Injectable 25 Gram(s) IV Push once  glucagon  Injectable 1 milliGRAM(s) IntraMuscular once  heparin   Injectable 5000 Unit(s) SubCutaneous every 8 hours  insulin lispro (ADMELOG) corrective regimen sliding scale   SubCutaneous every 6 hours  iron sucrose IVPB 300 milliGRAM(s) IV Intermittent every 48 hours  lipid, fat emulsion (Fish Oil and Plant Based) 20% Infusion 18.7 mL/Hr IV Continuous <Continuous>  pantoprazole  Injectable 40 milliGRAM(s) IV Push daily  Parenteral Nutrition - Adult 1 Each TPN Continuous <Continuous>  Parenteral Nutrition - Adult 1 Each TPN Continuous <Continuous>  tamsulosin 0.4 milliGRAM(s) Oral at bedtime    PRN Inpatient Medications  dextrose Oral Gel 15 Gram(s) Oral once PRN  HYDROmorphone  Injectable 0.25 milliGRAM(s) IV Push every 4 hours PRN  HYDROmorphone  Injectable 0.5 milliGRAM(s) IV Push every 4 hours PRN  sodium chloride 0.9% lock flush 10 milliLiter(s) IV Push every 1 hour PRN        VITALS/PHYSICAL EXAM  --------------------------------------------------------------------------------  T(C): 36.4 (07-25-24 @ 13:14), Max: 36.8 (07-24-24 @ 16:15)  HR: 75 (07-25-24 @ 13:14) (75 - 89)  BP: 118/45 (07-25-24 @ 13:14) (118/45 - 164/65)  RR: 18 (07-25-24 @ 13:14) (18 - 18)  SpO2: 99% (07-25-24 @ 13:14) (98% - 100%)  Wt(kg): --      07-24-24 @ 07:01  -  07-25-24 @ 07:00  --------------------------------------------------------  IN: 2135.7 mL / OUT: 1575 mL / NET: 560.7 mL    07-25-24 @ 07:01  -  07-25-24 @ 14:33  --------------------------------------------------------  IN: 0 mL / OUT: 250 mL / NET: -250 mL      I&O's Detail    24 Jul 2024 07:01  -  25 Jul 2024 07:00  --------------------------------------------------------  IN:    Fat Emulsion (Fish Oil &amp; Plant Based) 20% Infusion: 205.7 mL    IV PiggyBack: 130 mL    TPN (Total Parenteral Nutrition): 1800 mL  Total IN: 2135.7 mL    OUT:    Indwelling Catheter - Urethral (mL): 1525 mL    Nasogastric/Oral tube (mL): 50 mL    Oral Fluid: 0 mL  Total OUT: 1575 mL    Total NET: 560.7 mL      25 Jul 2024 07:01  -  25 Jul 2024 14:33  --------------------------------------------------------  IN:  Total IN: 0 mL    OUT:    Indwelling Catheter - Urethral (mL): 250 mL    Oral Fluid: 0 mL    Voided (mL): 0 mL  Total OUT: 250 mL    Total NET: -250 mL          Physical Exam:  Gen: NAD, thin/frail appearing; laying in bed   HEENT: supple neck, no JVD  Chest: non labored breathing, equal chest expansion bilaterally  Abd: soft, nontender/nondistended  : No suprapubic tenderness  Ext: Warm, FROM, no edema b/l LE  Neuro: No focal deficits  Psych: Normal affect and mood  Skin: Warm, without rashes           LABS/STUDIES  --------------------------------------------------------------------------------              9.0    16.75 >-----------<  378      [07-25-24 @ 07:10]              26.7     136  |  100  |  35  ----------------------------<  91      [07-25-24 @ 07:10]  4.0   |  24  |  0.85        Ca     8.9     [07-25-24 @ 07:10]      iCa    1.11     [07-25 @ 07:10]      Mg     2.2     [07-25-24 @ 07:10]      Phos  4.4     [07-25-24 @ 07:10]    TPro  6.5  /  Alb  2.9  /  TBili  0.2  /  DBili  x   /  AST  39  /  ALT  28  /  AlkPhos  110  [07-25-24 @ 07:10]          Ca ionized  Creatinine Trend:  POC glucoseGlucose: 91 mg/dL (07-25-24 @ 07:10)  CAPILLARY BLOOD GLUCOSE      POCT Blood Glucose.: 110 mg/dL (25 Jul 2024 11:00)  POCT Blood Glucose.: 124 mg/dL (25 Jul 2024 06:17)  POCT Blood Glucose.: 130 mg/dL (25 Jul 2024 01:00)  POCT Blood Glucose.: 136 mg/dL (24 Jul 2024 17:26)    PrealbuminPrealbumin, Serum: 7 mg/dL (07-16-24 @ 23:00)    Triglycerides

## 2024-07-25 NOTE — PROGRESS NOTE ADULT - SUBJECTIVE AND OBJECTIVE BOX
DATE OF SERVICE: 07-25-24 @ 11:08    Patient is a 83y old  Male who presents with a chief complaint of SBO (25 Jul 2024 03:27)      INTERVAL HISTORY: Feels ok.     REVIEW OF SYSTEMS:  CONSTITUTIONAL: No weakness  EYES/ENT: No visual changes;  No throat pain   NECK: No pain or stiffness  RESPIRATORY: No cough, wheezing; No shortness of breath  CARDIOVASCULAR: No chest pain or palpitations  GASTROINTESTINAL: No abdominal  pain. No nausea, vomiting, or hematemesis  GENITOURINARY: No dysuria, frequency or hematuria  NEUROLOGICAL: No stroke like symptoms  SKIN: No rashes    	  MEDICATIONS:        PHYSICAL EXAM:  T(C): 36.7 (07-25-24 @ 10:04), Max: 36.8 (07-24-24 @ 12:12)  HR: 79 (07-25-24 @ 10:04) (74 - 89)  BP: 137/50 (07-25-24 @ 10:04) (125/63 - 164/65)  RR: 18 (07-25-24 @ 10:04) (18 - 18)  SpO2: 100% (07-25-24 @ 10:04) (97% - 100%)  Wt(kg): --  I&O's Summary    24 Jul 2024 07:01  -  25 Jul 2024 07:00  --------------------------------------------------------  IN: 2135.7 mL / OUT: 1575 mL / NET: 560.7 mL    25 Jul 2024 07:01  -  25 Jul 2024 11:08  --------------------------------------------------------  IN: 0 mL / OUT: 250 mL / NET: -250 mL          Appearance: In no distress	  HEENT:    PERRL, EOMI	  Cardiovascular:  S1 S2, No JVD  Respiratory: Lungs clear to auscultation	  Gastrointestinal:  Soft, Non-tender, + BS	  Vascularature:  No edema of LE  Psychiatric: Appropriate affect   Neuro: no acute focal deficits                               9.0    16.75 )-----------( 378      ( 25 Jul 2024 07:10 )             26.7     07-25    136  |  100  |  35<H>  ----------------------------<  91  4.0   |  24  |  0.85    Ca    8.9      25 Jul 2024 07:10  Phos  4.4     07-25  Mg     2.2     07-25    TPro  6.5  /  Alb  2.9<L>  /  TBili  0.2  /  DBili  x   /  AST  39  /  ALT  28  /  AlkPhos  110  07-25        Labs personally reviewed      ASSESSMENT/PLAN: 	      83M, poor historian, PMH of DM, asthma, no known PSHx recently admitted to [place] in 5/2024 for abdominal pain, found to have 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 Reports abdominal pain improved with episodes of emesis. Last BM/flatus was yesterday.        1. HTN  - SBP 170s overnight 7/20  - EKG biphasic anterior wall twi   - trops neg  - TTE 7/17 normal EF 55-60%, atrial septal defect  - TTE shows preserved EF, no pericardial effusion  - bp systolic acceptable.  Will start oral anti hypertensives when no longer npo    2. High Grade SBO  - as seen on CTA  - management per alexia    3. Abnormal EKG - Denies ant cardiac Hx, CP or SOB  - Advise OP elective ischemic work up  - TTE with preserved EF    4. ASD - Probable secundum atrial septal defect with left to right flow. Mild to moderate pulmonary hypertension.  - OP cardiac MRI or ALYSSA to further evaluate    5. Cardiac Risk Stratification  - Patient is mod risk for mod risk for laparoscopic removal of foreign body in intestine, laparoscopic removal of foreign body in intestine. No contraindication to proceed  - Patient not in decompensated HF  - No tachy/shabbir arrhythmia  - No hx of severe MS/AS  - Tolerated procedure well         Marcela Ramos, AG-NP   Leonid Sal DO [facility]  Cardiovascular Medicine  [address], [address]  Available through call or text on [employer] TE[employer]  Office: [phone]   DATE OF SERVICE: 07-25-24 @ 11:08    Patient is a 83y old  Male who presents with a chief complaint of SBO (25 Jul 2024 03:27)      INTERVAL HISTORY: Feels ok.     REVIEW OF SYSTEMS:  CONSTITUTIONAL: No weakness  EYES/ENT: No visual changes;  No throat pain   NECK: No pain or stiffness  RESPIRATORY: No cough, wheezing; No shortness of breath  CARDIOVASCULAR: No chest pain or palpitations  GASTROINTESTINAL: No abdominal  pain. No nausea, vomiting, or hematemesis  GENITOURINARY: No dysuria, frequency or hematuria  NEUROLOGICAL: No stroke like symptoms  SKIN: No rashes    	  MEDICATIONS:        PHYSICAL EXAM:  T(C): 36.7 (07-25-24 @ 10:04), Max: 36.8 (07-24-24 @ 12:12)  HR: 79 (07-25-24 @ 10:04) (74 - 89)  BP: 137/50 (07-25-24 @ 10:04) (125/63 - 164/65)  RR: 18 (07-25-24 @ 10:04) (18 - 18)  SpO2: 100% (07-25-24 @ 10:04) (97% - 100%)  Wt(kg): --  I&O's Summary    24 Jul 2024 07:01  -  25 Jul 2024 07:00  --------------------------------------------------------  IN: 2135.7 mL / OUT: 1575 mL / NET: 560.7 mL    25 Jul 2024 07:01  -  25 Jul 2024 11:08  --------------------------------------------------------  IN: 0 mL / OUT: 250 mL / NET: -250 mL          Appearance: In no distress	  HEENT:    PERRL, EOMI	  Cardiovascular:  S1 S2, No JVD  Respiratory: Lungs clear to auscultation	  Gastrointestinal:  Soft, Non-tender, + BS	  Vascularature:  No edema of LE  Psychiatric: Appropriate affect   Neuro: no acute focal deficits                               9.0    16.75 )-----------( 378      ( 25 Jul 2024 07:10 )             26.7     07-25    136  |  100  |  35<H>  ----------------------------<  91  4.0   |  24  |  0.85    Ca    8.9      25 Jul 2024 07:10  Phos  4.4     07-25  Mg     2.2     07-25    TPro  6.5  /  Alb  2.9<L>  /  TBili  0.2  /  DBili  x   /  AST  39  /  ALT  28  /  AlkPhos  110  07-25        Labs personally reviewed      ASSESSMENT/PLAN: 	    83M, poor historian, PMH of DM, asthma, no known PSHx recently admitted to [place] in 5/2024 for abdominal pain, found to have 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 Reports abdominal pain improved with episodes of emesis. Last BM/flatus was yesterday.        1. HTN  - SBP 170s overnight 7/20  - EKG biphasic anterior wall twi   - trops neg  - TTE 7/17 normal EF 55-60%, atrial septal defect  - TTE shows preserved EF, no pericardial effusion  - bp systolic acceptable.  Will start oral anti hypertensives when no longer npo    2. High Grade SBO  - as seen on CTA  - management per alexia    3. Abnormal EKG - Denies ant cardiac Hx, CP or SOB  - Advise OP elective ischemic work up  - TTE with preserved EF    4. ASD - Probable secundum atrial septal defect with left to right flow. Mild to moderate pulmonary hypertension.  - OP cardiac MRI or ALYSSA to further evaluate    5. Cardiac Risk Stratification  - Patient is mod risk for mod risk for laparoscopic removal of foreign body in intestine, laparoscopic removal of foreign body in intestine. No contraindication to proceed  - Patient not in decompensated HF  - No tachy/shabbir arrhythmia  - No hx of severe MS/AS  - Tolerated procedure well         Marcela Ramos, AG-NP   Leonid Sal DO [facility]  Cardiovascular Medicine  [address], [address]  Available through call or text on [employer] TEAM[employer]  Office: [phone]

## 2024-07-25 NOTE — CHART NOTE - NSCHARTNOTEFT_GEN_A_CORE
NUTRITION FOLLOW UP NOTE    PATIENT SEEN FOR: nutrition consult for ERP, low fiber diet education, TPN follow-up    SOURCE: [x] Patient  [x] Current Medical Record  [] RN  [x] Family/support person at bedside--daughter at bedside  [] Patient unavailable/inappropriate  [] Other:    CHART REVIEWED/EVENTS NOTED.  [] No changes to nutrition care plan to note  [x] Nutrition Status:  -Admitted to SICU for management of volume resuscitation in high grade SBO. Now transferred to medicine. TPN initiated   - now s/p POD2 diagnostic lap, small bowel resection, open removal of foreign body from small intestine  - NGT to LWS discontinued on       DIET ORDER:   Diet, NPO:   Except Medications (24)      CURRENT DIET ORDER IS:  [x] Appropriate: pt on TPN   [] Inadequate:  [] Other:    NUTRITION INTAKE/PROVISION:  [] PO: NPO since   [] Enteral Nutrition:  [x] Parenteral Nutrition:    PARENTERAL NUTRITION  [x] CPN (central PN)  [] PPN (peripheral PN; max 900 mOsm/L)  [] Premixed/Multi Chamber Bags     GOAL PN: AA 105g, Dextrose 210g and SMOF Lipids 45g. To provide: 1584kcal and 105g protein. Based on dosing wt 43.5kg, provides: 36.4kcal/kg and 2.4g protein/kg.    Non-Protein Calories: 1164kcal (26.8kcal/kg)  Dextrose Infusion Rate: 3.4mg/kg/min (24-hr infusion)  Lipid Infusion Rate: 1.0mg/kg; 0.8mg/kg/min (12-hr infusion)    Total Volume/Rate ordered: ()  [x] 24-hour infusion: 1.8L @ 75ml/hr  [] Compressed: xL x 12-hours    Electrolytes and Additives ordered: ()  NaCl (mEq):  20  Na acetate (mEq):  20  Na Phos (mmol):  60  KCL (mEq):  40  Calcium gluconate (mEq): 10  Mg sulfate (mEq):  12  MTE-4 concentrate (ml): 1  MVI (ml): 10    Insulin (units): 58  Thiamine (mg): 100     ANTHROPOMETRICS:  Drug Dosing Weight  Height (cm): 160 (2024 11:00)  Weight (kg): 43.5 (2024 11:00)  BMI (kg/m2): 17 (2024 11:00)  BSA (m2): 1.41 (2024 11:00)  Weights: Daily Weight in k.3 (07-16). Will continue to monitor/trend.         MEDICATIONS:  MEDICATIONS  (STANDING):  acetaminophen   IVPB .. 650 milliGRAM(s) IV Intermittent every 8 hours  albuterol    90 MICROgram(s) HFA Inhaler 2 Puff(s) Inhalation every 6 hours  budesonide 160 MICROgram(s)/formoterol 4.5 MICROgram(s) Inhaler 2 Puff(s) Inhalation two times a day  chlorhexidine 2% Cloths 1 Application(s) Topical <User Schedule>  chlorhexidine 4% Liquid 1 Application(s) Topical <User Schedule>  dextrose 5%. 1000 milliLiter(s) (50 mL/Hr) IV Continuous <Continuous>  dextrose 5%. 1000 milliLiter(s) (100 mL/Hr) IV Continuous <Continuous>  dextrose 50% Injectable 25 Gram(s) IV Push once  dextrose 50% Injectable 12.5 Gram(s) IV Push once  dextrose 50% Injectable 25 Gram(s) IV Push once  glucagon  Injectable 1 milliGRAM(s) IntraMuscular once  heparin   Injectable 5000 Unit(s) SubCutaneous every 8 hours  insulin lispro (ADMELOG) corrective regimen sliding scale   SubCutaneous every 6 hours  lipid, fat emulsion (Fish Oil and Plant Based) 20% Infusion 18.7 mL/Hr (18.7 mL/Hr) IV Continuous <Continuous>  pantoprazole  Injectable 40 milliGRAM(s) IV Push daily  Parenteral Nutrition - Adult 1 Each (75 mL/Hr) TPN Continuous <Continuous>  tamsulosin 0.4 milliGRAM(s) Oral at bedtime    MEDICATIONS  (PRN):  dextrose Oral Gel 15 Gram(s) Oral once PRN Blood Glucose LESS THAN 70 milliGRAM(s)/deciliter  HYDROmorphone  Injectable 0.25 milliGRAM(s) IV Push every 4 hours PRN Moderate Pain (4 - 6)  HYDROmorphone  Injectable 0.5 milliGRAM(s) IV Push every 4 hours PRN Severe Pain (7 - 10)  sodium chloride 0.9% lock flush 10 milliLiter(s) IV Push every 1 hour PRN Pre/post blood products, medications, blood draw, and to maintain line patency      NUTRITIONALLY PERTINENT LABS:   Na136 mmol/L Glu 91 mg/dL K+ 4.0 mmol/L Cr  0.85 mg/dL BUN 35 mg/dL<H>  Phos 4.4 mg/dL  Alb 2.9 g/dL<L>  PAB 7 mg/dL<L>  Chol --    LDL --    HDL --    Trig 175 mg/dL<H> ALT 28 U/L AST 39 U/L Alkaline Phosphatase 110 U/L  24 @ 22:37 a1c 7.8  07-15-24 @ 22:52 a1c 7.7    A1C with Estimated Average Glucose Result: 7.8 % (24 @ 22:37)  A1C with Estimated Average Glucose Result: 7.7 % (07-15-24 @ 22:52)          Finger Sticks:  POCT Blood Glucose.: 110 mg/dL ( @ 11:00)  POCT Blood Glucose.: 124 mg/dL ( @ 06:17)  POCT Blood Glucose.: 130 mg/dL ( @ 01:00)  POCT Blood Glucose.: 136 mg/dL ( @ 17:26)  POCT Blood Glucose.: 158 mg/dL ( @ 11:39)      NUTRITIONALLY PERTINENT MEDICATIONS/LABS:  [x] Reviewed  [x] Relevant notes on medications/labs:  ionized calcium 1.1     EDEMA:  [x] Reviewed  [] Relevant notes:    GI/ I&O:  [x] Reviewed  [] Relevant notes:  [x Other: no bowel movement noted     SKIN:   [x] No pressure injuries documented, per nursing flowsheet  [] Pressure injury previously noted  [] Change in pressure injury documentation:  [] Other:    ESTIMATED NEEDS:  [x] No change:  [] Updated:  Energy: 5486-5293  kcal/day (35-40 kcal/kg)  Protein:   g/day (2.0-2.5 g/kg)  Fluid:   ml/day or [x] defer to team  Based on: dosing wt 43.5kg, considering BMI 17, on TPN    NUTRITION DIAGNOSIS:  [x] Prior Dx: underweight, chronic severe protein calorie malnutrition    [] New Dx:    EDUCATION:  [x] Yes: typical diet advancement, low fiber diet   [] Not appropriate/warranted    NUTRITION CARE PLAN:  1. Diet: TPN as per TPN team, nutrition assessment. Defer advancement of diet to medical team.   2. Medical team to advance diet when medically feasible.     [] Achieved - Continue current nutrition intervention(s)  [] Current medical condition precludes nutrition intervention at this time.    MONITORING AND EVALUATION:   RD remains available upon request and will follow up per protocol.  Beverly Beltran RD, CDN, CDCES, Available on Teams

## 2024-07-25 NOTE — PROGRESS NOTE ADULT - ASSESSMENT
83M, poor historian, PMH of DM, asthma, recently admitted to [place] in 5/2024 for abdominal pain, found to have large ulcerating mass communicating with proximal jejunum on CT there but no intervention at that time, presenting for few days of brown/bloody emesis and abdominal pain. Leukocytosis to 21 with lactate 3.9, improved to 3.1 after 1L bolus.  CTAP significant for SBO with TP in R mid abdomen, thickened jejunum, no grossly obvious focal mass, dilated stomach and distal esophagus; possible Crohn's disease. Admitted to SICU for management of volume resuscitation in high grade SBO. Clinically improving in SICU. CT on admission failed to reveal transition point or obstructive mass. Recent CT s/o 3.7 cm ring shaped foreign body in the distal jejunum which has moved since the last imaging. Transferred to floor. On TPN.     POD 2 diagnostic lap, small bowel resection, open removal of foreign body from small intestine      Plan:  - IV Abx  - Pain control  - Diet: TPN  - Bowens present  - DVT ppx  - Monitor I/Os  - Appreciate GI recs, f/u fecal calprotectin  - Apprec nutrition recs for TPN  - OOB  - Flomax  - TOV tomorrow  - Clamp trial    [facility] surgery  [id number] 83M, poor historian, PMH of DM, asthma, recently admitted to [place] in 5/2024 for abdominal pain, found to have large ulcerating mass communicating with proximal jejunum on CT there but no intervention at that time, presenting for few days of brown/bloody emesis and abdominal pain. Leukocytosis to 21 with lactate 3.9, improved to 3.1 after 1L bolus.  CTAP significant for SBO with TP in R mid abdomen, thickened jejunum, no grossly obvious focal mass, dilated stomach and distal esophagus; possible Crohn's disease. Admitted to SICU for management of volume resuscitation in high grade SBO. Clinically improving in SICU. CT on admission failed to reveal transition point or obstructive mass. Recent CT s/o 3.7 cm ring shaped foreign body in the distal jejunum which has moved since the last imaging. Transferred to floor. On TPN.     POD 2 diagnostic lap, small bowel resection, open removal of foreign body from small intestine      Plan:  - IV Abx  - Pain control  - Diet: TPN  - TOV  - DVT ppx  - Monitor I/Os  - Appreciate GI recs, f/u fecal calprotectin  - Apprec nutrition recs for TPN  - OOB  - Flomax  - TOV tomorrow  - Clamp trial    [facility] surgery  [id number] 83M, poor historian, PMH of DM, asthma, recently admitted to [place] in 5/2024 for abdominal pain, found to have large ulcerating mass communicating with proximal jejunum on CT there but no intervention at that time, presenting for few days of brown/bloody emesis and abdominal pain. Leukocytosis to 21 with lactate 3.9, improved to 3.1 after 1L bolus.  CTAP significant for SBO with TP in R mid abdomen, thickened jejunum, no grossly obvious focal mass, dilated stomach and distal esophagus; possible Crohn's disease. Admitted to SICU for management of volume resuscitation in high grade SBO. Clinically improving in SICU. CT on admission failed to reveal transition point or obstructive mass. Recent CT s/o 3.7 cm ring shaped foreign body in the distal jejunum which has moved since the last imaging. Transferred to floor. On TPN.     POD 2 diagnostic lap, small bowel resection, open removal of foreign body from small intestine      Plan:  - IV Abx  - Pain control  - Diet: TPN  - TOV  - DVT ppx  - Monitor I/Os  - Appreciate GI recs, f/u fecal calprotectin  - Apprec nutrition recs for TPN  - [facility]  [id number]

## 2024-07-25 NOTE — PROGRESS NOTE ADULT - ATTENDING COMMENTS
Feels well  no flatus yet  comfortable  abd soft, NT, ND  stable  sips today  d/c Bowens  ambulate, PT

## 2024-07-26 LAB
24R-OH-CALCIDIOL SERPL-MCNC: 21.3 NG/ML — LOW (ref 30–80)
ALBUMIN SERPL ELPH-MCNC: 2.8 G/DL — LOW (ref 3.3–5)
ALP SERPL-CCNC: 85 U/L — SIGNIFICANT CHANGE UP (ref 40–120)
ALT FLD-CCNC: 18 U/L — SIGNIFICANT CHANGE UP (ref 10–45)
ANION GAP SERPL CALC-SCNC: 12 MMOL/L — SIGNIFICANT CHANGE UP (ref 5–17)
AST SERPL-CCNC: 16 U/L — SIGNIFICANT CHANGE UP (ref 10–40)
BASOPHILS # BLD AUTO: 0.03 K/UL — SIGNIFICANT CHANGE UP (ref 0–0.2)
BASOPHILS NFR BLD AUTO: 0.3 % — SIGNIFICANT CHANGE UP (ref 0–2)
BILIRUB SERPL-MCNC: 0.2 MG/DL — SIGNIFICANT CHANGE UP (ref 0.2–1.2)
BUN SERPL-MCNC: 34 MG/DL — HIGH (ref 7–23)
CA-I BLD-SCNC: 1.18 MMOL/L — SIGNIFICANT CHANGE UP (ref 1.15–1.33)
CALCIUM SERPL-MCNC: 8.2 MG/DL — LOW (ref 8.4–10.5)
CALCIUM SERPL-MCNC: 8.4 MG/DL — SIGNIFICANT CHANGE UP (ref 8.4–10.5)
CHLORIDE SERPL-SCNC: 101 MMOL/L — SIGNIFICANT CHANGE UP (ref 96–108)
CO2 SERPL-SCNC: 23 MMOL/L — SIGNIFICANT CHANGE UP (ref 22–31)
CREAT SERPL-MCNC: 0.85 MG/DL — SIGNIFICANT CHANGE UP (ref 0.5–1.3)
EGFR: 85 ML/MIN/1.73M2 — SIGNIFICANT CHANGE UP
EOSINOPHIL # BLD AUTO: 0.36 K/UL — SIGNIFICANT CHANGE UP (ref 0–0.5)
EOSINOPHIL NFR BLD AUTO: 3.3 % — SIGNIFICANT CHANGE UP (ref 0–6)
GLUCOSE BLDC GLUCOMTR-MCNC: 110 MG/DL — HIGH (ref 70–99)
GLUCOSE BLDC GLUCOMTR-MCNC: 142 MG/DL — HIGH (ref 70–99)
GLUCOSE BLDC GLUCOMTR-MCNC: 71 MG/DL — SIGNIFICANT CHANGE UP (ref 70–99)
GLUCOSE BLDC GLUCOMTR-MCNC: 85 MG/DL — SIGNIFICANT CHANGE UP (ref 70–99)
GLUCOSE BLDC GLUCOMTR-MCNC: 87 MG/DL — SIGNIFICANT CHANGE UP (ref 70–99)
GLUCOSE BLDC GLUCOMTR-MCNC: 88 MG/DL — SIGNIFICANT CHANGE UP (ref 70–99)
GLUCOSE BLDC GLUCOMTR-MCNC: 89 MG/DL — SIGNIFICANT CHANGE UP (ref 70–99)
GLUCOSE SERPL-MCNC: 98 MG/DL — SIGNIFICANT CHANGE UP (ref 70–99)
HCT VFR BLD CALC: 24.6 % — LOW (ref 39–50)
HGB BLD-MCNC: 8.1 G/DL — LOW (ref 13–17)
IMM GRANULOCYTES NFR BLD AUTO: 0.8 % — SIGNIFICANT CHANGE UP (ref 0–0.9)
LYMPHOCYTES # BLD AUTO: 1.54 K/UL — SIGNIFICANT CHANGE UP (ref 1–3.3)
LYMPHOCYTES # BLD AUTO: 13.9 % — SIGNIFICANT CHANGE UP (ref 13–44)
MAGNESIUM SERPL-MCNC: 2.2 MG/DL — SIGNIFICANT CHANGE UP (ref 1.6–2.6)
MCHC RBC-ENTMCNC: 29.1 PG — SIGNIFICANT CHANGE UP (ref 27–34)
MCHC RBC-ENTMCNC: 32.9 GM/DL — SIGNIFICANT CHANGE UP (ref 32–36)
MCV RBC AUTO: 88.5 FL — SIGNIFICANT CHANGE UP (ref 80–100)
MONOCYTES # BLD AUTO: 0.66 K/UL — SIGNIFICANT CHANGE UP (ref 0–0.9)
MONOCYTES NFR BLD AUTO: 6 % — SIGNIFICANT CHANGE UP (ref 2–14)
NEUTROPHILS # BLD AUTO: 8.38 K/UL — HIGH (ref 1.8–7.4)
NEUTROPHILS NFR BLD AUTO: 75.7 % — SIGNIFICANT CHANGE UP (ref 43–77)
NRBC # BLD: 0 /100 WBCS — SIGNIFICANT CHANGE UP (ref 0–0)
PHOSPHATE SERPL-MCNC: 3.9 MG/DL — SIGNIFICANT CHANGE UP (ref 2.5–4.5)
PLATELET # BLD AUTO: 365 K/UL — SIGNIFICANT CHANGE UP (ref 150–400)
POTASSIUM SERPL-MCNC: 3.4 MMOL/L — LOW (ref 3.5–5.3)
POTASSIUM SERPL-SCNC: 3.4 MMOL/L — LOW (ref 3.5–5.3)
PROT SERPL-MCNC: 6 G/DL — SIGNIFICANT CHANGE UP (ref 6–8.3)
PTH-INTACT FLD-MCNC: 53 PG/ML — SIGNIFICANT CHANGE UP (ref 15–65)
RBC # BLD: 2.78 M/UL — LOW (ref 4.2–5.8)
RBC # FLD: 15.6 % — HIGH (ref 10.3–14.5)
SODIUM SERPL-SCNC: 136 MMOL/L — SIGNIFICANT CHANGE UP (ref 135–145)
WBC # BLD: 11.06 K/UL — HIGH (ref 3.8–10.5)
WBC # FLD AUTO: 11.06 K/UL — HIGH (ref 3.8–10.5)

## 2024-07-26 PROCEDURE — 74018 RADEX ABDOMEN 1 VIEW: CPT | Mod: 26

## 2024-07-26 PROCEDURE — 99232 SBSQ HOSP IP/OBS MODERATE 35: CPT

## 2024-07-26 RX ORDER — DEXTROSE MONOHYDRATE, SODIUM CHLORIDE, SODIUM LACTATE, CALCIUM CHLORIDE, MAGNESIUM CHLORIDE 1.5; 538; 448; 18.4; 5.08 G/100ML; MG/100ML; MG/100ML; MG/100ML; MG/100ML
1000 SOLUTION INTRAPERITONEAL
Refills: 0 | Status: DISCONTINUED | OUTPATIENT
Start: 2024-07-26 | End: 2024-07-28

## 2024-07-26 RX ORDER — FISH OIL 0.1 G/ML
18.7 INJECTION, EMULSION INTRAVENOUS
Qty: 45 | Refills: 0 | Status: DISCONTINUED | OUTPATIENT
Start: 2024-07-26 | End: 2024-07-27

## 2024-07-26 RX ORDER — MULTI-ELECTROLYTE 98; 74.5; 64.6; 25.5; 16.55 MG/ML; MG/ML; MG/ML; MG/ML; MG/ML
1 INJECTION, SOLUTION, CONCENTRATE INTRAVENOUS
Refills: 0 | Status: DISCONTINUED | OUTPATIENT
Start: 2024-07-26 | End: 2024-07-27

## 2024-07-26 RX ADMIN — PANTOPRAZOLE SODIUM 40 MILLIGRAM(S): 20 TABLET, DELAYED RELEASE ORAL at 12:28

## 2024-07-26 RX ADMIN — MULTI-ELECTROLYTE 1 EACH: 98; 74.5; 64.6; 25.5; 16.55 INJECTION, SOLUTION, CONCENTRATE INTRAVENOUS at 19:24

## 2024-07-26 RX ADMIN — Medication 260 MILLIGRAM(S): at 03:25

## 2024-07-26 RX ADMIN — BUDESONIDE AND FORMOTEROL FUMARATE DIHYDRATE 2 PUFF(S): 80; 4.5 AEROSOL RESPIRATORY (INHALATION) at 18:08

## 2024-07-26 RX ADMIN — MULTI-ELECTROLYTE 1 EACH: 98; 74.5; 64.6; 25.5; 16.55 INJECTION, SOLUTION, CONCENTRATE INTRAVENOUS at 07:22

## 2024-07-26 RX ADMIN — BUDESONIDE AND FORMOTEROL FUMARATE DIHYDRATE 2 PUFF(S): 80; 4.5 AEROSOL RESPIRATORY (INHALATION) at 05:07

## 2024-07-26 RX ADMIN — DEXTROSE MONOHYDRATE, SODIUM CHLORIDE, SODIUM LACTATE, CALCIUM CHLORIDE, MAGNESIUM CHLORIDE 75 MILLILITER(S): 1.5; 538; 448; 18.4; 5.08 SOLUTION INTRAPERITONEAL at 10:24

## 2024-07-26 RX ADMIN — Medication 2 PUFF(S): at 05:07

## 2024-07-26 RX ADMIN — CHLORHEXIDINE GLUCONATE 1 APPLICATION(S): 500 CLOTH TOPICAL at 10:24

## 2024-07-26 RX ADMIN — FISH OIL 18.7 ML/HR: 0.1 INJECTION, EMULSION INTRAVENOUS at 19:25

## 2024-07-26 RX ADMIN — Medication 2 PUFF(S): at 15:09

## 2024-07-26 RX ADMIN — Medication 650 MILLIGRAM(S): at 03:55

## 2024-07-26 RX ADMIN — HEPARIN SODIUM 5000 UNIT(S): 1000 INJECTION, SOLUTION INTRAVENOUS; SUBCUTANEOUS at 05:07

## 2024-07-26 RX ADMIN — Medication 0.4 MILLIGRAM(S): at 23:06

## 2024-07-26 RX ADMIN — Medication 260 MILLIGRAM(S): at 12:32

## 2024-07-26 RX ADMIN — Medication 2 PUFF(S): at 23:08

## 2024-07-26 RX ADMIN — HEPARIN SODIUM 5000 UNIT(S): 1000 INJECTION, SOLUTION INTRAVENOUS; SUBCUTANEOUS at 23:06

## 2024-07-26 RX ADMIN — Medication 2 PUFF(S): at 00:18

## 2024-07-26 RX ADMIN — HEPARIN SODIUM 5000 UNIT(S): 1000 INJECTION, SOLUTION INTRAVENOUS; SUBCUTANEOUS at 15:08

## 2024-07-26 NOTE — PROGRESS NOTE ADULT - SUBJECTIVE AND OBJECTIVE BOX
Surgery Progress Note     Interval/Subjective:  Patient seen and examined.   __________________________________________________________________  Vitals:  T(C): 36.6 (04:43), Max: 36.8 (16:07)  HR: 85 (04:43) (75 - 85)  BP: 107/60 (04:43) (107/60 - 140/46)  RR: 18 (04:43) (18 - 18)  SpO2: 98% (04:43) (97% - 100%)    I & O's:  IN:    Fat Emulsion (Fish Oil &amp; Plant Based) 20% Infusion: 205.7 mL    IV PiggyBack - tylenol: 130 mL    TPN (Total Parenteral Nutrition): 1800 mL  Total IN: 2135.7 mL    OUT:    Indwelling Catheter - Urethral (mL): 1525 mL    Nasogastric/Oral tube (mL): 50 mL    Oral Fluid: 0 mL  Total OUT: 1575 mL        07-25-24 @ 07:01  -  07-26-24 @ 04:47  --------------------------------------------------------  OUT:    Voided (mL) - DTV 7/25 @ 1650: 0.95 mL/kg/hr  Total OUT: 0.95 mL/kg/hr      Physical Exam:  General: NAD, resting comfortably in bed  HEENT: Normocephalic atraumatic  Respiratory: Nonlabored respirations  Cardio: pulse present  Abdomen: soft, nontender, nondistended  Vascular: extremities are warm and well perfused.       __________________________________________________________________ Surgery Progress Note     Overnight: FS went down to 70, but have since normalized with an amp of D50 and new TPN bag.     Interval/Subjective:  Patient seen and examined at bedside. The patient is doing well and resting comfortable. He complains of nausea and feeling gaseous, but has yet to have a bowel movement or pass flatus. Pain is well controlled. Encouraged to ambulate. Dressing changed. Denies fever, chills, and vomiting. WBC continuing to downtrend.   __________________________________________________________________  Vitals:  T(C): 36.6 (04:43), Max: 36.8 (16:07)  HR: 85 (04:43) (75 - 85)  BP: 107/60 (04:43) (107/60 - 140/46)  RR: 18 (04:43) (18 - 18)  SpO2: 98% (04:43) (97% - 100%)    I & O's:  IN:    Fat Emulsion (Fish Oil &amp; Plant Based) 20% Infusion: 205.7 mL    IV PiggyBack - tylenol: 130 mL    TPN (Total Parenteral Nutrition): 1800 mL  Total IN: 2135.7 mL    OUT:    Indwelling Catheter - Urethral (mL): 1525 mL    Nasogastric/Oral tube (mL): 50 mL    Oral Fluid: 0 mL  Total OUT: 1575 mL        07-25-24 @ 07:01  -  07-26-24 @ 04:47  --------------------------------------------------------  OUT:    Voided (mL) - DTV 7/25 @ 1650: 0.95 mL/kg/hr  Total OUT: 0.95 mL/kg/hr      Physical Exam:  General: NAD, resting comfortably in bed  HEENT: Normocephalic atraumatic  Respiratory: Nonlabored respirations  Cardio: pulse present  Abdomen: soft, nontender, nondistended  Vascular: extremities are warm and well perfused.       __________________________________________________________________

## 2024-07-26 NOTE — PROGRESS NOTE ADULT - ASSESSMENT
83M, poor historian, PMH of DM, asthma, recently admitted to [place] in 5/2024 for abdominal pain, found to have large ulcerating mass communicating with proximal jejunum on CT there but no intervention at that time, presenting for few days of brown/bloody emesis and abdominal pain. Leukocytosis to 21 with lactate 3.9, improved to 3.1 after 1L bolus.  CTAP significant for SBO with TP in R mid abdomen, thickened jejunum, no grossly obvious focal mass, dilated stomach and distal esophagus; possible Crohn's disease. Admitted to SICU for management of volume resuscitation in high grade SBO. Clinically improving in SICU. CT on admission failed to reveal transition point or obstructive mass. Recent CT s/o 3.7 cm ring shaped foreign body in the distal jejunum which has moved since the last imaging. Transferred to floor. On TPN.     POD 3 diagnostic lap, small bowel resection, open removal of foreign body from small intestine      Plan:  - IV Abx  - Pain control  - Diet: TPN  - TOV  - DVT ppx  - Monitor I/Os  - Appreciate GI recs, f/u fecal calprotectin  - Apprec nutrition recs for TPN  [facility]  [id number] 83M, poor historian, PMH of DM, asthma, recently admitted to [place] in 5/2024 for abdominal pain, found to have large ulcerating mass communicating with proximal jejunum on CT there but no intervention at that time, presenting for few days of brown/bloody emesis and abdominal pain. Leukocytosis to 21 with lactate 3.9, improved to 3.1 after 1L bolus.  CTAP significant for SBO with TP in R mid abdomen, thickened jejunum, no grossly obvious focal mass, dilated stomach and distal esophagus; possible Crohn's disease. Admitted to SICU for management of volume resuscitation in high grade SBO. Clinically improving in SICU. CT on admission failed to reveal transition point or obstructive mass. Recent CT s/o 3.7 cm ring shaped foreign body in the distal jejunum which has moved since the last imaging. Transferred to floor. On TPN.     POD 3 diagnostic lap, small bowel resection, open removal of foreign body from small intestine      Plan:  - IV Abx  - Pain control  - Diet: TPN  - TOV  - DVT ppx  - Monitor I/Os  - Appreciate GI recs, f/u fecal calprotectin  - Apprec nutrition recs for TPN  - OOB  - PT eval  - Abdominal X-ray    [facility]  [id number] 83M, poor historian, PMH of DM, asthma, recently admitted to [place] in 5/2024 for abdominal pain, found to have large ulcerating mass communicating with proximal jejunum on CT there but no intervention at that time, presenting for few days of brown/bloody emesis and abdominal pain. Leukocytosis to 21 with lactate 3.9, improved to 3.1 after 1L bolus.  CTAP significant for SBO with TP in R mid abdomen, thickened jejunum, no grossly obvious focal mass, dilated stomach and distal esophagus; possible Crohn's disease. Admitted to SICU for management of volume resuscitation in high grade SBO. Clinically improving in SICU. CT on admission failed to reveal transition point or obstructive mass. Recent CT s/o 3.7 cm ring shaped foreign body in the distal jejunum which has moved since the last imaging. Transferred to floor. On TPN.     POD 3 diagnostic lap, small bowel resection, open removal of foreign body from small intestine      Plan:  - IV Abx  - Pain control  - Diet: TPN  - DVT ppx  - Monitor I/Os  - Appreciate GI recs, f/u fecal calprotectin  - Apprec nutrition recs for TPN  - OOB  - PT eval  - Abdominal X-ray    [facility]  [id number]

## 2024-07-26 NOTE — PROGRESS NOTE ADULT - ATTENDING COMMENTS
comfortable  feels gas moving in his abdomen but no flatus yet  abd NT, slightly distended, soft  AXR with some SB dil, + air in the colon  ambulate  await bowel fx

## 2024-07-26 NOTE — PROGRESS NOTE ADULT - SUBJECTIVE AND OBJECTIVE BOX
DATE OF SERVICE: 07-26-24 @ 10:44    Patient is a 83y old  Male who presents with a chief complaint of SBO (26 Jul 2024 04:47)      INTERVAL HISTORY: Feeling much better.    REVIEW OF SYSTEMS:  CONSTITUTIONAL: No weakness  EYES/ENT: No visual changes;  No throat pain   NECK: No pain or stiffness  RESPIRATORY: No cough, wheezing; No shortness of breath  CARDIOVASCULAR: No chest pain or palpitations  GASTROINTESTINAL: No abdominal  pain. No nausea, vomiting, or hematemesis  GENITOURINARY: No dysuria, frequency or hematuria  NEUROLOGICAL: No stroke like symptoms  SKIN: No rashes    	  MEDICATIONS:        PHYSICAL EXAM:  T(C): 36.7 (07-26-24 @ 09:49), Max: 36.8 (07-25-24 @ 16:07)  HR: 79 (07-26-24 @ 09:49) (75 - 85)  BP: 109/58 (07-26-24 @ 09:49) (107/60 - 140/46)  RR: 18 (07-26-24 @ 09:49) (18 - 18)  SpO2: 99% (07-26-24 @ 09:49) (97% - 99%)  Wt(kg): --  I&O's Summary    25 Jul 2024 07:01  -  26 Jul 2024 07:00  --------------------------------------------------------  IN: 1233.7 mL / OUT: 1150 mL / NET: 83.7 mL          Appearance: In no distress	  HEENT:    PERRL, EOMI	  Cardiovascular:  S1 S2, No JVD  Respiratory: Lungs clear to auscultation	  Gastrointestinal:  Soft, Non-tender, + BS	  Vascularature:  No edema of LE  Psychiatric: Appropriate affect   Neuro: no acute focal deficits                               8.1    11.06 )-----------( 365      ( 26 Jul 2024 06:58 )             24.6     07-26    136  |  101  |  34<H>  ----------------------------<  98  3.4<L>   |  23  |  0.85    Ca    8.4      26 Jul 2024 06:53  Phos  3.9     07-26  Mg     2.2     07-26    TPro  6.0  /  Alb  2.8<L>  /  TBili  0.2  /  DBili  x   /  AST  16  /  ALT  18  /  AlkPhos  85  07-26        Labs personally reviewed      ASSESSMENT/PLAN: 	    83M, poor historian, PMH of DM, asthma, no known PSHx recently admitted to [place] in 5/2024 for abdominal pain, found to have 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 Reports abdominal pain improved with episodes of emesis. Last BM/flatus was yesterday.        1. HTN  - SBP 170s overnight 7/20  - EKG biphasic anterior wall twi   - trops neg  - TTE 7/17 normal EF 55-60%, atrial septal defect  - TTE shows preserved EF, no pericardial effusion  - bp systolic acceptable.  Will start oral anti hypertensives when no longer npo    2. High Grade SBO  - as seen on CTA  - management per sugyael    3. Abnormal EKG - Denies ant cardiac Hx, CP or SOB  - Advise OP elective ischemic work up  - TTE with preserved EF    4. ASD - Probable secundum atrial septal defect with left to right flow. Mild to moderate pulmonary hypertension.  - OP cardiac MRI or ALYSSA to further evaluate    5. Cardiac Risk Stratification  - Patient is mod risk for mod risk for laparoscopic removal of foreign body in intestine, laparoscopic removal of foreign body in intestine. No contraindication to proceed  - Patient not in decompensated HF  - No tachy/shabbir arrhythmia  - No hx of severe MS/AS  - Tolerated procedure well       SITA Camacho-NP   Leonid Sal DO [facility]  Cardiovascular Medicine  [address], [address]  Available through call or text on [employer]  Office: [phone]

## 2024-07-26 NOTE — PROGRESS NOTE ADULT - ASSESSMENT
83M PMH of Asthma and BPH on Flomax, last colonoscopy around 8-10 years ago (normal per wife/patient) no known PSHx recently admitted to [place] in 5/2024 for abdominal pain, found to have large ulcerating mass communicating with proximal jejunum on CT (discharged home with outpatient GI f/u for concern of contained perforation vs inflamed giant diverticulum) presented to [facility] given hematemesis constipation and abdominal pain. Reports abdominal pain improved with episodes of emesis. Last BM/flatus was yesterday.  TPN team consulted given concern for severe protein calorie malnutrition and anticipated prolonged inadequate caloric intake .    AA 105g, Dextrose 210g and SMOF Lipids 45g. To provide: 1584kcal and 105g protein. Based on dosing wt 43.5kg, provides: 36.4kcal/kg and 2.4g protein/kg.     - Nutritional Assessment, patient with severe protein calorie malnutrition not meeting nutritional goals enterally due to SBO/NPO status and would benefit from TPN for nutritional support; prealbumin only 7 mg/dL - trend weekly   - TPN plan:  continue goal TPN with limited sodium in TPN given elevated BNP, potassium of 80 mEQ given hypokalemia/Na Phos of 45 mm given hypophosphatemia and risk of refeeding syndrome; s/p Thiamine 100 mg to TPN to further reduce risk of refeeding syndrome.   - TPN access:  PICC in place; maintain per protocol; reserve one picc port for TPN only   - Hyponatremia/NAA:  stable; trend in AM; BNP 2554 pg/mL; Echo with EF 55-60%; atrial septal defect with R flow; mod pulm HTN; BNP only 336 pg/mL on 7/24 (previoulsy 2k range); Na and glucose improved today   - Anemia:  iron def noted; started IV Iron   - Electrolyte Imbalance risk:  recommend to check CMP, Mg, Phos and ionized Ca daily, to be supplemented via TPN  - SBO:  NGT removed 7/24; monitor diet advancement s/p FB removal and jejunal resection on 7/23  - Fever:  f/u Blood cx from 7/17 (neg to date); on Zosyn   - Recommend strict intake and output/weight checks three times a week  - Risk of glucose dysfunction with TPN:  HgA1c 7.8% on 7/17/24; continue finger sticks with RISS; glucose trend reviewed; decrease to 30 units RI in TPN to avoid hypoglycemia   - Hypocalcemia:  Ca Gluconate 10 meq in TPN; check Vitamin D/PTH  - Risk for Hypertriglyceridemia with TPN:  baseline lipid profile reviewed; TG 75 mg/dL on 7/17 --> 175 mg/dL from 7/23; monitor TG closely on TPN  - Global care per primary team     Available on TEAMS  TPN spectra [id number] ([phone] when dialing from outside line)  M-F 8A-2P, Weekends and holidays 8/9A-12/1P  Discussed with Dr Morris Comer

## 2024-07-26 NOTE — PROGRESS NOTE ADULT - SUBJECTIVE AND OBJECTIVE BOX
[facility] NUTRITION SUPPORT-- FOLLOW UP NOTE      24 hour events/subjective:  Patient seen and examined at bedside, chart reviewed and events noted and I's and O's reviewed.  Patient denies chest pain, shortness of breath, nausea or vomiting, dizziness, chills at time of visit.  Patient's VSS and no other acute overnight events noted.   Fingersticks declining; s/p D 50 yesterday.  Patient continues on TPN and remains NPO for post op ileus      ROS:  Except as noted above, all other systems reviewed and are negative     Diet:  Diet, NPO:   Except Medications (07-24-24 @ 20:56)      PAST HISTORY  --------------------------------------------------------------------------------  PAST MEDICAL & SURGICAL HISTORY:  Asthma      DM (diabetes mellitus)      BPH (benign prostatic hyperplasia)      Diabetes      Asthma      Asthma      Diabetes      S/P cataract extraction      No significant past surgical history        No significant changes to PMH, PSH, FHx, SHx, unless otherwise noted    ALLERGIES & MEDICATIONS  --------------------------------------------------------------------------------  Allergies    No Known Allergies    Intolerances      Standing Inpatient Medications  albuterol    90 MICROgram(s) HFA Inhaler 2 Puff(s) Inhalation every 6 hours  budesonide 160 MICROgram(s)/formoterol 4.5 MICROgram(s) Inhaler 2 Puff(s) Inhalation two times a day  chlorhexidine 2% Cloths 1 Application(s) Topical <User Schedule>  chlorhexidine 4% Liquid 1 Application(s) Topical <User Schedule>  dextrose 5%. 1000 milliLiter(s) IV Continuous <Continuous>  dextrose 5%. 1000 milliLiter(s) IV Continuous <Continuous>  dextrose 5%. 1000 milliLiter(s) IV Continuous <Continuous>  dextrose 50% Injectable 12.5 Gram(s) IV Push once  dextrose 50% Injectable 25 Gram(s) IV Push once  dextrose 50% Injectable 25 Gram(s) IV Push once  glucagon  Injectable 1 milliGRAM(s) IntraMuscular once  heparin   Injectable 5000 Unit(s) SubCutaneous every 8 hours  insulin lispro (ADMELOG) corrective regimen sliding scale   SubCutaneous every 6 hours  iron sucrose IVPB 300 milliGRAM(s) IV Intermittent every 48 hours  lipid, fat emulsion (Fish Oil and Plant Based) 20% Infusion 18.7 mL/Hr IV Continuous <Continuous>  pantoprazole  Injectable 40 milliGRAM(s) IV Push daily  Parenteral Nutrition - Adult 1 Each TPN Continuous <Continuous>  Parenteral Nutrition - Adult 1 Each TPN Continuous <Continuous>  tamsulosin 0.4 milliGRAM(s) Oral at bedtime    PRN Inpatient Medications  dextrose Oral Gel 15 Gram(s) Oral once PRN  HYDROmorphone  Injectable 0.25 milliGRAM(s) IV Push every 4 hours PRN  HYDROmorphone  Injectable 0.5 milliGRAM(s) IV Push every 4 hours PRN  sodium chloride 0.9% lock flush 10 milliLiter(s) IV Push every 1 hour PRN        VITALS/PHYSICAL EXAM  --------------------------------------------------------------------------------  T(C): 36.6 (07-26-24 @ 12:01), Max: 36.8 (07-25-24 @ 16:07)  HR: 80 (07-26-24 @ 12:01) (78 - 85)  BP: 118/60 (07-26-24 @ 12:01) (107/60 - 140/46)  RR: 18 (07-26-24 @ 12:01) (18 - 18)  SpO2: 99% (07-26-24 @ 12:01) (97% - 99%)  Wt(kg): --      07-25-24 @ 07:01  -  07-26-24 @ 07:00  --------------------------------------------------------  IN: 1233.7 mL / OUT: 1150 mL / NET: 83.7 mL    07-26-24 @ 07:01  -  07-26-24 @ 13:34  --------------------------------------------------------  IN: 0 mL / OUT: 200 mL / NET: -200 mL      I&O's Detail    25 Jul 2024 07:01  -  26 Jul 2024 07:00  --------------------------------------------------------  IN:    Fat Emulsion (Fish Oil &amp; Plant Based) 20% Infusion: 18.7 mL    IV PiggyBack: 250 mL    IV PiggyBack: 65 mL    TPN (Total Parenteral Nutrition): 900 mL  Total IN: 1233.7 mL    OUT:    Indwelling Catheter - Urethral (mL): 250 mL    Oral Fluid: 0 mL    Voided (mL): 900 mL  Total OUT: 1150 mL    Total NET: 83.7 mL      26 Jul 2024 07:01  -  26 Jul 2024 13:34  --------------------------------------------------------  IN:  Total IN: 0 mL    OUT:    Oral Fluid: 0 mL    Voided (mL): 200 mL  Total OUT: 200 mL    Total NET: -200 mL          Physical Exam:  Gen: NAD, thin/frail appearing; laying in bed   HEENT: supple neck, no JVD  Chest: non labored breathing, equal chest expansion bilaterally  Abd: soft, nontender/nondistended  : No suprapubic tenderness  Ext: Warm, FROM, no edema b/l LE  Neuro: No focal deficits  Psych: Normal affect and mood  Skin: Warm, without rashes           LABS/STUDIES  --------------------------------------------------------------------------------              8.1    11.06 >-----------<  365      [07-26-24 @ 06:58]              24.6     136  |  101  |  34  ----------------------------<  98      [07-26-24 @ 06:53]  3.4   |  23  |  0.85        Ca     8.4     [07-26-24 @ 06:53]      iCa    1.18     [07-26 @ 06:52]      Mg     2.2     [07-26-24 @ 06:53]      Phos  3.9     [07-26-24 @ 06:53]    TPro  6.0  /  Alb  2.8  /  TBili  0.2  /  DBili  x   /  AST  16  /  ALT  18  /  AlkPhos  85  [07-26-24 @ 06:53]          Ca ionized  Creatinine Trend:  POC glucoseGlucose: 98 mg/dL (07-26-24 @ 06:53)  CAPILLARY BLOOD GLUCOSE      POCT Blood Glucose.: 87 mg/dL (26 Jul 2024 13:02)  POCT Blood Glucose.: 71 mg/dL (26 Jul 2024 09:52)  POCT Blood Glucose.: 110 mg/dL (26 Jul 2024 05:08)  POCT Blood Glucose.: 142 mg/dL (26 Jul 2024 01:33)  POCT Blood Glucose.: 153 mg/dL (25 Jul 2024 22:04)  POCT Blood Glucose.: 293 mg/dL (25 Jul 2024 17:57)  POCT Blood Glucose.: 77 mg/dL (25 Jul 2024 17:44)  POCT Blood Glucose.: 70 mg/dL (25 Jul 2024 17:19)    PrealbuminPrealbumin, Serum: 7 mg/dL (07-16-24 @ 23:00)    Triglycerides

## 2024-07-27 LAB
ALBUMIN SERPL ELPH-MCNC: 3.1 G/DL — LOW (ref 3.3–5)
ALP SERPL-CCNC: 104 U/L — SIGNIFICANT CHANGE UP (ref 40–120)
ALT FLD-CCNC: 17 U/L — SIGNIFICANT CHANGE UP (ref 10–45)
ANION GAP SERPL CALC-SCNC: 9 MMOL/L — SIGNIFICANT CHANGE UP (ref 5–17)
AST SERPL-CCNC: 16 U/L — SIGNIFICANT CHANGE UP (ref 10–40)
BASOPHILS # BLD AUTO: 0.03 K/UL — SIGNIFICANT CHANGE UP (ref 0–0.2)
BASOPHILS NFR BLD AUTO: 0.2 % — SIGNIFICANT CHANGE UP (ref 0–2)
BILIRUB SERPL-MCNC: 0.2 MG/DL — SIGNIFICANT CHANGE UP (ref 0.2–1.2)
BLD GP AB SCN SERPL QL: NEGATIVE — SIGNIFICANT CHANGE UP
BUN SERPL-MCNC: 29 MG/DL — HIGH (ref 7–23)
CA-I BLD-SCNC: 1.21 MMOL/L — SIGNIFICANT CHANGE UP (ref 1.15–1.33)
CALCIUM SERPL-MCNC: 8.8 MG/DL — SIGNIFICANT CHANGE UP (ref 8.4–10.5)
CHLORIDE SERPL-SCNC: 99 MMOL/L — SIGNIFICANT CHANGE UP (ref 96–108)
CO2 SERPL-SCNC: 25 MMOL/L — SIGNIFICANT CHANGE UP (ref 22–31)
CREAT SERPL-MCNC: 0.76 MG/DL — SIGNIFICANT CHANGE UP (ref 0.5–1.3)
EGFR: 88 ML/MIN/1.73M2 — SIGNIFICANT CHANGE UP
EOSINOPHIL # BLD AUTO: 0.27 K/UL — SIGNIFICANT CHANGE UP (ref 0–0.5)
EOSINOPHIL NFR BLD AUTO: 2.2 % — SIGNIFICANT CHANGE UP (ref 0–6)
GLUCOSE BLDC GLUCOMTR-MCNC: 101 MG/DL — HIGH (ref 70–99)
GLUCOSE BLDC GLUCOMTR-MCNC: 146 MG/DL — HIGH (ref 70–99)
GLUCOSE BLDC GLUCOMTR-MCNC: 163 MG/DL — HIGH (ref 70–99)
GLUCOSE BLDC GLUCOMTR-MCNC: 185 MG/DL — HIGH (ref 70–99)
GLUCOSE SERPL-MCNC: 116 MG/DL — HIGH (ref 70–99)
HCT VFR BLD CALC: 25.5 % — LOW (ref 39–50)
HGB BLD-MCNC: 8.6 G/DL — LOW (ref 13–17)
IMM GRANULOCYTES NFR BLD AUTO: 0.6 % — SIGNIFICANT CHANGE UP (ref 0–0.9)
LYMPHOCYTES # BLD AUTO: 1.74 K/UL — SIGNIFICANT CHANGE UP (ref 1–3.3)
LYMPHOCYTES # BLD AUTO: 14.1 % — SIGNIFICANT CHANGE UP (ref 13–44)
MAGNESIUM SERPL-MCNC: 2 MG/DL — SIGNIFICANT CHANGE UP (ref 1.6–2.6)
MCHC RBC-ENTMCNC: 29.3 PG — SIGNIFICANT CHANGE UP (ref 27–34)
MCHC RBC-ENTMCNC: 33.7 GM/DL — SIGNIFICANT CHANGE UP (ref 32–36)
MCV RBC AUTO: 86.7 FL — SIGNIFICANT CHANGE UP (ref 80–100)
MONOCYTES # BLD AUTO: 0.73 K/UL — SIGNIFICANT CHANGE UP (ref 0–0.9)
MONOCYTES NFR BLD AUTO: 5.9 % — SIGNIFICANT CHANGE UP (ref 2–14)
NEUTROPHILS # BLD AUTO: 9.49 K/UL — HIGH (ref 1.8–7.4)
NEUTROPHILS NFR BLD AUTO: 77 % — SIGNIFICANT CHANGE UP (ref 43–77)
NRBC # BLD: 0 /100 WBCS — SIGNIFICANT CHANGE UP (ref 0–0)
PHOSPHATE SERPL-MCNC: 3.1 MG/DL — SIGNIFICANT CHANGE UP (ref 2.5–4.5)
PLATELET # BLD AUTO: 388 K/UL — SIGNIFICANT CHANGE UP (ref 150–400)
POTASSIUM SERPL-MCNC: 3.6 MMOL/L — SIGNIFICANT CHANGE UP (ref 3.5–5.3)
POTASSIUM SERPL-SCNC: 3.6 MMOL/L — SIGNIFICANT CHANGE UP (ref 3.5–5.3)
PROT SERPL-MCNC: 6.5 G/DL — SIGNIFICANT CHANGE UP (ref 6–8.3)
RBC # BLD: 2.94 M/UL — LOW (ref 4.2–5.8)
RBC # FLD: 15.5 % — HIGH (ref 10.3–14.5)
RH IG SCN BLD-IMP: POSITIVE — SIGNIFICANT CHANGE UP
SODIUM SERPL-SCNC: 133 MMOL/L — LOW (ref 135–145)
WBC # BLD: 12.34 K/UL — HIGH (ref 3.8–10.5)
WBC # FLD AUTO: 12.34 K/UL — HIGH (ref 3.8–10.5)

## 2024-07-27 PROCEDURE — 99232 SBSQ HOSP IP/OBS MODERATE 35: CPT

## 2024-07-27 RX ORDER — INSULIN LISPRO 100/ML
VIAL (ML) SUBCUTANEOUS
Refills: 0 | Status: DISCONTINUED | OUTPATIENT
Start: 2024-07-27 | End: 2024-07-29

## 2024-07-27 RX ORDER — ACETAMINOPHEN 500 MG
650 TABLET ORAL EVERY 6 HOURS
Refills: 0 | Status: DISCONTINUED | OUTPATIENT
Start: 2024-07-27 | End: 2024-07-30

## 2024-07-27 RX ORDER — MULTI-ELECTROLYTE 98; 74.5; 64.6; 25.5; 16.55 MG/ML; MG/ML; MG/ML; MG/ML; MG/ML
1 INJECTION, SOLUTION, CONCENTRATE INTRAVENOUS
Refills: 0 | Status: DISCONTINUED | OUTPATIENT
Start: 2024-07-27 | End: 2024-07-28

## 2024-07-27 RX ORDER — ACETAMINOPHEN 500 MG
1000 TABLET ORAL EVERY 8 HOURS
Refills: 0 | Status: DISCONTINUED | OUTPATIENT
Start: 2024-07-27 | End: 2024-07-27

## 2024-07-27 RX ORDER — ACETAMINOPHEN 500 MG
650 TABLET ORAL ONCE
Refills: 0 | Status: DISCONTINUED | OUTPATIENT
Start: 2024-07-27 | End: 2024-07-27

## 2024-07-27 RX ORDER — INSULIN LISPRO 100/ML
VIAL (ML) SUBCUTANEOUS AT BEDTIME
Refills: 0 | Status: DISCONTINUED | OUTPATIENT
Start: 2024-07-27 | End: 2024-07-29

## 2024-07-27 RX ORDER — FISH OIL 0.1 G/ML
18.7 INJECTION, EMULSION INTRAVENOUS
Qty: 45 | Refills: 0 | Status: DISCONTINUED | OUTPATIENT
Start: 2024-07-27 | End: 2024-07-28

## 2024-07-27 RX ADMIN — Medication 2: at 17:32

## 2024-07-27 RX ADMIN — BUDESONIDE AND FORMOTEROL FUMARATE DIHYDRATE 2 PUFF(S): 80; 4.5 AEROSOL RESPIRATORY (INHALATION) at 05:26

## 2024-07-27 RX ADMIN — Medication 2 PUFF(S): at 05:25

## 2024-07-27 RX ADMIN — HEPARIN SODIUM 5000 UNIT(S): 1000 INJECTION, SOLUTION INTRAVENOUS; SUBCUTANEOUS at 14:43

## 2024-07-27 RX ADMIN — Medication 0.4 MILLIGRAM(S): at 23:49

## 2024-07-27 RX ADMIN — PANTOPRAZOLE SODIUM 40 MILLIGRAM(S): 20 TABLET, DELAYED RELEASE ORAL at 11:51

## 2024-07-27 RX ADMIN — Medication 2 PUFF(S): at 11:51

## 2024-07-27 RX ADMIN — HEPARIN SODIUM 5000 UNIT(S): 1000 INJECTION, SOLUTION INTRAVENOUS; SUBCUTANEOUS at 05:26

## 2024-07-27 RX ADMIN — MULTI-ELECTROLYTE 1 EACH: 98; 74.5; 64.6; 25.5; 16.55 INJECTION, SOLUTION, CONCENTRATE INTRAVENOUS at 07:14

## 2024-07-27 RX ADMIN — Medication 650 MILLIGRAM(S): at 17:32

## 2024-07-27 RX ADMIN — Medication 2 PUFF(S): at 23:49

## 2024-07-27 RX ADMIN — Medication 650 MILLIGRAM(S): at 23:48

## 2024-07-27 RX ADMIN — FISH OIL 18.7 ML/HR: 0.1 INJECTION, EMULSION INTRAVENOUS at 07:14

## 2024-07-27 RX ADMIN — FISH OIL 18.7 ML/HR: 0.1 INJECTION, EMULSION INTRAVENOUS at 19:20

## 2024-07-27 RX ADMIN — MULTI-ELECTROLYTE 1 EACH: 98; 74.5; 64.6; 25.5; 16.55 INJECTION, SOLUTION, CONCENTRATE INTRAVENOUS at 19:19

## 2024-07-27 RX ADMIN — BUDESONIDE AND FORMOTEROL FUMARATE DIHYDRATE 2 PUFF(S): 80; 4.5 AEROSOL RESPIRATORY (INHALATION) at 19:18

## 2024-07-27 RX ADMIN — IRON SUCROSE 176.67 MILLIGRAM(S): 20 INJECTION, SOLUTION INTRAVENOUS at 14:43

## 2024-07-27 RX ADMIN — HEPARIN SODIUM 5000 UNIT(S): 1000 INJECTION, SOLUTION INTRAVENOUS; SUBCUTANEOUS at 23:49

## 2024-07-27 RX ADMIN — Medication 2 PUFF(S): at 19:19

## 2024-07-27 RX ADMIN — CHLORHEXIDINE GLUCONATE 1 APPLICATION(S): 500 CLOTH TOPICAL at 11:56

## 2024-07-27 NOTE — PROGRESS NOTE ADULT - SUBJECTIVE AND OBJECTIVE BOX
[facility] NUTRITION SUPPORT-- FOLLOW UP NOTE      24 hour events/subjective:  Patient seen and examined at bedside, chart reviewed and events noted and I's and O's reviewed; daughter at bedside.  Patient denies chest pain, shortness of breath, nausea or vomiting, dizziness, chills at time of visit.  Patient's VSS and no other acute overnight events noted.   Patient continues on TPN and glucose trend is within appropriate range.  Trial of CLD started today with Ensure.      ROS:  Except as noted above, all other systems reviewed and are negative     Diet:  Diet, Clear Liquid:   Consistent Carbohydrate Evening Snack (CSTCHOSN)  Supplement Feeding Modality:  Oral  Ensure Clear Cans or Servings Per Day:  1       Frequency:  Two Times a day (07-27-24 @ 10:46)      PAST HISTORY  --------------------------------------------------------------------------------  PAST MEDICAL & SURGICAL HISTORY:  Asthma      DM (diabetes mellitus)      BPH (benign prostatic hyperplasia)      Diabetes      Asthma      Asthma      Diabetes      S/P cataract extraction      No significant past surgical history        No significant changes to PMH, PSH, FHx, SHx, unless otherwise noted    ALLERGIES & MEDICATIONS  --------------------------------------------------------------------------------  Allergies    No Known Allergies    Intolerances      Standing Inpatient Medications  acetaminophen   IVPB .. 650 milliGRAM(s) IV Intermittent once  albuterol    90 MICROgram(s) HFA Inhaler 2 Puff(s) Inhalation every 6 hours  budesonide 160 MICROgram(s)/formoterol 4.5 MICROgram(s) Inhaler 2 Puff(s) Inhalation two times a day  chlorhexidine 2% Cloths 1 Application(s) Topical <User Schedule>  chlorhexidine 4% Liquid 1 Application(s) Topical <User Schedule>  dextrose 5%. 1000 milliLiter(s) IV Continuous <Continuous>  dextrose 5%. 1000 milliLiter(s) IV Continuous <Continuous>  dextrose 5%. 1000 milliLiter(s) IV Continuous <Continuous>  dextrose 50% Injectable 12.5 Gram(s) IV Push once  dextrose 50% Injectable 25 Gram(s) IV Push once  dextrose 50% Injectable 25 Gram(s) IV Push once  glucagon  Injectable 1 milliGRAM(s) IntraMuscular once  heparin   Injectable 5000 Unit(s) SubCutaneous every 8 hours  insulin lispro (ADMELOG) corrective regimen sliding scale   SubCutaneous every 6 hours  iron sucrose IVPB 300 milliGRAM(s) IV Intermittent every 48 hours  lipid, fat emulsion (Fish Oil and Plant Based) 20% Infusion 18.7 mL/Hr IV Continuous <Continuous>  pantoprazole  Injectable 40 milliGRAM(s) IV Push daily  Parenteral Nutrition - Adult 1 Each TPN Continuous <Continuous>  Parenteral Nutrition - Adult 1 Each TPN Continuous <Continuous>  tamsulosin 0.4 milliGRAM(s) Oral at bedtime    PRN Inpatient Medications  dextrose Oral Gel 15 Gram(s) Oral once PRN  HYDROmorphone  Injectable 0.25 milliGRAM(s) IV Push every 4 hours PRN  HYDROmorphone  Injectable 0.5 milliGRAM(s) IV Push every 4 hours PRN  sodium chloride 0.9% lock flush 10 milliLiter(s) IV Push every 1 hour PRN        VITALS/PHYSICAL EXAM  --------------------------------------------------------------------------------  T(C): 36.8 (07-27-24 @ 09:30), Max: 37 (07-26-24 @ 20:54)  HR: 80 (07-27-24 @ 09:30) (79 - 85)  BP: 129/60 (07-27-24 @ 09:30) (117/57 - 141/50)  RR: 18 (07-27-24 @ 09:30) (18 - 18)  SpO2: 98% (07-27-24 @ 09:30) (98% - 99%)  Wt(kg): --      07-26-24 @ 07:01  -  07-27-24 @ 07:00  --------------------------------------------------------  IN: 0 mL / OUT: 1050 mL / NET: -1050 mL      I&O's Detail    26 Jul 2024 07:01  -  27 Jul 2024 07:00  --------------------------------------------------------  IN:  Total IN: 0 mL    OUT:    Oral Fluid: 0 mL    Voided (mL): 1050 mL  Total OUT: 1050 mL    Total NET: -1050 mL          Physical Exam:  Gen: NAD, thin/frail appearing; laying in bed   HEENT: supple neck, no JVD  Chest: non labored breathing, equal chest expansion bilaterally  Abd: soft, nontender/nondistended  : No suprapubic tenderness  Ext: Warm, FROM, no edema b/l LE  Neuro: No focal deficits  Psych: Normal affect and mood  Skin: Warm, without rashes           LABS/STUDIES  --------------------------------------------------------------------------------              8.6    12.34 >-----------<  388      [07-27-24 @ 05:45]              25.5     133  |  99  |  29  ----------------------------<  116      [07-27-24 @ 05:45]  3.6   |  25  |  0.76        Ca     8.8     [07-27-24 @ 05:45]      iCa    1.21     [07-27 @ 05:59]      Mg     2.0     [07-27-24 @ 05:45]      Phos  3.1     [07-27-24 @ 05:45]    TPro  6.5  /  Alb  3.1  /  TBili  0.2  /  DBili  x   /  AST  16  /  ALT  17  /  AlkPhos  104  [07-27-24 @ 05:45]          Ca ionized  Creatinine Trend:  POC glucoseGlucose: 116 mg/dL (07-27-24 @ 05:45)  CAPILLARY BLOOD GLUCOSE      POCT Blood Glucose.: 146 mg/dL (27 Jul 2024 12:07)  POCT Blood Glucose.: 101 mg/dL (27 Jul 2024 06:00)  POCT Blood Glucose.: 85 mg/dL (26 Jul 2024 23:25)  POCT Blood Glucose.: 88 mg/dL (26 Jul 2024 21:11)  POCT Blood Glucose.: 89 mg/dL (26 Jul 2024 16:54)  POCT Blood Glucose.: 87 mg/dL (26 Jul 2024 13:02)    PrealbuminPrealbumin, Serum: 7 mg/dL (07-16-24 @ 23:00)    Triglycerides

## 2024-07-27 NOTE — PROGRESS NOTE ADULT - SUBJECTIVE AND OBJECTIVE BOX
DATE OF SERVICE: 07-27-24 @ 11:04    Patient is a 83y old  Male who presents with a chief complaint of SBO (27 Jul 2024 02:16)      INTERVAL HISTORY: no complaints     REVIEW OF SYSTEMS:  CONSTITUTIONAL: No weakness  EYES/ENT: No visual changes;  No throat pain   NECK: No pain or stiffness  RESPIRATORY: No cough, wheezing; No shortness of breath  CARDIOVASCULAR: No chest pain or palpitations  GASTROINTESTINAL: No abdominal  pain. No nausea, vomiting, or hematemesis  GENITOURINARY: No dysuria, frequency or hematuria  NEUROLOGICAL: No stroke like symptoms  SKIN: No rashes      	  MEDICATIONS:        PHYSICAL EXAM:  T(C): 36.8 (07-27-24 @ 09:30), Max: 37 (07-26-24 @ 20:54)  HR: 80 (07-27-24 @ 09:30) (79 - 85)  BP: 129/60 (07-27-24 @ 09:30) (117/57 - 141/50)  RR: 18 (07-27-24 @ 09:30) (18 - 18)  SpO2: 98% (07-27-24 @ 09:30) (98% - 99%)  Wt(kg): --  I&O's Summary    26 Jul 2024 07:01  -  27 Jul 2024 07:00  --------------------------------------------------------  IN: 0 mL / OUT: 1050 mL / NET: -1050 mL          Appearance: In no distress	  HEENT:    PERRL, EOMI	  Cardiovascular:  S1 S2, No JVD  Respiratory: Lungs clear to auscultation	  Gastrointestinal:  Soft, Non-tender, + BS	  Vascularature:  No edema of LE  Psychiatric: Appropriate affect   Neuro: no acute focal deficits                               8.6    12.34 )-----------( 388      ( 27 Jul 2024 05:45 )             25.5     07-27    133<L>  |  99  |  29<H>  ----------------------------<  116<H>  3.6   |  25  |  0.76    Ca    8.8      27 Jul 2024 05:45  Phos  3.1     07-27  Mg     2.0     07-27    TPro  6.5  /  Alb  3.1<L>  /  TBili  0.2  /  DBili  x   /  AST  16  /  ALT  17  /  AlkPhos  104  07-27        Labs personally reviewed      ASSESSMENT/PLAN: 	    83M, poor historian, PMH of DM, asthma, no known PSHx recently admitted to [place] in 5/2024 for abdominal pain, found to have 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 Reports abdominal pain improved with episodes of emesis. Last BM/flatus was yesterday.        1. HTN  - SBP 170s overnight 7/20  - EKG biphasic anterior wall twi   - trops neg  - TTE 7/17 normal EF 55-60%, atrial septal defect  - TTE shows preserved EF, no pericardial effusion  - bp systolic acceptable.  Will start oral anti hypertensives when no longer npo    2. High Grade SBO  - as seen on CTA  - management per alexia    3. Abnormal EKG - Denies ant cardiac Hx, CP or SOB  - Advise OP elective ischemic work up  - TTE with preserved EF    4. ASD - Probable secundum atrial septal defect with left to right flow. Mild to moderate pulmonary hypertension.  - OP cardiac MRI or ALYSSA to further evaluate    5. Cardiac Risk Stratification  - Patient is mod risk for mod risk for laparoscopic removal of foreign body in intestine, laparoscopic removal of foreign body in intestine. No contraindication to proceed  - Patient not in decompensated HF  - No tachy/shabbir arrhythmia  - No hx of severe MS/AS  - Tolerated procedure well         JORDAN Mitchell DO [facility]  Cardiovascular Medicine  [address] [place] [address], [address]  Office: [phone]  Available via call/text on [employer] Teams

## 2024-07-27 NOTE — PROGRESS NOTE ADULT - ATTENDING COMMENTS
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  Looks well, no complaints. Tolerating sips.  Poor historian but nurse reports +flatus and BMs  Abd soft, mildly distended  Incision clean and dry  On TPN  advance to clears    David Rolle MD

## 2024-07-27 NOTE — PROGRESS NOTE ADULT - ASSESSMENT
83M, poor historian, PMH of DM, asthma, recently admitted to [place] in 5/2024 for abdominal pain, found to have large ulcerating mass communicating with proximal jejunum on CT there but no intervention at that time, presenting for few days of brown/bloody emesis and abdominal pain. Leukocytosis to 21 with lactate 3.9, improved to 3.1 after 1L bolus.  CTAP significant for SBO with TP in R mid abdomen, thickened jejunum, no grossly obvious focal mass, dilated stomach and distal esophagus; possible Crohn's disease. Admitted to SICU for management of volume resuscitation in high grade SBO. Clinically improving in SICU. CT on admission failed to reveal transition point or obstructive mass. Recent CT s/o 3.7 cm ring shaped foreign body in the distal jejunum which has moved since the last imaging. Transferred to floor. On TPN.     POD 4 diagnostic lap, small bowel resection, open removal of foreign body from small intestine      Plan:  - IV Abx  - Pain control  - Diet: TPN  - DVT ppx  - Monitor I/Os  - Appreciate GI recs, f/u fecal calprotectin  - Apprec nutrition recs for TPN  - OOB  - PT eval  - Abdominal X-ray    [facility]  [id number] 83M, poor historian, PMH of DM, asthma, recently admitted to [place] in 5/2024 for abdominal pain, found to have large ulcerating mass communicating with proximal jejunum on CT there but no intervention at that time, presenting for few days of brown/bloody emesis and abdominal pain. Leukocytosis to 21 with lactate 3.9, improved to 3.1 after 1L bolus.  CTAP significant for SBO with TP in R mid abdomen, thickened jejunum, no grossly obvious focal mass, dilated stomach and distal esophagus; possible Crohn's disease. Admitted to SICU for management of volume resuscitation in high grade SBO. Clinically improving in SICU. CT on admission failed to reveal transition point or obstructive mass. Recent CT s/o 3.7 cm ring shaped foreign body in the distal jejunum which has moved since the last imaging. Transferred to floor. On TPN.     POD 4 diagnostic lap, small bowel resection, open removal of foreign body from small intestine      Plan:  - IV Abx  - Pain control  - Diet: TPN  - CLD   - DVT ppx  - Monitor I/Os  - Appreciate GI recs, f/u fecal calprotectin  - Apprec nutrition recs for TPN  - OOB  - PT eval  - Abdominal X-ray    [facility]  [id number]

## 2024-07-27 NOTE — PROGRESS NOTE ADULT - SUBJECTIVE AND OBJECTIVE BOX
Surgery Progress Note     Interval/Subjective:  Patient seen and examined.   __________________________________________________________________  Vitals:  T(C): 36.4 (00:10), Max: 37 (20:54)  HR: 85 (00:10) (79 - 85)  BP: 132/60 (00:10) (107/60 - 141/50)  RR: 18 (00:10) (18 - 18)  SpO2: 99% (00:10) (97% - 99%)    I & O's:  IN:    Fat Emulsion (Fish Oil &amp; Plant Based) 20% Infusion: 18.7 mL    IV PiggyBack - iron: 250 mL    IV PiggyBack - tylenol: 65 mL    TPN (Total Parenteral Nutrition): 900 mL  Total IN: 1233.7 mL    OUT:    Indwelling Catheter - Urethral (mL): 250 mL    Oral Fluid: 0 mL    Voided (mL) - DTV 7/25 @ 1650: 900 mL  Total OUT: 1150 mL        07-25-24 @ 07:01  -  07-26-24 @ 07:00  --------------------------------------------------------  OUT:    Voided (mL) - DTV 7/25 @ 1650: 0.86 mL/kg/hr  Total OUT: 0.86 mL/kg/hr      07-26-24 @ 07:01  -  07-27-24 @ 02:16  --------------------------------------------------------  OUT:    Voided (mL) - DTV 7/25 @ 1650: 1.25 mL/kg/hr  Total OUT: 1.25 mL/kg/hr      Physical Exam:  General: NAD, resting comfortably in bed  HEENT: Normocephalic atraumatic  Respiratory: Nonlabored respirations  Cardio: pulse present  Abdomen: soft, nontender, nondistended  Vascular: extremities are warm and well perfused.       __________________________________________________________________ Surgery Progress Note     Interval/Subjective:  Patient seen and examined in NAD. Pt endorses having a bowel movement yesterday. LUCERO.   __________________________________________________________________  Vitals:  T(C): 36.4 (00:10), Max: 37 (20:54)  HR: 85 (00:10) (79 - 85)  BP: 132/60 (00:10) (107/60 - 141/50)  RR: 18 (00:10) (18 - 18)  SpO2: 99% (00:10) (97% - 99%)    I & O's:  IN:    Fat Emulsion (Fish Oil &amp; Plant Based) 20% Infusion: 18.7 mL    IV PiggyBack - iron: 250 mL    IV PiggyBack - tylenol: 65 mL    TPN (Total Parenteral Nutrition): 900 mL  Total IN: 1233.7 mL    OUT:    Indwelling Catheter - Urethral (mL): 250 mL    Oral Fluid: 0 mL    Voided (mL) - DTV 7/25 @ 1650: 900 mL  Total OUT: 1150 mL        07-25-24 @ 07:01  -  07-26-24 @ 07:00  --------------------------------------------------------  OUT:    Voided (mL) - DTV 7/25 @ 1650: 0.86 mL/kg/hr  Total OUT: 0.86 mL/kg/hr      07-26-24 @ 07:01  -  07-27-24 @ 02:16  --------------------------------------------------------  OUT:    Voided (mL) - DTV 7/25 @ 1650: 1.25 mL/kg/hr  Total OUT: 1.25 mL/kg/hr      Physical Exam:  General: NAD, resting comfortably in bed  HEENT: Normocephalic atraumatic  Respiratory: Nonlabored respirations  Cardio: pulse present  Abdomen: soft, nontender, nondistended  Vascular: extremities are warm and well perfused.       __________________________________________________________________

## 2024-07-27 NOTE — PROGRESS NOTE ADULT - ASSESSMENT
83M PMH of Asthma and BPH on Flomax, last colonoscopy around 8-10 years ago (normal per wife/patient) no known PSHx recently admitted to [place] in 5/2024 for abdominal pain, found to have large ulcerating mass communicating with proximal jejunum on CT (discharged home with outpatient GI f/u for concern of contained perforation vs inflamed giant diverticulum) presented to [facility] given hematemesis constipation and abdominal pain. Reports abdominal pain improved with episodes of emesis. Last BM/flatus was yesterday.  TPN team consulted given concern for severe protein calorie malnutrition and anticipated prolonged inadequate caloric intake .    AA 105g, Dextrose 210g and SMOF Lipids 45g. To provide: 1584kcal and 105g protein. Based on dosing wt 43.5kg, provides: 36.4kcal/kg and 2.4g protein/kg.     - Nutritional Assessment, patient with severe protein calorie malnutrition not meeting nutritional goals enterally due to SBO/NPO status and would benefit from TPN for nutritional support; prealbumin only 7 mg/dL - trend weekly   - TPN plan:  continue goal TPN with limited sodium in TPN given elevated BNP, potassium of 80 mEQ given hypokalemia/Na Phos of 45 mm given hypophosphatemia and risk of refeeding syndrome; s/p Thiamine 100 mg to TPN to further reduce risk of refeeding syndrome.   - TPN access:  PICC in place; maintain per protocol; reserve one picc port for TPN only   - Hyponatremia/NAA:  trend in AM; BNP 2554 pg/mL; Echo with EF 55-60%; atrial septal defect with R flow; mod pulm HTN; BNP only 336 pg/mL on 7/24 (previoulsy 2k range); trend in AM and if continues to trend down, would check urine studies   - Anemia:  iron def noted; on IV Iron   - Electrolyte Imbalance risk:  recommend to check CMP, Mg, Phos and ionized Ca daily, to be supplemented via TPN  - SBO:  NGT removed 7/24; monitor diet advancement s/p FB removal and jejunal resection on 7/23  - Fever:  f/u Blood cx from 7/17 (neg to date); on Zosyn   - Recommend strict intake and output/weight checks three times a week  - Risk of glucose dysfunction with TPN:  HgA1c 7.8% on 7/17/24; continue finger sticks with RISS; glucose trend reviewed; decrease to 20 units RI in TPN to avoid hypoglycemia   - Hypocalcemia:  Ca Gluconate 10 meq in TPN; f/u Vitamin D/PTH  - Risk for Hypertriglyceridemia with TPN:  baseline lipid profile reviewed; TG 75 mg/dL on 7/17 --> 175 mg/dL from 7/23; monitor TG closely on TPN  - Global care per primary team     Available on TEAMS  TPN spectra [id number] ([phone] when dialing from outside line)  M-F 8A-2P, Weekends and holidays 8/9A-12/1P  Discussed with Dr Morris Comer

## 2024-07-28 LAB
ALBUMIN SERPL ELPH-MCNC: 2.8 G/DL — LOW (ref 3.3–5)
ALP SERPL-CCNC: 101 U/L — SIGNIFICANT CHANGE UP (ref 40–120)
ALT FLD-CCNC: 17 U/L — SIGNIFICANT CHANGE UP (ref 10–45)
ANION GAP SERPL CALC-SCNC: 10 MMOL/L — SIGNIFICANT CHANGE UP (ref 5–17)
AST SERPL-CCNC: 17 U/L — SIGNIFICANT CHANGE UP (ref 10–40)
BASOPHILS # BLD AUTO: 0.03 K/UL — SIGNIFICANT CHANGE UP (ref 0–0.2)
BASOPHILS NFR BLD AUTO: 0.3 % — SIGNIFICANT CHANGE UP (ref 0–2)
BILIRUB SERPL-MCNC: 0.2 MG/DL — SIGNIFICANT CHANGE UP (ref 0.2–1.2)
BUN SERPL-MCNC: 32 MG/DL — HIGH (ref 7–23)
CA-I BLD-SCNC: 1.21 MMOL/L — SIGNIFICANT CHANGE UP (ref 1.15–1.33)
CALCIUM SERPL-MCNC: 8.6 MG/DL — SIGNIFICANT CHANGE UP (ref 8.4–10.5)
CHLORIDE SERPL-SCNC: 102 MMOL/L — SIGNIFICANT CHANGE UP (ref 96–108)
CO2 SERPL-SCNC: 24 MMOL/L — SIGNIFICANT CHANGE UP (ref 22–31)
CREAT SERPL-MCNC: 0.94 MG/DL — SIGNIFICANT CHANGE UP (ref 0.5–1.3)
EGFR: 79 ML/MIN/1.73M2 — SIGNIFICANT CHANGE UP
EOSINOPHIL # BLD AUTO: 0.32 K/UL — SIGNIFICANT CHANGE UP (ref 0–0.5)
EOSINOPHIL NFR BLD AUTO: 3.3 % — SIGNIFICANT CHANGE UP (ref 0–6)
GLUCOSE BLDC GLUCOMTR-MCNC: 198 MG/DL — HIGH (ref 70–99)
GLUCOSE BLDC GLUCOMTR-MCNC: 230 MG/DL — HIGH (ref 70–99)
GLUCOSE BLDC GLUCOMTR-MCNC: 239 MG/DL — HIGH (ref 70–99)
GLUCOSE BLDC GLUCOMTR-MCNC: 310 MG/DL — HIGH (ref 70–99)
GLUCOSE SERPL-MCNC: 183 MG/DL — HIGH (ref 70–99)
HCT VFR BLD CALC: 24.5 % — LOW (ref 39–50)
HGB BLD-MCNC: 7.8 G/DL — LOW (ref 13–17)
IMM GRANULOCYTES NFR BLD AUTO: 1 % — HIGH (ref 0–0.9)
LYMPHOCYTES # BLD AUTO: 1.68 K/UL — SIGNIFICANT CHANGE UP (ref 1–3.3)
LYMPHOCYTES # BLD AUTO: 17.4 % — SIGNIFICANT CHANGE UP (ref 13–44)
MAGNESIUM SERPL-MCNC: 2 MG/DL — SIGNIFICANT CHANGE UP (ref 1.6–2.6)
MCHC RBC-ENTMCNC: 28.3 PG — SIGNIFICANT CHANGE UP (ref 27–34)
MCHC RBC-ENTMCNC: 31.8 GM/DL — LOW (ref 32–36)
MCV RBC AUTO: 88.8 FL — SIGNIFICANT CHANGE UP (ref 80–100)
MONOCYTES # BLD AUTO: 0.74 K/UL — SIGNIFICANT CHANGE UP (ref 0–0.9)
MONOCYTES NFR BLD AUTO: 7.6 % — SIGNIFICANT CHANGE UP (ref 2–14)
NEUTROPHILS # BLD AUTO: 6.81 K/UL — SIGNIFICANT CHANGE UP (ref 1.8–7.4)
NEUTROPHILS NFR BLD AUTO: 70.4 % — SIGNIFICANT CHANGE UP (ref 43–77)
NRBC # BLD: 0 /100 WBCS — SIGNIFICANT CHANGE UP (ref 0–0)
NT-PROBNP SERPL-SCNC: 224 PG/ML — SIGNIFICANT CHANGE UP (ref 0–300)
PHOSPHATE SERPL-MCNC: 3.3 MG/DL — SIGNIFICANT CHANGE UP (ref 2.5–4.5)
PLATELET # BLD AUTO: 387 K/UL — SIGNIFICANT CHANGE UP (ref 150–400)
POTASSIUM SERPL-MCNC: 4.1 MMOL/L — SIGNIFICANT CHANGE UP (ref 3.5–5.3)
POTASSIUM SERPL-SCNC: 4.1 MMOL/L — SIGNIFICANT CHANGE UP (ref 3.5–5.3)
PROT SERPL-MCNC: 6.1 G/DL — SIGNIFICANT CHANGE UP (ref 6–8.3)
RBC # BLD: 2.76 M/UL — LOW (ref 4.2–5.8)
RBC # FLD: 16.1 % — HIGH (ref 10.3–14.5)
SODIUM SERPL-SCNC: 136 MMOL/L — SIGNIFICANT CHANGE UP (ref 135–145)
WBC # BLD: 9.68 K/UL — SIGNIFICANT CHANGE UP (ref 3.8–10.5)
WBC # FLD AUTO: 9.68 K/UL — SIGNIFICANT CHANGE UP (ref 3.8–10.5)

## 2024-07-28 RX ORDER — MULTI-ELECTROLYTE 98; 74.5; 64.6; 25.5; 16.55 MG/ML; MG/ML; MG/ML; MG/ML; MG/ML
1 INJECTION, SOLUTION, CONCENTRATE INTRAVENOUS
Refills: 0 | Status: DISCONTINUED | OUTPATIENT
Start: 2024-07-28 | End: 2024-07-29

## 2024-07-28 RX ORDER — OXYCODONE HYDROCHLORIDE 30 MG/1
2.5 TABLET ORAL EVERY 6 HOURS
Refills: 0 | Status: DISCONTINUED | OUTPATIENT
Start: 2024-07-28 | End: 2024-07-30

## 2024-07-28 RX ORDER — OXYCODONE HYDROCHLORIDE 30 MG/1
5 TABLET ORAL EVERY 6 HOURS
Refills: 0 | Status: DISCONTINUED | OUTPATIENT
Start: 2024-07-28 | End: 2024-07-30

## 2024-07-28 RX ORDER — FISH OIL 0.1 G/ML
8.3 INJECTION, EMULSION INTRAVENOUS
Qty: 20 | Refills: 0 | Status: DISCONTINUED | OUTPATIENT
Start: 2024-07-28 | End: 2024-07-29

## 2024-07-28 RX ADMIN — BUDESONIDE AND FORMOTEROL FUMARATE DIHYDRATE 2 PUFF(S): 80; 4.5 AEROSOL RESPIRATORY (INHALATION) at 17:30

## 2024-07-28 RX ADMIN — BUDESONIDE AND FORMOTEROL FUMARATE DIHYDRATE 2 PUFF(S): 80; 4.5 AEROSOL RESPIRATORY (INHALATION) at 05:35

## 2024-07-28 RX ADMIN — Medication 2 PUFF(S): at 17:29

## 2024-07-28 RX ADMIN — PANTOPRAZOLE SODIUM 40 MILLIGRAM(S): 20 TABLET, DELAYED RELEASE ORAL at 14:19

## 2024-07-28 RX ADMIN — MULTI-ELECTROLYTE 1 EACH: 98; 74.5; 64.6; 25.5; 16.55 INJECTION, SOLUTION, CONCENTRATE INTRAVENOUS at 07:12

## 2024-07-28 RX ADMIN — HEPARIN SODIUM 5000 UNIT(S): 1000 INJECTION, SOLUTION INTRAVENOUS; SUBCUTANEOUS at 05:33

## 2024-07-28 RX ADMIN — Medication 650 MILLIGRAM(S): at 05:33

## 2024-07-28 RX ADMIN — Medication 8: at 14:20

## 2024-07-28 RX ADMIN — Medication 2: at 08:19

## 2024-07-28 RX ADMIN — MULTI-ELECTROLYTE 1 EACH: 98; 74.5; 64.6; 25.5; 16.55 INJECTION, SOLUTION, CONCENTRATE INTRAVENOUS at 19:16

## 2024-07-28 RX ADMIN — Medication 650 MILLIGRAM(S): at 17:30

## 2024-07-28 RX ADMIN — FISH OIL 8.3 ML/HR: 0.1 INJECTION, EMULSION INTRAVENOUS at 17:28

## 2024-07-28 RX ADMIN — MULTI-ELECTROLYTE 1 EACH: 98; 74.5; 64.6; 25.5; 16.55 INJECTION, SOLUTION, CONCENTRATE INTRAVENOUS at 17:28

## 2024-07-28 RX ADMIN — FISH OIL 8.3 ML/HR: 0.1 INJECTION, EMULSION INTRAVENOUS at 19:16

## 2024-07-28 RX ADMIN — Medication 650 MILLIGRAM(S): at 18:00

## 2024-07-28 RX ADMIN — Medication 2 PUFF(S): at 14:20

## 2024-07-28 RX ADMIN — Medication 4: at 17:31

## 2024-07-28 RX ADMIN — HEPARIN SODIUM 5000 UNIT(S): 1000 INJECTION, SOLUTION INTRAVENOUS; SUBCUTANEOUS at 21:44

## 2024-07-28 RX ADMIN — Medication 650 MILLIGRAM(S): at 00:15

## 2024-07-28 RX ADMIN — Medication 2 PUFF(S): at 05:34

## 2024-07-28 RX ADMIN — CHLORHEXIDINE GLUCONATE 1 APPLICATION(S): 500 CLOTH TOPICAL at 08:20

## 2024-07-28 RX ADMIN — HEPARIN SODIUM 5000 UNIT(S): 1000 INJECTION, SOLUTION INTRAVENOUS; SUBCUTANEOUS at 14:20

## 2024-07-28 RX ADMIN — Medication 0.4 MILLIGRAM(S): at 21:44

## 2024-07-28 NOTE — PROGRESS NOTE ADULT - SUBJECTIVE AND OBJECTIVE BOX
DATE OF SERVICE: 07-28-24 @ 11:29    Patient is a 83y old  Male who presents with a chief complaint of SBO (28 Jul 2024 02:28)      INTERVAL HISTORY: no complaints     REVIEW OF SYSTEMS:  CONSTITUTIONAL: No weakness  EYES/ENT: No visual changes;  No throat pain   NECK: No pain or stiffness  RESPIRATORY: No cough, wheezing; No shortness of breath  CARDIOVASCULAR: No chest pain or palpitations  GASTROINTESTINAL: No abdominal  pain. No nausea, vomiting, or hematemesis  GENITOURINARY: No dysuria, frequency or hematuria  NEUROLOGICAL: No stroke like symptoms  SKIN: No rashes      	  MEDICATIONS:        PHYSICAL EXAM:  T(C): 36.7 (07-28-24 @ 04:29), Max: 36.7 (07-27-24 @ 14:21)  HR: 80 (07-28-24 @ 04:29) (77 - 93)  BP: 107/53 (07-28-24 @ 04:29) (107/53 - 128/59)  RR: 18 (07-28-24 @ 04:29) (18 - 18)  SpO2: 97% (07-28-24 @ 04:29) (97% - 98%)  Wt(kg): --  I&O's Summary    27 Jul 2024 07:01  -  28 Jul 2024 07:00  --------------------------------------------------------  IN: 750 mL / OUT: 550 mL / NET: 200 mL          Appearance: In no distress	  HEENT:    PERRL, EOMI	  Cardiovascular:  S1 S2, No JVD  Respiratory: Lungs clear to auscultation	  Gastrointestinal:  Soft, Non-tender, + BS	  Vascularature:  No edema of LE  Psychiatric: Appropriate affect   Neuro: no acute focal deficits                               7.8    9.68  )-----------( 387      ( 28 Jul 2024 07:27 )             24.5     07-28    136  |  102  |  32<H>  ----------------------------<  183<H>  4.1   |  24  |  0.94    Ca    8.6      28 Jul 2024 07:27  Phos  3.3     07-28  Mg     2.0     07-28    TPro  6.1  /  Alb  2.8<L>  /  TBili  0.2  /  DBili  x   /  AST  17  /  ALT  17  /  AlkPhos  101  07-28        Labs personally reviewed      ASSESSMENT/PLAN: 	      83M, poor historian, PMH of DM, asthma, no known PSHx recently admitted to [place] in 5/2024 for abdominal pain, found to have 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 Reports abdominal pain improved with episodes of emesis. Last BM/flatus was yesterday.        1. HTN  - SBP 170s overnight 7/20  - EKG biphasic anterior wall twi   - trops neg  - TTE 7/17 normal EF 55-60%, atrial septal defect  - TTE shows preserved EF, no pericardial effusion  - bp systolic acceptable.  Will start oral anti hypertensives when no longer npo    2. High Grade SBO  - as seen on CTA  - management per sugyael    3. Abnormal EKG - Denies ant cardiac Hx, CP or SOB  - Advise OP elective ischemic work up  - TTE with preserved EF    4. ASD - Probable secundum atrial septal defect with left to right flow. Mild to moderate pulmonary hypertension.  - OP cardiac MRI or ALYSSA to further evaluate    5. Cardiac Risk Stratification  - Patient is mod risk for mod risk for laparoscopic removal of foreign body in intestine, laparoscopic removal of foreign body in intestine. No contraindication to proceed  - Patient not in decompensated HF  - No tachy/shabbir arrhythmia  - No hx of severe MS/AS  - Tolerated procedure well           JORDAN Mitchell DO [facility]  Cardiovascular Medicine  [address] [place] [address], [address]  Office: [phone]  Available via call/text on [employer] Teams

## 2024-07-28 NOTE — PROGRESS NOTE ADULT - ASSESSMENT
83M PMH of Asthma and BPH on Flomax, last colonoscopy around 8-10 years ago (normal per wife/patient) no known PSHx recently admitted to [place] in 5/2024 for abdominal pain, found to have large ulcerating mass communicating with proximal jejunum on CT (discharged home with outpatient GI f/u for concern of contained perforation vs inflamed giant diverticulum) presented to [facility] given hematemesis constipation and abdominal pain. Reports abdominal pain improved with episodes of emesis. Last BM/flatus was yesterday.  TPN team consulted given concern for severe protein calorie malnutrition and anticipated prolonged inadequate caloric intake .    AA 105g, Dextrose 210g and SMOF Lipids 45g. To provide: 1584kcal and 105g protein. Based on dosing wt 43.5kg, provides: 36.4kcal/kg and 2.4g protein/kg.     - decrease TPN to half goal:  60gAA, 140g dextrose, 20g SMOF  - ADAT  - electrolytes and volume halved  - hyperglycemia - FS trend noted.  Insulin decreased to 12U.  Check FS q 6 hours and cover with ISS  - care per primary team    TPN spectra [id number]  M-F 8:30A-2:30P, Weekends and holidays 8A-12P  discussed with Dr. BOSTON Comer

## 2024-07-28 NOTE — PROGRESS NOTE ADULT - ATTENDING COMMENTS
Feeling well, + flatus and BMs, tolerating clears  Abdomen soft, nondistended, incision healing well  Plan to advance diet, halve TPN    David Rolle MD

## 2024-07-28 NOTE — PROGRESS NOTE ADULT - ASSESSMENT
83M, poor historian, PMH of DM, asthma, recently admitted to [place] in 5/2024 for abdominal pain, found to have large ulcerating mass communicating with proximal jejunum on CT there but no intervention at that time, presenting for few days of brown/bloody emesis and abdominal pain. Leukocytosis to 21 with lactate 3.9, improved to 3.1 after 1L bolus.  CTAP significant for SBO with TP in R mid abdomen, thickened jejunum, no grossly obvious focal mass, dilated stomach and distal esophagus; possible Crohn's disease. Admitted to SICU for management of volume resuscitation in high grade SBO. Clinically improving in SICU. CT on admission failed to reveal transition point or obstructive mass. Recent CT s/o 3.7 cm ring shaped foreign body in the distal jejunum which has moved since the last imaging. Transferred to floor. On TPN.     POD 5 diagnostic lap, small bowel resection, open removal of foreign body from small intestine      Plan:  - Pain control- Tylenol, dilaudid  - Diet: TPN  - CLD, advance as tolerated  - DVT ppx- SQH  - Monitor I/Os  - Appreciate GI recs, f/u fecal calprotectin  - OOB  [facility]  [id number] 83M, poor historian, PMH of DM, asthma, recently admitted to [place] in 5/2024 for abdominal pain, found to have large ulcerating mass communicating with proximal jejunum on CT there but no intervention at that time, presenting for few days of brown/bloody emesis and abdominal pain. Leukocytosis to 21 with lactate 3.9, improved to 3.1 after 1L bolus.  CTAP significant for SBO with TP in R mid abdomen, thickened jejunum, no grossly obvious focal mass, dilated stomach and distal esophagus; possible Crohn's disease. Admitted to SICU for management of volume resuscitation in high grade SBO. Clinically improving in SICU. CT on admission failed to reveal transition point or obstructive mass. Recent CT s/o 3.7 cm ring shaped foreign body in the distal jejunum which has moved since the last imaging.  Now POD 5 diagnostic lap, small bowel resection, open removal of foreign body from small intestine      Plan:  - Pain control- Tylenol, dilaudid  - TPN  - LRD  - DVT ppx- SQH  - Monitor I/Os  - OOB  [facility]  [id number]

## 2024-07-28 NOTE — PROGRESS NOTE ADULT - SUBJECTIVE AND OBJECTIVE BOX
DAILY SURGERY PROGRESS NOTE    Overnight Events:  No acute events overnight    SUBJECTIVE:  Reports pain well controlled  Denies nausea, vomiting  Is passing gas and having bowel movements, denies diarrhea  Tolerating diet  Ambulating independently    OBJECTIVE:  Vital Signs Last 24 Hrs  T(C): 36.3 (27 Jul 2024 20:14), Max: 36.8 (27 Jul 2024 09:30)  T(F): 97.4 (27 Jul 2024 20:14), Max: 98.3 (27 Jul 2024 09:30)  HR: 93 (27 Jul 2024 20:14) (77 - 93)  BP: 116/58 (27 Jul 2024 20:14) (109/58 - 129/60)  BP(mean): --  RR: 18 (27 Jul 2024 20:14) (18 - 18)  SpO2: 98% (27 Jul 2024 20:14) (97% - 99%)    Parameters below as of 27 Jul 2024 20:14  Patient On (Oxygen Delivery Method): room air        Physical Examination:  GEN: NAD, resting quietly  NEURO: AAOx3, CN II-XII grossly intact, no focal deficits  PULM: symmetric chest rise bilaterally, no increased WOB  ABD: soft, nontender, nondistended  EXTR: no lower extremity edema, moving all extremities   DAILY SURGERY PROGRESS NOTE    POD5 s/p ileocolic resection and foreign body removal.     Overnight Events:  No acute events overnight    SUBJECTIVE:  Reports pain well controlled  Denies nausea, vomiting  Is passing flatus and having bowel movements, denies diarrhea  Tolerating diet  Ambulating independently    OBJECTIVE:  Vital Signs Last 24 Hrs  T(C): 36.3 (27 Jul 2024 20:14), Max: 36.8 (27 Jul 2024 09:30)  T(F): 97.4 (27 Jul 2024 20:14), Max: 98.3 (27 Jul 2024 09:30)  HR: 93 (27 Jul 2024 20:14) (77 - 93)  BP: 116/58 (27 Jul 2024 20:14) (109/58 - 129/60)  BP(mean): --  RR: 18 (27 Jul 2024 20:14) (18 - 18)  SpO2: 98% (27 Jul 2024 20:14) (97% - 99%)    Parameters below as of 27 Jul 2024 20:14  Patient On (Oxygen Delivery Method): room air        Physical Examination:  GEN: NAD, resting quietly  NEURO: AAOx3, CN II-XII grossly intact, no focal deficits  PULM: symmetric chest rise bilaterally, no increased WOB  ABD: soft, nontender, nondistended  EXTR: no lower extremity edema, moving all extremities

## 2024-07-28 NOTE — PROGRESS NOTE ADULT - SUBJECTIVE AND OBJECTIVE BOX
[facility] NUTRITION SUPPORT / TPN -- FOLLOW UP NOTE  --------------------------------------------------------------------------------    24 hour events/subjective:  Tolerated full liquids yesterday, denies N/V.  Pain controlled.    Diet:  Diet, Low Fiber:   Consistent Carbohydrate Evening Snack (CSTCHOSN) (07-28-24 @ 09:15)      PAST HISTORY  --------------------------------------------------------------------------------  No significant changes to PMH, PSH, FHx, SHx, unless otherwise noted    ALLERGIES & MEDICATIONS  --------------------------------------------------------------------------------  Allergies    No Known Allergies    Intolerances      Standing Inpatient Medications  acetaminophen     Tablet .. 650 milliGRAM(s) Oral every 6 hours  albuterol    90 MICROgram(s) HFA Inhaler 2 Puff(s) Inhalation every 6 hours  budesonide 160 MICROgram(s)/formoterol 4.5 MICROgram(s) Inhaler 2 Puff(s) Inhalation two times a day  chlorhexidine 2% Cloths 1 Application(s) Topical <User Schedule>  chlorhexidine 4% Liquid 1 Application(s) Topical <User Schedule>  dextrose 5%. 1000 milliLiter(s) IV Continuous <Continuous>  dextrose 5%. 1000 milliLiter(s) IV Continuous <Continuous>  dextrose 5%. 1000 milliLiter(s) IV Continuous <Continuous>  dextrose 50% Injectable 12.5 Gram(s) IV Push once  dextrose 50% Injectable 25 Gram(s) IV Push once  dextrose 50% Injectable 25 Gram(s) IV Push once  glucagon  Injectable 1 milliGRAM(s) IntraMuscular once  heparin   Injectable 5000 Unit(s) SubCutaneous every 8 hours  insulin lispro (ADMELOG) corrective regimen sliding scale   SubCutaneous three times a day before meals  insulin lispro (ADMELOG) corrective regimen sliding scale   SubCutaneous at bedtime  lipid, fat emulsion (Fish Oil and Plant Based) 20% Infusion 8.3 mL/Hr IV Continuous <Continuous>  pantoprazole  Injectable 40 milliGRAM(s) IV Push daily  Parenteral Nutrition - Adult 1 Each TPN Continuous <Continuous>  Parenteral Nutrition - Adult 1 Each TPN Continuous <Continuous>  tamsulosin 0.4 milliGRAM(s) Oral at bedtime    PRN Inpatient Medications  dextrose Oral Gel 15 Gram(s) Oral once PRN  HYDROmorphone  Injectable 0.25 milliGRAM(s) IV Push every 4 hours PRN  HYDROmorphone  Injectable 0.5 milliGRAM(s) IV Push every 4 hours PRN  sodium chloride 0.9% lock flush 10 milliLiter(s) IV Push every 1 hour PRN      VITALS/PHYSICAL EXAM  --------------------------------------------------------------------------------  T(C): 36.7 (07-28-24 @ 04:29), Max: 36.7 (07-27-24 @ 14:21)  HR: 80 (07-28-24 @ 04:29) (77 - 93)  BP: 107/53 (07-28-24 @ 04:29) (107/53 - 128/59)  RR: 18 (07-28-24 @ 04:29) (18 - 18)  SpO2: 97% (07-28-24 @ 04:29) (97% - 98%)  Wt(kg): --        07-27-24 @ 07:01  -  07-28-24 @ 07:00  --------------------------------------------------------  IN: 750 mL / OUT: 550 mL / NET: 200 mL             Physical Exam:  Gen: NAD, thin/frail appearing; laying in bed   HEENT: supple neck, no JVD  Chest: non labored breathing, equal chest expansion bilaterally  Abd: soft, nontender/nondistended  : No suprapubic tenderness  Ext: Warm, FROM, no edema b/l LE  Neuro: No focal deficits  Psych: Normal affect and mood  Skin: Warm, without rashes       LABS/STUDIES  --------------------------------------------------------------------------------              7.8    9.68  >-----------<  387      [07-28-24 @ 07:27]              24.5     136  |  102  |  32  ----------------------------<  183      [07-28-24 @ 07:27]  4.1   |  24  |  0.94        Ca     8.6     [07-28-24 @ 07:27]      iCa    1.21     [07-28 @ 07:30]      Mg     2.0     [07-28-24 @ 07:27]      Phos  3.3     [07-28-24 @ 07:27]    TPro  6.1  /  Alb  2.8  /  TBili  0.2  /  DBili  x   /  AST  17  /  ALT  17  /  AlkPhos  101  [07-28-24 @ 07:27]          Ca ionized  Creatinine Trend:  POC glucoseGlucose: 183 mg/dL (07-28-24 @ 07:27)  CAPILLARY BLOOD GLUCOSE      POCT Blood Glucose.: 198 mg/dL (28 Jul 2024 08:13)  POCT Blood Glucose.: 185 mg/dL (27 Jul 2024 23:54)  POCT Blood Glucose.: 163 mg/dL (27 Jul 2024 17:08)  POCT Blood Glucose.: 146 mg/dL (27 Jul 2024 12:07)    PrealbuminPrealbumin, Serum: 7 mg/dL (07-16-24 @ 23:00)    Triglycerides

## 2024-07-29 ENCOUNTER — TRANSCRIPTION ENCOUNTER (OUTPATIENT)
Age: 84
End: 2024-07-29

## 2024-07-29 DIAGNOSIS — I10 ESSENTIAL (PRIMARY) HYPERTENSION: ICD-10-CM

## 2024-07-29 DIAGNOSIS — E11.9 TYPE 2 DIABETES MELLITUS WITHOUT COMPLICATIONS: ICD-10-CM

## 2024-07-29 LAB
ALBUMIN SERPL ELPH-MCNC: 2.7 G/DL — LOW (ref 3.3–5)
ALP SERPL-CCNC: 148 U/L — HIGH (ref 40–120)
ALT FLD-CCNC: 46 U/L — HIGH (ref 10–45)
ANION GAP SERPL CALC-SCNC: 11 MMOL/L — SIGNIFICANT CHANGE UP (ref 5–17)
AST SERPL-CCNC: 43 U/L — HIGH (ref 10–40)
BASOPHILS # BLD AUTO: 0.03 K/UL — SIGNIFICANT CHANGE UP (ref 0–0.2)
BASOPHILS NFR BLD AUTO: 0.3 % — SIGNIFICANT CHANGE UP (ref 0–2)
BILIRUB SERPL-MCNC: 0.2 MG/DL — SIGNIFICANT CHANGE UP (ref 0.2–1.2)
BUN SERPL-MCNC: 32 MG/DL — HIGH (ref 7–23)
CA-I BLD-SCNC: 1.15 MMOL/L — SIGNIFICANT CHANGE UP (ref 1.15–1.33)
CALCIUM SERPL-MCNC: 8.5 MG/DL — SIGNIFICANT CHANGE UP (ref 8.4–10.5)
CHLORIDE SERPL-SCNC: 102 MMOL/L — SIGNIFICANT CHANGE UP (ref 96–108)
CO2 SERPL-SCNC: 22 MMOL/L — SIGNIFICANT CHANGE UP (ref 22–31)
CREAT SERPL-MCNC: 0.9 MG/DL — SIGNIFICANT CHANGE UP (ref 0.5–1.3)
EGFR: 84 ML/MIN/1.73M2 — SIGNIFICANT CHANGE UP
EOSINOPHIL # BLD AUTO: 0.38 K/UL — SIGNIFICANT CHANGE UP (ref 0–0.5)
EOSINOPHIL NFR BLD AUTO: 4 % — SIGNIFICANT CHANGE UP (ref 0–6)
GLUCOSE BLDC GLUCOMTR-MCNC: 121 MG/DL — HIGH (ref 70–99)
GLUCOSE BLDC GLUCOMTR-MCNC: 131 MG/DL — HIGH (ref 70–99)
GLUCOSE BLDC GLUCOMTR-MCNC: 201 MG/DL — HIGH (ref 70–99)
GLUCOSE BLDC GLUCOMTR-MCNC: 228 MG/DL — HIGH (ref 70–99)
GLUCOSE SERPL-MCNC: 190 MG/DL — HIGH (ref 70–99)
HCT VFR BLD CALC: 23.5 % — LOW (ref 39–50)
HGB BLD-MCNC: 7.8 G/DL — LOW (ref 13–17)
IMM GRANULOCYTES NFR BLD AUTO: 0.7 % — SIGNIFICANT CHANGE UP (ref 0–0.9)
LYMPHOCYTES # BLD AUTO: 1.99 K/UL — SIGNIFICANT CHANGE UP (ref 1–3.3)
LYMPHOCYTES # BLD AUTO: 21.1 % — SIGNIFICANT CHANGE UP (ref 13–44)
MAGNESIUM SERPL-MCNC: 1.9 MG/DL — SIGNIFICANT CHANGE UP (ref 1.6–2.6)
MCHC RBC-ENTMCNC: 29.2 PG — SIGNIFICANT CHANGE UP (ref 27–34)
MCHC RBC-ENTMCNC: 33.2 GM/DL — SIGNIFICANT CHANGE UP (ref 32–36)
MCV RBC AUTO: 88 FL — SIGNIFICANT CHANGE UP (ref 80–100)
MONOCYTES # BLD AUTO: 0.79 K/UL — SIGNIFICANT CHANGE UP (ref 0–0.9)
MONOCYTES NFR BLD AUTO: 8.4 % — SIGNIFICANT CHANGE UP (ref 2–14)
NEUTROPHILS # BLD AUTO: 6.17 K/UL — SIGNIFICANT CHANGE UP (ref 1.8–7.4)
NEUTROPHILS NFR BLD AUTO: 65.5 % — SIGNIFICANT CHANGE UP (ref 43–77)
NRBC # BLD: 0 /100 WBCS — SIGNIFICANT CHANGE UP (ref 0–0)
PHOSPHATE SERPL-MCNC: 2.4 MG/DL — LOW (ref 2.5–4.5)
PLATELET # BLD AUTO: 373 K/UL — SIGNIFICANT CHANGE UP (ref 150–400)
POTASSIUM SERPL-MCNC: 4.6 MMOL/L — SIGNIFICANT CHANGE UP (ref 3.5–5.3)
POTASSIUM SERPL-SCNC: 4.6 MMOL/L — SIGNIFICANT CHANGE UP (ref 3.5–5.3)
PROT SERPL-MCNC: 6.1 G/DL — SIGNIFICANT CHANGE UP (ref 6–8.3)
RBC # BLD: 2.67 M/UL — LOW (ref 4.2–5.8)
RBC # FLD: 16.3 % — HIGH (ref 10.3–14.5)
SODIUM SERPL-SCNC: 135 MMOL/L — SIGNIFICANT CHANGE UP (ref 135–145)
SURGICAL PATHOLOGY STUDY: SIGNIFICANT CHANGE UP
WBC # BLD: 9.43 K/UL — SIGNIFICANT CHANGE UP (ref 3.8–10.5)
WBC # FLD AUTO: 9.43 K/UL — SIGNIFICANT CHANGE UP (ref 3.8–10.5)

## 2024-07-29 PROCEDURE — 99232 SBSQ HOSP IP/OBS MODERATE 35: CPT

## 2024-07-29 PROCEDURE — 99222 1ST HOSP IP/OBS MODERATE 55: CPT

## 2024-07-29 RX ORDER — INSULIN LISPRO 100/ML
VIAL (ML) SUBCUTANEOUS
Refills: 0 | Status: DISCONTINUED | OUTPATIENT
Start: 2024-07-29 | End: 2024-07-30

## 2024-07-29 RX ORDER — INSULIN LISPRO 100/ML
VIAL (ML) SUBCUTANEOUS AT BEDTIME
Refills: 0 | Status: DISCONTINUED | OUTPATIENT
Start: 2024-07-29 | End: 2024-07-30

## 2024-07-29 RX ORDER — LINAGLIPTIN 5 MG/1
5 TABLET, FILM COATED ORAL DAILY
Refills: 0 | Status: DISCONTINUED | OUTPATIENT
Start: 2024-07-29 | End: 2024-07-30

## 2024-07-29 RX ADMIN — Medication 2 PUFF(S): at 05:14

## 2024-07-29 RX ADMIN — Medication 4: at 09:33

## 2024-07-29 RX ADMIN — CHLORHEXIDINE GLUCONATE 1 APPLICATION(S): 500 CLOTH TOPICAL at 09:33

## 2024-07-29 RX ADMIN — Medication 2: at 18:15

## 2024-07-29 RX ADMIN — Medication 2 PUFF(S): at 17:24

## 2024-07-29 RX ADMIN — Medication 650 MILLIGRAM(S): at 12:19

## 2024-07-29 RX ADMIN — Medication 2 PUFF(S): at 11:49

## 2024-07-29 RX ADMIN — BUDESONIDE AND FORMOTEROL FUMARATE DIHYDRATE 2 PUFF(S): 80; 4.5 AEROSOL RESPIRATORY (INHALATION) at 05:14

## 2024-07-29 RX ADMIN — PANTOPRAZOLE SODIUM 40 MILLIGRAM(S): 20 TABLET, DELAYED RELEASE ORAL at 11:49

## 2024-07-29 RX ADMIN — BUDESONIDE AND FORMOTEROL FUMARATE DIHYDRATE 2 PUFF(S): 80; 4.5 AEROSOL RESPIRATORY (INHALATION) at 17:24

## 2024-07-29 RX ADMIN — Medication 650 MILLIGRAM(S): at 05:14

## 2024-07-29 RX ADMIN — Medication 0.4 MILLIGRAM(S): at 21:22

## 2024-07-29 RX ADMIN — HEPARIN SODIUM 5000 UNIT(S): 1000 INJECTION, SOLUTION INTRAVENOUS; SUBCUTANEOUS at 13:22

## 2024-07-29 RX ADMIN — Medication 650 MILLIGRAM(S): at 17:24

## 2024-07-29 RX ADMIN — Medication 650 MILLIGRAM(S): at 17:54

## 2024-07-29 RX ADMIN — Medication 2 PUFF(S): at 00:26

## 2024-07-29 RX ADMIN — Medication 650 MILLIGRAM(S): at 11:49

## 2024-07-29 RX ADMIN — HEPARIN SODIUM 5000 UNIT(S): 1000 INJECTION, SOLUTION INTRAVENOUS; SUBCUTANEOUS at 21:22

## 2024-07-29 RX ADMIN — MULTI-ELECTROLYTE 1 EACH: 98; 74.5; 64.6; 25.5; 16.55 INJECTION, SOLUTION, CONCENTRATE INTRAVENOUS at 07:05

## 2024-07-29 RX ADMIN — HEPARIN SODIUM 5000 UNIT(S): 1000 INJECTION, SOLUTION INTRAVENOUS; SUBCUTANEOUS at 05:14

## 2024-07-29 NOTE — CONSULT NOTE ADULT - ATTENDING COMMENTS
84 yo M pmh DM, asthma, admitted for SBO with n/v/abd pain.  Had similar admit to OSH 2 months ago with ? lesion in duodenum.  Ultimately decompressed and discharged home.  Did not have follow up imaging or endoscopic evaluation, but now represents with SBO.  Imaging now with non specific areas of lesions (? skip lesions) as well as transition point.  Improving since decompression started via NGT.  Repeat CT scan today with ? lesion in small bowel more distal of unclear etiology.  Patient relatively comfortable at this time.  On GI ROS - patient with no overt bleeding, melena, diarrhea, skin lesions, or other findings c/w IBD.  May ultimately need endoscopic evaluation, but location creates challenge for direct endoscopy and VCE contraindicated in someone with recurrent SBO.      Would clarify new lesion with radiology  Pending this discussion, after decompression would consider enterography as next step when able to get to best target lesion  consider fecal patricia, but if post obstruction may show signs of inflammation  GI to follow
Imaging reviewed and discussed with primary surgeon. Duodenal inflammation most likely related to passing of foreign body. Will require close monitoring. Agree with plans for surgical management of small bowel obstruction. Hepatobiliary surgery will be available if needed intraoperatively and will continue to follow postoperatively. WOuld hold off on endoscopic procedures in the short term.
Agree with Dr. Mckinney's assessment and plan as outlined above. Reviewed all pertinent labs, and imaging studies. Modifications made as indicated above. 83 M with history of T2D, asthma here for large ulcerating mass and abdominal pain. Started TPN on 7/19, now transitioning to PO diet.  Endocrinology consutled for T2D management. A1C 7.8. PO intake is overall poor as patient just transitioned from TPN. Start tradjenta 5 mg daily and monitor glucose.   Rest of the plan as above.

## 2024-07-29 NOTE — CONSULT NOTE ADULT - SUBJECTIVE AND OBJECTIVE BOX
Ruchi Mckinney MD   |   PGY-4  Endocrinology Fellow  Available on [employer] Teams    HPI:  83M, poor historian, PMH of DM2, asthma, no known PSHx recently admitted to [place] in 5/2024 for abdominal pain, found to have 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 Reports abdominal pain improved with episodes of emesis. Last BM/flatus was yesterday.  Patient reports getting colonoscopy 20 years ago, however in [place] note last colonoscopy was 8 years ago per son with non-concerning findings. No history of endoscopy. At [place], no intervention at the time and recommended for outpatient GI followup. Differential diagnosis at the time was contained perforation or inflamed giant diverticulum.     In ED, patient afebrile, hemodynamically stable. Leukocytosis to 21. Elevated lactate 3.9, improved to 3.1 with 1L NS bolus. (16 Jul 2024 06:50)      Endocrine History:    DM__ x __ years  Endocrinologist:  Last A1c   Current GFR ___ ml/min/1.73m2  BMI:   Weight:  Current Meds:  Compliance:  Side effects to medications: Patient denies diarrhea, decreased appetite, nausea/vomiting, weight gain/loss, lower leg swelling, UTIs, pancreatitis  Medications tried in past:  Who administers medications:  Support system:  Glucose monitoring:  Home log / CGM/Smartphone  Hypoglycemic episodes? Frequency/Sxs:  Any hx of DKA:   Microvascular complications:  Macrovascular complications:   FHx:  Outpatient diet:   Breakfast:  Lunch:   Dinner:   Snacks:   Activity:  Appetite:    Inpatient diet:  % of meals eaten today:  Inpatient diabetes regimen:  On steroids inpatient?  On dextrose-containing fluids inpatient?  Statin:  ACEi/ARB:  Insurance:  Upcoming follow-ups/appointments:       PAST MEDICAL & SURGICAL HISTORY:  Asthma      DM (diabetes mellitus)      BPH (benign prostatic hyperplasia)      Diabetes      Asthma      Asthma      Diabetes      S/P cataract extraction      No significant past surgical history          MEDICATIONS  (STANDING):  acetaminophen     Tablet .. 650 milliGRAM(s) Oral every 6 hours  albuterol    90 MICROgram(s) HFA Inhaler 2 Puff(s) Inhalation every 6 hours  budesonide 160 MICROgram(s)/formoterol 4.5 MICROgram(s) Inhaler 2 Puff(s) Inhalation two times a day  chlorhexidine 2% Cloths 1 Application(s) Topical <User Schedule>  chlorhexidine 4% Liquid 1 Application(s) Topical <User Schedule>  dextrose 5%. 1000 milliLiter(s) (50 mL/Hr) IV Continuous <Continuous>  dextrose 5%. 1000 milliLiter(s) (100 mL/Hr) IV Continuous <Continuous>  dextrose 50% Injectable 25 Gram(s) IV Push once  dextrose 50% Injectable 12.5 Gram(s) IV Push once  dextrose 50% Injectable 25 Gram(s) IV Push once  glucagon  Injectable 1 milliGRAM(s) IntraMuscular once  heparin   Injectable 5000 Unit(s) SubCutaneous every 8 hours  insulin lispro (ADMELOG) corrective regimen sliding scale   SubCutaneous three times a day before meals  insulin lispro (ADMELOG) corrective regimen sliding scale   SubCutaneous at bedtime  pantoprazole  Injectable 40 milliGRAM(s) IV Push daily  Parenteral Nutrition - Adult 1 Each (38 mL/Hr) TPN Continuous <Continuous>  tamsulosin 0.4 milliGRAM(s) Oral at bedtime    MEDICATIONS  (PRN):  dextrose Oral Gel 15 Gram(s) Oral once PRN Blood Glucose LESS THAN 70 milliGRAM(s)/deciliter  oxyCODONE    IR 5 milliGRAM(s) Oral every 6 hours PRN Severe Pain (7 - 10)  oxyCODONE    IR 2.5 milliGRAM(s) Oral every 6 hours PRN Moderate Pain (4 - 6)  sodium chloride 0.9% lock flush 10 milliLiter(s) IV Push every 1 hour PRN Pre/post blood products, medications, blood draw, and to maintain line patency      Allergies    No Known Allergies    Intolerances      Review of Systems:  Constitutional: No fever  Eyes: No blurry vision  Neuro: No tremors  HEENT: No pain  Cardiovascular: No chest pain, palpitations  Respiratory: No SOB, no cough  GI: No nausea, vomiting, abdominal pain  : No dysuria  Skin: no rash  Psych: no depression  Endocrine: no polyuria, polydipsia  Hem/lymph: no swelling  Osteoporosis: no fractures    ===================PHYSICAL EXAM=======================  VITALS: T(C): 36.8 (07-29-24 @ 13:14)  T(F): 98.3 (07-29-24 @ 13:14), Max: 98.3 (07-29-24 @ 08:47)  HR: 86 (07-29-24 @ 13:14) (80 - 93)  BP: 110/60 (07-29-24 @ 13:14) (100/58 - 117/62)  RR:  (18 - 18)  SpO2:  (97% - 100%)  Wt(kg): --  GENERAL: NAD  EYES: No proptosis, no lid lag, anicteric  THYROID: Normal size, no palpable nodules  RESPIRATORY: Clear to auscultation bilaterally  CARDIOVASCULAR: Regular rate and rhythm  GI: Soft, nontender, non distended  EXT: b/l feet without wounds; 2+ pulses  PSYCH: Alert and oriented x 3, reactive mood  ======================================================  POCT Blood Glucose.: 131 mg/dL (07-29-24 @ 12:47)  POCT Blood Glucose.: 228 mg/dL (07-29-24 @ 09:04)  POCT Blood Glucose.: 230 mg/dL (07-28-24 @ 21:21)  POCT Blood Glucose.: 239 mg/dL (07-28-24 @ 17:07)  POCT Blood Glucose.: 310 mg/dL (07-28-24 @ 14:18)  POCT Blood Glucose.: 198 mg/dL (07-28-24 @ 08:13)  POCT Blood Glucose.: 185 mg/dL (07-27-24 @ 23:54)  POCT Blood Glucose.: 163 mg/dL (07-27-24 @ 17:08)  POCT Blood Glucose.: 146 mg/dL (07-27-24 @ 12:07)  POCT Blood Glucose.: 101 mg/dL (07-27-24 @ 06:00)  POCT Blood Glucose.: 85 mg/dL (07-26-24 @ 23:25)  POCT Blood Glucose.: 88 mg/dL (07-26-24 @ 21:11)  POCT Blood Glucose.: 89 mg/dL (07-26-24 @ 16:54)                            7.8    9.43  )-----------( 373      ( 29 Jul 2024 06:53 )             23.5       07-29    135  |  102  |  32<H>  ----------------------------<  190<H>  4.6   |  22  |  0.90    eGFR: 84    Ca    8.5      07-29  Mg     1.9     07-29  Phos  2.4     07-29    TPro  6.1  /  Alb  2.7<L>  /  TBili  0.2  /  DBili  x   /  AST  43<H>  /  ALT  46<H>  /  AlkPhos  148<H>  07-29      Thyroid Function Tests:  07-16 @ 23:00 TSH 4.94 FreeT4 -- T3 -- Anti TPO -- Anti Thyroglobulin Ab -- TSI --      A1C with Estimated Average Glucose Result: 7.8 % (07-16-24 @ 22:37)  A1C with Estimated Average Glucose Result: 7.7 % (07-15-24 @ 22:52)  A1C with Estimated Average Glucose Result: 9.5 % (12-08-23 @ 06:00)  A1C with Estimated Average Glucose Result: 8.3 % (11-28-23 @ 05:38)  A1C with Estimated Average Glucose Result: 8.1 % (11-27-23 @ 05:50)      07-23 Chol -- Direct LDL -- LDL calculated -- HDL -- Trig 175<H>, 07-22 Chol -- Direct LDL -- LDL calculated -- HDL -- Trig 158<H>, 07-21 Chol -- Direct LDL -- LDL calculated -- HDL -- Trig 229<H>, 07-20 Chol -- Direct LDL -- LDL calculated -- HDL -- Trig 175<H>, 07-16 Chol 94 Direct LDL -- LDL calculated 47 HDL 31<L> Trig 75    Radiology:              Ruchi Mckinney MD   |   PGY-4  Endocrinology Fellow  Available on [employer] Teams    HPI:  83M PMH of DM2, asthma, no known PSHx recently admitted to [place] in 5/2024 for abdominal pain, found to have 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 Reports abdominal pain improved with episodes of emesis. Patient reports getting colonoscopy 20 years ago, however in [place] note last colonoscopy was 8 years ago per son with non-concerning findings. No history of endoscopy. At [place], no intervention at the time and recommended for outpatient GI followup. Differential diagnosis at the time was contained perforation or inflamed giant diverticulum. In ED, patient afebrile, hemodynamically stable. Leukocytosis to 21. Elevated lactate 3.9, improved to 3.1 with 1L NS bolus. Patient admitted for SBO, possible Crohn's disease, now s/p diagnostic laparoscopy, small bowel resection, open removal of foreign body from small intestine. Patient was started on TPN 7/19, though patient is now transitioning to PO diet. C/c/b hyperglycemia. Endocrinology consulted for further management of hyperglycemia.    Endocrine History:    DM2 diagnosed earlier this year. Diet controlled.  Endocrinologist: Sees PCP for DM2 f/u  Last A1c: 7.8%  Current GFR 84 ml/min/1.73m2  BMI: 17.0  Weight: 43.5 kg  Current Meds: States he does not take diabetes medications, nor has he tried in the past  Support system: Wife  Glucose monitoring:  Checks fasting BG daily, gets anywhere from ~  Hypoglycemic episodes? Frequency/Sxs: Denies  Any hx of DKA: Denies  Microvascular complications: Denies neuropathy, retinopathy or nephropathy  Macrovascular complications: Denies CVA, MI  Outpatient diet:   Breakfast: egg, 1/2 plantain +/- pudding  Lunch: roti, rice, daal, vegetables  Dinner: chapati with roti, chicken, fish, vegetables  Snacks: sometimes will snack on cookies, "diet bread"  Activity: gardens  Appetite: improving, able to eat apple sauce earlier today  Inpatient diet: TPN -->CC, low fiber  Inpatient diabetes regimen: Moderte dose correctional insulin with meals, low dose correctional insulin at bedtime  On steroids inpatient? No  On dextrose-containing fluids inpatient? Zosyn      PAST MEDICAL & SURGICAL HISTORY:  Asthma      DM (diabetes mellitus)      BPH (benign prostatic hyperplasia)      Diabetes      Asthma      Asthma      Diabetes      S/P cataract extraction      No significant past surgical history          MEDICATIONS  (STANDING):  acetaminophen     Tablet .. 650 milliGRAM(s) Oral every 6 hours  albuterol    90 MICROgram(s) HFA Inhaler 2 Puff(s) Inhalation every 6 hours  budesonide 160 MICROgram(s)/formoterol 4.5 MICROgram(s) Inhaler 2 Puff(s) Inhalation two times a day  chlorhexidine 2% Cloths 1 Application(s) Topical <User Schedule>  chlorhexidine 4% Liquid 1 Application(s) Topical <User Schedule>  dextrose 5%. 1000 milliLiter(s) (50 mL/Hr) IV Continuous <Continuous>  dextrose 5%. 1000 milliLiter(s) (100 mL/Hr) IV Continuous <Continuous>  dextrose 50% Injectable 25 Gram(s) IV Push once  dextrose 50% Injectable 12.5 Gram(s) IV Push once  dextrose 50% Injectable 25 Gram(s) IV Push once  glucagon  Injectable 1 milliGRAM(s) IntraMuscular once  heparin   Injectable 5000 Unit(s) SubCutaneous every 8 hours  insulin lispro (ADMELOG) corrective regimen sliding scale   SubCutaneous three times a day before meals  insulin lispro (ADMELOG) corrective regimen sliding scale   SubCutaneous at bedtime  pantoprazole  Injectable 40 milliGRAM(s) IV Push daily  Parenteral Nutrition - Adult 1 Each (38 mL/Hr) TPN Continuous <Continuous>  tamsulosin 0.4 milliGRAM(s) Oral at bedtime    MEDICATIONS  (PRN):  dextrose Oral Gel 15 Gram(s) Oral once PRN Blood Glucose LESS THAN 70 milliGRAM(s)/deciliter  oxyCODONE    IR 5 milliGRAM(s) Oral every 6 hours PRN Severe Pain (7 - 10)  oxyCODONE    IR 2.5 milliGRAM(s) Oral every 6 hours PRN Moderate Pain (4 - 6)  sodium chloride 0.9% lock flush 10 milliLiter(s) IV Push every 1 hour PRN Pre/post blood products, medications, blood draw, and to maintain line patency      Allergies    No Known Allergies    Intolerances      Review of Systems:  Constitutional: No fever  Eyes: No blurry vision  Neuro: No tremors  HEENT: No pain  Cardiovascular: No chest pain, palpitations  Respiratory: No SOB  GI: No nausea, vomiting. +abdominal pain  Endocrine: no polyuria, polydipsia      ===================PHYSICAL EXAM=======================  VITALS: T(C): 36.8 (07-29-24 @ 13:14)  T(F): 98.3 (07-29-24 @ 13:14), Max: 98.3 (07-29-24 @ 08:47)  HR: 86 (07-29-24 @ 13:14) (80 - 93)  BP: 110/60 (07-29-24 @ 13:14) (100/58 - 117/62)  RR:  (18 - 18)  SpO2:  (97% - 100%)  Wt(kg): --  GENERAL: NAD  EYES: No proptosis, no lid lag, anicteric  THYROID: Normal size, no palpable nodules  RESPIRATORY: Clear to auscultation bilaterally  CARDIOVASCULAR: Regular rate and rhythm  GI: Soft, nontender, non distended  EXT: 2+ pulses, no edema  PSYCH: Alert and oriented x 3, reactive mood  ======================================================  POCT Blood Glucose.: 131 mg/dL (07-29-24 @ 12:47)  POCT Blood Glucose.: 228 mg/dL (07-29-24 @ 09:04)  POCT Blood Glucose.: 230 mg/dL (07-28-24 @ 21:21)  POCT Blood Glucose.: 239 mg/dL (07-28-24 @ 17:07)  POCT Blood Glucose.: 310 mg/dL (07-28-24 @ 14:18)  POCT Blood Glucose.: 198 mg/dL (07-28-24 @ 08:13)  POCT Blood Glucose.: 185 mg/dL (07-27-24 @ 23:54)  POCT Blood Glucose.: 163 mg/dL (07-27-24 @ 17:08)  POCT Blood Glucose.: 146 mg/dL (07-27-24 @ 12:07)  POCT Blood Glucose.: 101 mg/dL (07-27-24 @ 06:00)  POCT Blood Glucose.: 85 mg/dL (07-26-24 @ 23:25)  POCT Blood Glucose.: 88 mg/dL (07-26-24 @ 21:11)  POCT Blood Glucose.: 89 mg/dL (07-26-24 @ 16:54)                            7.8    9.43  )-----------( 373      ( 29 Jul 2024 06:53 )             23.5       07-29    135  |  102  |  32<H>  ----------------------------<  190<H>  4.6   |  22  |  0.90    eGFR: 84    Ca    8.5      07-29  Mg     1.9     07-29  Phos  2.4     07-29    TPro  6.1  /  Alb  2.7<L>  /  TBili  0.2  /  DBili  x   /  AST  43<H>  /  ALT  46<H>  /  AlkPhos  148<H>  07-29      Thyroid Function Tests:  07-16 @ 23:00 TSH 4.94 FreeT4 -- T3 -- Anti TPO -- Anti Thyroglobulin Ab -- TSI --      A1C with Estimated Average Glucose Result: 7.8 % (07-16-24 @ 22:37)  A1C with Estimated Average Glucose Result: 7.7 % (07-15-24 @ 22:52)  A1C with Estimated Average Glucose Result: 9.5 % (12-08-23 @ 06:00)  A1C with Estimated Average Glucose Result: 8.3 % (11-28-23 @ 05:38)  A1C with Estimated Average Glucose Result: 8.1 % (11-27-23 @ 05:50)      07-23 Chol -- Direct LDL -- LDL calculated -- HDL -- Trig 175<H>, 07-22 Chol -- Direct LDL -- LDL calculated -- HDL -- Trig 158<H>, 07-21 Chol -- Direct LDL -- LDL calculated -- HDL -- Trig 229<H>, 07-20 Chol -- Direct LDL -- LDL calculated -- HDL -- Trig 175<H>, 07-16 Chol 94 Direct LDL -- LDL calculated 47 HDL 31<L> Trig 75    Radiology:

## 2024-07-29 NOTE — CONSULT NOTE ADULT - CONSULT REASON
abnormal EKG, HTN
Concern for inflammatory bowel disease
HIgh grade SBO, resuscitation
Protein calorie malnutrition
Contained perforation vs inflammation
DM2 with uncontrolled hyperglycemia

## 2024-07-29 NOTE — DISCHARGE NOTE PROVIDER - NSDCCPCAREPLAN_GEN_ALL_CORE_FT
PRINCIPAL DISCHARGE DIAGNOSIS  Diagnosis: Small bowel obstruction  Assessment and Plan of Treatment: low residue diet- avoid raw fruits and vegetables,  thoroughly cooked vegetables that are soft and easily mashed with a fork are ok to eat. Bananas are also ok to eat.  You may shower, remove dressing prior to showering. Let soap and water run over incision, pat dry after and replace dry gauze with paper tape  Please follow up with Dr. Chaney in 1 week please call to schedule an appointment for thursday 8/8   Please follow up with your regular medical doctor in 1 week, please call to schedule an appointment to discuss recent hospitalization  Notfiy Dr. Chaney if develop fever, chills, worsening abdominal pain, nausea/vomiting, puruelent drainage from wound       PRINCIPAL DISCHARGE DIAGNOSIS  Diagnosis: Small bowel obstruction  Assessment and Plan of Treatment: low residue diet- avoid raw fruits and vegetables,  thoroughly cooked vegetables that are soft and easily mashed with a fork are ok to eat. Bananas are also ok to eat.  You may shower, remove dressing prior to showering. Let soap and water run over incision, pat dry after and replace dry gauze with paper tape, the little white bandsaids called steristrips will fall on own in 1 week   Please follow up with Dr. Chaney in 1 week please call to schedule an appointment for thursday 8/8   Please follow up with your regular medical doctor in 1 week, please call to schedule an appointment to discuss recent hospitalization  Notfiy Dr. Chaney if develop fever, chills, worsening abdominal pain, nausea/vomiting, puruelent drainage from wound        SECONDARY DISCHARGE DIAGNOSES  Diagnosis: T2DM (type 2 diabetes mellitus)  Assessment and Plan of Treatment: take your fingerstick fasting in the morning and at bedtime   if glucose is greater than 200  for 2 days in a row then start Tradjenta 5mg daily   Please follow up with your regular medical doctor in 1 week, please call to schedule an appointment to discuss recent hospitalization and review finger sticks        PRINCIPAL DISCHARGE DIAGNOSIS  Diagnosis: Small bowel obstruction  Assessment and Plan of Treatment: low residue diet- avoid raw fruits and vegetables,  thoroughly cooked vegetables that are soft and easily mashed with a fork are ok to eat. Bananas are also ok to eat.  You may shower, remove dressing prior to showering. Let soap and water run over incision, pat dry after and replace dry gauze with paper tape, the little white bandsaids called steristrips will fall on own in 1 week   Please follow up with Dr. Chaney in 1 week please call to schedule an appointment for thursday 8/8   Please follow up with your regular medical doctor in 1 week, please call to schedule an appointment to discuss recent hospitalization  Notfiy Dr. Chaney if develop fever, chills, worsening abdominal pain, nausea/vomiting, puruelent drainage from wound        SECONDARY DISCHARGE DIAGNOSES  Diagnosis: T2DM (type 2 diabetes mellitus)  Assessment and Plan of Treatment: take your fingerstick fasting in the morning and at bedtime keep log of your glucose levels   if glucose is greater than 200  for 2 days in a row then start Tradjenta 5mg daily   Please follow up with your regular medical doctor in 1 week, please call to schedule an appointment to discuss recent hospitalization and review finger sticks        PRINCIPAL DISCHARGE DIAGNOSIS  Diagnosis: Small bowel obstruction  Assessment and Plan of Treatment: low residue diet- avoid raw fruits and vegetables,  thoroughly cooked vegetables that are soft and easily mashed with a fork are ok to eat. Bananas are also ok to eat.  You may shower, remove dressing prior to showering. Let soap and water run over incision, pat dry after and replace dry gauze with paper tape, the little white bandsaids called steristrips will fall on own in 1 week   Please follow up with Dr. Chaney in 1 week please call to schedule an appointment for thursday 8/8   Please follow up with your regular medical doctor in 1 week, please call to schedule an appointment to discuss recent hospitalization  Notfiy Dr. Chaney if develop fever, chills, worsening abdominal pain, nausea/vomiting, puruelent drainage from wound  Please take tylenol 1000mg every 6 hours continue around the clock for the next 2 days then can change to as needed   you yuliana take oxycodone 2.5mg (1/2 tab) as needed every 4 hours for moderate breakthrough pain or oxycodone 5mg (1 tab) every 4 hours as needed for severe breakthrough pain        SECONDARY DISCHARGE DIAGNOSES  Diagnosis: T2DM (type 2 diabetes mellitus)  Assessment and Plan of Treatment: take your fingerstick fasting in the morning and at bedtime keep log of your glucose levels   if glucose is greater than 200  for 2 days in a row then start Tradjenta 5mg daily   Please follow up with your regular medical doctor in 1 week, please call to schedule an appointment to discuss recent hospitalization and review finger sticks        PRINCIPAL DISCHARGE DIAGNOSIS  Diagnosis: Small bowel obstruction  Assessment and Plan of Treatment: low residue diet- avoid raw fruits and vegetables,  thoroughly cooked vegetables that are soft and easily mashed with a fork are ok to eat. Bananas are also ok to eat.  You may shower, remove dressing prior to showering. Let soap and water run over incision, pat dry after and replace dry gauze with paper tape, the little white bandsaids called steristrips will fall on own in 1 week   Please follow up with Dr. Chaney in 1 week please call to schedule an appointment for thursday 8/8   Please follow up with your regular medical doctor in 1 week, please call to schedule an appointment to discuss recent hospitalization  Notfiy Dr. Chaney if develop fever, chills, worsening abdominal pain, nausea/vomiting, puruelent drainage from wound  Please take tylenol 650mg every 6 hours continue around the clock for the next 2 days then can change to as needed   you yuliana take oxycodone 2.5mg (1/2 tab) as needed every 4 hours for moderate breakthrough pain or oxycodone 5mg (1 tab) every 4 hours as needed for severe breakthrough pain        SECONDARY DISCHARGE DIAGNOSES  Diagnosis: T2DM (type 2 diabetes mellitus)  Assessment and Plan of Treatment: take your fingerstick fasting in the morning and at bedtime keep log of your glucose levels   if glucose is greater than 200  for 2 days in a row then start Tradjenta 5mg daily   Please follow up with your regular medical doctor in 1 week, please call to schedule an appointment to discuss recent hospitalization and review finger sticks

## 2024-07-29 NOTE — PROGRESS NOTE ADULT - ATTENDING COMMENTS
Feels well  minimal discomfort  + flatus, BMs  abd soft, NT, ND  stable  needs tx plan for his sugars  home once this is done

## 2024-07-29 NOTE — PROGRESS NOTE ADULT - SUBJECTIVE AND OBJECTIVE BOX
[facility] NUTRITION SUPPORT-- FOLLOW UP NOTE      24 hour events/subjective:  Patient seen and examined at bedside, chart reviewed and events noted and I's and O's reviewed.  Patient denies chest pain, shortness of breath, nausea or vomiting, dizziness, chills at time of visit.  Patient's VSS and no other acute overnight events noted.   Patient continues on 1/2 TPN and glucose trend is elevated       ROS:  Except as noted above, all other systems reviewed and are negative     Diet:  Diet, Low Fiber:   Consistent Carbohydrate Evening Snack (CSTCHOSN) (07-28-24 @ 09:15)      PAST HISTORY  --------------------------------------------------------------------------------  PAST MEDICAL & SURGICAL HISTORY:  Asthma      DM (diabetes mellitus)      BPH (benign prostatic hyperplasia)      Diabetes      Asthma      Asthma      Diabetes      S/P cataract extraction      No significant past surgical history        No significant changes to PMH, PSH, FHx, SHx, unless otherwise noted    ALLERGIES & MEDICATIONS  --------------------------------------------------------------------------------  Allergies    No Known Allergies    Intolerances      Standing Inpatient Medications  acetaminophen     Tablet .. 650 milliGRAM(s) Oral every 6 hours  albuterol    90 MICROgram(s) HFA Inhaler 2 Puff(s) Inhalation every 6 hours  budesonide 160 MICROgram(s)/formoterol 4.5 MICROgram(s) Inhaler 2 Puff(s) Inhalation two times a day  chlorhexidine 2% Cloths 1 Application(s) Topical <User Schedule>  chlorhexidine 4% Liquid 1 Application(s) Topical <User Schedule>  dextrose 5%. 1000 milliLiter(s) IV Continuous <Continuous>  dextrose 5%. 1000 milliLiter(s) IV Continuous <Continuous>  dextrose 50% Injectable 12.5 Gram(s) IV Push once  dextrose 50% Injectable 25 Gram(s) IV Push once  dextrose 50% Injectable 25 Gram(s) IV Push once  glucagon  Injectable 1 milliGRAM(s) IntraMuscular once  heparin   Injectable 5000 Unit(s) SubCutaneous every 8 hours  insulin lispro (ADMELOG) corrective regimen sliding scale   SubCutaneous three times a day before meals  insulin lispro (ADMELOG) corrective regimen sliding scale   SubCutaneous at bedtime  pantoprazole  Injectable 40 milliGRAM(s) IV Push daily  Parenteral Nutrition - Adult 1 Each TPN Continuous <Continuous>  tamsulosin 0.4 milliGRAM(s) Oral at bedtime    PRN Inpatient Medications  dextrose Oral Gel 15 Gram(s) Oral once PRN  oxyCODONE    IR 5 milliGRAM(s) Oral every 6 hours PRN  oxyCODONE    IR 2.5 milliGRAM(s) Oral every 6 hours PRN  sodium chloride 0.9% lock flush 10 milliLiter(s) IV Push every 1 hour PRN        VITALS/PHYSICAL EXAM  --------------------------------------------------------------------------------  T(C): 36.8 (07-29-24 @ 13:14), Max: 36.8 (07-29-24 @ 08:47)  HR: 86 (07-29-24 @ 13:14) (80 - 93)  BP: 110/60 (07-29-24 @ 13:14) (100/58 - 117/62)  RR: 18 (07-29-24 @ 13:14) (18 - 18)  SpO2: 97% (07-29-24 @ 13:14) (97% - 100%)  Wt(kg): --      07-28-24 @ 07:01  -  07-29-24 @ 07:00  --------------------------------------------------------  IN: 2235 mL / OUT: 1150 mL / NET: 1085 mL    07-29-24 @ 07:01  -  07-29-24 @ 13:58  --------------------------------------------------------  IN: 580 mL / OUT: 0 mL / NET: 580 mL      I&O's Detail    28 Jul 2024 07:01  -  29 Jul 2024 07:00  --------------------------------------------------------  IN:    Fat Emulsion (Fish Oil &amp; Plant Based) 20% Infusion: 83 mL    Oral Fluid: 500 mL    TPN (Total Parenteral Nutrition): 1652 mL  Total IN: 2235 mL    OUT:    Voided (mL): 1150 mL  Total OUT: 1150 mL    Total NET: 1085 mL      29 Jul 2024 07:01  -  29 Jul 2024 13:58  --------------------------------------------------------  IN:    Oral Fluid: 580 mL  Total IN: 580 mL    OUT:  Total OUT: 0 mL    Total NET: 580 mL          Physical Exam:  Gen: NAD, thin/frail appearing; laying in bed   HEENT: supple neck, no JVD  Chest: non labored breathing, equal chest expansion bilaterally  Abd: soft, nontender/nondistended  : No suprapubic tenderness  Ext: Warm, FROM, no edema b/l LE  Neuro: No focal deficits  Psych: Normal affect and mood  Skin: Warm, without rashes         LABS/STUDIES  --------------------------------------------------------------------------------              7.8    9.43  >-----------<  373      [07-29-24 @ 06:53]              23.5     135  |  102  |  32  ----------------------------<  190      [07-29-24 @ 06:52]  4.6   |  22  |  0.90        Ca     8.5     [07-29-24 @ 06:52]      iCa    1.15     [07-29 @ 06:58]      Mg     1.9     [07-29-24 @ 06:52]      Phos  2.4     [07-29-24 @ 06:52]    TPro  6.1  /  Alb  2.7  /  TBili  0.2  /  DBili  x   /  AST  43  /  ALT  46  /  AlkPhos  148  [07-29-24 @ 06:52]          Ca ionized  Creatinine Trend:  POC glucoseGlucose: 190 mg/dL (07-29-24 @ 06:52)  CAPILLARY BLOOD GLUCOSE      POCT Blood Glucose.: 131 mg/dL (29 Jul 2024 12:47)  POCT Blood Glucose.: 228 mg/dL (29 Jul 2024 09:04)  POCT Blood Glucose.: 230 mg/dL (28 Jul 2024 21:21)  POCT Blood Glucose.: 239 mg/dL (28 Jul 2024 17:07)  POCT Blood Glucose.: 310 mg/dL (28 Jul 2024 14:18)    PrealbuminPrealbumin, Serum: 7 mg/dL (07-16-24 @ 23:00)    Triglycerides

## 2024-07-29 NOTE — DISCHARGE NOTE PROVIDER - CARE PROVIDER_API CALL
Ericka Chaney  Colon/Rectal Surgery  [address]  [place] [address]  Phone: [phone]  Fax: [phone]  Follow Up Time: 1 week

## 2024-07-29 NOTE — DISCHARGE NOTE PROVIDER - NSDCMRMEDTOKEN_GEN_ALL_CORE_FT
acetaminophen 325 mg oral tablet: 2 tab(s) orally every 6 hours, As needed, Temp greater or equal to 38C (100.4F), Mild Pain (1 - 3), Moderate Pain (4 - 6)  Advair Diskus 250 mcg-50 mcg inhalation powder: 1 puff(s) inhaled 2 times a day  Albuterol (Eqv-ProAir HFA) 90 mcg/inh inhalation aerosol: 2 puff(s) inhaled every 6 hours  Albuterol (Eqv-ProAir HFA) 90 mcg/inh inhalation aerosol: 2 puff(s) inhaled every 6 hours  Aspirin Enteric Coated 81 mg oral delayed release tablet: 1 tab(s) orally once a day  benzonatate 100 mg oral capsule: 1 cap(s) orally 3 times a day, As needed, Cough  Fluticasone-Salmeterol: 2 puff(s) 2 times a day  Glucose Test Strips: Provide strips compatible with patients glucometer  metFORMIN 500 mg oral tablet: 0.5 tab(s) orally 2 times a day  metFORMIN 500 mg oral tablet: 0.5 tab(s) orally 2 times a day  predniSONE 50 mg oral tablet: 1 tab(s) orally once a day  Protonix 40 mg oral delayed release tablet: 1 tab(s) orally once a day  Protonix 40 mg oral delayed release tablet: 1 tab(s) orally  Rolling Walker: use as directed  tamsulosin 0.4 mg oral capsule: 1 cap(s) orally once a day (at bedtime)  tamsulosin 0.4 mg oral capsule: 1 cap(s) orally once a day (at bedtime)  Ventolin HFA 90 mcg/inh inhalation aerosol: 2 puff(s) inhaled every 6 hours    acetaminophen 325 mg oral tablet: 2 tab(s) orally every 6 hours  Advair Diskus 250 mcg-50 mcg inhalation powder: 1 puff(s) inhaled 2 times a day  Albuterol (Eqv-ProAir HFA) 90 mcg/inh inhalation aerosol: 2 puff(s) inhaled every 6 hours as needed for  shortness of breath and/or wheezing  glucometer (per patient&#x27;s insurance): Test blood sugars four times a day. Dispense #1 glucometer.  lancets: 1 application subcutaneously 4 times a day  linagliptin 5 mg oral tablet: 1 tab(s) orally once a day if finger stick greater than 200 for more than 2 days  oxyCODONE: 2.5 milligram(s) orally every 4 hours as needed for  moderate pain  oxyCODONE 5 mg oral tablet: 1 tab(s) orally every 4 hours as needed for Severe Pain (7 - 10) MDD: 6  Protonix 40 mg oral delayed release tablet: 1 tab(s) orally  tamsulosin 0.4 mg oral capsule: 1 cap(s) orally once a day (at bedtime)  test strips (per patient&#x27;s insurance): 1 application subcutaneously 4 times a day. ** Compatible with patient&#x27;s glucometer **

## 2024-07-29 NOTE — PROGRESS NOTE ADULT - SUBJECTIVE AND OBJECTIVE BOX
DATE OF SERVICE: 07-29-24 @ 15:33    Patient is a 83y old  Male who presents with a chief complaint of SBO (29 Jul 2024 14:12)      INTERVAL HISTORY: Feels well, denies chest pain and palpitations    REVIEW OF SYSTEMS:  CONSTITUTIONAL: No weakness  EYES/ENT: No visual changes;  No throat pain   NECK: No pain or stiffness  RESPIRATORY: No cough, wheezing; No shortness of breath  CARDIOVASCULAR: No chest pain or palpitations  GASTROINTESTINAL: No abdominal  pain. No nausea, vomiting, or hematemesis  GENITOURINARY: No dysuria, frequency or hematuria  NEUROLOGICAL: No stroke like symptoms  SKIN: No rashes    MEDICATIONS:        PHYSICAL EXAM:  T(C): 36.8 (07-29-24 @ 13:14), Max: 36.8 (07-29-24 @ 08:47)  HR: 86 (07-29-24 @ 13:14) (80 - 93)  BP: 110/60 (07-29-24 @ 13:14) (100/58 - 117/62)  RR: 18 (07-29-24 @ 13:14) (18 - 18)  SpO2: 97% (07-29-24 @ 13:14) (97% - 100%)  Wt(kg): --  I&O's Summary    28 Jul 2024 07:01  -  29 Jul 2024 07:00  --------------------------------------------------------  IN: 2235 mL / OUT: 1150 mL / NET: 1085 mL    29 Jul 2024 07:01  -  29 Jul 2024 15:33  --------------------------------------------------------  IN: 580 mL / OUT: 0 mL / NET: 580 mL          Appearance: In no distress	  HEENT:    PERRL, EOMI	  Cardiovascular:  S1 S2, No JVD  Respiratory: Lungs clear to auscultation	  Gastrointestinal:  Soft, Non-tender, + BS	  Vascularature:  No edema of LE  Psychiatric: Appropriate affect   Neuro: no acute focal deficits                               7.8    9.43  )-----------( 373      ( 29 Jul 2024 06:53 )             23.5     07-29    135  |  102  |  32<H>  ----------------------------<  190<H>  4.6   |  22  |  0.90    Ca    8.5      29 Jul 2024 06:52  Phos  2.4     07-29  Mg     1.9     07-29    TPro  6.1  /  Alb  2.7<L>  /  TBili  0.2  /  DBili  x   /  AST  43<H>  /  ALT  46<H>  /  AlkPhos  148<H>  07-29        Labs personally reviewed      ASSESSMENT/PLAN: 	  83M, poor historian, PMH of DM, asthma, no known PSHx recently admitted to [place] in 5/2024 for abdominal pain, found to have 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 Reports abdominal pain improved with episodes of emesis. Last BM/flatus was yesterday.        1. HTN  - SBP 170s overnight 7/20  - EKG biphasic anterior wall twi   - trops neg  - TTE 7/17 normal EF 55-60%, atrial septal defect  - TTE shows preserved EF, no pericardial effusion  - bp systolic acceptable.  Will start oral anti hypertensives when no longer npo    2. High Grade SBO  - as seen on CTA  - management per sugyael    3. Abnormal EKG - Denies ant cardiac Hx, CP or SOB  - Advise OP elective ischemic work up  - TTE with preserved EF    4. ASD - Probable secundum atrial septal defect with left to right flow. Mild to moderate pulmonary hypertension.  - OP cardiac MRI or ALYSSA to further evaluate    5. Cardiac Risk Stratification  - Patient is mod risk for mod risk for laparoscopic removal of foreign body in intestine, laparoscopic removal of foreign body in intestine. No contraindication to proceed  - Patient not in decompensated HF  - No tachy/shabbir arrhythmia  - No hx of severe MS/AS  - Tolerated procedure well             DOMO Ramos DO [facility]  Cardiovascular Medicine  [address], [address]  Available via call or text on [employer]  Office: [phone]

## 2024-07-29 NOTE — CHART NOTE - NSCHARTNOTEFT_GEN_A_CORE
NUTRITION FOLLOW UP NOTE    PATIENT SEEN FOR: TPN follow-up    SOURCE: [x] Patient  [x] Current Medical Record  [] RN  [x] Family/support person at bedside-daughter  [] Patient unavailable/inappropriate  [] Other:    CHART REVIEWED/EVENTS NOTED.  [] No changes to nutrition care plan to note  [x] Nutrition Status:  -Admitted to SICU for management of volume resuscitation in high grade SBO. Now transferred to medicine. TPN initiated 7/19  - now s/p POD2 diagnostic lap, small bowel resection, open removal of foreign body from small intestine  - NGT to [facility] discontinued on 7/24  -diet advanced to low fiber on 7/28  -Pt being weaned from TPN, received half dose yesterday, plan to discontinue today.   -Endocrine consulted for hyperglycemia     DIET ORDER:   Diet, Low Fiber:   Consistent Carbohydrate {Evening Snack} (CSTCHOSN) (07-28-24)      CURRENT DIET ORDER IS:  [x] Appropriate: PO diet + TPN  [] Inadequate:  [] Other:    NUTRITION INTAKE/PROVISION:  [x PO: daughter reports pt with good PO intake yesterday, consumed mostly all of meals. Fair intake this morning, reports tray arrived a little later and patient was no longer hungry. Tolerating low fiber diet well. Requesting Ensure shakes   [] Enteral Nutrition:  [x] Parenteral Nutrition:    PARENTERAL NUTRITION  [x] CPN (central PN)---half dose yesterday (7/28)  [] PPN (peripheral PN; max 900 mOsm/L)  [] Premixed/Multi Chamber Bags     GOAL PN: AA 105g, Dextrose 210g and SMOF Lipids 45g. To provide: 1584kcal and 105g protein. Based on dosing wt 43.5kg, provides: 36.4kcal/kg and 2.4g protein/kg.  Non-Protein Calories: 1164kcal (26.8kcal/kg)  Dextrose Infusion Rate: 3.4mg/kg/min (24-hr infusion)  Lipid Infusion Rate: 1.0mg/kg; 0.8mg/kg/min (12-hr infusion)    HALF DOSE (7/28): AA 60g, Dextrose 140g and SMOF Lipids 20g. To provide: 916kcal and 60g protein. Based on dosing wt 43.5kg, provides: 36.4kcal/kg and 2.4g protein/kg.    ANTHROPOMETRICS:  Drug Dosing Weight  Height (cm): 160 (23 Jul 2024 11:00)  Weight (kg): 43.5 (23 Jul 2024 11:00)  BMI (kg/m2): 17 (23 Jul 2024 11:00)  BSA (m2): 1.41 (23 Jul 2024 11:00)  Weights: no new weights to assess       MEDICATIONS:  MEDICATIONS  (STANDING):  acetaminophen     Tablet .. 650 milliGRAM(s) Oral every 6 hours  albuterol    90 MICROgram(s) HFA Inhaler 2 Puff(s) Inhalation every 6 hours  budesonide 160 MICROgram(s)/formoterol 4.5 MICROgram(s) Inhaler 2 Puff(s) Inhalation two times a day  chlorhexidine 2% Cloths 1 Application(s) Topical <User Schedule>  chlorhexidine 4% Liquid 1 Application(s) Topical <User Schedule>  dextrose 5%. 1000 milliLiter(s) (100 mL/Hr) IV Continuous <Continuous>  dextrose 5%. 1000 milliLiter(s) (50 mL/Hr) IV Continuous <Continuous>  dextrose 50% Injectable 12.5 Gram(s) IV Push once  dextrose 50% Injectable 25 Gram(s) IV Push once  dextrose 50% Injectable 25 Gram(s) IV Push once  glucagon  Injectable 1 milliGRAM(s) IntraMuscular once  heparin   Injectable 5000 Unit(s) SubCutaneous every 8 hours  insulin lispro (ADMELOG) corrective regimen sliding scale   SubCutaneous three times a day before meals  insulin lispro (ADMELOG) corrective regimen sliding scale   SubCutaneous at bedtime  pantoprazole  Injectable 40 milliGRAM(s) IV Push daily  Parenteral Nutrition - Adult 1 Each (38 mL/Hr) TPN Continuous <Continuous>  tamsulosin 0.4 milliGRAM(s) Oral at bedtime    MEDICATIONS  (PRN):  dextrose Oral Gel 15 Gram(s) Oral once PRN Blood Glucose LESS THAN 70 milliGRAM(s)/deciliter  oxyCODONE    IR 5 milliGRAM(s) Oral every 6 hours PRN Severe Pain (7 - 10)  oxyCODONE    IR 2.5 milliGRAM(s) Oral every 6 hours PRN Moderate Pain (4 - 6)  sodium chloride 0.9% lock flush 10 milliLiter(s) IV Push every 1 hour PRN Pre/post blood products, medications, blood draw, and to maintain line patency      NUTRITIONALLY PERTINENT LABS:  07-29 Na135 mmol/L Glu 190 mg/dL<H> K+ 4.6 mmol/L Cr  0.90 mg/dL BUN 32 mg/dL<H> 07-29 Phos 2.4 mg/dL<L> 07-29 Alb 2.7 g/dL<L> 07-16 PAB 7 mg/dL<L> 07-23 Chol --    LDL --    HDL --    Trig 175 mg/dL<H>07-29 ALT 46 U/L<H> AST 43 U/L<H> Alkaline Phosphatase 148 U/L<H>  07-16-24 @ 22:37 a1c 7.8  07-15-24 @ 22:52 a1c 7.7    A1C with Estimated Average Glucose Result: 7.8 % (07-16-24 @ 22:37)  A1C with Estimated Average Glucose Result: 7.7 % (07-15-24 @ 22:52)          Finger Sticks:  POCT Blood Glucose.: 131 mg/dL (07-29 @ 12:47)  POCT Blood Glucose.: 228 mg/dL (07-29 @ 09:04)  POCT Blood Glucose.: 230 mg/dL (07-28 @ 21:21)  POCT Blood Glucose.: 239 mg/dL (07-28 @ 17:07)      NUTRITIONALLY PERTINENT MEDICATIONS/LABS:  [x] Reviewed  [] Relevant notes on medications/labs:    EDEMA:  [x] Reviewed  [] Relevant notes:    GI/ I&O:  [x] Reviewed  [] Relevant notes:  [] Other:    SKIN:   [x] No pressure injuries documented, per nursing flowsheet  [] Pressure injury previously noted  [] Change in pressure injury documentation:  [] Other:    ESTIMATED NEEDS:  [] No change:  [x] Updated: with consideration for transition to PO diet   Energy: 2474-9408  kcal/day (35-40 kcal/kg)  Protein:  78-87g/day (1.5-2.0 g/kg)  Fluid:   ml/day or [x] defer to team  Based on: dosing wt 43.5kg, considering BMI 17      NUTRITION DIAGNOSIS:  [x] Prior Dx:  underweight, chronic severe protein calorie malnutrition  [] New Dx:    EDUCATION:  [x] Yes: Discussed with patient/daughter importance of PO intake and prioritizing sources of protein to maintain lean body mass, low fiber diet   [] Not appropriate/warranted    NUTRITION CARE PLAN:  1. Diet: Continue carbohydrate consistent + low fiber diet   2. Supplements: Ensure Max protein 2x daily   3. Multivitamin/mineral supplementation: recommend multivitamin once daily      [] Achieved - Continue current nutrition intervention(s)  [] Current medical condition precludes nutrition intervention at this time.    MONITORING AND EVALUATION:   RD remains available upon request and will follow up per protocol.    Beverly Beltran RD, CDN, CDCES, Available on Teams

## 2024-07-29 NOTE — PROGRESS NOTE ADULT - ASSESSMENT
83M PMH of Asthma and BPH on Flomax, last colonoscopy around 8-10 years ago (normal per wife/patient) no known PSHx recently admitted to [place] in 5/2024 for abdominal pain, found to have large ulcerating mass communicating with proximal jejunum on CT (discharged home with outpatient GI f/u for concern of contained perforation vs inflamed giant diverticulum) presented to [facility] given hematemesis constipation and abdominal pain. Reports abdominal pain improved with episodes of emesis. Last BM/flatus was yesterday.  TPN team consulted given concern for severe protein calorie malnutrition and anticipated prolonged inadequate caloric intake .    AA 105g, Dextrose 210g and SMOF Lipids 45g. To provide: 1584kcal and 105g protein. Based on dosing wt 43.5kg, provides: 36.4kcal/kg and 2.4g protein/kg.     - Nutritional Assessment, patient with severe protein calorie malnutrition not meeting nutritional goals enterally due to SBO/NPO status and would benefit from TPN for nutritional support; prealbumin only 7 mg/dL - trend weekly   - TPN plan:  STOP TPN today  - TPN access:  PICC in place; maintain per protocol; reserve one picc port for TPN only   - Hyponatremia/NAA:  trend in AM; BNP 2554 pg/mL; Echo with EF 55-60%; atrial septal defect with R flow; mod pulm HTN; BNP only 336 pg/mL on 7/24 (previoulsy 2k range); trend in AM and if continues to trend down, would check urine studies   - Anemia:  iron def noted; on IV Iron   - Electrolyte Imbalance risk:  recommend to check CMP, Mg, Phos and ionized Ca daily, to be supplemented via TPN  - SBO:  NGT removed 7/24; monitor diet advancement s/p FB removal and jejunal resection on 7/23  - Fever:  f/u Blood cx from 7/17 (neg to date); on Zosyn   - Recommend strict intake and output/weight checks three times a week  - Risk of glucose dysfunction with TPN:  HgA1c 7.8% on 7/17/24; continue finger sticks with RISS; glucose trend reviewed; decrease to 20 units RI in TPN to avoid hypoglycemia   - Hypocalcemia:  Ca Gluconate 10 meq in TPN; f/u Vitamin D/PTH  - Risk for Hypertriglyceridemia with TPN:  baseline lipid profile reviewed; TG 75 mg/dL on 7/17 --> 175 mg/dL from 7/23; monitor TG closely on TPN  - Global care per primary team     Available on TEAMS  TPN spectra [id number] ([phone] when dialing from outside line)  M-F 8A-2P, Weekends and holidays 8/9A-12/1P  Discussed with Dr Morris Comer   83M PMH of Asthma and BPH on Flomax, last colonoscopy around 8-10 years ago (normal per wife/patient) no known PSHx recently admitted to [facility] in 5/2024 for abdominal pain, found to have large ulcerating mass communicating with proximal jejunum on CT (discharged home with outpatient GI f/u for concern of contained perforation vs inflamed giant diverticulum) presented to [facility] given hematemesis constipation and abdominal pain. Reports abdominal pain improved with episodes of emesis. Last BM/flatus was yesterday.  TPN team consulted given concern for severe protein calorie malnutrition and anticipated prolonged inadequate caloric intake .    AA 105g, Dextrose 210g and SMOF Lipids 45g. To provide: 1584kcal and 105g protein. Based on dosing wt 43.5kg, provides: 36.4kcal/kg and 2.4g protein/kg.     - Nutritional Assessment, patient with severe protein calorie malnutrition not meeting nutritional goals enterally due to SBO/NPO status and would benefit from TPN for nutritional support; prealbumin only 7 mg/dL - trend weekly   - TPN plan:  STOP TPN today; add Ensure; monitor intake   - TPN access:  PICC in place; maintain per protocol; reserve one picc port for TPN only   - Hyponatremia/NAA:  trend in AM; BNP 2554 pg/mL; Echo with EF 55-60%; atrial septal defect with R flow; mod pulm HTN; BNP only 336 pg/mL on 7/24 (previoulsy 2k range); trend in AM and if continues to trend down, would check urine studies   - Anemia:  iron def noted; on IV Iron   - Electrolyte Imbalance risk:  recommend to check CMP, Mg, Phos and ionized Ca daily, to be supplemented via TPN  - SBO:  NGT removed 7/24; monitor diet advancement s/p FB removal and jejunal resection on 7/23  - Fever:  f/u Blood cx from 7/17 (neg to date); on Zosyn   - Recommend strict intake and output/weight checks three times a week  - Risk of glucose dysfunction with TPN:  HgA1c 7.8% on 7/17/24; continue finger sticks with RISS; glucose trend reviewed; Patient would benefit from Endo consultation to determine outpatient/PO hypoglycemics   - Hypocalcemia:  Vitamin D def- recommend Vitamin D repletion   - Risk for Hypertriglyceridemia with TPN:  baseline lipid profile reviewed; TG 75 mg/dL on 7/17 --> 175 mg/dL from 7/23; monitor TG closely on TPN  - Global care per primary team     Available on TEAMS  TPN spectra [id number] ([phone] when dialing from outside line)  M-F 8A-2P, Weekends and holidays 8/9A-12/1P  Discussed with Dr Morris Comer

## 2024-07-29 NOTE — DISCHARGE NOTE PROVIDER - DETAILS OF MALNUTRITION DIAGNOSIS/DIAGNOSES
This patient has been assessed with a concern for Malnutrition and was treated during this hospitalization for the following Nutrition diagnosis/diagnoses:     -  07/18/2024: Severe protein-calorie malnutrition   -  07/18/2024: Underweight (BMI < 19)

## 2024-07-29 NOTE — PROGRESS NOTE ADULT - SUBJECTIVE AND OBJECTIVE BOX
DAILY SURGERY PROGRESS NOTE    POD6 s/p ileocolic resection and foreign body removal.     Overnight Events:  No acute events overnight    SUBJECTIVE:  Reports pain well controlled  Denies nausea, vomiting  Is passing flatus and having bowel movements, denies diarrhea  Tolerating diet  Ambulating independently    OBJECTIVE:  Vital Signs Last 24 Hrs  T(C): 36.6 (29 Jul 2024 01:00), Max: 36.8 (28 Jul 2024 10:35)  T(F): 97.9 (29 Jul 2024 01:00), Max: 98.3 (28 Jul 2024 10:35)  HR: 90 (29 Jul 2024 01:00) (80 - 93)  BP: 104/62 (29 Jul 2024 01:00) (103/60 - 117/62)  BP(mean): --  RR: 18 (29 Jul 2024 01:00) (18 - 18)  SpO2: 99% (29 Jul 2024 01:00) (95% - 100%)    Parameters below as of 29 Jul 2024 01:00  Patient On (Oxygen Delivery Method): room air          Physical Examination:  GEN: NAD, resting quietly  NEURO: AAOx3, CN II-XII grossly intact, no focal deficits  PULM: symmetric chest rise bilaterally, no increased WOB  ABD: soft, nontender, nondistended  EXTR: no lower extremity edema, moving all extremities   DAILY SURGERY PROGRESS NOTE    POD6 s/p ileocolic resection and foreign body removal.     Overnight Events:  No acute events overnight    SUBJECTIVE:  Reports pain well controlled  Denies nausea, vomiting  Tolerating diet  Ambulating independently    OBJECTIVE:  Vital Signs Last 24 Hrs  T(C): 36.6 (29 Jul 2024 01:00), Max: 36.8 (28 Jul 2024 10:35)  T(F): 97.9 (29 Jul 2024 01:00), Max: 98.3 (28 Jul 2024 10:35)  HR: 90 (29 Jul 2024 01:00) (80 - 93)  BP: 104/62 (29 Jul 2024 01:00) (103/60 - 117/62)  BP(mean): --  RR: 18 (29 Jul 2024 01:00) (18 - 18)  SpO2: 99% (29 Jul 2024 01:00) (95% - 100%)    Parameters below as of 29 Jul 2024 01:00  Patient On (Oxygen Delivery Method): room air          Physical Examination:  GEN: NAD, resting quietly  NEURO: AAOx3, CN II-XII grossly intact, no focal deficits  PULM: symmetric chest rise bilaterally, no increased WOB  ABD: soft, nontender, nondistended  EXTR: no lower extremity edema, moving all extremities

## 2024-07-29 NOTE — PROGRESS NOTE ADULT - ASSESSMENT
83M, poor historian, PMH of DM, asthma, recently admitted to [place] in 5/2024 for abdominal pain, found to have large ulcerating mass communicating with proximal jejunum on CT there but no intervention at that time, presenting for few days of brown/bloody emesis and abdominal pain. Leukocytosis to 21 with lactate 3.9, improved to 3.1 after 1L bolus.  CTAP significant for SBO with TP in R mid abdomen, thickened jejunum, no grossly obvious focal mass, dilated stomach and distal esophagus; possible Crohn's disease. Admitted to SICU for management of volume resuscitation in high grade SBO. Clinically improving in SICU. CT on admission failed to reveal transition point or obstructive mass. Recent CT s/o 3.7 cm ring shaped foreign body in the distal jejunum which has moved since the last imaging.  Now POD 6 diagnostic lap, small bowel resection, open removal of foreign body from small intestine      Plan:  - Pain control- Tylenol, dilaudid  - TPN  - LRD  - DVT ppx- SQH  - Monitor I/Os  - OOB  - PT ev\[facility]\"" surgery  [id number] 83M, poor historian, PMH of DM, asthma, recently admitted to [place] in 5/2024 for abdominal pain, found to have large ulcerating mass communicating with proximal jejunum on CT there but no intervention at that time, presenting for few days of brown/bloody emesis and abdominal pain. Leukocytosis to 21 with lactate 3.9, improved to 3.1 after 1L bolus.  CTAP significant for SBO with TP in R mid abdomen, thickened jejunum, no grossly obvious focal mass, dilated stomach and distal esophagus; possible Crohn's disease. Admitted to SICU for management of volume resuscitation in high grade SBO. Clinically improving in SICU. CT on admission failed to reveal transition point or obstructive mass. Recent CT s/o 3.7 cm ring shaped foreign body in the distal jejunum which has moved since the last imaging.  Now POD 6 diagnostic lap, small bowel resection, open removal of foreign body from small intestine      Plan:  - PT Eval today   - Pain control- Tylenol, dilaudid  - TPN  - LRD  - DVT ppx- SQH  - Monitor I/Os  [facility]  [id number]

## 2024-07-29 NOTE — DISCHARGE NOTE PROVIDER - HOSPITAL COURSE
83M with PMH asthma, DM presents with  worsening upper aching abd pain over the last 4  days, Initially pain started 4 weeks ago, he went to [facility] and the treated him for constipation. Per son patient has had decreased PO intake past 4-5 weeks secondary  abdominal pain w/ meals. + 15lb wt loss over that period of time. Pt w/ 1 episode black emesis 1mo ago, 1 episode diarrhea w/ dark-red blood mixed into stool 2 days ago. Pt stated he has Hx constipation. Denies F/C, h/a, CP, SOB, cough, flank pain. Colonoscopy 8 yrs ago, normal per son, has never had upper endoscopy.    CT Small bowel obstruction with transition point in the right mid abdomen.   Thickening of jejunum in the left lower quadrant. Dilated vessels in the   rectum suggests transmural inflammation. No grossly obvious focal mass.   Consider acute on chronic inflammatory bowel disease (Crohn's disease).    As there is dilatation of the stomach and distal esophagus, the patient   may benefit from orogastric tube decompression. No free air or discrete   focal collection. No obvious fistulization.      Patient NPO/IVF/NGT  patient with lactic acidosis.     Admitted to SICU for resuscitation and close monitoring.     TPN consulted for nutrition     Repeat CT 7/18 Interval placement of an enteric tube with partial decompression of the   small bowel and stomach upstream of a transition point in the distal   jejunum at the level of a 3.7 cm ring-shaped foreign body, compatible   with persistent small bowel obstruction in the setting of a foreign body.   Transition point is unchanged from 7/16/2024. Enteric contrast progresses   beyond the transition point, suggesting partial obstruction. This   obstructing foreign body in the distal jejunum was previously located in   an inflamed outpouching at the duodenojejunal junction on 5/20/2024 CT.   There is marked inflammatory change at the level of the duodenojejunal   junction surrounding this outpouching, favoring contained perforation in   the setting of ulcer, presumably secondary to the foreign body, versus   diverticulitis of a large small bowel diverticulum.    New consolidative opacity in the right lower lobe, raising concern for   pneumonia. New small bilateral pleural effusions.    Started on Abx     7/23 taken to OR Diagnostic laparoscopy  Small bowel inspected laparoscopically until area of proximal dilation encountered in jejunum    Jejunum exteriorized through midline incision   Foreign body palpated, enterotomy made, foreign body removed     Post op had return of GI function NGT removed- started on clears advanced. TPN weaned off.     Endocrine consulted _     83M with PMH asthma, DM presents with  worsening upper aching abd pain over the last 4  days, Initially pain started 4 weeks ago, he went to [facility] and the treated him for constipation. Per son patient has had decreased PO intake past 4-5 weeks secondary  abdominal pain w/ meals. + 15lb wt loss over that period of time. Pt w/ 1 episode black emesis 1mo ago, 1 episode diarrhea w/ dark-red blood mixed into stool 2 days ago. Pt stated he has Hx constipation. Denies F/C, h/a, CP, SOB, cough, flank pain. Colonoscopy 8 yrs ago, normal per son, has never had upper endoscopy.    CT Small bowel obstruction with transition point in the right mid abdomen.   Thickening of jejunum in the left lower quadrant. Dilated vessels in the   rectum suggests transmural inflammation. No grossly obvious focal mass.   Consider acute on chronic inflammatory bowel disease (Crohn's disease).    As there is dilatation of the stomach and distal esophagus, the patient   may benefit from orogastric tube decompression. No free air or discrete   focal collection. No obvious fistulization.      Patient NPO/IVF/NGT  patient with lactic acidosis.     Admitted to SICU for resuscitation and close monitoring.     TPN consulted for nutrition     Repeat CT 7/18 Interval placement of an enteric tube with partial decompression of the   small bowel and stomach upstream of a transition point in the distal   jejunum at the level of a 3.7 cm ring-shaped foreign body, compatible   with persistent small bowel obstruction in the setting of a foreign body.   Transition point is unchanged from 7/16/2024. Enteric contrast progresses   beyond the transition point, suggesting partial obstruction. This   obstructing foreign body in the distal jejunum was previously located in   an inflamed outpouching at the duodenojejunal junction on 5/20/2024 CT.   There is marked inflammatory change at the level of the duodenojejunal   junction surrounding this outpouching, favoring contained perforation in   the setting of ulcer, presumably secondary to the foreign body, versus   diverticulitis of a large small bowel diverticulum.    New consolidative opacity in the right lower lobe, raising concern for   pneumonia. New small bilateral pleural effusions.    Started on Abx     7/23 taken to OR Diagnostic laparoscopy  Small bowel inspected laparoscopically until area of proximal dilation encountered in jejunum    Jejunum exteriorized through midline incision   Foreign body palpated, enterotomy made, foreign body removed     Post op had return of GI function NGT removed- started on clears advanced. TPN weaned off.     Endocrine consulted rec discharge without medication only to start trajenta if FS >200 for more than 2 days

## 2024-07-29 NOTE — CONSULT NOTE ADULT - CONSULT REQUESTED DATE/TIME
16-Jul-2024 12:50
21-Jul-2024 18:14
18-Jul-2024 18:21
19-Jul-2024 17:52
29-Jul-2024 14:13
16-Jul-2024

## 2024-07-29 NOTE — CONSULT NOTE ADULT - ASSESSMENT
**************************RECOMMENDATIONS NOT FINAL UNTIL SIGNED BY ATTENDING**************************  83M PMH of DM2, asthma, no known PSHx recently admitted to [place] in 5/2024 for abdominal pain, found to have 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 Patient admitted for SBO, possible Crohn's disease, now s/p diagnostic laparoscopy, small bowel resection, open removal of foreign body from small intestine. Patient was started on TPN 7/19, though patient is now transitioning to PO diet. C/c/b hyperglycemia. Endocrinology consulted for further management of hyperglycemia.    #T2DM with hyperglycemia  #Diet-controlled T2DM  - a1c 7.8%, appropriate for age-group  - BGs on TPN fluctuating  - currently on moderate dose insulin correction with meals and low dose insulin at bedtime    PLAN:  - start Lantus 5 un qhs and Admelog 3 un tidac  - change to LOW dose correctional insulin at meals   - c/w low dose correctional insulin at bedtime   - appreciate Nutrition recs    Discharge Recommendations:  - Final recs TBD, pending BG control off of TPN  - Consider starting metformin 500 mg daily, to be uptitrated by patient  - Patient may continue to follow up with PCP for diabetes management    #HTN  - no prior hx, noted this admission, possibly iso pain   - goal BP <130/80  - not currently on antihypertensives; ctm    **************************RECOMMENDATIONS NOT FINAL UNTIL SIGNED BY ATTENDING**************************  83M PMH of DM2, asthma, no known PSHx recently admitted to [place] in 5/2024 for abdominal pain, found to have 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 Patient admitted for SBO, possible Crohn's disease, now s/p diagnostic laparoscopy, small bowel resection, open removal of foreign body from small intestine. Patient was started on TPN 7/19, though patient is now transitioning to PO diet. C/c/b hyperglycemia. Endocrinology consulted for further management of hyperglycemia.    #T2DM with hyperglycemia  #Diet-controlled T2DM  - a1c 7.8%, appropriate for age-group  - BGs on TPN fluctuating  - currently on moderate dose insulin correction with meals and low dose insulin at bedtime    PLAN:  - start Tradjenta 5mg daily  - change to LOW dose correctional insulin at meals   - c/w low dose correctional insulin at bedtime   - appreciate Nutrition recs    Discharge Recommendations:  - Final recs TBD, pending BG control off of TPN  - Patient may continue to follow up with PCP for diabetes management    #HTN  - no prior hx, noted this admission, possibly iso pain   - goal BP <130/80  - not currently on antihypertensives; ctm      D/w Dr. Berry 83M PMH of DM2, asthma, no known PSHx recently admitted to [place] in 5/2024 for abdominal pain, found to have 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 Patient admitted for SBO, possible Crohn's disease, now s/p diagnostic laparoscopy, small bowel resection, open removal of foreign body from small intestine. Patient was started on TPN 7/19, though patient is now transitioning to PO diet. C/c/b hyperglycemia. Endocrinology consulted for further management of hyperglycemia.    #T2DM with hyperglycemia  #Diet-controlled T2DM  - a1c 7.8%, appropriate for age-group  - BGs on TPN fluctuating  - currently on moderate dose insulin correction with meals and low dose insulin at bedtime    PLAN:  - start Tradjenta 5mg daily  - change to LOW dose correctional insulin at meals and at bedtime   - appreciate Nutrition recs    Discharge Recommendations:  - Final recs TBD, pending BG control off of TPN  - Patient may continue to follow up with PCP for diabetes management    #HTN  - no prior hx, noted this admission, possibly iso pain   - goal BP <130/80  - not currently on antihypertensives; ctm      D/w Dr. Berry

## 2024-07-29 NOTE — CHART NOTE - NSCHARTNOTESELECT_GEN_ALL_CORE
Event Note
Nutrition Services
Post op check
Event Note
Nutrition Services
Nutrition Services
SICU Ultrasound/Event Note

## 2024-07-30 ENCOUNTER — TRANSCRIPTION ENCOUNTER (OUTPATIENT)
Age: 84
End: 2024-07-30

## 2024-07-30 VITALS
HEART RATE: 81 BPM | TEMPERATURE: 98 F | SYSTOLIC BLOOD PRESSURE: 110 MMHG | RESPIRATION RATE: 18 BRPM | OXYGEN SATURATION: 99 % | DIASTOLIC BLOOD PRESSURE: 55 MMHG

## 2024-07-30 DIAGNOSIS — E78.5 HYPERLIPIDEMIA, UNSPECIFIED: ICD-10-CM

## 2024-07-30 DIAGNOSIS — E11.65 TYPE 2 DIABETES MELLITUS WITH HYPERGLYCEMIA: ICD-10-CM

## 2024-07-30 LAB
ANION GAP SERPL CALC-SCNC: 9 MMOL/L — SIGNIFICANT CHANGE UP (ref 5–17)
BLD GP AB SCN SERPL QL: NEGATIVE — SIGNIFICANT CHANGE UP
BUN SERPL-MCNC: 26 MG/DL — HIGH (ref 7–23)
CALCIUM SERPL-MCNC: 8.7 MG/DL — SIGNIFICANT CHANGE UP (ref 8.4–10.5)
CHLORIDE SERPL-SCNC: 101 MMOL/L — SIGNIFICANT CHANGE UP (ref 96–108)
CO2 SERPL-SCNC: 24 MMOL/L — SIGNIFICANT CHANGE UP (ref 22–31)
CREAT SERPL-MCNC: 1 MG/DL — SIGNIFICANT CHANGE UP (ref 0.5–1.3)
EGFR: 74 ML/MIN/1.73M2 — SIGNIFICANT CHANGE UP
GLUCOSE BLDC GLUCOMTR-MCNC: 146 MG/DL — HIGH (ref 70–99)
GLUCOSE BLDC GLUCOMTR-MCNC: 168 MG/DL — HIGH (ref 70–99)
GLUCOSE SERPL-MCNC: 142 MG/DL — HIGH (ref 70–99)
HCT VFR BLD CALC: 24.3 % — LOW (ref 39–50)
HGB BLD-MCNC: 8.2 G/DL — LOW (ref 13–17)
MAGNESIUM SERPL-MCNC: 1.8 MG/DL — SIGNIFICANT CHANGE UP (ref 1.6–2.6)
MCHC RBC-ENTMCNC: 29.7 PG — SIGNIFICANT CHANGE UP (ref 27–34)
MCHC RBC-ENTMCNC: 33.7 GM/DL — SIGNIFICANT CHANGE UP (ref 32–36)
MCV RBC AUTO: 88 FL — SIGNIFICANT CHANGE UP (ref 80–100)
NRBC # BLD: 0 /100 WBCS — SIGNIFICANT CHANGE UP (ref 0–0)
PHOSPHATE SERPL-MCNC: 2.4 MG/DL — LOW (ref 2.5–4.5)
PLATELET # BLD AUTO: 388 K/UL — SIGNIFICANT CHANGE UP (ref 150–400)
POTASSIUM SERPL-MCNC: 4.5 MMOL/L — SIGNIFICANT CHANGE UP (ref 3.5–5.3)
POTASSIUM SERPL-SCNC: 4.5 MMOL/L — SIGNIFICANT CHANGE UP (ref 3.5–5.3)
RBC # BLD: 2.76 M/UL — LOW (ref 4.2–5.8)
RBC # FLD: 17 % — HIGH (ref 10.3–14.5)
RH IG SCN BLD-IMP: POSITIVE — SIGNIFICANT CHANGE UP
SODIUM SERPL-SCNC: 134 MMOL/L — LOW (ref 135–145)
WBC # BLD: 8.89 K/UL — SIGNIFICANT CHANGE UP (ref 3.8–10.5)
WBC # FLD AUTO: 8.89 K/UL — SIGNIFICANT CHANGE UP (ref 3.8–10.5)

## 2024-07-30 PROCEDURE — 86850 RBC ANTIBODY SCREEN: CPT

## 2024-07-30 PROCEDURE — 97110 THERAPEUTIC EXERCISES: CPT

## 2024-07-30 PROCEDURE — 82306 VITAMIN D 25 HYDROXY: CPT

## 2024-07-30 PROCEDURE — 93308 TTE F-UP OR LMTD: CPT

## 2024-07-30 PROCEDURE — 80053 COMPREHEN METABOLIC PANEL: CPT

## 2024-07-30 PROCEDURE — 83735 ASSAY OF MAGNESIUM: CPT

## 2024-07-30 PROCEDURE — 99232 SBSQ HOSP IP/OBS MODERATE 35: CPT

## 2024-07-30 PROCEDURE — 87040 BLOOD CULTURE FOR BACTERIA: CPT

## 2024-07-30 PROCEDURE — 83550 IRON BINDING TEST: CPT

## 2024-07-30 PROCEDURE — 84295 ASSAY OF SERUM SODIUM: CPT

## 2024-07-30 PROCEDURE — 85027 COMPLETE CBC AUTOMATED: CPT

## 2024-07-30 PROCEDURE — 71045 X-RAY EXAM CHEST 1 VIEW: CPT

## 2024-07-30 PROCEDURE — 87086 URINE CULTURE/COLONY COUNT: CPT

## 2024-07-30 PROCEDURE — 36415 COLL VENOUS BLD VENIPUNCTURE: CPT

## 2024-07-30 PROCEDURE — 82947 ASSAY GLUCOSE BLOOD QUANT: CPT

## 2024-07-30 PROCEDURE — 87641 MR-STAPH DNA AMP PROBE: CPT

## 2024-07-30 PROCEDURE — 84443 ASSAY THYROID STIM HORMONE: CPT

## 2024-07-30 PROCEDURE — 93306 TTE W/DOPPLER COMPLETE: CPT

## 2024-07-30 PROCEDURE — 93321 DOPPLER ECHO F-UP/LMTD STD: CPT

## 2024-07-30 PROCEDURE — 82962 GLUCOSE BLOOD TEST: CPT

## 2024-07-30 PROCEDURE — 82728 ASSAY OF FERRITIN: CPT

## 2024-07-30 PROCEDURE — C1751: CPT

## 2024-07-30 PROCEDURE — 97162 PT EVAL MOD COMPLEX 30 MIN: CPT

## 2024-07-30 PROCEDURE — 85025 COMPLETE CBC W/AUTO DIFF WBC: CPT

## 2024-07-30 PROCEDURE — 83540 ASSAY OF IRON: CPT

## 2024-07-30 PROCEDURE — 84478 ASSAY OF TRIGLYCERIDES: CPT

## 2024-07-30 PROCEDURE — 88307 TISSUE EXAM BY PATHOLOGIST: CPT

## 2024-07-30 PROCEDURE — 80048 BASIC METABOLIC PNL TOTAL CA: CPT

## 2024-07-30 PROCEDURE — 82803 BLOOD GASES ANY COMBINATION: CPT

## 2024-07-30 PROCEDURE — 74018 RADEX ABDOMEN 1 VIEW: CPT

## 2024-07-30 PROCEDURE — 84484 ASSAY OF TROPONIN QUANT: CPT

## 2024-07-30 PROCEDURE — 97530 THERAPEUTIC ACTIVITIES: CPT

## 2024-07-30 PROCEDURE — 85045 AUTOMATED RETICULOCYTE COUNT: CPT

## 2024-07-30 PROCEDURE — 86901 BLOOD TYPING SEROLOGIC RH(D): CPT

## 2024-07-30 PROCEDURE — 82010 KETONE BODYS QUAN: CPT

## 2024-07-30 PROCEDURE — 85018 HEMOGLOBIN: CPT

## 2024-07-30 PROCEDURE — 86900 BLOOD TYPING SEROLOGIC ABO: CPT

## 2024-07-30 PROCEDURE — 94640 AIRWAY INHALATION TREATMENT: CPT

## 2024-07-30 PROCEDURE — 88300 SURGICAL PATH GROSS: CPT

## 2024-07-30 PROCEDURE — C9399: CPT

## 2024-07-30 PROCEDURE — 97116 GAIT TRAINING THERAPY: CPT

## 2024-07-30 PROCEDURE — C1889: CPT

## 2024-07-30 PROCEDURE — 85014 HEMATOCRIT: CPT

## 2024-07-30 PROCEDURE — 84100 ASSAY OF PHOSPHORUS: CPT

## 2024-07-30 PROCEDURE — 82248 BILIRUBIN DIRECT: CPT

## 2024-07-30 PROCEDURE — 83605 ASSAY OF LACTIC ACID: CPT

## 2024-07-30 PROCEDURE — 93005 ELECTROCARDIOGRAM TRACING: CPT

## 2024-07-30 PROCEDURE — 82553 CREATINE MB FRACTION: CPT

## 2024-07-30 PROCEDURE — 82310 ASSAY OF CALCIUM: CPT

## 2024-07-30 PROCEDURE — 36569 INSJ PICC 5 YR+ W/O IMAGING: CPT

## 2024-07-30 PROCEDURE — 83880 ASSAY OF NATRIURETIC PEPTIDE: CPT

## 2024-07-30 PROCEDURE — 83690 ASSAY OF LIPASE: CPT

## 2024-07-30 PROCEDURE — 84132 ASSAY OF SERUM POTASSIUM: CPT

## 2024-07-30 PROCEDURE — 97535 SELF CARE MNGMENT TRAINING: CPT

## 2024-07-30 PROCEDURE — 84145 PROCALCITONIN (PCT): CPT

## 2024-07-30 PROCEDURE — 87640 STAPH A DNA AMP PROBE: CPT

## 2024-07-30 PROCEDURE — 85610 PROTHROMBIN TIME: CPT

## 2024-07-30 PROCEDURE — 84466 ASSAY OF TRANSFERRIN: CPT

## 2024-07-30 PROCEDURE — 83036 HEMOGLOBIN GLYCOSYLATED A1C: CPT

## 2024-07-30 PROCEDURE — 83970 ASSAY OF PARATHORMONE: CPT

## 2024-07-30 PROCEDURE — 81001 URINALYSIS AUTO W/SCOPE: CPT

## 2024-07-30 PROCEDURE — 82330 ASSAY OF CALCIUM: CPT

## 2024-07-30 PROCEDURE — 74177 CT ABD & PELVIS W/CONTRAST: CPT | Mod: MC

## 2024-07-30 PROCEDURE — 85730 THROMBOPLASTIN TIME PARTIAL: CPT

## 2024-07-30 PROCEDURE — 80061 LIPID PANEL: CPT

## 2024-07-30 PROCEDURE — 82435 ASSAY OF BLOOD CHLORIDE: CPT

## 2024-07-30 PROCEDURE — 84134 ASSAY OF PREALBUMIN: CPT

## 2024-07-30 PROCEDURE — 99285 EMERGENCY DEPT VISIT HI MDM: CPT

## 2024-07-30 PROCEDURE — 97166 OT EVAL MOD COMPLEX 45 MIN: CPT

## 2024-07-30 RX ORDER — TAMSULOSIN HCL 0.4 MG
1 CAPSULE ORAL
Refills: 0 | DISCHARGE

## 2024-07-30 RX ORDER — LINAGLIPTIN 5 MG/1
1 TABLET, FILM COATED ORAL
Qty: 30 | Refills: 0
Start: 2024-07-30 | End: 2024-08-28

## 2024-07-30 RX ORDER — OXYCODONE HYDROCHLORIDE 30 MG/1
2.5 TABLET ORAL
Qty: 0 | Refills: 0 | DISCHARGE
Start: 2024-07-30

## 2024-07-30 RX ORDER — ACETAMINOPHEN 500 MG
2 TABLET ORAL
Qty: 0 | Refills: 0 | DISCHARGE
Start: 2024-07-30

## 2024-07-30 RX ORDER — ERGOCALCIFEROL (VITAMIN D2) 200 MCG/ML
50000 DROPS ORAL
Refills: 0 | Status: DISCONTINUED | OUTPATIENT
Start: 2024-07-30 | End: 2024-07-30

## 2024-07-30 RX ORDER — PANTOPRAZOLE SODIUM 20 MG/1
1 TABLET, DELAYED RELEASE ORAL
Refills: 0 | DISCHARGE

## 2024-07-30 RX ORDER — ALBUTEROL 90 MCG
2 AEROSOL REFILL (GRAM) INHALATION
Refills: 0 | DISCHARGE

## 2024-07-30 RX ORDER — FLUTICASONE PROPION/SALMETEROL 500-50 MCG
2 BLISTER, WITH INHALATION DEVICE INHALATION
Refills: 0 | DISCHARGE

## 2024-07-30 RX ORDER — OXYCODONE HYDROCHLORIDE 30 MG/1
1 TABLET ORAL
Qty: 6 | Refills: 0
Start: 2024-07-30 | End: 2024-07-30

## 2024-07-30 RX ORDER — SODIUM PHOSPHATE, MONOBASIC, MONOHYDRATE 276; 142 MG/ML; MG/ML
15 INJECTION, SOLUTION INTRAVENOUS ONCE
Refills: 0 | Status: COMPLETED | OUTPATIENT
Start: 2024-07-30 | End: 2024-07-30

## 2024-07-30 RX ADMIN — Medication 650 MILLIGRAM(S): at 11:29

## 2024-07-30 RX ADMIN — Medication 50000 UNIT(S): at 11:29

## 2024-07-30 RX ADMIN — SODIUM PHOSPHATE, MONOBASIC, MONOHYDRATE 63.75 MILLIMOLE(S): 276; 142 INJECTION, SOLUTION INTRAVENOUS at 09:15

## 2024-07-30 RX ADMIN — CHLORHEXIDINE GLUCONATE 1 APPLICATION(S): 500 CLOTH TOPICAL at 09:39

## 2024-07-30 RX ADMIN — Medication 2 PUFF(S): at 05:51

## 2024-07-30 RX ADMIN — BUDESONIDE AND FORMOTEROL FUMARATE DIHYDRATE 2 PUFF(S): 80; 4.5 AEROSOL RESPIRATORY (INHALATION) at 05:51

## 2024-07-30 RX ADMIN — Medication 650 MILLIGRAM(S): at 05:50

## 2024-07-30 RX ADMIN — HEPARIN SODIUM 5000 UNIT(S): 1000 INJECTION, SOLUTION INTRAVENOUS; SUBCUTANEOUS at 05:51

## 2024-07-30 RX ADMIN — Medication 1: at 09:36

## 2024-07-30 RX ADMIN — PANTOPRAZOLE SODIUM 40 MILLIGRAM(S): 20 TABLET, DELAYED RELEASE ORAL at 11:29

## 2024-07-30 RX ADMIN — CHLORHEXIDINE GLUCONATE 1 APPLICATION(S): 500 CLOTH TOPICAL at 09:25

## 2024-07-30 RX ADMIN — LINAGLIPTIN 5 MILLIGRAM(S): 5 TABLET, FILM COATED ORAL at 11:29

## 2024-07-30 RX ADMIN — Medication 2 PUFF(S): at 00:00

## 2024-07-30 RX ADMIN — Medication 650 MILLIGRAM(S): at 05:58

## 2024-07-30 RX ADMIN — Medication 2 PUFF(S): at 11:28

## 2024-07-30 NOTE — PROGRESS NOTE ADULT - PROBLEM SELECTOR PLAN 1
- Test BG ac and hs.   - C/w low dose correctional insulin at meals and at bedtime   - Discontinue Tradjenta at this time due to recent GI surgery and pt not requiring almost any insulin at this time  Discharge Recommendations:  - Pt to test BG fasting and hs at home  -If BG >200s x2 days start Tradjenta 5mg day> Please write Rx for it  -Needs new glucose meter/strips and lancets  -Patient may continue to follow up with PCP for diabetes management  - Needs Optho

## 2024-07-30 NOTE — PROGRESS NOTE ADULT - ASSESSMENT
83M PMH of Asthma and BPH on Flomax, last colonoscopy around 8-10 years ago (normal per wife/patient) no known PSHx recently admitted to [place] in 5/2024 for abdominal pain, found to have large ulcerating mass communicating with proximal jejunum on CT (discharged home with outpatient GI f/u for concern of contained perforation vs inflamed giant diverticulum) presented to [facility] given hematemesis constipation and abdominal pain. Reports abdominal pain improved with episodes of emesis. Last BM/flatus was yesterday.  TPN team consulted given concern for severe protein calorie malnutrition and anticipated prolonged inadequate caloric intake .    AA 105g, Dextrose 210g and SMOF Lipids 45g. To provide: 1584kcal and 105g protein. Based on dosing wt 43.5kg, provides: 36.4kcal/kg and 2.4g protein/kg.     - Nutritional Assessment, patient with severe protein calorie malnutrition initially not meeting nutritional goals enterally due to SBO/NPO status and benefited from TPN for nutritional support; prealbumin only 7 mg/dL - f/u pending Prealbumin  - TPN plan:  TPN STOPPED on 7/29/24; continue Ensure; monitor intake   - TPN access:  PICC in place; maintain per protocol; reserve one picc port for TPN only; may be removed from TPN prospective  - Hyponatremia/NAA:  trend in AM; BNP 2554 pg/mL; Echo with EF 55-60%; atrial septal defect with R flow; mod pulm HTN; BNP only 336 pg/mL on 7/24 (previoulsy 2k range); trend in AM and if continues to trend down, would check urine studies; management per primary team   - Anemia:  iron def noted; s/p IV Iron   - Electrolyte Imbalance risk:  recommend to check CMP, Mg, Phos and ionized Ca daily, to be supplemented via TPN  - SBO:  NGT removed 7/24; s/p FB removal and jejunal resection on 7/23  - Recommend strict intake and output/weight checks three times a week  - Risk of glucose dysfunction with TPN:  HgA1c 7.8% on 7/17/24; finger sticks with RISS per Endo; glucose trend reviewed; management per Endo  - Hypocalcemia:  Vitamin D def- recommend Vitamin D repletion   - Risk for Hypertriglyceridemia with TPN:  baseline lipid profile reviewed; TG 75 mg/dL on 7/17 --> 175 mg/dL from 7/23; no need to trend TG from TPN prospective   - Global care per primary team     Available on TEAMS  TPN spectra [id number] ([phone] when dialing from outside line)  M-F 8A-2P, Weekends and holidays 8/9A-12/1P  Discussed with Dr Morris Comer

## 2024-07-30 NOTE — PROGRESS NOTE ADULT - ASSESSMENT
83M, poor historian, PMH of DM, asthma, recently admitted to [place] in 5/2024 for abdominal pain, found to have large ulcerating mass communicating with proximal jejunum on CT there but no intervention at that time, presenting for few days of brown/bloody emesis and abdominal pain. Leukocytosis to 21 with lactate 3.9, improved to 3.1 after 1L bolus.  CTAP significant for SBO with TP in R mid abdomen, thickened jejunum, no grossly obvious focal mass, dilated stomach and distal esophagus; possible Crohn's disease. Admitted to SICU for management of volume resuscitation in high grade SBO. Clinically improving in SICU. CT on admission failed to reveal transition point or obstructive mass. Recent CT s/o 3.7 cm ring shaped foreign body in the distal jejunum which has moved since the last imaging.  Now POD 6 diagnostic lap, small bowel resection, open removal of foreign body from small intestine      Plan:  - Pain contro: l- Tylenol, dilaudid  - Diet: LRD   - supplemTPN  - DVT ppx- SQH  - Monitor I/Os  - OOB  - PT rec home PT    [facility] surgery  [id number] 83M, poor historian, PMH of DM, asthma, recently admitted to [place] in 5/2024 for abdominal pain, found to have large ulcerating mass communicating with proximal jejunum on CT there but no intervention at that time, presenting for few days of brown/bloody emesis and abdominal pain. Leukocytosis to 21 with lactate 3.9, improved to 3.1 after 1L bolus.  CTAP significant for SBO with TP in R mid abdomen, thickened jejunum, no grossly obvious focal mass, dilated stomach and distal esophagus; possible Crohn's disease. Admitted to SICU for management of volume resuscitation in high grade SBO. Clinically improving in SICU. CT on admission failed to reveal transition point or obstructive mass. Recent CT s/o 3.7 cm ring shaped foreign body in the distal jejunum which has moved since the last imaging.  Now POD 7 diagnostic lap, small bowel resection, open removal of foreign body from small intestine      Plan:  - Pain contro: l- Tylenol, dilaudid  - Diet: LRD   - supplemental nutrition with TPN  - DVT ppx- SQH  - I/Os: monitor   - Activity: OOB  - Dispo: PT rec home PT    [facility] surgery  [id number] 83M, poor historian, PMH of DM, asthma, recently admitted to [place] in 5/2024 for abdominal pain, found to have large ulcerating mass communicating with proximal jejunum on CT there but no intervention at that time, presenting for few days of brown/bloody emesis and abdominal pain. Leukocytosis to 21 with lactate 3.9, improved to 3.1 after 1L bolus.  CTAP significant for SBO with TP in R mid abdomen, thickened jejunum, no grossly obvious focal mass, dilated stomach and distal esophagus; possible Crohn's disease. Admitted to SICU for management of volume resuscitation in high grade SBO. Clinically improving in SICU. CT on admission failed to reveal transition point or obstructive mass. Recent CT s/o 3.7 cm ring shaped foreign body in the distal jejunum which has moved since the last imaging.  Now POD 7 diagnostic lap, small bowel resection, open removal of foreign body from small intestine      Plan:  - Home today pending Endocrinology clearance  - Pain control: Tylenol, dilaudid  - Diet: LRD   - DVT ppx- SQH  - I/Os: monitor   - Activity: OOB  - Dispo: PT rec home PT    [facility] surgery  [id number] 83M, poor historian, PMH of DM, asthma, recently admitted to [place] in 5/2024 for abdominal pain, found to have large ulcerating mass communicating with proximal jejunum on CT there but no intervention at that time, presenting for few days of brown/bloody emesis and abdominal pain. Leukocytosis to 21 with lactate 3.9, improved to 3.1 after 1L bolus.  CTAP significant for SBO with TP in R mid abdomen, thickened jejunum, no grossly obvious focal mass, dilated stomach and distal esophagus; possible Crohn's disease. Admitted to SICU for management of volume resuscitation in high grade SBO. Clinically improving in SICU. CT on admission failed to reveal transition point or obstructive mass. Recent CT s/o 3.7 cm ring shaped foreign body in the distal jejunum which has moved since the last imaging.  Now POD 7 diagnostic lap, small bowel resection, open removal of foreign body from small intestine      Plan:  - Home today - Pain control: Tylenol, dilaudid  - Diet: LRD   - DVT ppx- SQH  - I/Os: monitor   - Activity: OOB  - Dispo: PT rec home PT    [facility] surgery  [id number]

## 2024-07-30 NOTE — PROGRESS NOTE ADULT - SUBJECTIVE AND OBJECTIVE BOX
DIABETES FOLLOW UP NOTE: Saw pt earlier today    Chief Complaint: Endocrine consult requested for management of DM    INTERVAL HX: Pt stable, reports tolerating POs and eating about 40 to 50% of his meals with BG levels between 100s to 200s while on correction Admelog scale and Tradjenta which was started today. No hypoglycemia. Pt/wife reports his BG levels at home are in 100s without any meds> lost weight (has an old glucose meter).  Denies pain/N/V/D at time of visit and is eager to go home today.       Review of Systems:  General: As above  Cardiovascular: No chest pain, palpitations  Respiratory: No SOB, no cough  GI: No nausea, vomiting, abdominal pain  Endocrine: No S&Sx of hypoglycemia    Allergies    No Known Allergies    Intolerances      MEDICATIONS:  ergocalciferol 52271 Unit(s) Oral every week  insulin lispro (ADMELOG) corrective regimen sliding scale   SubCutaneous at bedtime  insulin lispro (ADMELOG) corrective regimen sliding scale   SubCutaneous three times a day before meals  linagliptin 5 milliGRAM(s) Oral daily      PHYSICAL EXAM:  VITALS: T(C): 36.6 (07-30-24 @ 13:05)  T(F): 97.8 (07-30-24 @ 13:05), Max: 98.5 (07-30-24 @ 00:56)  HR: 81 (07-30-24 @ 13:05) (77 - 87)  BP: 110/55 (07-30-24 @ 13:05) (99/41 - 138/54)  RR:  (18 - 18)  SpO2:  (97% - 100%)  Wt(kg): --  GENERAL: Male laying in bed  in NAD. Wife at bedside (per wife she is a RN and pt has daughters who are RNs too)  Abdomen: Soft, nontender, non distended  Extremities: Warm, no edema in all 4 exts  NEURO: A&O X3    LABS:  POCT Blood Glucose.: 146 mg/dL (07-30-24 @ 13:49)  POCT Blood Glucose.: 168 mg/dL (07-30-24 @ 09:32)  POCT Blood Glucose.: 121 mg/dL (07-29-24 @ 21:04)  POCT Blood Glucose.: 201 mg/dL (07-29-24 @ 17:17)  POCT Blood Glucose.: 131 mg/dL (07-29-24 @ 12:47)  POCT Blood Glucose.: 228 mg/dL (07-29-24 @ 09:04)  POCT Blood Glucose.: 230 mg/dL (07-28-24 @ 21:21)  POCT Blood Glucose.: 239 mg/dL (07-28-24 @ 17:07)  POCT Blood Glucose.: 310 mg/dL (07-28-24 @ 14:18)  POCT Blood Glucose.: 198 mg/dL (07-28-24 @ 08:13)  POCT Blood Glucose.: 185 mg/dL (07-27-24 @ 23:54)  POCT Blood Glucose.: 163 mg/dL (07-27-24 @ 17:08)                            8.2    8.89  )-----------( 388      ( 30 Jul 2024 07:32 )             24.3       07-30    134<L>  |  101  |  26<H>  ----------------------------<  142<H>  4.5   |  24  |  1.00    eGFR: 74    Ca    8.7      07-30  Mg     1.8     07-30  Phos  2.4     07-30    TPro  6.1  /  Alb  2.7<L>  /  TBili  0.2  /  DBili  x   /  AST  43<H>  /  ALT  46<H>  /  AlkPhos  148<H>  07-29    Thyroid Function Tests:  07-16 @ 23:00 TSH 4.94 FreeT4 -- T3 -- Anti TPO -- Anti Thyroglobulin Ab -- TSI --      A1C with Estimated Average Glucose Result: 7.8 % (07-16-24 @ 22:37)  A1C with Estimated Average Glucose Result: 7.7 % (07-15-24 @ 22:52)      Estimated Average Glucose: 177 mg/dL (07-16-24 @ 22:37)  Estimated Average Glucose: 174 mg/dL (07-15-24 @ 22:52)        07-23 Chol -- Direct LDL -- LDL calculated -- HDL -- Trig 175<H>, 07-22 Chol -- Direct LDL -- LDL calculated -- HDL -- Trig 158<H>, 07-21 Chol -- Direct LDL -- LDL calculated -- HDL -- Trig 229<H>, 07-20 Chol -- Direct LDL -- LDL calculated -- HDL -- Trig 175<H>, 07-16 Chol 94 Direct LDL -- LDL calculated 47 HDL 31<L> Trig 75

## 2024-07-30 NOTE — PROGRESS NOTE ADULT - REASON FOR ADMISSION
SBO

## 2024-07-30 NOTE — PROGRESS NOTE ADULT - SUBJECTIVE AND OBJECTIVE BOX
DATE OF SERVICE: 07-30-24 @ 14:01    Patient is a 83y old  Male who presents with a chief complaint of SBO (30 Jul 2024 12:56)      INTERVAL HISTORY: Feels ok.     REVIEW OF SYSTEMS:  CONSTITUTIONAL: No weakness  EYES/ENT: No visual changes;  No throat pain   NECK: No pain or stiffness  RESPIRATORY: No cough, wheezing; No shortness of breath  CARDIOVASCULAR: No chest pain or palpitations  GASTROINTESTINAL: No abdominal  pain. No nausea, vomiting, or hematemesis  GENITOURINARY: No dysuria, frequency or hematuria  NEUROLOGICAL: No stroke like symptoms  SKIN: No rashes      	  MEDICATIONS:        PHYSICAL EXAM:  T(C): 36.6 (07-30-24 @ 13:05), Max: 36.9 (07-30-24 @ 00:56)  HR: 81 (07-30-24 @ 13:05) (77 - 87)  BP: 110/55 (07-30-24 @ 13:05) (99/41 - 138/54)  RR: 18 (07-30-24 @ 13:05) (18 - 18)  SpO2: 99% (07-30-24 @ 13:05) (97% - 100%)  Wt(kg): --  I&O's Summary    29 Jul 2024 07:01  -  30 Jul 2024 07:00  --------------------------------------------------------  IN: 1036 mL / OUT: 0 mL / NET: 1036 mL          Appearance: In no distress	  HEENT:    PERRL, EOMI	  Cardiovascular:  S1 S2, No JVD  Respiratory: Lungs clear to auscultation	  Gastrointestinal:  Soft, Non-tender, + BS	  Vascularature:  No edema of LE  Psychiatric: Appropriate affect   Neuro: no acute focal deficits                               8.2    8.89  )-----------( 388      ( 30 Jul 2024 07:32 )             24.3     07-30    134<L>  |  101  |  26<H>  ----------------------------<  142<H>  4.5   |  24  |  1.00    Ca    8.7      30 Jul 2024 07:32  Phos  2.4     07-30  Mg     1.8     07-30    TPro  6.1  /  Alb  2.7<L>  /  TBili  0.2  /  DBili  x   /  AST  43<H>  /  ALT  46<H>  /  AlkPhos  148<H>  07-29        Labs personally reviewed      ASSESSMENT/PLAN: 	    83M, poor historian, PMH of DM, asthma, no known PSHx recently admitted to [place] in 5/2024 for abdominal pain, found to have 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 Reports abdominal pain improved with episodes of emesis. Last BM/flatus was yesterday.        1. HTN  - SBP 170s overnight 7/20  - EKG biphasic anterior wall twi   - trops neg  - TTE 7/17 normal EF 55-60%, atrial septal defect  - TTE shows preserved EF, no pericardial effusion  - bp systolic acceptable.  Will DC home off anti-hypertensives given BP now soft.     2. High Grade SBO  - as seen on CTA  - management per sugyael    3. Abnormal EKG - Denies ant cardiac Hx, CP or SOB  - Advise OP elective ischemic work up  - TTE with preserved EF    4. ASD - Probable secundum atrial septal defect with left to right flow. Mild to moderate pulmonary hypertension.  - OP cardiac MRI or ALYSSA to further evaluate    5. Cardiac Risk Stratification  - Patient is mod risk for mod risk for laparoscopic removal of foreign body in intestine, laparoscopic removal of foreign body in intestine. No contraindication to proceed  - Patient not in decompensated HF  - No tachy/shabbir arrhythmia  - No hx of severe MS/AS  - Tolerated procedure well           Marcela Ramos, SITA-NP   Leonid Sal DO [facility]  Cardiovascular Medicine  [address], [address]  Available through call or text on [employer] TEAMs  Office: [phone]   DATE OF SERVICE: 07-30-24 @ 14:01    Patient is a 83y old  Male who presents with a chief complaint of SBO (30 Jul 2024 12:56)      INTERVAL HISTORY: Feels ok.     REVIEW OF SYSTEMS:  CONSTITUTIONAL: No weakness  EYES/ENT: No visual changes;  No throat pain   NECK: No pain or stiffness  RESPIRATORY: No cough, wheezing; No shortness of breath  CARDIOVASCULAR: No chest pain or palpitations  GASTROINTESTINAL: No abdominal  pain. No nausea, vomiting, or hematemesis  GENITOURINARY: No dysuria, frequency or hematuria  NEUROLOGICAL: No stroke like symptoms  SKIN: No rashes      	  MEDICATIONS:        PHYSICAL EXAM:  T(C): 36.6 (07-30-24 @ 13:05), Max: 36.9 (07-30-24 @ 00:56)  HR: 81 (07-30-24 @ 13:05) (77 - 87)  BP: 110/55 (07-30-24 @ 13:05) (99/41 - 138/54)  RR: 18 (07-30-24 @ 13:05) (18 - 18)  SpO2: 99% (07-30-24 @ 13:05) (97% - 100%)  Wt(kg): --  I&O's Summary    29 Jul 2024 07:01  -  30 Jul 2024 07:00  --------------------------------------------------------  IN: 1036 mL / OUT: 0 mL / NET: 1036 mL          Appearance: In no distress	  HEENT:    PERRL, EOMI	  Cardiovascular:  S1 S2, No JVD  Respiratory: Lungs clear to auscultation	  Gastrointestinal:  Soft, Non-tender, + BS	  Vascularature:  No edema of LE  Psychiatric: Appropriate affect   Neuro: no acute focal deficits                               8.2    8.89  )-----------( 388      ( 30 Jul 2024 07:32 )             24.3     07-30    134<L>  |  101  |  26<H>  ----------------------------<  142<H>  4.5   |  24  |  1.00    Ca    8.7      30 Jul 2024 07:32  Phos  2.4     07-30  Mg     1.8     07-30    TPro  6.1  /  Alb  2.7<L>  /  TBili  0.2  /  DBili  x   /  AST  43<H>  /  ALT  46<H>  /  AlkPhos  148<H>  07-29        Labs personally reviewed      ASSESSMENT/PLAN: 	    83M, poor historian, PMH of DM, asthma, no known PSHx recently admitted to [place] in 5/2024 for abdominal pain, found to have 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 Reports abdominal pain improved with episodes of emesis. Last BM/flatus was yesterday.        1. HTN  - SBP 170s overnight 7/20  - EKG biphasic anterior wall twi   - trops neg  - TTE 7/17 normal EF 55-60%, atrial septal defect  - TTE shows preserved EF, no pericardial effusion  - bp systolic acceptable.  Will DC home off anti-hypertensives given BP now soft.     2. High Grade SBO  - as seen on CTA  - management per sugyael    3. Abnormal EKG - Denies ant cardiac Hx, CP or SOB  - Advise OP elective ischemic work up  - TTE with preserved EF    4. ASD - Probable secundum atrial septal defect with left to right flow. Mild to moderate pulmonary hypertension.  - OP cardiac MRI or ALYSSA to further evaluate    5. Cardiac Risk Stratification  - Patient is mod risk for mod risk for laparoscopic removal of foreign body in intestine, laparoscopic removal of foreign body in intestine. No contraindication to proceed  - Patient not in decompensated HF  - No tachy/shabbir arrhythmia  - No hx of severe MS/AS  - Tolerated procedure well           Marcela Ramos, SITA-NP   Leonid Sal DO [facility]  Cardiovascular Medicine  [address], [address]  Available through call or text on [employer] TEAM[employer]  Office: [phone]

## 2024-07-30 NOTE — DISCHARGE NOTE NURSING/CASE MANAGEMENT/SOCIAL WORK - PATIENT PORTAL LINK FT
You can access the FollowMyHealth Patient Portal offered by [facility] by registering at the following website: [email or web address]. By joining [employer]’s FollowMyHealth portal, you will also be able to view your health information using other applications (apps) compatible with our system.

## 2024-07-30 NOTE — PROGRESS NOTE ADULT - ASSESSMENT
83M w/h/o T2DM with unknown control due to skewed A1C levels in the setting of anemia. No known DM complications. Also h/o asthma, BPH, anemia. Presented to [place] in 5/2024 for abdominal pain, found to have 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 Patient admitted for SBO, possible Crohn's disease, now s/p diagnostic laparoscopy, small bowel resection, open removal of foreign body from small intestine. Endocrinology consulted for management of hyperglycemia. Patient was started on TPN 7/19, but is now tolerating POs with BG at goal requiring minimal doses of insulin at this time. Pt improving PO intake and going home today. Started Tradjenta today. No hypoglycemia.    Discussed with pt/wife and surgical team discharge plan of care. All questions answered and pt to f/u with PCP.

## 2024-07-30 NOTE — PROGRESS NOTE ADULT - SUBJECTIVE AND OBJECTIVE BOX
[facility] NUTRITION SUPPORT-- FOLLOW UP NOTE      24 hour events/subjective:  Patient seen and examined at bedside, chart reviewed and events noted and I's and O's reviewed.  Patient denies chest pain, shortness of breath, nausea or vomiting, dizziness, chills at time of visit.  Patient's VSS and no other acute overnight events noted.   Patient's TPN stopped yesterday; diet advanced and Ensure added.     ROS:  Except as noted above, all other systems reviewed and are negative     Diet:  Diet, Low Fiber:   Consistent Carbohydrate Evening Snack (CSTCHOSN)  Supplement Feeding Modality:  Oral  Ensure Max Cans or Servings Per Day:  2       Frequency:  Daily (07-30-24 @ 07:18)      PAST HISTORY  --------------------------------------------------------------------------------  PAST MEDICAL & SURGICAL HISTORY:  Asthma      DM (diabetes mellitus)      BPH (benign prostatic hyperplasia)      Diabetes      Asthma      Asthma      Diabetes      S/P cataract extraction      No significant past surgical history        No significant changes to PMH, PSH, FHx, SHx, unless otherwise noted    ALLERGIES & MEDICATIONS  --------------------------------------------------------------------------------  Allergies    No Known Allergies    Intolerances      Standing Inpatient Medications  acetaminophen     Tablet .. 650 milliGRAM(s) Oral every 6 hours  albuterol    90 MICROgram(s) HFA Inhaler 2 Puff(s) Inhalation every 6 hours  budesonide 160 MICROgram(s)/formoterol 4.5 MICROgram(s) Inhaler 2 Puff(s) Inhalation two times a day  chlorhexidine 2% Cloths 1 Application(s) Topical <User Schedule>  chlorhexidine 4% Liquid 1 Application(s) Topical <User Schedule>  dextrose 5%. 1000 milliLiter(s) IV Continuous <Continuous>  dextrose 5%. 1000 milliLiter(s) IV Continuous <Continuous>  dextrose 50% Injectable 12.5 Gram(s) IV Push once  dextrose 50% Injectable 25 Gram(s) IV Push once  dextrose 50% Injectable 25 Gram(s) IV Push once  ergocalciferol 93992 Unit(s) Oral every week  glucagon  Injectable 1 milliGRAM(s) IntraMuscular once  heparin   Injectable 5000 Unit(s) SubCutaneous every 8 hours  insulin lispro (ADMELOG) corrective regimen sliding scale   SubCutaneous three times a day before meals  insulin lispro (ADMELOG) corrective regimen sliding scale   SubCutaneous at bedtime  linagliptin 5 milliGRAM(s) Oral daily  pantoprazole  Injectable 40 milliGRAM(s) IV Push daily  tamsulosin 0.4 milliGRAM(s) Oral at bedtime    PRN Inpatient Medications  dextrose Oral Gel 15 Gram(s) Oral once PRN  oxyCODONE    IR 5 milliGRAM(s) Oral every 6 hours PRN  oxyCODONE    IR 2.5 milliGRAM(s) Oral every 6 hours PRN  sodium chloride 0.9% lock flush 10 milliLiter(s) IV Push every 1 hour PRN        VITALS/PHYSICAL EXAM  --------------------------------------------------------------------------------  T(C): 36.6 (07-30-24 @ 08:56), Max: 36.9 (07-30-24 @ 00:56)  HR: 77 (07-30-24 @ 08:56) (77 - 87)  BP: 105/55 (07-30-24 @ 08:56) (99/41 - 138/54)  RR: 18 (07-30-24 @ 08:56) (18 - 18)  SpO2: 98% (07-30-24 @ 08:56) (97% - 100%)  Wt(kg): --      07-29-24 @ 07:01  -  07-30-24 @ 07:00  --------------------------------------------------------  IN: 1036 mL / OUT: 0 mL / NET: 1036 mL      I&O's Detail    29 Jul 2024 07:01  -  30 Jul 2024 07:00  --------------------------------------------------------  IN:    Oral Fluid: 580 mL    TPN (Total Parenteral Nutrition): 456 mL  Total IN: 1036 mL    OUT:  Total OUT: 0 mL    Total NET: 1036 mL          Physical Exam:  Gen: NAD, thin/frail appearing; laying in bed   HEENT: supple neck, no JVD  Chest: non labored breathing, equal chest expansion bilaterally  Abd: soft, nontender/nondistended  : No suprapubic tenderness  Ext: Warm, FROM, no edema b/l LE  Neuro: No focal deficits  Psych: Normal affect and mood  Skin: Warm, without rashes           LABS/STUDIES  --------------------------------------------------------------------------------              8.2    8.89  >-----------<  388      [07-30-24 @ 07:32]              24.3     134  |  101  |  26  ----------------------------<  142      [07-30-24 @ 07:32]  4.5   |  24  |  1.00        Ca     8.7     [07-30-24 @ 07:32]      iCa    1.15     [07-29 @ 06:58]      Mg     1.8     [07-30-24 @ 07:32]      Phos  2.4     [07-30-24 @ 07:32]    TPro  6.1  /  Alb  2.7  /  TBili  0.2  /  DBili  x   /  AST  43  /  ALT  46  /  AlkPhos  148  [07-29-24 @ 06:52]          Ca ionized  Creatinine Trend:  POC glucoseGlucose: 142 mg/dL (07-30-24 @ 07:32)  CAPILLARY BLOOD GLUCOSE      POCT Blood Glucose.: 168 mg/dL (30 Jul 2024 09:32)  POCT Blood Glucose.: 121 mg/dL (29 Jul 2024 21:04)  POCT Blood Glucose.: 201 mg/dL (29 Jul 2024 17:17)    PrealbuminPrealbumin, Serum: 7 mg/dL (07-16-24 @ 23:00)    Triglycerides

## 2024-07-30 NOTE — PROGRESS NOTE ADULT - PROBLEM SELECTOR PLAN 3
LDL goal <70 due to DM  Pt LDL NA  Order fasting lipid if not recently done  Pt would benefit from moderate intensity statin in not contraindicated due to DM dx placing him at risk for CV events  Manage per primary team   F/u levels as out pt

## 2024-07-30 NOTE — PROGRESS NOTE ADULT - NUTRITIONAL ASSESSMENT
This patient has been assessed with a concern for Malnutrition and has been determined to have a diagnosis/diagnoses of Severe protein-calorie malnutrition and Underweight (BMI < 19).    This patient is being managed with:   Parenteral Nutrition - Adult-  Entered: Jul 19 2024  5:00PM    Diet NPO-  Entered: Jul 16 2024  7:13AM  
This patient has been assessed with a concern for Malnutrition and has been determined to have a diagnosis/diagnoses of Severe protein-calorie malnutrition and Underweight (BMI < 19).    This patient is being managed with:   lipid fat emulsion (Fish Oil and Plant Based) 20% Infusion-[Known as SMOFLIPID 20% Infusion]  45 Gram(s) in IV Solution 225 milliLiter(s) infuse at 18.7 mL/Hr  Dose Rate: 18.7 mL/Hr Infuse Over: 12 Hours  Administration Instructions: Use 1.2 micron in-line filter  Entered: Jul 27 2024  5:00PM    Parenteral Nutrition - Adult-  Entered: Jul 27 2024  5:00PM    Diet Clear Liquid-  Consistent Carbohydrate {Evening Snack} (CSTCHOSN)  Supplement Feeding Modality:  Oral  Ensure Clear Cans or Servings Per Day:  1       Frequency:  Two Times a day  Entered: Jul 27 2024 10:47AM  
This patient has been assessed with a concern for Malnutrition and has been determined to have a diagnosis/diagnoses of Underweight (BMI < 19) and Severe protein-calorie malnutrition.    This patient is being managed with:   Parenteral Nutrition - Adult-  Entered: Jul 22 2024  5:00PM    lipid fat emulsion (Fish Oil and Plant Based) 20% Infusion-[Known as SMOFLIPID 20% Infusion]  45 Gram(s) in IV Solution 225 milliLiter(s) infuse at 18.7 mL/Hr  Dose Rate: 18.7 mL/Hr Infuse Over: 12 Hours  Administration Instructions: Use 1.2 micron in-line filter  Entered: Jul 22 2024 11:49AM    Diet NPO-  Entered: Jul 16 2024  7:13AM  
This patient has been assessed with a concern for Malnutrition and has been determined to have a diagnosis/diagnoses of Severe protein-calorie malnutrition and Underweight (BMI < 19).    This patient is being managed with:   Parenteral Nutrition - Adult-  Entered: Jul 23 2024  5:00PM    lipid fat emulsion (Fish Oil and Plant Based) 20% Infusion-[Known as SMOFLIPID 20% Infusion]  45 Gram(s) in IV Solution 225 milliLiter(s) infuse at 18.7 mL/Hr  Dose Rate: 18.7 mL/Hr Infuse Over: 12 Hours  Administration Instructions: Use 1.2 micron in-line filter  Entered: Jul 23 2024  5:00PM    Diet NPO-  Entered: Jul 23 2024  3:01PM  
This patient has been assessed with a concern for Malnutrition and has been determined to have a diagnosis/diagnoses of Severe protein-calorie malnutrition and Underweight (BMI < 19).    This patient is being managed with:   Diet NPO-  Entered: Jul 16 2024  7:13AM  
This patient has been assessed with a concern for Malnutrition and has been determined to have a diagnosis/diagnoses of Severe protein-calorie malnutrition and Underweight (BMI < 19).    This patient is being managed with:   lipid fat emulsion (Fish Oil and Plant Based) 20% Infusion-[Known as SMOFLIPID 20% Infusion]  45 Gram(s) in IV Solution 225 milliLiter(s) infuse at 18.7 mL/Hr  Dose Rate: 18.7 mL/Hr Infuse Over: 12 Hours  Administration Instructions: Use 1.2 micron in-line filter  Entered: Jul 25 2024  5:00PM    Parenteral Nutrition - Adult-  Entered: Jul 25 2024  5:00PM    Diet NPO-  Except Medications  Entered: Jul 24 2024  8:57PM  
This patient has been assessed with a concern for Malnutrition and has been determined to have a diagnosis/diagnoses of Severe protein-calorie malnutrition and Underweight (BMI < 19).    This patient is being managed with:   Diet Low Fiber-  Consistent Carbohydrate {Evening Snack} (CSTCHOSN)  Entered: Jul 28 2024  9:14AM  
This patient has been assessed with a concern for Malnutrition and has been determined to have a diagnosis/diagnoses of Severe protein-calorie malnutrition and Underweight (BMI < 19).    This patient is being managed with:   Diet NPO-  Except Medications  Entered: Jul 24 2024  8:57PM    lipid fat emulsion (Fish Oil and Plant Based) 20% Infusion-[Known as SMOFLIPID 20% Infusion]  45 Gram(s) in IV Solution 225 milliLiter(s) infuse at 18.7 mL/Hr  Dose Rate: 18.7 mL/Hr Infuse Over: 12 Hours  Administration Instructions: Use 1.2 micron in-line filter  Entered: Jul 24 2024  5:00PM    Parenteral Nutrition - Adult-  Entered: Jul 24 2024  5:00PM  
This patient has been assessed with a concern for Malnutrition and has been determined to have a diagnosis/diagnoses of Severe protein-calorie malnutrition and Underweight (BMI < 19).    This patient is being managed with:   Parenteral Nutrition - Adult-  Entered: Jul 21 2024  5:00PM    lipid fat emulsion (Fish Oil and Plant Based) 20% Infusion-[Known as SMOFLIPID 20% Infusion]  45 Gram(s) in IV Solution 225 milliLiter(s) infuse at 18.7 mL/Hr  Dose Rate: 18.7 mL/Hr Infuse Over: 12 Hours  Administration Instructions: Use 1.2 micron in-line filter  Entered: Jul 21 2024 10:44AM    Diet NPO-  Entered: Jul 16 2024  7:13AM  
This patient has been assessed with a concern for Malnutrition and has been determined to have a diagnosis/diagnoses of Severe protein-calorie malnutrition and Underweight (BMI < 19).    This patient is being managed with:   Parenteral Nutrition - Adult-  Entered: Jul 28 2024  5:00PM    lipid fat emulsion (Fish Oil and Plant Based) 20% Infusion-[Known as SMOFLIPID 20% Infusion]  20 Gram(s) in IV Solution 100 milliLiter(s) infuse at 8.3 mL/Hr  Dose Rate: 8.3 mL/Hr Infuse Over: 12 Hours  Administration Instructions: Use 1.2 micron in-line filter  Entered: Jul 28 2024 10:05AM    Diet Low Fiber-  Consistent Carbohydrate {Evening Snack} (CSTCHOSN)  Entered: Jul 28 2024  9:14AM  
This patient has been assessed with a concern for Malnutrition and has been determined to have a diagnosis/diagnoses of Severe protein-calorie malnutrition and Underweight (BMI < 19).    This patient is being managed with:   lipid fat emulsion (Fish Oil and Plant Based) 20% Infusion-[Known as SMOFLIPID 20% Infusion]  20 Gram(s) in IV Solution 100 milliLiter(s) infuse at 8.3 mL/Hr  Dose Rate: 8.3 mL/Hr Infuse Over: 12 Hours  Administration Instructions: Use 1.2 micron in-line filter  Entered: Jul 20 2024  5:00PM    Parenteral Nutrition - Adult-  Entered: Jul 20 2024  5:00PM    Diet NPO-  Entered: Jul 16 2024  7:13AM  
This patient has been assessed with a concern for Malnutrition and has been determined to have a diagnosis/diagnoses of Severe protein-calorie malnutrition and Underweight (BMI < 19).    This patient is being managed with:   lipid fat emulsion (Fish Oil and Plant Based) 20% Infusion-[Known as SMOFLIPID 20% Infusion]  45 Gram(s) in IV Solution 225 milliLiter(s) infuse at 18.7 mL/Hr  Dose Rate: 18.7 mL/Hr Infuse Over: 12 Hours  Administration Instructions: Use 1.2 micron in-line filter  Entered: Jul 26 2024  5:00PM    Parenteral Nutrition - Adult-  Entered: Jul 26 2024  5:00PM    Diet NPO-  Except Medications  Entered: Jul 24 2024  8:57PM  
Diet, Low Fiber:   Consistent Carbohydrate {Evening Snack} (CSTCHOSN)  Supplement Feeding Modality:  Oral  Ensure Max Cans or Servings Per Day:  2       Frequency:  Daily (07-30-24 @ 07:18) [Active]      Please see RD assessment and/or follow up.  Managed by primary team as well

## 2024-07-30 NOTE — PROGRESS NOTE ADULT - NSPROGADDITIONALINFOA_GEN_ALL_CORE
-Plan discussed with pt/team.  Contact info: [phone] (24/7). pager [phone]  Amion on [employer]  Teams on M-T-W-F. Unavailable Thu/Weekends/Holidays  Provided face to face education as well as assessed  pt/labs/meds and discussed discharge plan with primary team/pt and wife  Discharge plan  Follow up care

## 2024-07-30 NOTE — DISCHARGE NOTE NURSING/CASE MANAGEMENT/SOCIAL WORK - NSDCPEFALRISK_GEN_ALL_CORE
For information on Fall & Injury Prevention, visit: [email or web address] OR  [email or web address] OR  https://www.cdc.gov/steadi/patient.html

## 2024-07-30 NOTE — PROGRESS NOTE ADULT - PROVIDER SPECIALTY LIST ADULT
Cardiology
Nutrition Support
SICU
Surgery
Anesthesia
Cardiology
Gastroenterology
Nutrition Support
Surgery
Anesthesia
Cardiology
Nutrition Support
Surgery
Endocrinology
Gastroenterology
Nutrition Support
SICU
Surgery

## 2024-07-30 NOTE — PROGRESS NOTE ADULT - ATTENDING COMMENTS
Agree with Dr. Mckinney's assessment and plan as outlined above. Reviewed all pertinent labs, and imaging studies. Modifications made as indicated above. 83 M with history of T2D, asthma here for large ulcerating mass and abdominal pain. Started TPN on 7/19, now transitioning to PO diet.  Endocrinology consutled for T2D management. A1C 7.8. PO intake is overall poor as patient just transitioned from TPN. Start tradjenta 5 mg daily and monitor glucose.   Rest of the plan as above.

## 2024-07-30 NOTE — PROGRESS NOTE ADULT - SUBJECTIVE AND OBJECTIVE BOX
DAILY SURGERY PROGRESS NOTE    POD7 s/p ileocolic resection and foreign body removal.     Overnight Events:  No acute events overnight    SUBJECTIVE:  Reports pain well controlled  Denies nausea, vomiting  Tolerating diet  Ambulating independently    OBJECTIVE:  Vital Signs Last 24 Hrs  T(C): 36.4 (29 Jul 2024 21:05), Max: 36.8 (29 Jul 2024 08:47)  T(F): 97.5 (29 Jul 2024 21:05), Max: 98.3 (29 Jul 2024 08:47)  HR: 87 (29 Jul 2024 21:05) (79 - 90)  BP: 109/61 (29 Jul 2024 21:15) (99/41 - 138/54)  BP(mean): --  RR: 18 (29 Jul 2024 21:05) (18 - 18)  SpO2: 100% (29 Jul 2024 21:05) (97% - 100%)    Parameters below as of 29 Jul 2024 21:05  Patient On (Oxygen Delivery Method): room air        Physical Examination:  GEN: NAD, resting quietly  NEURO: AAOx3, CN II-XII grossly intact, no focal deficits  PULM: symmetric chest rise bilaterally, no increased WOB  ABD: soft, nontender, nondistended  EXTR: no lower extremity edema, moving all extremities

## 2024-07-31 ENCOUNTER — TRANSCRIPTION ENCOUNTER (OUTPATIENT)
Age: 84
End: 2024-07-31

## 2024-07-31 PROBLEM — J45.909 UNSPECIFIED ASTHMA, UNCOMPLICATED: Chronic | Status: ACTIVE | Noted: 2024-07-15

## 2024-08-01 ENCOUNTER — TRANSCRIPTION ENCOUNTER (OUTPATIENT)
Age: 84
End: 2024-08-01

## 2024-08-01 PROBLEM — E11.9 TYPE 2 DIABETES MELLITUS WITHOUT COMPLICATIONS: Chronic | Status: ACTIVE | Noted: 2024-07-15

## 2024-08-02 ENCOUNTER — APPOINTMENT (OUTPATIENT)
Dept: CARE COORDINATION | Facility: HOME HEALTH | Age: 84
End: 2024-08-02

## 2024-08-02 DIAGNOSIS — E11.9 TYPE 2 DIABETES MELLITUS W/OUT COMPLICATIONS: ICD-10-CM

## 2024-08-02 DIAGNOSIS — K21.9 GASTRO-ESOPHAGEAL REFLUX DISEASE W/OUT ESOPHAGITIS: ICD-10-CM

## 2024-08-02 DIAGNOSIS — K56.609 UNSPECIFIED INTESTINAL OBSTRUCTION, UNSPECIFIED AS TO PARTIAL VERSUS COMPLETE OBSTRUCTION: ICD-10-CM

## 2024-08-02 DIAGNOSIS — G89.18 OTHER ACUTE POSTPROCEDURAL PAIN: ICD-10-CM

## 2024-08-02 PROBLEM — Z09 POSTOPERATIVE EXAMINATION: Status: ACTIVE | Noted: 2024-08-02

## 2024-08-02 PROCEDURE — 99349 HOME/RES VST EST MOD MDM 40: CPT | Mod: 95

## 2024-08-05 PROBLEM — E11.9 DIABETES MELLITUS TYPE 2, NONINSULIN DEPENDENT: Status: ACTIVE | Noted: 2024-08-05

## 2024-08-05 PROBLEM — K21.9 CHRONIC GERD: Status: ACTIVE | Noted: 2024-08-05

## 2024-08-05 PROBLEM — G89.18 POST-OPERATIVE PAIN: Status: ACTIVE | Noted: 2024-08-05

## 2024-08-05 RX ORDER — OXYCODONE 5 MG/1
5 TABLET ORAL DAILY
Refills: 0 | Status: ACTIVE | COMMUNITY
Start: 2024-08-05

## 2024-08-05 RX ORDER — LINAGLIPTIN 5 MG/1
5 TABLET, FILM COATED ORAL DAILY
Refills: 0 | Status: ACTIVE | COMMUNITY
Start: 2024-08-05

## 2024-08-05 RX ORDER — ALBUTEROL SULFATE 90 UG/1
108 (90 BASE) INHALANT RESPIRATORY (INHALATION) EVERY 6 HOURS
Refills: 0 | Status: ACTIVE | COMMUNITY
Start: 2024-08-05

## 2024-08-05 RX ORDER — OXYCODONE 5 MG/1
5 TABLET ORAL EVERY 4 HOURS
Refills: 0 | Status: ACTIVE | COMMUNITY
Start: 2024-08-05

## 2024-08-05 RX ORDER — BUDESONIDE AND FORMOTEROL FUMARATE DIHYDRATE 160; 4.5 UG/1; UG/1
160-4.5 AEROSOL RESPIRATORY (INHALATION) TWICE DAILY
Refills: 0 | Status: ACTIVE | COMMUNITY
Start: 2024-08-05

## 2024-08-05 RX ORDER — PANTOPRAZOLE 40 MG/1
40 TABLET, DELAYED RELEASE ORAL DAILY
Refills: 0 | Status: ACTIVE | COMMUNITY
Start: 2024-08-05

## 2024-08-05 NOTE — HEALTH RISK ASSESSMENT
[No] : In the past 12 months have you used drugs other than those required for medical reasons? No [No falls in past year] : Patient reported no falls in the past year [Little interest or pleasure doing things] : 1) Little interest or pleasure doing things [Feeling down, depressed, or hopeless] : 2) Feeling down, depressed, or hopeless [0] : 2) Feeling down, depressed, or hopeless: Not at all (0) [PHQ-2 Negative - No further assessment needed] : PHQ-2 Negative - No further assessment needed [None] : None [With Significant Other] : lives with significant other [With Family] : lives with family [Fully functional (bathing, dressing, toileting, transferring, walking, feeding)] : Fully functional (bathing, dressing, toileting, transferring, walking, feeding) [Fully functional (using the telephone, shopping, preparing meals, housekeeping, doing laundry, using] : Fully functional and needs no help or supervision to perform IADLs (using the telephone, shopping, preparing meals, housekeeping, doing laundry, using transportation, managing medications and managing finances)

## 2024-08-05 NOTE — HISTORY OF PRESENT ILLNESS
[FreeTextEntry2] : 82 y/o male seen today for TCM post hospital discharge. He is being assisted by his daughter Nguyen and son in law. Seen on video, awake alert and oriented x 3, in no acute distress. Sitting upright on bed with no complaints.  [Post-hospitalization from ___ Hospital] : Post-hospitalization from [unfilled] Hospital [Admitted on: ___] : The patient was admitted on [unfilled] [Discharged on ___] : discharged on [unfilled] [Discharge Summary] : discharge summary [Discharge Med List] : discharge medication list [Other: ____] : [unfilled] [Med Reconciliation] : medication reconciliation has been completed [Patient Contacted By: ____] : and contacted by [unfilled] [Home] : at home, [unfilled] , at the time of the visit. [Other Location: e.g. Home (Enter Location, City,State)___] : at [unfilled] [Family Member] : family member [Other:____] : [unfilled]

## 2024-08-05 NOTE — HISTORY OF PRESENT ILLNESS
[FreeTextEntry2] : 84 y/o male seen today for TCM post hospital discharge. He is being assisted by his daughter Nguyen and son in law. Seen on video, awake alert and oriented x 3, in no acute distress. Sitting upright on bed with no complaints.  [Post-hospitalization from ___ Hospital] : Post-hospitalization from [unfilled] Hospital [Admitted on: ___] : The patient was admitted on [unfilled] [Discharged on ___] : discharged on [unfilled] [Discharge Summary] : discharge summary [Discharge Med List] : discharge medication list [Other: ____] : [unfilled] [Med Reconciliation] : medication reconciliation has been completed [Patient Contacted By: ____] : and contacted by [unfilled] [Home] : at home, [unfilled] , at the time of the visit. [Other Location: e.g. Home (Enter Location, City,State)___] : at [unfilled] [Family Member] : family member [Other:____] : [unfilled]

## 2024-08-05 NOTE — PHYSICAL EXAM
[No Acute Distress] : no acute distress [Well-Appearing] : well-appearing [Normal Voice/Communication] : normal voice/communication [No Accessory Muscle Use] : no accessory muscle use [Normal Affect] : the affect was normal [Alert and Oriented x3] : oriented to person, place, and time [Normal Mood] : the mood was normal

## 2024-08-07 ENCOUNTER — TRANSCRIPTION ENCOUNTER (OUTPATIENT)
Age: 84
End: 2024-08-07

## 2024-08-08 ENCOUNTER — APPOINTMENT (OUTPATIENT)
Dept: SURGERY | Facility: CLINIC | Age: 84
End: 2024-08-08

## 2024-08-08 PROCEDURE — 99024 POSTOP FOLLOW-UP VISIT: CPT

## 2024-08-08 NOTE — HISTORY OF PRESENT ILLNESS
[FreeTextEntry1] : Miko is an 84 y/o male being seen for post-op visit, s/p Diagnostic laparoscopy, SBR, open removal of foreign body from small intestine on 7/23/24   Pathology: 1. Foreign body, removal - Foreign body, gross examination only 2. Small intestine, resection - Segment of small intestine with patchy mucosal ischemic changes - Viable proximal and distal resection margins - Patent mesenteric vessels with congestion and without thrombi - One reactive lymph node  Today patient c/o mild surgical incisional pain does not take any analgesics.  BMs once daily, no constipation or diarrhea.   Good appetite.   Denies nausea, vomiting, fever or chills.  Surgical incision with steri strips, no redness, swelling or drainage.

## 2024-08-08 NOTE — HISTORY OF PRESENT ILLNESS
[FreeTextEntry1] : Miko is an 82 y/o male being seen for post-op visit, s/p Diagnostic laparoscopy, SBR, open removal of foreign body from small intestine on 7/23/24   Pathology: 1. Foreign body, removal - Foreign body, gross examination only 2. Small intestine, resection - Segment of small intestine with patchy mucosal ischemic changes - Viable proximal and distal resection margins - Patent mesenteric vessels with congestion and without thrombi - One reactive lymph node  Today patient c/o mild surgical incisional pain does not take any analgesics.  BMs once daily, no constipation or diarrhea.   Good appetite.   Denies nausea, vomiting, fever or chills.  Surgical incision with steri strips, no redness, swelling or drainage.

## 2024-08-13 ENCOUNTER — TRANSCRIPTION ENCOUNTER (OUTPATIENT)
Age: 84
End: 2024-08-13

## 2024-08-28 ENCOUNTER — APPOINTMENT (OUTPATIENT)
Dept: SURGERY | Facility: CLINIC | Age: 84
End: 2024-08-28

## 2024-08-28 VITALS
DIASTOLIC BLOOD PRESSURE: 40 MMHG | RESPIRATION RATE: 17 BRPM | HEART RATE: 65 BPM | SYSTOLIC BLOOD PRESSURE: 117 MMHG | TEMPERATURE: 97.2 F | OXYGEN SATURATION: 98 %

## 2024-08-28 PROCEDURE — 99024 POSTOP FOLLOW-UP VISIT: CPT

## 2024-08-28 NOTE — HISTORY OF PRESENT ILLNESS
[FreeTextEntry1] : Miko is a 84 y/o male here for a post-op visit, s/p  Diagnostic laparoscopy, SBR, open removal of foreign body from small intestine on 7/23/24 Pathology: 1. Foreign body, removal - Foreign body, gross examination only 2. Small intestine, resection - Segment of small intestine with patchy mucosal ischemic changes - Viable proximal and distal resection margins - Patent mesenteric vessels with congestion and without thrombi - One reactive lymph node  Last seen on 8/8/24 - Doing well postop. No problems tolerating po, no abd pain. The DJ jx ulceration where the FB had initially been lodged does not appear to be causing any symptoms. Path discussed. Instructions for diet (LRD 2 more weeks then regular diet), activity, wound care given, all questions answered. Will plan to re-image with CT for the DJ jx ulceration in a month, will order CT on next visit. RTO in 2-3 weeks.   Today pt reports no pain. Surgical incision is clean,dry, and intact. Denies nausea and vomiting. Denies fever and chills. Daily BM, denies constipation or diarrhea. Good appetite, normal diet.

## 2024-09-12 NOTE — ED ADULT NURSE NOTE - RESPIRATORY ASSESSMENT
September 20, 2024       Briana Ordoñezcelio  [address]  [facility] [address]    Dear Mr. Chaudhari,    Your procedure is scheduled with Keshawn John MD on November 21, 2024, at [facility]. The start time of your procedure is tentatively scheduled for 12:45 PM. You can expect to be contacted 1 to 3 days prior to the surgery to confirm arrival and surgery time. Occasionally these times may change.    Please arrive to register for your procedure at 10:45 AM. The address of the facility is:    [facility]   [address] [id number]  [phone]    The following appointment(s) have been scheduled for you:     2 week post op with SURI Estrella at the [facility] on December 9, 2024 at 1:20 PM     Here are instructions for your surgery:     Please call Dr. Hamm to set up a pre-procedure physical for surgical clearance within 4 weeks of your surgery date.        If your surgeon has not given you a special skin cleanser with instructions on how to use at home before your surgery, please call [phone] to arrange .     7 days prior to your appointment do not take aspirin or aspirin containing products. This includes products such as 81 mg baby aspirin, [place], Pepto Bismol,Aleve, Meloxicam, Naproxen, Motrin, Ibuprofen, Advil or Fish Oil.  If you need a pain reliever, Tylenol is OK - it does not contain aspirin.      NOTE: If you take coumadin or other blood thinners contact your primary care physician regarding how long to hold prior to surgery.     NOTE: If you take a diet pill or injection medication for weight loss. You must hold weekly medication/injection for 7 days. You must hold daily medication the day of your procedure only.  Any questions speak with your prescribing physician.         If you have any work related and/or disability forms that need to be completed, please fax these forms to  [phone] or send an e-mail to: [email or web address]. It takes approximately 7-10 business days to complete these forms. Questions call [phone].    Do not eat or drink 8 hours before your surgery.    You will not be allowed to drive yourself home after your surgery.  An adult must accompany you home and remain with you up to 24 hours after you have been discharged. Please make arrangements accordingly.  Taking a cab or Uber is not acceptable.     The day of procedure: do not use any deodorant, lotion, nail polish or cosmetics.  Remove any jewelry or piercings.  Leave valuables at home.    If you need to reschedule, please contact me at the telephone number and extension listed below.      If you have any medical questions or need to speak with a nurse please call the office [phone].    If you need to cancel your surgery after hours for the next day please call the facility that you are scheduled:  [facility] [phone]  [facility] [phone], opens at 6:00 AM.        Thank you,    Jewels ALBERTS [phone]  Surgery Scheduler for Keshawn John MD   [facility] Pre-Admit Department       - - -

## 2024-09-14 ENCOUNTER — APPOINTMENT (OUTPATIENT)
Dept: CT IMAGING | Facility: CLINIC | Age: 84
End: 2024-09-14

## 2024-09-14 ENCOUNTER — OUTPATIENT (OUTPATIENT)
Dept: OUTPATIENT SERVICES | Facility: HOSPITAL | Age: 84
LOS: 1 days | End: 2024-09-14
Payer: MEDICARE

## 2024-09-14 DIAGNOSIS — Z98.49 CATARACT EXTRACTION STATUS, UNSPECIFIED EYE: Chronic | ICD-10-CM

## 2024-09-14 DIAGNOSIS — G89.18 OTHER ACUTE POSTPROCEDURAL PAIN: ICD-10-CM

## 2024-09-14 DIAGNOSIS — Z00.8 ENCOUNTER FOR OTHER GENERAL EXAMINATION: ICD-10-CM

## 2024-09-14 PROCEDURE — 74177 CT ABD & PELVIS W/CONTRAST: CPT | Mod: 26

## 2024-09-14 PROCEDURE — 74177 CT ABD & PELVIS W/CONTRAST: CPT

## 2024-10-04 ENCOUNTER — NON-APPOINTMENT (OUTPATIENT)
Age: 84
End: 2024-10-04

## 2024-10-09 ENCOUNTER — APPOINTMENT (OUTPATIENT)
Dept: SURGERY | Facility: CLINIC | Age: 84
End: 2024-10-09
Payer: MEDICARE

## 2024-10-09 DIAGNOSIS — Z09 ENCOUNTER FOR FOLLOW-UP EXAMINATION AFTER COMPLETED TREATMENT FOR CONDITIONS OTHER THAN MALIGNANT NEOPLASM: ICD-10-CM

## 2024-10-09 DIAGNOSIS — K40.90 UNILATERAL INGUINAL HERNIA, W/OUT OBSTRUCTION OR GANGRENE, NOT SPECIFIED AS RECURRENT: ICD-10-CM

## 2024-10-09 PROCEDURE — 99024 POSTOP FOLLOW-UP VISIT: CPT

## 2024-10-15 PROBLEM — K40.90 RIGHT INGUINAL HERNIA: Status: ACTIVE | Noted: 2024-10-15

## 2025-02-18 NOTE — H&P ADULT - HISTORY OF PRESENT ILLNESS
No care due was identified.  [facility] Embedded Care Due Messages. Reference number: [id number].   2/18/2025 11:59:14 AM CST   83M, poor historian, [facility] of DM, asthma, recently admitted to [facility] in 5/2024 for abdominal pain, found to have large ulcerating mass communicating with proximal jejunum, presenting for few days of brown/bloody emesis and abdominal pain. Reports abdominal pain improved with episodes of emesis. Last BM/flatus was yesterday  Patient eports getting colonoscopy 20 years ago, however in [facility] note last colonoscopy was 8 years ago per son with non-concerning findings. No history of endoscopy.    At [facility], no intervention at the time and recommended for outpatient GI followup. Differential diagnosis at the time was contained perforation or inflamed giant diverticulum.  83M, poor historian, PMH of DM, asthma, no known PSHx recently admitted to [facility] in 5/2024 for abdominal pain, found to have 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 Reports abdominal pain improved with episodes of emesis. Last BM/flatus was yesterday.  Patient reports getting colonoscopy 20 years ago, however in [facility] note last colonoscopy was 8 years ago per son with non-concerning findings. No history of endoscopy. At [facility], no intervention at the time and recommended for outpatient GI followup. Differential diagnosis at the time was contained perforation or inflamed giant diverticulum.     In ED, patient afebrile, hemodynamically stable. Leukocytosis to 21. Elevated lactate 3.9, improved to 3.1 with 1L NS bolus.

## 2025-07-02 NOTE — DIETITIAN INITIAL EVALUATION ADULT - RD TO REMAIN AVAILABLE
[employer] Pending    Insurance response  Prescription Drug Insurance: [employer]  Notes: Prior authorization submitted - will update provider when decision has been made by insurance.     EPA     yes

## 2025-07-30 NOTE — ED ADULT NURSE NOTE - CAS TRG GENERAL AIRWAY, MLM
[FreeTextEntry1] : 63-year-old man right-handed with history of diabetes, complaining of painful left lateral numbness of the thigh, consistent with meralgia paresthetica.  Electrodiagnostic study of the left lower extremity showed mildly reduced sensory amplitudes but otherwise unremarkable no evidence of underlying lumbosacral radiculopathy, neuropathy. Reviewed and discussed possible treatments, can try gabapentin, duloxetine.  Patient hesitant to try any medical oral medications. Could try Lidoderm patches over the area patient at night of sleep. Seen by pain management, consider increasing naltrexone. Return as needed. Patent

## (undated) DEVICE — GLV 7 PROTEXIS (WHITE)

## (undated) DEVICE — PACK MAJOR ABDOMINAL SUPINE

## (undated) DEVICE — SUT POLYSORB 3-0 30" V-20 UNDYED

## (undated) DEVICE — DRAPE GENERAL ENDOSCOPY

## (undated) DEVICE — VENODYNE/SCD SLEEVE CALF MEDIUM

## (undated) DEVICE — PACK BASIN SPECIAL PROCEDURE

## (undated) DEVICE — SUT SOFSILK 2-0 18" V-20 (POP-OFF)

## (undated) DEVICE — STAPLER SKIN VISI-STAT 35 WIDE

## (undated) DEVICE — GOWN TRIMAX LG

## (undated) DEVICE — GLV 8.5 PROTEXIS (WHITE)

## (undated) DEVICE — SOL IRR POUR H2O 250ML

## (undated) DEVICE — SOL IRR POUR NS 0.9% 500ML

## (undated) DEVICE — ELCTR BOVIE PENCIL SMOKE EVACUATION

## (undated) DEVICE — Device

## (undated) DEVICE — GLV 8 PROTEXIS (WHITE)

## (undated) DEVICE — WARMING BLANKET UPPER ADULT

## (undated) DEVICE — GLV 7.5 PROTEXIS (WHITE)

## (undated) DEVICE — GLV 6.5 PROTEXIS (WHITE)

## (undated) DEVICE — PREP CHLORAPREP HI-LITE ORANGE 26ML

## (undated) DEVICE — STAPLER COVIDIEN ENDO GIA STANDARD HANDLE

## (undated) DEVICE — FOLEY TRAY 16FR 5CC LTX UMETER CLOSED

## (undated) DEVICE — DRAPE TOWEL BLUE 17" X 24"

## (undated) DEVICE — SUT SOFSILK 3-0 18" V-20 (POP-OFF)

## (undated) DEVICE — SPECIMEN CONTAINER 100ML

## (undated) DEVICE — LIGASURE IMPACT

## (undated) DEVICE — SUT PDS II 1 48" TP-1

## (undated) DEVICE — POSITIONER FOAM EGG CRATE ULNAR 2PCS (PINK)